# Patient Record
Sex: FEMALE | Race: WHITE | NOT HISPANIC OR LATINO | Employment: OTHER | URBAN - METROPOLITAN AREA
[De-identification: names, ages, dates, MRNs, and addresses within clinical notes are randomized per-mention and may not be internally consistent; named-entity substitution may affect disease eponyms.]

---

## 2017-01-01 ENCOUNTER — HOSPITAL ENCOUNTER (EMERGENCY)
Facility: HOSPITAL | Age: 67
Discharge: HOME | End: 2017-01-01
Attending: EMERGENCY MEDICINE
Payer: MEDICARE

## 2017-01-01 VITALS
RESPIRATION RATE: 16 BRPM | HEIGHT: 67 IN | SYSTOLIC BLOOD PRESSURE: 120 MMHG | OXYGEN SATURATION: 100 % | DIASTOLIC BLOOD PRESSURE: 57 MMHG | TEMPERATURE: 98 F | WEIGHT: 234 LBS | HEART RATE: 79 BPM | BODY MASS INDEX: 36.73 KG/M2

## 2017-01-01 DIAGNOSIS — S82.891A FRACTURE DISLOCATION OF ANKLE, RIGHT, CLOSED, INITIAL ENCOUNTER: Primary | ICD-10-CM

## 2017-01-01 DIAGNOSIS — S99.919A ANKLE INJURY: ICD-10-CM

## 2017-01-01 PROCEDURE — 94770 HC EXHALED C02 TEST: CPT

## 2017-01-01 PROCEDURE — 27000221 HC OXYGEN, UP TO 24 HOURS

## 2017-01-01 PROCEDURE — 63600175 PHARM REV CODE 636 W HCPCS: Performed by: EMERGENCY MEDICINE

## 2017-01-01 PROCEDURE — 27810 TREATMENT OF ANKLE FRACTURE: CPT

## 2017-01-01 PROCEDURE — 25000003 PHARM REV CODE 250

## 2017-01-01 PROCEDURE — 96374 THER/PROPH/DIAG INJ IV PUSH: CPT

## 2017-01-01 PROCEDURE — 27840 TREAT ANKLE DISLOCATION: CPT | Mod: RT

## 2017-01-01 PROCEDURE — 99285 EMERGENCY DEPT VISIT HI MDM: CPT | Mod: 25

## 2017-01-01 PROCEDURE — 27201258 HC MOISTURE TRAP-END TIDAL C02

## 2017-01-01 PROCEDURE — 94761 N-INVAS EAR/PLS OXIMETRY MLT: CPT

## 2017-01-01 RX ORDER — MORPHINE SULFATE 2 MG/ML
6 INJECTION, SOLUTION INTRAMUSCULAR; INTRAVENOUS
Status: COMPLETED | OUTPATIENT
Start: 2017-01-01 | End: 2017-01-01

## 2017-01-01 RX ORDER — LIDOCAINE HYDROCHLORIDE 10 MG/ML
INJECTION, SOLUTION EPIDURAL; INFILTRATION; INTRACAUDAL; PERINEURAL
Status: COMPLETED
Start: 2017-01-01 | End: 2017-01-01

## 2017-01-01 RX ORDER — PROPOFOL 10 MG/ML
100 VIAL (ML) INTRAVENOUS ONCE
Status: COMPLETED | OUTPATIENT
Start: 2017-01-01 | End: 2017-01-01

## 2017-01-01 RX ORDER — LIDOCAINE HYDROCHLORIDE 10 MG/ML
10 INJECTION, SOLUTION EPIDURAL; INFILTRATION; INTRACAUDAL; PERINEURAL
Status: COMPLETED | OUTPATIENT
Start: 2017-01-01 | End: 2017-01-01

## 2017-01-01 RX ORDER — OXYCODONE AND ACETAMINOPHEN 10; 325 MG/1; MG/1
1 TABLET ORAL EVERY 6 HOURS PRN
Qty: 20 TABLET | Refills: 0 | Status: SHIPPED | OUTPATIENT
Start: 2017-01-01 | End: 2017-07-06

## 2017-01-01 RX ADMIN — PROPOFOL 100 MG: 10 INJECTION, EMULSION INTRAVENOUS at 04:01

## 2017-01-01 RX ADMIN — LIDOCAINE HYDROCHLORIDE 100 MG: 10 INJECTION, SOLUTION EPIDURAL; INFILTRATION; INTRACAUDAL; PERINEURAL at 02:01

## 2017-01-01 RX ADMIN — MORPHINE SULFATE 6 MG: 2 INJECTION, SOLUTION INTRAMUSCULAR; INTRAVENOUS at 02:01

## 2017-01-01 NOTE — PROGRESS NOTES
01/01/17 1616   Vital Signs   SpO2 99 %   Pulse Oximetry Type Continuous   Pulse 83   Resp 16   O2 Device (Oxygen Therapy) nasal cannula   Flow (L/min) 2   Oxygen Concentration (%) 28   ETCO2 (mmHg) 30 mmHg

## 2017-01-01 NOTE — ED PROVIDER NOTES
Encounter Date: 1/1/2017       History     Chief Complaint   Patient presents with    Fall    Ankle Pain     Review of patient's allergies indicates:   Allergen Reactions    Hydrocodone Hives    Pcn [penicillins] Hives     HPI Comments: 66-year-old female with a past medical history of hypertension, neuropathy, and diabetes mellitus presents with a chief complaint of right lower leg pain.  The patient reports she was walking down her ramp this morning when she slipped and fell getting her leg twisted up under her.  The patient reports that she landed on her buttocks.  She denies any head injury, head pain, neck pain, or back pain.  The patient did not have a loss of consciousness and it was a mechanical fall.  The patient has not taken anything for pain relief.    The history is provided by the patient and the EMS personnel.     Past Medical History   Diagnosis Date    Arthritis     Diabetes mellitus     Heart attack     Hypertension     Neuropathy     Occasional tremors      Past Medical History Pertinent Negatives   Diagnosis Date Noted    Encounter for blood transfusion 2/2/2015     Past Surgical History   Procedure Laterality Date    Hysterectomy      Knee surgery Left     Ganglion cyst excision      Eye surgery       bilat cataract removal with iol implants    Skin biopsy       on side of nose    Carpal tunnel release       bilateral    Heart stents       History reviewed. No pertinent family history.  Social History   Substance Use Topics    Smoking status: Former Smoker     Quit date: 1/6/2014    Smokeless tobacco: None    Alcohol use Yes      Comment: drinks one wine cooler daily     Review of Systems   Constitutional: Negative for chills and fever.   HENT: Negative for congestion.    Respiratory: Negative for cough and shortness of breath.    Cardiovascular: Negative for chest pain.   Gastrointestinal: Negative for abdominal pain, nausea and vomiting.   Genitourinary: Negative for  "dysuria.   Musculoskeletal: Positive for arthralgias and joint swelling. Negative for back pain and gait problem.        Right lower leg pain and swelling.   Skin: Negative for color change.        Abrasions and ecchymosis noted to the right tib-fib region.   Neurological: Negative for dizziness and numbness.   Psychiatric/Behavioral: Negative for agitation and confusion.       Physical Exam   Initial Vitals   BP Pulse Resp Temp SpO2   17 1305 17 1305 17 1305 17 1305 17 1305   165/79 96 18 97.7 °F (36.5 °C) 98 %     Physical Exam    Nursing note and vitals reviewed.  Constitutional: She appears well-developed and well-nourished.   HENT:   Head: Atraumatic.   Eyes: EOM are normal. Pupils are equal, round, and reactive to light.   Neck: Normal range of motion.   Cardiovascular: Normal rate and regular rhythm.   Pulmonary/Chest: Breath sounds normal.   Abdominal: Soft. Bowel sounds are normal.   Musculoskeletal: Normal range of motion. She exhibits edema and tenderness.        Right shoulder: Normal.        Left shoulder: Normal.   Edema and tenderness noted to the right ankle region.   Neurological: She is alert and oriented to person, place, and time.   Skin: Skin is warm and dry.   Abrasion and ecchymosis noted to the right distal tib-fib region.   Psychiatric: She has a normal mood and affect.         ED Course   Orthopedic Injury  Authorized by: CHINO DURAN   Performed by: CHINO DURAN.  Consent Done: Yes  Consent: Written consent obtained.  Risks and benefits: risks, benefits and alternatives were discussed  Consent given by: patient  Patient identity confirmed:  and name  Time out: Immediately prior to procedure a "time out" was called to verify the correct patient, procedure, equipment, support staff and site/side marked as required.  Injury location: ankle  Location details: right ankle  Injury type: fracture-dislocation  Fracture type: bimalleolar  Comments: The " "patient was placed in a short leg posterior splint and stirrup splint postreduction.  Pre-procedure distal perfusion: normal  Pre-procedure neurological function: normal  Pre-procedure neurovascular assessment: neurovascularly intact  Pre-procedure range of motion: reduced  Local anesthesia used: yes    Anesthesia:  Local anesthesia used: yes  Anesthesia: hematoma block  Local Anesthetic: lidocaine 1% without epinephrine   Anesthetic total: 10 mL  Anesthesia: hematoma block  Sedation:  Patient sedated: yes  Sedation type: moderate (conscious) sedation   Sedatives: propofol    Manipulation performed: yes  Reduction successful: yes  X-ray confirmed reduction: yes  Immobilization: splint  Splint type: ankle stirrup  Post-procedure neurovascular assessment: post-procedure neurovascularly intact  Post-procedure distal perfusion: normal  Post-procedure neurological function: normal  Post-procedure range of motion: improved  Patient tolerance: Patient tolerated the procedure well with no immediate complications    Procedural Sedation  Date/Time: 1/1/2017 8:13 PM  Performed by: CHINO DURAN  Authorized by: CHINO DURAN   Consent Done: Yes  Consent: Written consent obtained.  Risks and benefits: risks, benefits and alternatives were discussed  Time out: Immediately prior to procedure a "time out" was called to verify the correct patient, procedure, equipment, support staff and site/side marked as required.  ASA Class: Class 2 - Mild Illness without functional impairment.   Mallampati Score: Class 3 - Visualization of the soft palate and base of the uvula.   Equipment: on cardiac monitor., on BP monitor., on CO2 monitor., on supplemental oxygen., suction available. and airway equipment available.   Sedatives: propofol  Vitals: Vital signs were monitored during sedation.  Complications: No complications.         Labs Reviewed - No data to display          Medical Decision Making:   Initial Assessment:   66-year-old " female presents with a chief complaint of right lower leg pain status post injury.  Differential Diagnosis:   Initial differential diagnosis included but not limited to fracture, dislocation, contusion, and ligamentous injury.  ED Management:  The patient was emergently evaluated in the ED, her evaluation was significant for an an elderly female with a right lower leg injury.  The patient's pain was aggressively treated with IV narcotic pain medication.  The patient's x-rays were significant for a severe right ankle fracture/dislocation as well as a proximal right fibular fracture per my independent interpretation.  The patient was consciously sedated and her fracture dislocation was reduced.  She tolerated the procedure well.  I discussed the case with the orthopedist on-call, Dr. Bowden.  He visualized the patient's films both before and after reduction, and reports the patient can be discharged home to follow-up with him in his clinic in 3 days.  The patient will be discharged home with by mouth pain medication and on crutches.  She is to follow-up with Dr. Bowden in 3 days.  Other:   I have discussed this case with another health care provider.                   ED Course     Clinical Impression:   The primary encounter diagnosis was Fracture dislocation of ankle, right, closed, initial encounter. A diagnosis of Ankle injury was also pertinent to this visit.          Nelson Kinsey MD  01/01/17 2016       Nelson Kinsey MD  01/01/17 4191

## 2017-01-01 NOTE — ED AVS SNAPSHOT
[facility] [address]  [place] [address]               Kateryna Connolly   2017  1:02 PM   ED    Description:  Female : 1950   Department:  [facility]           Your Care was Coordinated By:     Provider Role From To    Nelson Kinsey MD Attending Provider 17 [id number] --      Reason for Visit     Fall     Ankle Pain           Diagnoses this Visit        Comments    Fracture dislocation of ankle, right, closed, initial encounter    -  Primary     Ankle injury           ED Disposition     None           To Do List           Follow-up Information     Follow up with Bry Bowden MD. Schedule an appointment as soon as possible for a visit in 3 days.    Specialty:  Orthopedic Surgery    Why:  return to the ED, As needed, If symptoms worsen    Contact information:    [address] [place], [place] [address] [place] [address] [phone]         These Medications        Disp Refills Start End    oxycodone-acetaminophen (PERCOCET)  mg per tablet 20 tablet 0 2017     Take 1 tablet by mouth every 6 (six) hours as needed for Pain. - Oral    Pharmacy: [facility] [address] [place] [address], [place] - [address] Ph #: [phone]         [facility]     [facility] Nurse Care Line -  Assistance  Registered nurses in the [facility] provide clinical advisement, health education, appointment booking, and other advisory services.  Call for this free service at [phone].             Medications           Message regarding Medications     Verify the changes and/or additions to your medication regime listed below are the same as discussed with your clinician today.  If any of these changes or additions are incorrect, please notify your healthcare provider.        START taking these NEW medications        Refills    oxycodone-acetaminophen (PERCOCET)  mg per tablet 0    Sig: Take 1 tablet by mouth every 6 (six) hours as  needed for Pain.    Class: Print    Route: Oral      These medications were administered today        Dose Freq    morphine injection 6 mg 6 mg ED 1 Time    Sig: Inject 3 mLs (6 mg total) into the vein ED 1 Time.    Class: Normal    Route: Intravenous    lidocaine (PF) 10 mg/ml (1%) injection 100 mg 10 mL ED 1 Time    Sig: 10 mLs (100 mg total) by Infiltration route ED 1 Time.    Class: Normal    Route: Infiltration    lidocaine (PF) 10 mg/ml (1%) 10 mg/mL (1 %) injection      Notes to Pharmacy: Created by cabinet override    propofol (DIPRIVAN) 10 mg/mL  mg Once    Sig: Inject 10 mLs (100 mg total) into the vein once.    Class: Normal    Route: Intravenous           Verify that the below list of medications is an accurate representation of the medications you are currently taking.  If none reported, the list may be blank. If incorrect, please contact your healthcare provider. Carry this list with you in case of emergency.           Current Medications     ACCU-CHEK CHARLENE PLUS TEST STRP Strp     aspirin 81 MG Chew Take 1 tablet (81 mg total) by mouth once daily.    atorvastatin (LIPITOR) 80 MG tablet Take 1 tablet (80 mg total) by mouth every evening.    ciprofloxacin HCl (CIPRO) 500 MG tablet Take 500 mg by mouth 2 (two) times daily.    clopidogrel (PLAVIX) 75 mg tablet Take 1 tablet (75 mg total) by mouth once daily.    collagenase ointment Apply topically once daily.    insulin glargine (LANTUS) 100 unit/mL injection Inject 20 Units into the skin once daily.    lisinopril (PRINIVIL,ZESTRIL) 2.5 MG tablet Take 1 tablet (2.5 mg total) by mouth once daily.    melatonin 3 mg Tab Take by mouth.    metformin (GLUCOPHAGE) 1000 MG tablet Take 1,000 mg by mouth 2 (two) times daily with meals.    metoprolol succinate (TOPROL-XL) 25 MG 24 hr tablet Take 1 tablet (25 mg total) by mouth once daily.    oxycodone-acetaminophen (PERCOCET)  mg per tablet Take 1 tablet by mouth every 6 (six) hours as needed for Pain.  "          Clinical Reference Information           Your Vitals Were     BP Pulse Temp Resp Height Weight    119/58 80 97.7 °F (36.5 °C) (Oral) 16 5' 7" (1.702 m) 106.1 kg (234 lb)    SpO2 BMI             99% 36.65 kg/m2         Allergies as of 1/1/2017        Reactions    Hydrocodone Hives    Pcn [Penicillins] Hives      Immunizations Administered on Date of Encounter - 1/1/2017     None      ED Micro, Lab, POCT     None      ED Imaging Orders     Start Ordered       Status Ordering Provider    01/01/17 1625 01/01/17 1624  FL Less Than 1 Hour  1 time imaging     Comments:  Fluoro charge for reduction of ankle in emergency room    Final result     01/01/17 1624 01/01/17 1624  X-Ray Ankle 2 View Right  1 time imaging     Comments:  Intra op reduction of right ankle in emergency room    Final result     01/01/17 1312 01/01/17 1311  X-Ray Tibia Fibula 2 View Right  1 time imaging      Final result     01/01/17 1312 01/01/17 1311  X-Ray Ankle Complete Right  1 time imaging      Final result     01/01/17 1312 01/01/17 1311  X-Ray Foot Complete Right  1 time imaging      Final result       Discharge References/Attachments     SPLINT CARE, DISCHARGE INSTRUCTIONS (ENGLISH)    FRACTURE, LEG OR ARM (ENGLISH)    DISLOCATION, ANKLE (ADULT) (ENGLISH)    ANKLE FRACTURES, TREATING (ENGLISH)    FRACTURE, ANKLE (ENGLISH)      Your Scheduled Appointments     Jan 04, 2017  8:00 AM CST   Established Patient Visit with FLORES Doyle - Podiatry ([facility])    [address] [place] [address]   [phone]              Smoking Cessation     If you would like to quit smoking:   You may be eligible for free services if you are a [place] resident and started smoking cigarettes before September 1, 1988.  Call the Smoking Cessation Trust (SCT) toll free at (047) 755-9801 or (143) 609-8773.   Call 2-538-QUIT-NOW if you do not meet the above criteria.             [facility] complies with applicable " Federal civil rights laws and does not discriminate on the basis of race, color, national origin, age, disability, or sex.        Language Assistance Services     ATTENTION: Language assistance services are available, free of charge. Please call [phone].      ATENCIÓN: Si habla arlene, tiene a rosenberg disposición servicios gratuitos de asistencia lingüística. Llame al [phone].     CHÚ Ý: N?u b?n nói Ti?ng Vi?t, có các d?ch v? h? tr? ngôn ng? mi?n phí dành cho b?n. G?i s? [phone].

## 2017-01-01 NOTE — ED NOTES
Pt ankle placed, splint applied - posterior and stirrup. Xray confirmed. Also nonstick applied under splint to area where block was done.

## 2017-01-02 NOTE — ED NOTES
Consents signed by pt. ERP explained procedure to pt. RT is in room, also XRAY for reduction films. RNx3 present in room.

## 2017-01-02 NOTE — ED NOTES
Pt states crutches will be difficult at home. Pt states she will probably go to medical supply store tomorrow to purchase/ borrow WC.  Instructed on crutches use. Will also stay with daughter for help. ERP aware of situation, pt states she feels unable to use crutches to ambulate out of ED at this time but is able to stand. OK to DC home with daughter and crutches per orders by ERP. Pt brought to car by WC.

## 2017-02-15 ENCOUNTER — HOSPITAL ENCOUNTER (EMERGENCY)
Facility: HOSPITAL | Age: 67
End: 2017-02-15
Attending: EMERGENCY MEDICINE
Payer: MEDICARE

## 2017-02-15 VITALS
HEIGHT: 67 IN | TEMPERATURE: 98 F | SYSTOLIC BLOOD PRESSURE: 103 MMHG | HEART RATE: 86 BPM | BODY MASS INDEX: 35.31 KG/M2 | DIASTOLIC BLOOD PRESSURE: 55 MMHG | RESPIRATION RATE: 16 BRPM | OXYGEN SATURATION: 97 % | WEIGHT: 225 LBS

## 2017-02-15 DIAGNOSIS — M79.89 LEG SWELLING: ICD-10-CM

## 2017-02-15 DIAGNOSIS — I82.431 DEEP VEIN THROMBOSIS (DVT) OF POPLITEAL VEIN OF RIGHT LOWER EXTREMITY, UNSPECIFIED CHRONICITY: Primary | ICD-10-CM

## 2017-02-15 PROCEDURE — 25000003 PHARM REV CODE 250: Performed by: NURSE PRACTITIONER

## 2017-02-15 PROCEDURE — 99284 EMERGENCY DEPT VISIT MOD MDM: CPT

## 2017-02-15 RX ADMIN — APIXABAN 10 MG: 2.5 TABLET, FILM COATED ORAL at 07:02

## 2017-02-15 NOTE — ED NOTES
C/o lower right leg pain that is throbbing and preventing her from sleeping well, has a FX ankle on new years day and has had 2 surgeries last one was 2 weeks ago cast/splint in place CMS and pulses intact had an US done at rehab facility but they were unable to R/O DVT alert calm sitting in WC friend at bedside. Aware to notify nurse of needs or concerns.

## 2017-02-15 NOTE — ED AVS SNAPSHOT
[facility] [address] [place] [address]               Kateryna Lai   2/15/2017  4:37 PM   ED    Description:  Female : 1950   Department:  [facility]           Your Care was Coordinated By:     Provider Role From To    Timothy Bledsoe III, MD Attending Provider 02/15/17 1290 --    Jossie Bliss NP Nurse Practitioner 02/15/17 0828 --      Reason for Visit     Leg Pain           Diagnoses this Visit        Comments    Deep vein thrombosis (DVT) of popliteal vein of right lower extremity, unspecified chronicity    -  Primary     Leg swelling           ED Disposition     None           To Do List            These Medications        Disp Refills Start End    apixaban 5 mg Tab 28 tablet 0 2/15/2017 2017    Take 2 tablets (10 mg total) by mouth 2 (two) times daily. - Oral    apixaban 5 mg Tab 60 tablet 0 2/15/2017 3/17/2017    Take 1 tablet (5 mg total) by mouth 2 (two) times daily. - Oral      [facility]     [facility] Nurse Care [facility] -  Assistance  Registered nurses in the [facility] provide clinical advisement, health education, appointment booking, and other advisory services.  Call for this free service at [phone].             Medications           START taking these NEW medications        Refills    apixaban 5 mg Tab 0    Sig: Take 2 tablets (10 mg total) by mouth 2 (two) times daily.    Class: Print    Route: Oral    apixaban 5 mg Tab 0    Sig: Take 1 tablet (5 mg total) by mouth 2 (two) times daily.    Class: Print    Route: Oral      These medications were administered today        Dose Freq    apixaban tablet 10 mg 10 mg ED 1 Time    Sig: Take 2 tablets (10 mg total) by mouth ED 1 Time.    Class: Normal    Route: Oral           Verify that the below list of medications is an accurate representation of the medications you are currently taking.  If none reported, the list may be  "blank. If incorrect, please contact your healthcare provider. Carry this list with you in case of emergency.           Current Medications     apixaban 5 mg Tab Take 2 tablets (10 mg total) by mouth 2 (two) times daily.    apixaban 5 mg Tab Take 1 tablet (5 mg total) by mouth 2 (two) times daily.    apixaban tablet 10 mg Take 2 tablets (10 mg total) by mouth ED 1 Time.           Clinical Reference Information           Your Vitals Were     BP Pulse Temp Resp Height Weight    103/55 (BP Location: Right arm, Patient Position: Sitting) 86 98.3 °F (36.8 °C) (Oral) 16 5' 6.5" (1.689 m) 102.1 kg (225 lb)    SpO2 BMI             97% 35.77 kg/m2         Allergies as of 2/15/2017        Reactions    Pcn [Penicillins] Rash      Immunizations Administered on Date of Encounter - 2/15/2017     None      ED Micro, Lab, POCT     None      ED Imaging Orders     Start Ordered       Status Ordering Provider    02/15/17 1703 02/15/17 1702   Lower Extremity Veins Right  1 time imaging      Edited Result - FINAL       Discharge References/Attachments     DEEP VEIN THROMBOSIS (DVT) (ENGLISH)    DEEP VEIN THROMBOSIS, DISCHARGE INSTRUCTIONS FOR (ENGLISH)      [facility] Sign-Up     Activating your [facility] account is as easy as 1-2-3!     1) Visit [email or web address], select Sign Up Now, enter this activation code and your date of birth, then select Next.  [email or web address]  Expires: 4/1/2017  7:16 PM      2) Create a username and password to use when you visit [facility] in the future and select a security question in case you lose your password and select Next.    3) Enter your e-mail address and click Sign Up!    Additional Information  If you have questions, please e-mail [email or web address] or call [phone] to talk to our [facility] staff. Remember, [facility] is NOT to be used for urgent needs. For medical emergencies, dial 911.         Smoking Cessation     If you would like to quit smoking:   You may be eligible for free services " if you are a [place] resident and started smoking cigarettes before September 1, 1988.  Call the Smoking Cessation Trust (SCT) toll free at (090) 184-1275 or (445) 640-1657.   Call 1-800-QUIT-NOW if you do not meet the above criteria.             [facility] complies with applicable Federal civil rights laws and does not discriminate on the basis of race, color, national origin, age, disability, or sex.        Language Assistance Services     ATTENTION: Language assistance services are available, free of charge. Please call [phone].      ATENCIÓN: Si habla español, tiene a rosenberg disposición servicios gratuitos de asistencia lingüística. Llame al [phone].     CHÚ Ý: N?u b?n nói Ti?ng Vi?t, có các d?ch v? h? tr? ngôn ng? mi?n phí dành cho b?n. G?i s? [phone].

## 2017-02-16 NOTE — ED NOTES
Padding replaced and splint replaced CMS intact pt teaching done. Pt able to transfer self to . Given copy of US and DC instructions.

## 2017-02-16 NOTE — ED NOTES
US at bedside pt able to transfer self to bed with 1 person SBA, pedial pulses per doppler 81-85 NP aware. Dressing and splint removed leg elevated on pillows surgical sites CDI vasotracin dressing intact on medial and lateral sides able to move her toes well foot pink and warm.

## 2017-02-16 NOTE — ED PROVIDER NOTES
Encounter Date: 2/15/2017       History     Chief Complaint   Patient presents with    Leg Pain     RLE pain.  Sent here from [facility] for possible DVT.  Throbbing pain started 3 days ago, interrupting ability to sleep.      Review of patient's allergies indicates:   Allergen Reactions    Pcn [penicillins] Rash     HPI Comments: Kateryna Lai is a 66 year old female presenting to the ED with c/o throbbing, shooting pain in the right lower extremity. The patient broke her ankle on January 1, 2017 and has had to surgical procedures since then to repair the injury. Her last operation was two weeks ago. She began having throbbing pain in the calf shooting to the foot 3 days ago. An ultrasound was done at the patient's nursing home and they were unable to visualize the calf veins. She was sent here for a repeat ultrasound. She denies SOB, chest pain, and has no prior history of DVT.     The history is provided by the patient.     History reviewed. No pertinent past medical history.  No past medical history pertinent negatives.  No past surgical history on file.  History reviewed. No pertinent family history.  Social History   Substance Use Topics    Smoking status: None    Smokeless tobacco: None    Alcohol use None     Review of Systems   Constitutional: Negative.    HENT: Negative.    Respiratory: Negative.  Negative for shortness of breath.    Cardiovascular: Positive for leg swelling. Negative for chest pain and palpitations.   Gastrointestinal: Negative.    Genitourinary: Negative.    Musculoskeletal: Positive for myalgias (right leg pain).   Neurological: Negative.        Physical Exam   Initial Vitals   BP Pulse Resp Temp SpO2   02/15/17 1633 02/15/17 1633 02/15/17 1633 02/15/17 1633 02/15/17 1633   103/55 86 16 98.3 °F (36.8 °C) 97 %     Physical Exam    Nursing note and vitals reviewed.  Constitutional: She appears well-developed and well-nourished. She is not diaphoretic. No distress.   HENT:   Head:  Normocephalic and atraumatic.   Eyes: Conjunctivae are normal.   Neck: Normal range of motion.   Cardiovascular: Normal rate and regular rhythm.   Pulmonary/Chest: Breath sounds normal. No respiratory distress.   Musculoskeletal: Normal range of motion.        Right lower leg: She exhibits tenderness and swelling.   DP pulse confirmed with doppler   Neurological: She is alert and oriented to person, place, and time.   Skin: Skin is warm and dry.        Psychiatric: She has a normal mood and affect.         ED Course   Procedures  Labs Reviewed - No data to display                APC / Resident Notes:   Kateryna Lai is a 66 year old female presenting to the ED with right calf pain/swelling s/p surgical repair of ankle two weeks ago. Surgical wounds appear well healing with no evidence of infection. There was right calf tenderness and swelling. Ultrasound done and revealed occlusive deep vein thrombosis within the right popliteal and peroneal veins. I discussed these findings and the treatment plan with Dr. Bledsoe. I prescribed eliquis and gave her the first dose in the ED. She has a follow up appointment with her orthopedist in two days and I instructed her to notify him of these findings. Prescription for eliquis provided at time of discharge. Specific return precautions discussed and patient verbalized understanding. Based on my clinical evaluation, I do not appreciate any immediate, emergent, or life threatening condition or etiology that warrants additional workup today and feel that the patient can be discharged with close follow up care.                 ED Course     Clinical Impression:   The primary encounter diagnosis was Deep vein thrombosis (DVT) of popliteal vein of right lower extremity, unspecified chronicity. A diagnosis of Leg swelling was also pertinent to this visit.    Disposition:   Disposition: Discharged  Condition: Stable       Jossie Bliss NP  02/15/17 2003

## 2017-04-05 ENCOUNTER — HOSPITAL ENCOUNTER (OUTPATIENT)
Dept: RADIOLOGY | Facility: HOSPITAL | Age: 67
Discharge: HOME | End: 2017-04-05
Attending: INTERNAL MEDICINE
Payer: MEDICARE

## 2017-04-05 DIAGNOSIS — J40 BRONCHITIS: ICD-10-CM

## 2017-04-05 DIAGNOSIS — R05.9 COUGH: ICD-10-CM

## 2017-04-05 PROCEDURE — 71250 CT THORAX DX C-: CPT | Mod: TC

## 2017-04-05 PROCEDURE — 71250 CT THORAX DX C-: CPT | Mod: 26,,, | Performed by: RADIOLOGY

## 2017-04-24 ENCOUNTER — TELEPHONE (OUTPATIENT)
Dept: PODIATRY | Facility: CLINIC | Age: 67
End: 2017-04-24

## 2017-04-24 NOTE — TELEPHONE ENCOUNTER
----- Message from Ragini Domingo sent at 4/24/2017  2:56 PM CDT -----  Patient returning a missed call from Karen.     Call placed to pod. No answer    Please call back at [phone].     Thank you

## 2017-04-24 NOTE — TELEPHONE ENCOUNTER
Spoke with pt.  Post surgical wounds, referred to us for wound care.  Declined to be seen today.  Will call back to see if we can work in sooner than 05/03/2017 which is the appointment she made.

## 2017-04-24 NOTE — TELEPHONE ENCOUNTER
----- Message from Kirstie Banerjee sent at 4/24/2017  2:16 PM CDT -----  Patient needs to get in today to see doctor for wound care on an ankle she broke on 1/1/17. She scheduled for 5/3/17. Oh, she hung up.

## 2017-05-08 ENCOUNTER — TELEPHONE (OUTPATIENT)
Dept: PODIATRY | Facility: CLINIC | Age: 67
End: 2017-05-08

## 2017-05-08 NOTE — TELEPHONE ENCOUNTER
----- Message from Carey Kang sent at 5/8/2017  1:40 PM CDT -----  Contact: self  Patient needs same day appointment due to wounds on right foot both sides of ankle.patient only has transportation on Mondays. Please call patient at [phone]. Thanks!

## 2017-05-08 NOTE — TELEPHONE ENCOUNTER
Called pt reqarding appointment today.  We have scheduled her twice for same issue and she has either canceled or no showed.  Nothing available for today, recommended ED or urgent care.  States that home health and Dr Bowden are follow wounds.  States Dr Bowden referred her back to Dr Alexander.  Declined all offered appointments for this week.  Wants to come in on 05/22/2017 when daughter is available to bring her.  Advised against waiting that long to be seen.  Pt verbalized understanding.

## 2017-05-12 DIAGNOSIS — I70.245 ATHEROSCLEROSIS OF NATIVE ARTERIES OF LEFT LEG WITH ULCERATION OF OTHER PART OF FOOT: Primary | ICD-10-CM

## 2017-05-22 ENCOUNTER — OFFICE VISIT (OUTPATIENT)
Dept: PODIATRY | Facility: CLINIC | Age: 67
End: 2017-05-22
Payer: MEDICARE

## 2017-05-22 ENCOUNTER — HOSPITAL ENCOUNTER (OUTPATIENT)
Dept: RADIOLOGY | Facility: HOSPITAL | Age: 67
Discharge: HOME | End: 2017-05-22
Attending: SURGERY
Payer: MEDICARE

## 2017-05-22 VITALS — WEIGHT: 217 LBS | BODY MASS INDEX: 34.06 KG/M2 | HEIGHT: 67 IN

## 2017-05-22 DIAGNOSIS — L97.319 ANKLE ULCER, RIGHT, WITH UNSPECIFIED SEVERITY: Primary | ICD-10-CM

## 2017-05-22 DIAGNOSIS — I70.245 ATHEROSCLEROSIS OF NATIVE ARTERIES OF LEFT LEG WITH ULCERATION OF OTHER PART OF FOOT: ICD-10-CM

## 2017-05-22 PROCEDURE — 93922 UPR/L XTREMITY ART 2 LEVELS: CPT | Mod: TC

## 2017-05-22 PROCEDURE — 99213 OFFICE O/P EST LOW 20 MIN: CPT | Mod: PBBFAC,25,PO | Performed by: PODIATRIST

## 2017-05-22 PROCEDURE — 93926 LOWER EXTREMITY STUDY: CPT | Mod: TC

## 2017-05-22 PROCEDURE — 99999 PR PBB SHADOW E&M-EST. PATIENT-LVL III: CPT | Mod: PBBFAC,,, | Performed by: PODIATRIST

## 2017-05-22 PROCEDURE — 93922 UPR/L XTREMITY ART 2 LEVELS: CPT | Mod: 26,,, | Performed by: RADIOLOGY

## 2017-05-22 PROCEDURE — 99213 OFFICE O/P EST LOW 20 MIN: CPT | Mod: S$PBB,,, | Performed by: PODIATRIST

## 2017-05-22 PROCEDURE — 93926 LOWER EXTREMITY STUDY: CPT | Mod: 26,,, | Performed by: RADIOLOGY

## 2017-05-22 NOTE — Clinical Note
Dr. Bowden.  Thanks for sending Mrs. Connolly.  I'm concerned about her fibular hardware.  Please see notes for possible recommendations.  Consulted ID (Courtney) for imaging and potential antibiotic management.  Defer to you for any debridement or surgical work unless otherwise stated by you.  Thanks again.  Iglesia

## 2017-05-22 NOTE — PROGRESS NOTES
Subjective:      Patient ID: Kateryna Connolly is a 66 y.o. female.    Chief Complaint: Wound Dehiscence    Wounds from surgical wound dehissance medial and lateral right ankle about 1 month following 2nd ankle surgery.  Currently covering with mepore with home health changing same.  Gradual onset, without improvement over past several weeks, aggravated by increased weight bearing, shoe gear, pressure, and swelling right leg/ankle.  No self treatment.      Review of Systems   Constitution: Negative for chills, diaphoresis, fever, malaise/fatigue and night sweats.   Cardiovascular: Positive for leg swelling. Negative for claudication, cyanosis and syncope.   Skin: Negative for color change, dry skin, rash, suspicious lesions and unusual hair distribution.   Musculoskeletal: Negative for falls, joint pain, joint swelling, muscle cramps, muscle weakness and stiffness.   Gastrointestinal: Negative for constipation, diarrhea, nausea and vomiting.   Neurological: Positive for numbness and sensory change. Negative for brief paralysis, disturbances in coordination, focal weakness, paresthesias and tremors.           Objective:      Physical Exam   Constitutional: She appears well-developed and well-nourished. She is cooperative. No distress.   Cardiovascular:   Pulses:       Popliteal pulses are 2+ on the right side, and 2+ on the left side.        Dorsalis pedis pulses are 1+ on the right side, and 1+ on the left side.        Posterior tibial pulses are 1+ on the right side, and 1+ on the left side.   Capillary refill 3 seconds all toes/distal feet, all toes/both feet warm to touch.      Negative lymphadenopathy bilateral popliteal fossa and tarsal tunnel.      2+ pitting lower extremity edema bilateral.     Musculoskeletal:        Right ankle: Normal. She exhibits normal range of motion, no swelling, no ecchymosis, no deformity, no laceration and normal pulse. Achilles tendon normal. Achilles tendon exhibits no pain, no  defect and normal Cox's test results.   Currently nwb right in wheelchair. All ten toes without clubbing, cyanosis, or signs of ischemia.  No pain to palpation bilateral lower extremities.  Range of motion, stability, muscle strength, and muscle tone normal bilateral feet and legs.     Lymphadenopathy:   Negative lymphadenopathy bilateral popliteal fossa and tarsal tunnel.   Neurological: She is alert. She has normal strength. She displays no atrophy and no tremor. No sensory deficit. She exhibits normal muscle tone. She displays no seizure activity. Gait normal.   Reflex Scores:       Patellar reflexes are 2+ on the right side and 2+ on the left side.       Achilles reflexes are 2+ on the right side and 2+ on the left side.  Skin: Skin is warm, dry and intact. No abrasion, no bruising, no burn, no ecchymosis, no laceration, no lesion and no rash noted. She is not diaphoretic. No cyanosis or erythema. No pallor. Nails show no clubbing.   Wound: medial right ankle    Size:    Pre:[id number]    Base:  Tan fibrous base with moderate serous/serosanaguinous drainage only.  No pus, tracking, fluctuance, malodor, or cardinal signs infection.    Borders:   flat, pink, blanchable skin edges without undermining.    Wound: lateral right ankle    Size:    Pre: about [id number]    Base:  Fibrous tan with moderate serous/serosanaguinous drainage only.  No pus, tracking, fluctuance, malodor, or cardinal signs infection.  Base is firm and may be bone or hardware covered with fibrous wound slough.  Concern for osteomyelitis.    Borders:   flat, pink, blanchable skin edges without undermining.               Assessment:       Encounter Diagnosis   Name Primary?    Ankle ulcer, right, with unspecified severity Yes         Plan:       Kateryna was seen today for wound dehiscence.    Diagnoses and all orders for this visit:    Ankle ulcer, right, with unspecified severity      I counseled the patient on her conditions, their  implications and medical management.    Home health recommendations today:    Recommend santyl to both wound beds, cover with wound foam or mepilex border gauze.  Change same every other day.    Dr. Bowden:  Consider formal open wound debridement with VAC for lateral ankle incision, possible hardware removal pending base appearance.  Alternatively, santyl and VAC can be used provided you have no exposed hardware.    EDEMA control essential to healing these wounds.     ID consult - possible bone involvement.              Return in about 1 week (around 5/29/2017) for wounds right ankle.

## 2017-06-19 ENCOUNTER — OFFICE VISIT (OUTPATIENT)
Dept: PODIATRY | Facility: CLINIC | Age: 67
End: 2017-06-19
Payer: MEDICARE

## 2017-06-19 ENCOUNTER — HOSPITAL ENCOUNTER (OUTPATIENT)
Dept: RADIOLOGY | Facility: CLINIC | Age: 67
Discharge: HOME | End: 2017-06-19
Attending: PODIATRIST
Payer: MEDICARE

## 2017-06-19 VITALS — HEIGHT: 67 IN | BODY MASS INDEX: 34.06 KG/M2 | WEIGHT: 217 LBS

## 2017-06-19 DIAGNOSIS — R60.9 EDEMA, UNSPECIFIED TYPE: ICD-10-CM

## 2017-06-19 DIAGNOSIS — I73.9 PAD (PERIPHERAL ARTERY DISEASE): ICD-10-CM

## 2017-06-19 DIAGNOSIS — E11.40 TYPE 2 DIABETES MELLITUS WITH DIABETIC NEUROPATHY, UNSPECIFIED LONG TERM INSULIN USE STATUS: ICD-10-CM

## 2017-06-19 DIAGNOSIS — L97.319 ANKLE ULCER, RIGHT, WITH UNSPECIFIED SEVERITY: ICD-10-CM

## 2017-06-19 DIAGNOSIS — S91.001A WOUND OF ANKLE, RIGHT, INITIAL ENCOUNTER: Primary | ICD-10-CM

## 2017-06-19 PROCEDURE — 1159F MED LIST DOCD IN RCRD: CPT | Mod: ,,, | Performed by: PODIATRIST

## 2017-06-19 PROCEDURE — 73610 X-RAY EXAM OF ANKLE: CPT | Mod: TC,PO,RT

## 2017-06-19 PROCEDURE — 1125F AMNT PAIN NOTED PAIN PRSNT: CPT | Mod: ,,, | Performed by: PODIATRIST

## 2017-06-19 PROCEDURE — 99999 PR PBB SHADOW E&M-EST. PATIENT-LVL III: CPT | Mod: PBBFAC,,, | Performed by: PODIATRIST

## 2017-06-19 PROCEDURE — 73610 X-RAY EXAM OF ANKLE: CPT | Mod: 26,RT,S$GLB, | Performed by: RADIOLOGY

## 2017-06-19 PROCEDURE — 99213 OFFICE O/P EST LOW 20 MIN: CPT | Mod: S$PBB,,, | Performed by: PODIATRIST

## 2017-06-19 NOTE — PROGRESS NOTES
Subjective:      Patient ID: Kateryna Connolly is a 66 y.o. female.    Chief Complaint: Foot Ulcer (right)    Wounds from surgical wound dehissance medial and lateral right ankle about 1 month following 2nd ankle surgery.  Currently covering with mepore with home health changing same.  Gradual onset, without improvement over past several weeks, aggravated by increased weight bearing, shoe gear, pressure, and swelling right leg/ankle.  No self treatment.      Review of Systems   Constitution: Negative for chills, diaphoresis, fever, malaise/fatigue and night sweats.   Cardiovascular: Positive for leg swelling. Negative for claudication, cyanosis and syncope.   Skin: Negative for color change, dry skin, rash, suspicious lesions and unusual hair distribution.   Musculoskeletal: Negative for falls, joint pain, joint swelling, muscle cramps, muscle weakness and stiffness.   Gastrointestinal: Negative for constipation, diarrhea, nausea and vomiting.   Neurological: Positive for numbness and sensory change. Negative for brief paralysis, disturbances in coordination, focal weakness, paresthesias and tremors.           Objective:      Physical Exam   Constitutional: She appears well-developed and well-nourished. She is cooperative. No distress.   Cardiovascular:   Pulses:       Popliteal pulses are 2+ on the right side, and 2+ on the left side.        Dorsalis pedis pulses are 1+ on the right side, and 1+ on the left side.        Posterior tibial pulses are 1+ on the right side, and 1+ on the left side.   Capillary refill 3 seconds all toes/distal feet, all toes/both feet warm to touch.      Negative lymphadenopathy bilateral popliteal fossa and tarsal tunnel.      2+ pitting lower extremity edema bilateral.     Musculoskeletal:        Right ankle: Normal. She exhibits normal range of motion, no swelling, no ecchymosis, no deformity, no laceration and normal pulse. Achilles tendon normal. Achilles tendon exhibits no pain, no  defect and normal Cox's test results.   Currently nwb right in wheelchair. All ten toes without clubbing, cyanosis, or signs of ischemia.  No pain to palpation bilateral lower extremities.  Range of motion, stability, muscle strength, and muscle tone normal bilateral feet and legs.     Lymphadenopathy:   Negative lymphadenopathy bilateral popliteal fossa and tarsal tunnel.   Neurological: She is alert. She has normal strength. She displays no atrophy and no tremor. No sensory deficit. She exhibits normal muscle tone. She displays no seizure activity. Gait normal.   Reflex Scores:       Patellar reflexes are 2+ on the right side and 2+ on the left side.       Achilles reflexes are 2+ on the right side and 2+ on the left side.  Negative tinel sign to percussion sural, superficial peroneal, deep peroneal, saphenous, and posterior tibial nerves right and left ankles and feet.    Diminished/loss of protective sensation all toes bilateral to 10 gram monofilament.       Skin: Skin is warm, dry and intact. No abrasion, no bruising, no burn, no ecchymosis, no laceration, no lesion and no rash noted. She is not diaphoretic. No cyanosis or erythema. No pallor. Nails show no clubbing.   Wound: medial right ankle    Size:    Pre:[id number]    Base:  Tan fibrous base with moderate serous/serosanaguinous drainage only.  No pus, tracking, fluctuance, malodor, or cardinal signs infection.    Borders:   flat, pink, blanchable skin edges without undermining.    Wound: lateral right ankle    Size:    Pre: about [id number]    Base:  Fibrous tan with moderate serous/serosanaguinous drainage only.  No pus, tracking, fluctuance, malodor, or cardinal signs infection.  Base is firm and may be bone or hardware covered with fibrous wound slough.  Concern for osteomyelitis.    Borders:   flat, pink, blanchable skin edges without undermining.               Assessment:       Encounter Diagnosis   Name Primary?    Wound of ankle, right, initial  encounter Yes         Plan:       Kateryna was seen today for foot ulcer.    Diagnoses and all orders for this visit:    Wound of ankle, right, initial encounter  -     WOUND VAC FOR HOME USE      I counseled the patient on her conditions, their implications and medical management.    Home health recommendations today:    Recommend santyl to both wound beds, cover with wound foam or mepilex border gauze.  Change same every other day.    Discussed verbally with Dr. Bowden.  He believes Mrs Connolly healed enough to undergo hardware removal if absolutely needed.    Imaging will be difficult to interpret.  No clinical signs infection presently.    Rx santyl and VAC can be used - currently no exposed hardware.    EDEMA control essential to healing these wounds.     Rx xrays, arterial dopplers right.                  Return in about 1 week (around 6/26/2017) for right ankle wound.

## 2017-06-26 ENCOUNTER — HOSPITAL ENCOUNTER (OUTPATIENT)
Dept: RADIOLOGY | Facility: HOSPITAL | Age: 67
Discharge: HOME | End: 2017-06-26
Attending: PODIATRIST
Payer: MEDICARE

## 2017-06-26 ENCOUNTER — OFFICE VISIT (OUTPATIENT)
Dept: PODIATRY | Facility: CLINIC | Age: 67
End: 2017-06-26
Payer: MEDICARE

## 2017-06-26 VITALS — WEIGHT: 217 LBS | BODY MASS INDEX: 34.06 KG/M2 | HEIGHT: 67 IN

## 2017-06-26 DIAGNOSIS — G89.29 OTHER CHRONIC PAIN: ICD-10-CM

## 2017-06-26 DIAGNOSIS — I73.9 PAD (PERIPHERAL ARTERY DISEASE): ICD-10-CM

## 2017-06-26 DIAGNOSIS — L97.319 ANKLE ULCER, RIGHT, WITH UNSPECIFIED SEVERITY: Primary | ICD-10-CM

## 2017-06-26 DIAGNOSIS — S91.001A WOUND OF ANKLE, RIGHT, INITIAL ENCOUNTER: ICD-10-CM

## 2017-06-26 DIAGNOSIS — R60.9 EDEMA, UNSPECIFIED TYPE: ICD-10-CM

## 2017-06-26 DIAGNOSIS — E11.40 TYPE 2 DIABETES MELLITUS WITH DIABETIC NEUROPATHY, UNSPECIFIED LONG TERM INSULIN USE STATUS: ICD-10-CM

## 2017-06-26 PROCEDURE — 93922 UPR/L XTREMITY ART 2 LEVELS: CPT | Mod: 26,,, | Performed by: RADIOLOGY

## 2017-06-26 PROCEDURE — 93926 LOWER EXTREMITY STUDY: CPT | Mod: 26,,, | Performed by: RADIOLOGY

## 2017-06-26 PROCEDURE — 99499 UNLISTED E&M SERVICE: CPT | Mod: S$PBB,,, | Performed by: PODIATRIST

## 2017-06-26 PROCEDURE — 93926 LOWER EXTREMITY STUDY: CPT | Mod: TC

## 2017-06-26 PROCEDURE — 11042 DBRDMT SUBQ TIS 1ST 20SQCM/<: CPT | Mod: PBBFAC,PO | Performed by: PODIATRIST

## 2017-06-26 PROCEDURE — 99999 PR PBB SHADOW E&M-EST. PATIENT-LVL II: CPT | Mod: PBBFAC,,, | Performed by: PODIATRIST

## 2017-06-26 PROCEDURE — 93922 UPR/L XTREMITY ART 2 LEVELS: CPT | Mod: TC

## 2017-06-26 PROCEDURE — 11042 DBRDMT SUBQ TIS 1ST 20SQCM/<: CPT | Mod: S$PBB,,, | Performed by: PODIATRIST

## 2017-06-26 NOTE — PROGRESS NOTES
Patient refused appointment on 07/06/2017 states that she only has rides on Monday's. Appointment scheduled for 07/10/2017 at her request.

## 2017-06-26 NOTE — PROGRESS NOTES
Subjective:      Patient ID: Kateryna Connolly is a 66 y.o. female.    Chief Complaint: Foot Ulcer    Wounds from surgical wound dehissance medial and lateral right ankle following 2nd ankle surgery.  Currently covering with santyl/gauze with home health changing same.  Gradual onset, with modest improvement over past several weeks, aggravated by increased weight bearing, shoe gear, pressure, and swelling right leg/ankle.  No self treatment.    xrays show healing bone, syndesmotic instability, assymetrical joint space, but no current indication infection or failed hardware.      Review of Systems   Constitution: Negative for chills, diaphoresis, fever, malaise/fatigue and night sweats.   Cardiovascular: Positive for leg swelling. Negative for claudication, cyanosis and syncope.   Skin: Negative for color change, dry skin, rash, suspicious lesions and unusual hair distribution.   Musculoskeletal: Negative for falls, joint pain, joint swelling, muscle cramps, muscle weakness and stiffness.   Gastrointestinal: Negative for constipation, diarrhea, nausea and vomiting.   Neurological: Positive for numbness and sensory change. Negative for brief paralysis, disturbances in coordination, focal weakness, paresthesias and tremors.           Objective:      Physical Exam   Constitutional: She appears well-developed and well-nourished. She is cooperative. No distress.   Cardiovascular:   Pulses:       Popliteal pulses are 2+ on the right side, and 2+ on the left side.        Dorsalis pedis pulses are 1+ on the right side, and 1+ on the left side.        Posterior tibial pulses are 1+ on the right side, and 1+ on the left side.   Capillary refill 3 seconds all toes/distal feet, all toes/both feet warm to touch.      Negative lymphadenopathy bilateral popliteal fossa and tarsal tunnel.      2+ pitting lower extremity edema bilateral.     Musculoskeletal:        Right ankle: Normal. She exhibits normal range of motion, no swelling,  no ecchymosis, no deformity, no laceration and normal pulse. Achilles tendon normal. Achilles tendon exhibits no pain, no defect and normal Cox's test results.   Currently nwb right in wheelchair. All ten toes without clubbing, cyanosis, or signs of ischemia.  No pain to palpation bilateral lower extremities.  Range of motion, stability, muscle strength, and muscle tone normal bilateral feet and legs.     Lymphadenopathy:   Negative lymphadenopathy bilateral popliteal fossa and tarsal tunnel.   Neurological: She is alert. She has normal strength. She displays no atrophy and no tremor. No sensory deficit. She exhibits normal muscle tone. She displays no seizure activity. Gait normal.   Reflex Scores:       Patellar reflexes are 2+ on the right side and 2+ on the left side.       Achilles reflexes are 2+ on the right side and 2+ on the left side.  Negative tinel sign to percussion sural, superficial peroneal, deep peroneal, saphenous, and posterior tibial nerves right and left ankles and feet.    Diminished/loss of protective sensation all toes bilateral to 10 gram monofilament.       Skin: Skin is warm, dry and intact. No abrasion, no bruising, no burn, no ecchymosis, no laceration, no lesion and no rash noted. She is not diaphoretic. No cyanosis or erythema. No pallor. Nails show no clubbing.   Wound: medial right ankle    Size:    Pre:[id number]    Base:  Tan fibrous base with moderate serous/serosanaguinous drainage only.  No pus, tracking, fluctuance, malodor, or cardinal signs infection.    Borders:   flat, pink, blanchable skin edges without undermining.    Wound: lateral right ankle    Size:    Pre: about [id number]  Post:  10k80e1 mm  Base:  Fibrous tan with moderate serous/serosanaguinous drainage only and small islands of red granulation.  No pus, tracking, fluctuance, malodor, or cardinal signs infection.  Base is firm and may be bone or hardware covered with soft tissue.  Concern for osteomyelitis  minimal at present.    Borders:   flat, pink, blanchable skin edges without undermining.               Assessment:       Encounter Diagnoses   Name Primary?    Ankle ulcer, right, with unspecified severity Yes    Edema, unspecified type     Type 2 diabetes mellitus with diabetic neuropathy, unspecified long term insulin use status     PAD (peripheral artery disease)          Plan:       Kateryna was seen today for foot ulcer.    Diagnoses and all orders for this visit:    Ankle ulcer, right, with unspecified severity  -     WOUND VAC FOR HOME USE    Edema, unspecified type  -     WOUND VAC FOR HOME USE    Type 2 diabetes mellitus with diabetic neuropathy, unspecified long term insulin use status  -     WOUND VAC FOR HOME USE    PAD (peripheral artery disease)  -     WOUND VAC FOR HOME USE      I counseled the patient on her conditions, their implications and medical management.    Home health recommendations today:    Recommend santyl to both wound beds, cover with wound foam or mepilex border gauze.  Change same every other day.    Discussed verbally with Dr. Bowden.  He believes Mrs Connolly healed enough to undergo hardware removal if absolutely needed.    Continue to follow with Dr. Bowden for healing and activity on orif right ankle.    Discussed and patient verbalizes understanding that ankle reduction is not as we would like and will be unstable, but functioning as is likely less risky to loss of limb than removal hardware, revision orif, or fusion presently.  Ultimately these decisions will be made by patient and Dr. Bowden.    Imaging will be difficult to interpret.  No clinical signs infection presently.    Rx santyl and VAC can be used - currently no exposed hardware.    EDEMA control essential to healing these wounds risky to compress - skin break down and decreased arterial flow.     Rx  arterial dopplers right.                  Return in about 1 week (around 7/3/2017).

## 2017-06-26 NOTE — PROCEDURES
"Wound Debridement  Date/Time: 6/26/2017 8:04 AM  Performed by: DEMOND ROBISON  Authorized by: DEMOND ROBISON     Time out: Immediately prior to procedure a "time out" was called to verify the correct patient, procedure, equipment, support staff and site/side marked as required.    Consent Done?:  Yes (Verbal)  Local anesthesia used?: No      Wound Details:    Location:  Right leg (lateral right ankle)    Type of Debridement:  Excisional       Length (cm):  8.2       Area (sq cm):  10.66       Width (cm):  1.3       Percent Debrided (%):  50       Depth (cm):  0.8       Total Area Debrided (sq cm):  5.33    Depth of debridement:  Subcutaneous tissue    Tissue debrided:  Subcutaneous    Devitalized tissue debrided:  Sough and Fibrin    Instruments:  Blade and Nippers    2nd Wound Details:     Location:  Right leg (medial ankle)    Bleeding:  Minimal  Hemostasis Achieved: Yes    Method Used:  Pressure  Patient tolerance:  Patient tolerated the procedure well with no immediate complications      "

## 2017-06-29 ENCOUNTER — TELEPHONE (OUTPATIENT)
Dept: PODIATRY | Facility: CLINIC | Age: 67
End: 2017-06-29

## 2017-06-29 NOTE — TELEPHONE ENCOUNTER
Spoke with Fide with [facility], Dr Alexander circled 4 months on written order initialed and signed. Will refax order.

## 2017-06-29 NOTE — TELEPHONE ENCOUNTER
----- Message from Zoraida Domingo sent at 6/29/2017  1:09 PM CDT -----  Contact: Demetra with [employer]  Please call Fide at [phone], ext [phone]. Prescription for wound vac is not valid.

## 2017-06-30 ENCOUNTER — TELEPHONE (OUTPATIENT)
Dept: PODIATRY | Facility: CLINIC | Age: 67
End: 2017-06-30

## 2017-06-30 NOTE — TELEPHONE ENCOUNTER
----- Message from Jessica Poe sent at 6/30/2017  2:06 PM CDT -----  Contact:  call  [phone]    Calling to speak to  [facility] // please call  For  details

## 2017-06-30 NOTE — TELEPHONE ENCOUNTER
Patient stated that Dr Alexander has taken over her care from Dr Bowden - That she is in extreme pain and that the Gabapentin 300 mb tid nor the Hydrocodone 5/325 given by Dr Bowden is helping the pain - and that she cannot sleep due to pain.  Asking for something stronger.  Please advise. (Did inform patient that Dr Alexander is not in clinic until Thursday, but that a message would be sent -Epic and phone.)

## 2017-06-30 NOTE — TELEPHONE ENCOUNTER
Iglesia Alexander, FLORES Collins, SHANIA   Caller: Unspecified (Today,  2:20 PM)             Consult pain management.  ED if worsening or not bearable.  Can try deil office if she has not.  Not my surgery, and I do not manage chronic pain.

## 2017-06-30 NOTE — TELEPHONE ENCOUNTER
Per Dr Alexander:  Patient informed that she can consult with pain management, go to the ED if worsening or not bearable.  Or she can contact Dr Bowden as he did the surgery - that Dr Alexander does not manage chronic pain.  Patient verbalized understanding.

## 2017-07-05 ENCOUNTER — TELEPHONE (OUTPATIENT)
Dept: PODIATRY | Facility: CLINIC | Age: 67
End: 2017-07-05

## 2017-07-05 NOTE — TELEPHONE ENCOUNTER
Spoke with Shirley with [facility] she does not feel comfortable placing wound vac on Ms Connolly until after her wound is debride, she will schedule next visit for Tuesday 07/11/2017 to place wound vac on after her visit with Dr Alexander.

## 2017-07-06 ENCOUNTER — TELEPHONE (OUTPATIENT)
Dept: PODIATRY | Facility: CLINIC | Age: 67
End: 2017-07-06

## 2017-07-06 ENCOUNTER — HOSPITAL ENCOUNTER (INPATIENT)
Facility: HOSPITAL | Age: 67
LOS: 4 days | Discharge: NURSING, REHAB OR LONG-TERM CARE | DRG: 863 | End: 2017-07-11
Attending: EMERGENCY MEDICINE | Admitting: INTERNAL MEDICINE
Payer: MEDICARE

## 2017-07-06 DIAGNOSIS — S91.001S ANKLE WOUND, RIGHT, SEQUELA: Primary | ICD-10-CM

## 2017-07-06 DIAGNOSIS — J44.9 CHRONIC OBSTRUCTIVE PULMONARY DISEASE, UNSPECIFIED COPD TYPE: ICD-10-CM

## 2017-07-06 DIAGNOSIS — I73.9 PAD (PERIPHERAL ARTERY DISEASE): ICD-10-CM

## 2017-07-06 DIAGNOSIS — L97.309: ICD-10-CM

## 2017-07-06 DIAGNOSIS — E78.00 HYPERCHOLESTEROLEMIA: ICD-10-CM

## 2017-07-06 DIAGNOSIS — E11.42 DIABETIC POLYNEUROPATHY ASSOCIATED WITH TYPE 2 DIABETES MELLITUS: ICD-10-CM

## 2017-07-06 DIAGNOSIS — L97.509 TYPE 2 DIABETES MELLITUS WITH FOOT ULCER, WITH LONG-TERM CURRENT USE OF INSULIN: ICD-10-CM

## 2017-07-06 DIAGNOSIS — L03.115 CELLULITIS OF RIGHT LOWER EXTREMITY: ICD-10-CM

## 2017-07-06 DIAGNOSIS — T14.8XXA INFECTED WOUND: ICD-10-CM

## 2017-07-06 DIAGNOSIS — F17.200 TOBACCO DEPENDENCE: ICD-10-CM

## 2017-07-06 DIAGNOSIS — L08.9 INFECTED WOUND: ICD-10-CM

## 2017-07-06 DIAGNOSIS — S90.551A: ICD-10-CM

## 2017-07-06 DIAGNOSIS — I10 BENIGN ESSENTIAL HTN: ICD-10-CM

## 2017-07-06 DIAGNOSIS — I25.10 CORONARY ARTERY DISEASE, ANGINA PRESENCE UNSPECIFIED, UNSPECIFIED VESSEL OR LESION TYPE, UNSPECIFIED WHETHER NATIVE OR TRANSPLANTED HEART: ICD-10-CM

## 2017-07-06 DIAGNOSIS — Z79.4 TYPE 2 DIABETES MELLITUS WITH FOOT ULCER, WITH LONG-TERM CURRENT USE OF INSULIN: ICD-10-CM

## 2017-07-06 DIAGNOSIS — L03.115 CELLULITIS OF RIGHT ANKLE: ICD-10-CM

## 2017-07-06 DIAGNOSIS — E11.621 TYPE 2 DIABETES MELLITUS WITH FOOT ULCER, WITH LONG-TERM CURRENT USE OF INSULIN: ICD-10-CM

## 2017-07-06 PROBLEM — I82.401 DEEP VEIN THROMBOSIS (DVT) OF RIGHT LOWER EXTREMITY: Status: ACTIVE | Noted: 2017-07-06

## 2017-07-06 PROBLEM — Z79.01 CHRONIC ANTICOAGULATION: Status: ACTIVE | Noted: 2017-07-06

## 2017-07-06 PROBLEM — S91.009A ANKLE WOUND: Status: ACTIVE | Noted: 2017-07-06

## 2017-07-06 PROBLEM — J41.0 SMOKERS' COUGH: Status: ACTIVE | Noted: 2017-07-06

## 2017-07-06 PROBLEM — E11.628 TYPE 2 DIABETES MELLITUS WITH SKIN COMPLICATION, WITH LONG-TERM CURRENT USE OF INSULIN: Status: ACTIVE | Noted: 2017-07-06

## 2017-07-06 LAB
ALBUMIN SERPL BCP-MCNC: 3.6 G/DL
ALP SERPL-CCNC: 62 U/L
ALT SERPL W/O P-5'-P-CCNC: 11 U/L
ANION GAP SERPL CALC-SCNC: 13 MMOL/L
AST SERPL-CCNC: 17 U/L
BASOPHILS # BLD AUTO: 0.1 K/UL
BASOPHILS NFR BLD: 1 %
BILIRUB SERPL-MCNC: 0.6 MG/DL
BUN SERPL-MCNC: 11 MG/DL
CALCIUM SERPL-MCNC: 9.8 MG/DL
CHLORIDE SERPL-SCNC: 101 MMOL/L
CO2 SERPL-SCNC: 26 MMOL/L
CREAT SERPL-MCNC: 0.8 MG/DL
CRP SERPL-MCNC: 27 MG/L
DIFFERENTIAL METHOD: ABNORMAL
EOSINOPHIL # BLD AUTO: 0.2 K/UL
EOSINOPHIL NFR BLD: 2.2 %
ERYTHROCYTE [DISTWIDTH] IN BLOOD BY AUTOMATED COUNT: 15.2 %
ERYTHROCYTE [SEDIMENTATION RATE] IN BLOOD BY WESTERGREN METHOD: 38 MM/HR
EST. GFR  (AFRICAN AMERICAN): >60 ML/MIN/1.73 M^2
EST. GFR  (NON AFRICAN AMERICAN): >60 ML/MIN/1.73 M^2
GLUCOSE SERPL-MCNC: 109 MG/DL
HCT VFR BLD AUTO: 40 %
HGB BLD-MCNC: 13.2 G/DL
LYMPHOCYTES # BLD AUTO: 1.2 K/UL
LYMPHOCYTES NFR BLD: 11.3 %
MCH RBC QN AUTO: 30 PG
MCHC RBC AUTO-ENTMCNC: 33 %
MCV RBC AUTO: 91 FL
MONOCYTES # BLD AUTO: 0.6 K/UL
MONOCYTES NFR BLD: 5.8 %
NEUTROPHILS # BLD AUTO: 8.6 K/UL
NEUTROPHILS NFR BLD: 79.7 %
PLATELET # BLD AUTO: 209 K/UL
PMV BLD AUTO: 8.9 FL
POCT GLUCOSE: 160 MG/DL (ref 70–110)
POTASSIUM SERPL-SCNC: 3.9 MMOL/L
PROT SERPL-MCNC: 7.1 G/DL
RBC # BLD AUTO: 4.4 M/UL
SODIUM SERPL-SCNC: 140 MMOL/L
WBC # BLD AUTO: 10.8 K/UL

## 2017-07-06 PROCEDURE — G0378 HOSPITAL OBSERVATION PER HR: HCPCS

## 2017-07-06 PROCEDURE — 25000003 PHARM REV CODE 250: Performed by: NURSE PRACTITIONER

## 2017-07-06 PROCEDURE — 87040 BLOOD CULTURE FOR BACTERIA: CPT | Mod: 59

## 2017-07-06 PROCEDURE — 96374 THER/PROPH/DIAG INJ IV PUSH: CPT

## 2017-07-06 PROCEDURE — 80053 COMPREHEN METABOLIC PANEL: CPT

## 2017-07-06 PROCEDURE — 96365 THER/PROPH/DIAG IV INF INIT: CPT

## 2017-07-06 PROCEDURE — 25000003 PHARM REV CODE 250: Performed by: PHYSICIAN ASSISTANT

## 2017-07-06 PROCEDURE — 11000001 HC ACUTE MED/SURG PRIVATE ROOM

## 2017-07-06 PROCEDURE — 86140 C-REACTIVE PROTEIN: CPT

## 2017-07-06 PROCEDURE — 63600175 PHARM REV CODE 636 W HCPCS: Performed by: NURSE PRACTITIONER

## 2017-07-06 PROCEDURE — 63600175 PHARM REV CODE 636 W HCPCS: Performed by: PHYSICIAN ASSISTANT

## 2017-07-06 PROCEDURE — 36415 COLL VENOUS BLD VENIPUNCTURE: CPT

## 2017-07-06 PROCEDURE — 99222 1ST HOSP IP/OBS MODERATE 55: CPT | Mod: ,,, | Performed by: PODIATRIST

## 2017-07-06 PROCEDURE — 99284 EMERGENCY DEPT VISIT MOD MDM: CPT | Mod: 25

## 2017-07-06 PROCEDURE — 25000003 PHARM REV CODE 250: Performed by: INTERNAL MEDICINE

## 2017-07-06 PROCEDURE — 85025 COMPLETE CBC W/AUTO DIFF WBC: CPT

## 2017-07-06 PROCEDURE — 85651 RBC SED RATE NONAUTOMATED: CPT

## 2017-07-06 RX ORDER — ACETAMINOPHEN 325 MG/1
650 TABLET ORAL EVERY 6 HOURS PRN
Status: DISCONTINUED | OUTPATIENT
Start: 2017-07-06 | End: 2017-07-11 | Stop reason: HOSPADM

## 2017-07-06 RX ORDER — CLINDAMYCIN PHOSPHATE 900 MG/50ML
900 INJECTION, SOLUTION INTRAVENOUS
Status: DISCONTINUED | OUTPATIENT
Start: 2017-07-06 | End: 2017-07-07

## 2017-07-06 RX ORDER — NAPROXEN SODIUM 220 MG/1
81 TABLET, FILM COATED ORAL DAILY
Status: DISCONTINUED | OUTPATIENT
Start: 2017-07-06 | End: 2017-07-11 | Stop reason: HOSPADM

## 2017-07-06 RX ORDER — GABAPENTIN 100 MG/1
900 CAPSULE ORAL DAILY
COMMUNITY
End: 2018-02-05 | Stop reason: SDUPTHER

## 2017-07-06 RX ORDER — METFORMIN HYDROCHLORIDE 500 MG/1
1000 TABLET ORAL 2 TIMES DAILY WITH MEALS
Status: DISCONTINUED | OUTPATIENT
Start: 2017-07-06 | End: 2017-07-11 | Stop reason: HOSPADM

## 2017-07-06 RX ORDER — ZINC SULFATE 50(220)MG
220 CAPSULE ORAL DAILY
Status: DISCONTINUED | OUTPATIENT
Start: 2017-07-07 | End: 2017-07-11 | Stop reason: HOSPADM

## 2017-07-06 RX ORDER — ONDANSETRON 2 MG/ML
4 INJECTION INTRAMUSCULAR; INTRAVENOUS EVERY 8 HOURS PRN
Status: DISCONTINUED | OUTPATIENT
Start: 2017-07-06 | End: 2017-07-11 | Stop reason: HOSPADM

## 2017-07-06 RX ORDER — METOPROLOL SUCCINATE 25 MG/1
25 TABLET, EXTENDED RELEASE ORAL DAILY
Status: DISCONTINUED | OUTPATIENT
Start: 2017-07-07 | End: 2017-07-11 | Stop reason: HOSPADM

## 2017-07-06 RX ORDER — LISINOPRIL 2.5 MG/1
2.5 TABLET ORAL DAILY
Status: DISCONTINUED | OUTPATIENT
Start: 2017-07-07 | End: 2017-07-11 | Stop reason: HOSPADM

## 2017-07-06 RX ORDER — OXYCODONE AND ACETAMINOPHEN 5; 325 MG/1; MG/1
1 TABLET ORAL EVERY 4 HOURS PRN
Status: DISCONTINUED | OUTPATIENT
Start: 2017-07-06 | End: 2017-07-09

## 2017-07-06 RX ORDER — IBUPROFEN 200 MG
16 TABLET ORAL
Status: DISCONTINUED | OUTPATIENT
Start: 2017-07-06 | End: 2017-07-11 | Stop reason: HOSPADM

## 2017-07-06 RX ORDER — CLOPIDOGREL BISULFATE 75 MG/1
75 TABLET ORAL DAILY
Status: DISCONTINUED | OUTPATIENT
Start: 2017-07-07 | End: 2017-07-11 | Stop reason: HOSPADM

## 2017-07-06 RX ORDER — OXYCODONE AND ACETAMINOPHEN 10; 325 MG/1; MG/1
1 TABLET ORAL EVERY 6 HOURS PRN
Status: DISCONTINUED | OUTPATIENT
Start: 2017-07-06 | End: 2017-07-09

## 2017-07-06 RX ORDER — IBUPROFEN 200 MG
24 TABLET ORAL
Status: DISCONTINUED | OUTPATIENT
Start: 2017-07-06 | End: 2017-07-11 | Stop reason: HOSPADM

## 2017-07-06 RX ORDER — PROMETHAZINE HYDROCHLORIDE 25 MG/1
25 TABLET ORAL EVERY 6 HOURS PRN
COMMUNITY
End: 2018-02-05 | Stop reason: ALTCHOICE

## 2017-07-06 RX ORDER — GLUCAGON 1 MG
1 KIT INJECTION
Status: DISCONTINUED | OUTPATIENT
Start: 2017-07-06 | End: 2017-07-11 | Stop reason: HOSPADM

## 2017-07-06 RX ORDER — ATORVASTATIN CALCIUM 40 MG/1
80 TABLET, FILM COATED ORAL NIGHTLY
Status: DISCONTINUED | OUTPATIENT
Start: 2017-07-06 | End: 2017-07-11 | Stop reason: HOSPADM

## 2017-07-06 RX ORDER — RAMELTEON 8 MG/1
8 TABLET ORAL NIGHTLY PRN
Status: DISCONTINUED | OUTPATIENT
Start: 2017-07-06 | End: 2017-07-11 | Stop reason: HOSPADM

## 2017-07-06 RX ORDER — INSULIN ASPART 100 [IU]/ML
1-10 INJECTION, SOLUTION INTRAVENOUS; SUBCUTANEOUS
Status: DISCONTINUED | OUTPATIENT
Start: 2017-07-06 | End: 2017-07-06

## 2017-07-06 RX ORDER — INSULIN ASPART 100 [IU]/ML
0-5 INJECTION, SOLUTION INTRAVENOUS; SUBCUTANEOUS
Status: DISCONTINUED | OUTPATIENT
Start: 2017-07-06 | End: 2017-07-11 | Stop reason: HOSPADM

## 2017-07-06 RX ORDER — CIPROFLOXACIN 500 MG/1
500 TABLET ORAL EVERY 12 HOURS
Status: DISCONTINUED | OUTPATIENT
Start: 2017-07-06 | End: 2017-07-11 | Stop reason: HOSPADM

## 2017-07-06 RX ORDER — GABAPENTIN 300 MG/1
900 CAPSULE ORAL DAILY
Status: DISCONTINUED | OUTPATIENT
Start: 2017-07-07 | End: 2017-07-11 | Stop reason: HOSPADM

## 2017-07-06 RX ORDER — CLINDAMYCIN PHOSPHATE 900 MG/50ML
900 INJECTION, SOLUTION INTRAVENOUS
Status: DISCONTINUED | OUTPATIENT
Start: 2017-07-06 | End: 2017-07-06

## 2017-07-06 RX ORDER — ALPRAZOLAM 0.25 MG/1
0.25 TABLET ORAL 4 TIMES DAILY PRN
Status: DISCONTINUED | OUTPATIENT
Start: 2017-07-06 | End: 2017-07-11 | Stop reason: HOSPADM

## 2017-07-06 RX ORDER — CLINDAMYCIN PHOSPHATE 900 MG/50ML
900 INJECTION, SOLUTION INTRAVENOUS
Status: COMPLETED | OUTPATIENT
Start: 2017-07-06 | End: 2017-07-06

## 2017-07-06 RX ORDER — PROMETHAZINE HYDROCHLORIDE 25 MG/1
25 TABLET ORAL EVERY 6 HOURS PRN
Status: DISCONTINUED | OUTPATIENT
Start: 2017-07-06 | End: 2017-07-11 | Stop reason: HOSPADM

## 2017-07-06 RX ADMIN — INSULIN DETEMIR 15 UNITS: 100 INJECTION, SOLUTION SUBCUTANEOUS at 09:07

## 2017-07-06 RX ADMIN — ALPRAZOLAM 0.25 MG: 0.25 TABLET ORAL at 09:07

## 2017-07-06 RX ADMIN — CLINDAMYCIN IN 5 PERCENT DEXTROSE 900 MG: 18 INJECTION, SOLUTION INTRAVENOUS at 09:07

## 2017-07-06 RX ADMIN — OXYCODONE HYDROCHLORIDE AND ACETAMINOPHEN 1 TABLET: 10; 325 TABLET ORAL at 09:07

## 2017-07-06 RX ADMIN — ATORVASTATIN CALCIUM 80 MG: 40 TABLET, FILM COATED ORAL at 09:07

## 2017-07-06 RX ADMIN — APIXABAN 5 MG: 2.5 TABLET, FILM COATED ORAL at 09:07

## 2017-07-06 RX ADMIN — CIPROFLOXACIN HYDROCHLORIDE 500 MG: 500 TABLET, FILM COATED ORAL at 09:07

## 2017-07-06 RX ADMIN — METFORMIN HYDROCHLORIDE 1000 MG: 500 TABLET, FILM COATED ORAL at 04:07

## 2017-07-06 RX ADMIN — PROMETHAZINE HYDROCHLORIDE 25 MG: 25 TABLET ORAL at 05:07

## 2017-07-06 RX ADMIN — VANCOMYCIN HYDROCHLORIDE 1000 MG: 1 INJECTION, POWDER, LYOPHILIZED, FOR SOLUTION INTRAVENOUS at 03:07

## 2017-07-06 RX ADMIN — OXYCODONE HYDROCHLORIDE AND ACETAMINOPHEN 1 TABLET: 10; 325 TABLET ORAL at 03:07

## 2017-07-06 RX ADMIN — CLINDAMYCIN IN 5 PERCENT DEXTROSE 900 MG: 18 INJECTION, SOLUTION INTRAVENOUS at 02:07

## 2017-07-06 NOTE — TELEPHONE ENCOUNTER
----- Message from Trista Fang sent at 7/6/2017 11:04 AM CDT -----  Contact: patient  Patient calling in regards to her foot. She wouldn't give anymore information. She wants to speak with the Nurse. Please advise.  Call back .  Thanks!

## 2017-07-06 NOTE — ED NOTES
Patient arrived to the Ed for evaluation of right lower leg wounds to inside and outside of ankle.

## 2017-07-06 NOTE — TELEPHONE ENCOUNTER
----- Message from Carey Kang sent at 7/6/2017 10:11 AM CDT -----  Contact: Los with Dr Kal Madison with Dr Kal Sheth regarding a referral. Please call Los at [phone]. Thanks! Connected to pod.

## 2017-07-06 NOTE — TELEPHONE ENCOUNTER
Spoke with  Mohsen she stated that she is in the Emergency Department for infection. Ask that Dr Alexander come see her. Explained that once she is admitted he should be consulted and then he can go see her.

## 2017-07-06 NOTE — ED PROVIDER NOTES
Encounter Date: 7/6/2017       History     Chief Complaint   Patient presents with    Wound Check     foot surgery Janurary 2017     Patient is a 66 year old female with complaint of wound to right leg for seven months. She reports PMH significant for DM-2, hypertension and MI. She states she broke her ankle in 1/2017 and underwent surgery by Dr. Bowden. She states she has had slow wound healing since the surgery. Two weeks ago she saw Dr. Bowden and was sent to Dr. Alexander for further wound care. She reports she hasn't been on antibiotics in about a month. She was going to Dr. Alexander's office weekly and had home health three times a week. She states she was suppose to have a wound vac placed but the home health nurse was concerned about the drainage and redness around the wound. She reports the wound has been draining for about two days with the associated, worsening redness. She denied fever or chills.       The history is provided by the patient.     Review of patient's allergies indicates:   Allergen Reactions    Hydrocodone Hives    Pcn [penicillins] Hives    Pcn [penicillins] Rash     Past Medical History:   Diagnosis Date    Arthritis     Diabetes mellitus     Heart attack     Hypertension     Neuropathy     Occasional tremors      Past Surgical History:   Procedure Laterality Date    CARPAL TUNNEL RELEASE      bilateral    EYE SURGERY      bilat cataract removal with iol implants    GANGLION CYST EXCISION      heart stents      HYSTERECTOMY      KNEE SURGERY Left     SKIN BIOPSY      on side of nose     History reviewed. No pertinent family history.  Social History   Substance Use Topics    Smoking status: Current Every Day Smoker    Smokeless tobacco: Never Used    Alcohol use Yes      Comment: drinks one wine cooler daily     Review of Systems   Constitutional: Negative for chills and fever.   HENT: Negative for congestion and sore throat.    Respiratory: Negative for shortness of  breath.    Cardiovascular: Negative for chest pain.   Gastrointestinal: Negative for abdominal pain, diarrhea, nausea and vomiting.   Genitourinary: Negative for dysuria.   Musculoskeletal: Negative for back pain.   Skin: Positive for color change and wound. Negative for rash.   Neurological: Negative for weakness.   Hematological: Does not bruise/bleed easily.       Physical Exam     Initial Vitals [07/06/17 1118]   BP Pulse Resp Temp SpO2   137/79 100 16 98.1 °F (36.7 °C) (!) 94 %      MAP       98.33         Physical Exam    Nursing note and vitals reviewed.  Constitutional: She appears well-developed and well-nourished. No distress.   HENT:   Head: Normocephalic and atraumatic.   Right Ear: External ear normal.   Left Ear: External ear normal.   Eyes: Conjunctivae are normal. Pupils are equal, round, and reactive to light. Right eye exhibits no discharge. Left eye exhibits no discharge.   Neck: Normal range of motion. Neck supple.   Cardiovascular: Normal rate, regular rhythm and normal heart sounds. Exam reveals no gallop and no friction rub.    No murmur heard.  Pulmonary/Chest: Breath sounds normal. She has no wheezes. She has no rhonchi. She has no rales.   Abdominal: Soft. Bowel sounds are normal. There is no tenderness. There is no guarding.   Musculoskeletal: Normal range of motion.   Neurological: She is alert.   Skin: Skin is warm and dry. There is erythema.   See picture for further details                     ED Course   Procedures  Labs Reviewed   CBC W/ AUTO DIFFERENTIAL - Abnormal; Notable for the following:        Result Value    RDW 15.2 (*)     MPV 8.9 (*)     Gran # 8.6 (*)     Gran% 79.7 (*)     Lymph% 11.3 (*)     All other components within normal limits   C-REACTIVE PROTEIN - Abnormal; Notable for the following:     CRP 27.0 (*)     All other components within normal limits   SEDIMENTATION RATE, MANUAL - Abnormal; Notable for the following:     Sed Rate 38 (*)     All other components  within normal limits   CULTURE, BLOOD   CULTURE, BLOOD   COMPREHENSIVE METABOLIC PANEL             Medical Decision Making:   History:   Old Medical Records: I decided to obtain old medical records.  Clinical Tests:   Lab Tests: Ordered  Radiological Study: Ordered  Other:   I have discussed this case with another health care provider.       APC / Resident Notes:   This is an emergent evaluation of a 66-year-old female with complaint of nonhealing wound to the right ankle.  She had surgery approximately 7 months ago and has been following with podiatry for wound care.  She reports significant worsening, drainage and erythema to the wound.  She was instructed to come to the ER for further evaluation.  She denied fever.  She is well-appearing.  Vital signs are stable.  She has a large nonhealing wound to the lateral and medial aspect of the right ankle.  There is associated erythema.  There is mild green drainage.  There is mild odor.  There is tenderness to palpation.  Her vital signs do not show any concern for sepsis.  There is no leukocytosis.  I did send blood cultures secondary to the chronic wound.  X-ray shows no bone involvement at this time, however she may require further imaging.  I discussed the case with Dr. Alexander who requested infectious disease consult.  I discussed the case with Dr. Aguilera who is in agreement with admission.  He requested initiation of think and Zosyn however the patient is ALLERGIC to penicillin.  I did give her dose of clindamycin in the ER.  She is in agreement for admission. Discussed results with patient. Return precautions given. Patient is to follow up with their primary care provider. Case was discussed with Dr. Rico who is in agreement with the plan of care. All questions answered.                 ED Course     Clinical Impression:   The primary encounter diagnosis was Ankle wound, right, sequela. Diagnoses of Superficial foreign body of right ankle without major open  wound and without infection and Cellulitis of right lower extremity were also pertinent to this visit.                           Mariya Mora PA-C  07/06/17 0592

## 2017-07-06 NOTE — H&P
[facility] Hospital Medicine  History & Physical  [facility] Medicine  H&P    Patient Name: Kateryna Connolly  MRN: [id number]  Patient Class: OP- Observation   Admission Date: 7/6/2017  Length of Stay: 0 days  Attending Physician: Teto Aguilera MD  Primary Care Provider: Dmitriy Treadwell MD        Subjective:     Principal Problem:Infected wound    HPI:  This is a 66 year old female with complaint of wound to RLE since January 2017. She states she broke her ankle in 1/2017 and underwent surgery by Dr. Bowden. She states she has had slow wound healing since the surgery. Pt has been being followed by Dr. Alexander as out patient. Pt has  PMH significant for DM-2, hypertension, nicotine abuse, CAD with prior coronary stent placement x 2 in 2015 and MI. She was going to Dr. Alexander's office weekly and had home health three times a week. She states she was suppose to have a wound vac placed but the home health nurse was concerned about the drainage and redness around the wound. She reports the wound has been draining for about two days with the associated, worsening redness. She denied fever or chills. Pt placed in hospital for further evaluation and treatment.    Hospital Course:  No notes on file    Past Medical History:   Diagnosis Date    Arthritis     Diabetes mellitus     Heart attack     Hypertension     Neuropathy     Occasional tremors        Past Surgical History:   Procedure Laterality Date    CARPAL TUNNEL RELEASE      bilateral    EYE SURGERY      bilat cataract removal with iol implants    GANGLION CYST EXCISION      heart stents      HYSTERECTOMY      KNEE SURGERY Left     SKIN BIOPSY      on side of nose       Review of patient's allergies indicates:   Allergen Reactions    Hydrocodone Hives    Pcn [penicillins] Hives    Pcn [penicillins] Rash       No current facility-administered medications on file prior to encounter.      Current  Outpatient Prescriptions on File Prior to Encounter   Medication Sig    apixaban 5 mg Tab Take 2 tablets (10 mg total) by mouth 2 (two) times daily. (Patient taking differently: Take 5 mg by mouth 2 (two) times daily. )    atorvastatin (LIPITOR) 80 MG tablet Take 1 tablet (80 mg total) by mouth every evening.    clopidogrel (PLAVIX) 75 mg tablet Take 1 tablet (75 mg total) by mouth once daily.    collagenase ointment Apply topically once daily.    insulin glargine (LANTUS) 100 unit/mL injection Inject 25 Units into the skin every morning.     lisinopril (PRINIVIL,ZESTRIL) 2.5 MG tablet Take 1 tablet (2.5 mg total) by mouth once daily.    melatonin 3 mg Tab Take 3 mg by mouth every evening.     metformin (GLUCOPHAGE) 1000 MG tablet Take 1,000 mg by mouth 2 (two) times daily with meals.    metoprolol succinate (TOPROL-XL) 25 MG 24 hr tablet Take 1 tablet (25 mg total) by mouth once daily.    [DISCONTINUED] ACCU-CHEK CHARLENE PLUS TEST STRP Strp     [DISCONTINUED] aspirin 81 MG Chew Take 1 tablet (81 mg total) by mouth once daily.    [DISCONTINUED] oxycodone-acetaminophen (PERCOCET)  mg per tablet Take 1 tablet by mouth every 6 (six) hours as needed for Pain.     Family History     None        Social History Main Topics    Smoking status: Current Every Day Smoker    Smokeless tobacco: Never Used    Alcohol use Yes      Comment: drinks one wine cooler daily    Drug use: No    Sexual activity: Not on file     Review of Systems   Constitutional: Negative for activity change, appetite change, chills, diaphoresis, fatigue and fever.   HENT: Negative for ear pain and facial swelling.    Eyes: Negative for pain and redness.   Respiratory: Positive for cough. Negative for apnea, choking, chest tightness, wheezing and stridor.         Smoker's cough   Cardiovascular: Positive for leg swelling. Negative for chest pain and palpitations.   Gastrointestinal: Positive for constipation, nausea and vomiting.  Negative for abdominal distention and abdominal pain.   Endocrine: Negative for polydipsia and polyphagia.   Genitourinary: Negative for difficulty urinating, dysuria, flank pain and hematuria.   Musculoskeletal: Positive for arthralgias and gait problem. Negative for neck pain and neck stiffness.   Skin: Positive for wound.        Right ankle - See admit photo   Allergic/Immunologic: Negative for food allergies.   Neurological: Negative for syncope and weakness.   Hematological: Bruises/bleeds easily.   Psychiatric/Behavioral: Negative for agitation, behavioral problems, confusion, hallucinations and suicidal ideas.     Objective:     Vital Signs (Most Recent):  Temp: 98.1 °F (36.7 °C) (07/06/17 1451)  Pulse: 95 (07/06/17 1451)  Resp: 16 (07/06/17 1451)  BP: 117/64 (07/06/17 1451)  SpO2: 100 % (07/06/17 1451) Vital Signs (24h Range):  Temp:  [98.1 °F (36.7 °C)] 98.1 °F (36.7 °C)  Pulse:  [] 95  Resp:  [16] 16  SpO2:  [94 %-100 %] 100 %  BP: (117-137)/(64-79) 117/64     Weight: 94.8 kg (209 lb)  Body mass index is 32.73 kg/m².    Physical Exam   Constitutional: She is oriented to person, place, and time. She appears well-developed and well-nourished. No distress.   HENT:   Head: Normocephalic and atraumatic.   Eyes: Conjunctivae and EOM are normal. Pupils are equal, round, and reactive to light. Right eye exhibits no discharge. Left eye exhibits no discharge.   Neck: Normal range of motion. Neck supple. No JVD present.   Cardiovascular: Normal rate, regular rhythm and intact distal pulses.    Pulmonary/Chest: No stridor. No respiratory distress.   BBS diminished   Abdominal: Soft. Bowel sounds are normal. She exhibits no distension. There is no tenderness.   Obese   Genitourinary:   Genitourinary Comments: Not examined   Musculoskeletal: She exhibits edema.   BLE edema R>L   Neurological: She is alert and oriented to person, place, and time.   Skin: Skin is warm and dry. Capillary refill takes 2 to 3 seconds.  She is not diaphoretic.   RLE dressing intact  See admit photo  RLE wound x 2  with slough and surrounding redness   Psychiatric: She has a normal mood and affect. Her behavior is normal. Judgment and thought content normal.        Significant Labs: Reviewed    Significant Imaging: Reviewed    Assessment/Plan:      COPD (chronic obstructive pulmonary disease)    Nicotine Abuse/ Smoker's cough-  Smoking cessation education  Pt refused nicotine patch          Deep vein thrombosis (DVT) of right lower extremity    With chronic anticoagulation with apixaban-  Continue home apixaban- Dose verified with pharmacy          Type 2 diabetes mellitus with skin complication, with long-term current use of insulin    DM diet  Accuchecks with correctional SSI  Continue home metformin  Continue long acting insulin at lower dose            Diabetic polyneuropathy associated with type 2 diabetes mellitus    Continue home neurontin          CAD (coronary artery disease)    Hx Coronary stent placement x 2/ PAD Hx LLE stent placement/ HTN /Hyperlipidemia-  Continue home asa, plavix, ACEI, statin          * Infected wound    Cellulitis Right ankle-  Consult ID and podiatry  Continue Iv clindamycin till evaluated by ID  Add MVI, Zinc, and Sony  Wound care as per Podiatry  Prn pain medication              VTE Risk Mitigation         Ordered     apixaban tablet 5 mg  2 times daily     Route:  Oral        07/06/17 1630     Low Risk of VTE  Once      07/06/17 1442          SHAMA Bowens  Department of Hospital Medicine   [facility]    Time spent seeing patient( greater than 1/2 spent in direct contact) : 78 minutes

## 2017-07-06 NOTE — TELEPHONE ENCOUNTER
Spoke with Los with Dr Bowden, she stated that Ms Connolly has an infection. I explained to her that the home health called and stated that she did not feel comfortable placing the wound vac until after Dr Alexander debridekaterina's the wound. I advised Los to direct to the [facility] for evaluation of wound for infection.

## 2017-07-06 NOTE — SUBJECTIVE & OBJECTIVE
Past Medical History:   Diagnosis Date    Arthritis     Diabetes mellitus     Heart attack     Hypertension     Neuropathy     Occasional tremors        Past Surgical History:   Procedure Laterality Date    CARPAL TUNNEL RELEASE      bilateral    EYE SURGERY      bilat cataract removal with iol implants    GANGLION CYST EXCISION      heart stents      HYSTERECTOMY      KNEE SURGERY Left     SKIN BIOPSY      on side of nose       Review of patient's allergies indicates:   Allergen Reactions    Hydrocodone Hives    Pcn [penicillins] Hives    Pcn [penicillins] Rash       No current facility-administered medications on file prior to encounter.      Current Outpatient Prescriptions on File Prior to Encounter   Medication Sig    apixaban 5 mg Tab Take 2 tablets (10 mg total) by mouth 2 (two) times daily. (Patient taking differently: Take 5 mg by mouth 2 (two) times daily. )    atorvastatin (LIPITOR) 80 MG tablet Take 1 tablet (80 mg total) by mouth every evening.    clopidogrel (PLAVIX) 75 mg tablet Take 1 tablet (75 mg total) by mouth once daily.    collagenase ointment Apply topically once daily.    insulin glargine (LANTUS) 100 unit/mL injection Inject 25 Units into the skin every morning.     lisinopril (PRINIVIL,ZESTRIL) 2.5 MG tablet Take 1 tablet (2.5 mg total) by mouth once daily.    melatonin 3 mg Tab Take 3 mg by mouth every evening.     metformin (GLUCOPHAGE) 1000 MG tablet Take 1,000 mg by mouth 2 (two) times daily with meals.    metoprolol succinate (TOPROL-XL) 25 MG 24 hr tablet Take 1 tablet (25 mg total) by mouth once daily.    [DISCONTINUED] ACCU-CHEK CHARLENE PLUS TEST STRP Strp     [DISCONTINUED] aspirin 81 MG Chew Take 1 tablet (81 mg total) by mouth once daily.    [DISCONTINUED] oxycodone-acetaminophen (PERCOCET)  mg per tablet Take 1 tablet by mouth every 6 (six) hours as needed for Pain.     Family History     None        Social History Main Topics    Smoking status:  Current Every Day Smoker    Smokeless tobacco: Never Used    Alcohol use Yes      Comment: drinks one wine cooler daily    Drug use: No    Sexual activity: Not on file     Review of Systems   Constitutional: Negative for activity change, appetite change, chills, diaphoresis, fatigue and fever.   HENT: Negative for ear pain and facial swelling.    Eyes: Negative for pain and redness.   Respiratory: Positive for cough. Negative for apnea, choking, chest tightness, wheezing and stridor.         Smoker's cough   Cardiovascular: Positive for leg swelling. Negative for chest pain and palpitations.   Gastrointestinal: Negative for abdominal distention and abdominal pain.   Endocrine: Negative for polydipsia and polyphagia.   Genitourinary: Negative for difficulty urinating, dysuria, flank pain and hematuria.   Musculoskeletal: Positive for arthralgias and gait problem. Negative for neck pain and neck stiffness.   Skin: Positive for wound.        Right ankle - See admit photo   Allergic/Immunologic: Negative for food allergies.   Neurological: Negative for syncope and weakness.   Hematological: Bruises/bleeds easily.   Psychiatric/Behavioral: Negative for agitation, behavioral problems, confusion, hallucinations and suicidal ideas.     Objective:     Vital Signs (Most Recent):  Temp: 98.1 °F (36.7 °C) (07/06/17 1451)  Pulse: 95 (07/06/17 1451)  Resp: 16 (07/06/17 1451)  BP: 117/64 (07/06/17 1451)  SpO2: 100 % (07/06/17 1451) Vital Signs (24h Range):  Temp:  [98.1 °F (36.7 °C)] 98.1 °F (36.7 °C)  Pulse:  [] 95  Resp:  [16] 16  SpO2:  [94 %-100 %] 100 %  BP: (117-137)/(64-79) 117/64     Weight: 94.8 kg (209 lb)  Body mass index is 32.73 kg/m².    Physical Exam   Constitutional: She is oriented to person, place, and time. She appears well-developed and well-nourished. No distress.   HENT:   Head: Normocephalic and atraumatic.   Eyes: Conjunctivae and EOM are normal. Pupils are equal, round, and reactive to light.  Right eye exhibits no discharge. Left eye exhibits no discharge.   Neck: Normal range of motion. Neck supple. No JVD present.   Cardiovascular: Normal rate, regular rhythm and intact distal pulses.    Pulmonary/Chest: No stridor. No respiratory distress.   BBS diminished   Abdominal: Soft. Bowel sounds are normal. She exhibits no distension. There is no tenderness.   Obese   Genitourinary:   Genitourinary Comments: Not examined   Musculoskeletal: She exhibits edema.   BLE edema R>L   Neurological: She is alert and oriented to person, place, and time.   Skin: Skin is warm and dry. Capillary refill takes 2 to 3 seconds. She is not diaphoretic.   RLE dressing intact  See admit photo  RLE wound x 2  with slough and surrounding redness   Psychiatric: She has a normal mood and affect. Her behavior is normal. Judgment and thought content normal.        Significant Labs: Reviewed    Significant Imaging: Reviewed

## 2017-07-06 NOTE — PROGRESS NOTES
[facility] Hospital Medicine  H&P    Patient Name: Kateryna Connolly  MRN: [id number]  Patient Class: OP- Observation   Admission Date: 7/6/2017  Length of Stay: 0 days  Attending Physician: Teto Aguilera MD  Primary Care Provider: Dmitriy Treadwell MD        Subjective:     Principal Problem:Infected wound    HPI:  This is a 66 year old female with complaint of wound to RLE since January 2017. She states she broke her ankle in 1/2017 and underwent surgery by Dr. Bowden. She states she has had slow wound healing since the surgery. Pt has been being followed by Dr. Alexander as out patient. Pt has  PMH significant for DM-2, hypertension, nicotine abuse, CAD with prior coronary stent placement x 2 in 2015 and MI. She was going to Dr. Alexander's office weekly and had home health three times a week. She states she was suppose to have a wound vac placed but the home health nurse was concerned about the drainage and redness around the wound. She reports the wound has been draining for about two days with the associated, worsening redness. She denied fever or chills. Pt placed in hospital for further evaluation and treatment.    Hospital Course:  No notes on file    Past Medical History:   Diagnosis Date    Arthritis     Diabetes mellitus     Heart attack     Hypertension     Neuropathy     Occasional tremors        Past Surgical History:   Procedure Laterality Date    CARPAL TUNNEL RELEASE      bilateral    EYE SURGERY      bilat cataract removal with iol implants    GANGLION CYST EXCISION      heart stents      HYSTERECTOMY      KNEE SURGERY Left     SKIN BIOPSY      on side of nose       Review of patient's allergies indicates:   Allergen Reactions    Hydrocodone Hives    Pcn [penicillins] Hives    Pcn [penicillins] Rash       No current facility-administered medications on file prior to encounter.      Current Outpatient Prescriptions on File Prior to Encounter   Medication Sig     apixaban 5 mg Tab Take 2 tablets (10 mg total) by mouth 2 (two) times daily. (Patient taking differently: Take 5 mg by mouth 2 (two) times daily. )    atorvastatin (LIPITOR) 80 MG tablet Take 1 tablet (80 mg total) by mouth every evening.    clopidogrel (PLAVIX) 75 mg tablet Take 1 tablet (75 mg total) by mouth once daily.    collagenase ointment Apply topically once daily.    insulin glargine (LANTUS) 100 unit/mL injection Inject 25 Units into the skin every morning.     lisinopril (PRINIVIL,ZESTRIL) 2.5 MG tablet Take 1 tablet (2.5 mg total) by mouth once daily.    melatonin 3 mg Tab Take 3 mg by mouth every evening.     metformin (GLUCOPHAGE) 1000 MG tablet Take 1,000 mg by mouth 2 (two) times daily with meals.    metoprolol succinate (TOPROL-XL) 25 MG 24 hr tablet Take 1 tablet (25 mg total) by mouth once daily.    [DISCONTINUED] ACCU-CHEK CHARLENE PLUS TEST STRP Strp     [DISCONTINUED] aspirin 81 MG Chew Take 1 tablet (81 mg total) by mouth once daily.    [DISCONTINUED] oxycodone-acetaminophen (PERCOCET)  mg per tablet Take 1 tablet by mouth every 6 (six) hours as needed for Pain.     Family History     None        Social History Main Topics    Smoking status: Current Every Day Smoker    Smokeless tobacco: Never Used    Alcohol use Yes      Comment: drinks one wine cooler daily    Drug use: No    Sexual activity: Not on file     Review of Systems   Constitutional: Negative for activity change, appetite change, chills, diaphoresis, fatigue and fever.   HENT: Negative for ear pain and facial swelling.    Eyes: Negative for pain and redness.   Respiratory: Positive for cough. Negative for apnea, choking, chest tightness, wheezing and stridor.         Smoker's cough   Cardiovascular: Positive for leg swelling. Negative for chest pain and palpitations.   Gastrointestinal: Positive for constipation, nausea and vomiting. Negative for abdominal distention and abdominal pain.   Endocrine: Negative for  polydipsia and polyphagia.   Genitourinary: Negative for difficulty urinating, dysuria, flank pain and hematuria.   Musculoskeletal: Positive for arthralgias and gait problem. Negative for neck pain and neck stiffness.   Skin: Positive for wound.        Right ankle - See admit photo   Allergic/Immunologic: Negative for food allergies.   Neurological: Negative for syncope and weakness.   Hematological: Bruises/bleeds easily.   Psychiatric/Behavioral: Negative for agitation, behavioral problems, confusion, hallucinations and suicidal ideas.     Objective:     Vital Signs (Most Recent):  Temp: 98.1 °F (36.7 °C) (07/06/17 1451)  Pulse: 95 (07/06/17 1451)  Resp: 16 (07/06/17 1451)  BP: 117/64 (07/06/17 1451)  SpO2: 100 % (07/06/17 1451) Vital Signs (24h Range):  Temp:  [98.1 °F (36.7 °C)] 98.1 °F (36.7 °C)  Pulse:  [] 95  Resp:  [16] 16  SpO2:  [94 %-100 %] 100 %  BP: (117-137)/(64-79) 117/64     Weight: 94.8 kg (209 lb)  Body mass index is 32.73 kg/m².    Physical Exam   Constitutional: She is oriented to person, place, and time. She appears well-developed and well-nourished. No distress.   HENT:   Head: Normocephalic and atraumatic.   Eyes: Conjunctivae and EOM are normal. Pupils are equal, round, and reactive to light. Right eye exhibits no discharge. Left eye exhibits no discharge.   Neck: Normal range of motion. Neck supple. No JVD present.   Cardiovascular: Normal rate, regular rhythm and intact distal pulses.    Pulmonary/Chest: No stridor. No respiratory distress.   BBS diminished   Abdominal: Soft. Bowel sounds are normal. She exhibits no distension. There is no tenderness.   Obese   Genitourinary:   Genitourinary Comments: Not examined   Musculoskeletal: She exhibits edema.   BLE edema R>L   Neurological: She is alert and oriented to person, place, and time.   Skin: Skin is warm and dry. Capillary refill takes 2 to 3 seconds. She is not diaphoretic.   RLE dressing intact  See admit photo  RLE wound x 2   with slough and surrounding redness   Psychiatric: She has a normal mood and affect. Her behavior is normal. Judgment and thought content normal.        Significant Labs: Reviewed    Significant Imaging: Reviewed    Assessment/Plan:      COPD (chronic obstructive pulmonary disease)    Nicotine Abuse/ Smoker's cough-  Smoking cessation education  Pt refused nicotine patch          Deep vein thrombosis (DVT) of right lower extremity    With chronic anticoagulation with apixaban-  Continue home apixaban- Dose verified with pharmacy          Type 2 diabetes mellitus with skin complication, with long-term current use of insulin    DM diet  Accuchecks with correctional SSI  Continue home metformin  Continue long acting insulin at lower dose            Diabetic polyneuropathy associated with type 2 diabetes mellitus    Continue home neurontin          CAD (coronary artery disease)    Hx Coronary stent placement x 2/ PAD Hx LLE stent placement/ HTN /Hyperlipidemia-  Continue home asa, plavix, ACEI, statin          * Infected wound    Cellulitis Right ankle-  Consult ID and podiatry  Continue Iv clindamycin till evaluated by ID  Add MVI, Zinc, and Sony  Wound care as per Podiatry  Prn pain medication              VTE Risk Mitigation         Ordered     apixaban tablet 5 mg  2 times daily     Route:  Oral        07/06/17 1630     Low Risk of VTE  Once      07/06/17 1442          SHAMA Bowens  Department of Hospital Medicine   [facility]    Time spent seeing patient( greater than 1/2 spent in direct contact) : 78 minutes

## 2017-07-06 NOTE — ASSESSMENT & PLAN NOTE
Cellulitis Right ankle-  ID and podiatry following  Continue Iv abx as recommended by ID- Currently on cipro and vancomycin  Continue MVI, Zinc, and Sony  Wound care as per Podiatry  Prn pain medication

## 2017-07-06 NOTE — HPI
This is a 66 year old female with complaint of wound to RLE since January 2017. She states she broke her ankle in 1/2017 and underwent surgery by Dr. Bowden. She states she has had slow wound healing since the surgery. Pt has been being followed by Dr. Alexander as out patient. Pt has  PMH significant for DM-2, hypertension, nicotine abuse, CAD with prior coronary stent placement x 2 in 2015 and MI. She was going to Dr. Alexander's office weekly and had home health three times a week. She states she was suppose to have a wound vac placed but the home health nurse was concerned about the drainage and redness around the wound. She reports the wound has been draining for about two days with the associated, worsening redness. She denied fever or chills. Pt placed in hospital for further evaluation and treatment.

## 2017-07-06 NOTE — ED NOTES
Patient wounds to right ankle are secondary to surgery from approximately end of January to beginning of February as reported by patient. Patient has had wound care done at Dr. Bowden office and home health nursing for wound care. Patient reports that she has a wound vac sitting in her livingroom not being used. Noted patient to have slough to wound beds bilat, no bleeding, red periwound. Patient insisted that after I cleansed her wounds that I apply the Santyl that she had with her. I cleansed the wounds with wound cleanser, applied the santyl to both wounds covered with 4x4 gauze on each side then wrapped with kerlex and secured with tape. Patient then proceded to go outside to smoke a cigarette before I could place her Iv. IV is now in place. IV antibiotics are to be initiated and patient is to be admitted to observation.

## 2017-07-06 NOTE — ASSESSMENT & PLAN NOTE
With chronic anticoagulation with apixaban-  Continue home apixaban- Dose verified with pharmacy  Pt for possible debridement 7/10/17. Hold apixaban after am dose on 7/8/17 and bridge with  full dose lovenox .

## 2017-07-06 NOTE — ASSESSMENT & PLAN NOTE
Hx Coronary stent placement x 2/ PAD Hx LLE stent placement/ HTN /Hyperlipidemia-  Continue home asa, plavix, ACEI, statin

## 2017-07-06 NOTE — ASSESSMENT & PLAN NOTE
DM diet  Accuchecks with correctional SSI  Continue home metformin  Continue long acting insulin at lower dose  Blood sugars running 114-196

## 2017-07-06 NOTE — CONSULTS
[facility]  Podiatry  Consult Note    Patient Name: Kateryna Connolly  MRN: [id number]  Admission Date: 7/6/2017  Hospital Length of Stay: 0 days  Attending Physician: Teto Aguilera MD  Primary Care Provider: Dmitriy Treadwell MD     Consults  Subjective:     History of Present Illness: wound right ankle.    Scheduled Meds:   apixaban  5 mg Oral BID    aspirin  81 mg Oral Daily    atorvastatin  80 mg Oral QHS    clindamycin (CLEOCIN) IVPB  900 mg Intravenous Q8H    [START ON 7/7/2017] clopidogrel  75 mg Oral Daily    [START ON 7/7/2017] gabapentin  900 mg Oral Daily    insulin detemir  15 Units Subcutaneous QHS    [START ON 7/7/2017] lisinopril  2.5 mg Oral Daily    metformin  1,000 mg Oral BID WM    [START ON 7/7/2017] metoprolol succinate  25 mg Oral Daily    [START ON 7/7/2017] multivitamin  1 tablet Oral Daily    [START ON 7/7/2017] zinc sulfate  220 mg Oral Daily     Continuous Infusions:   PRN Meds:acetaminophen, dextrose 50%, dextrose 50%, glucagon (human recombinant), glucose, glucose, insulin aspart, ondansetron, oxycodone-acetaminophen, oxycodone-acetaminophen, promethazine, ramelteon    Review of patient's allergies indicates:   Allergen Reactions    Hydrocodone Hives    Pcn [penicillins] Hives    Pcn [penicillins] Rash        Past Medical History:   Diagnosis Date    Arthritis     Diabetes mellitus     Heart attack     Hypertension     Neuropathy     Occasional tremors      Past Surgical History:   Procedure Laterality Date    CARPAL TUNNEL RELEASE      bilateral    EYE SURGERY      bilat cataract removal with iol implants    GANGLION CYST EXCISION      heart stents      HYSTERECTOMY      KNEE SURGERY Left     SKIN BIOPSY      on side of nose       Family History     None        Social History Main Topics    Smoking status: Current Every Day Smoker    Smokeless tobacco: Never Used    Alcohol use Yes      Comment: drinks one wine cooler daily    Drug  use: No    Sexual activity: Not on file     Review of Systems  Objective:     Vital Signs (Most Recent):  Temp: 98.1 °F (36.7 °C) (07/06/17 1451)  Pulse: 95 (07/06/17 1451)  Resp: 16 (07/06/17 1451)  BP: 117/64 (07/06/17 1451)  SpO2: 100 % (07/06/17 1451) Vital Signs (24h Range):  Temp:  [98.1 °F (36.7 °C)] 98.1 °F (36.7 °C)  Pulse:  [] 95  Resp:  [16] 16  SpO2:  [94 %-100 %] 100 %  BP: (117-137)/(64-79) 117/64     Weight: 94.8 kg (209 lb)  Body mass index is 32.73 kg/m².    Foot Exam    Laboratory:  A1C: No results for input(s): HGBA1C in the last 4320 hours.  Blood Cultures: No results for input(s): LABBLOO in the last 48 hours.  BMP:   Recent Labs  Lab 07/06/17  1221         K 3.9      CO2 26   BUN 11   CREATININE 0.8   CALCIUM 9.8     CBC:   Recent Labs  Lab 07/06/17  1221   WBC 10.80   RBC 4.40   HGB 13.2   HCT 40.0      MCV 91   MCH 30.0   MCHC 33.0     CMP:   Recent Labs  Lab 07/06/17  1221      CALCIUM 9.8   ALBUMIN 3.6   PROT 7.1      K 3.9   CO2 26      BUN 11   CREATININE 0.8   ALKPHOS 62   ALT 11   AST 17   BILITOT 0.6     Coagulation: No results for input(s): INR, APTT in the last 168 hours.    Invalid input(s): PT  CRP:   Recent Labs  Lab 07/06/17  1221   CRP 27.0*     ESR:   Recent Labs  Lab 07/06/17  1221   SEDRATE 38*     Wound Cultures: No results for input(s): LABAERO in the last 4320 hours.  Microbiology Results (last 7 days)     Procedure Component Value Units Date/Time    Blood Culture #2 [[id number]] Collected:  07/06/17 1223    Order Status:  Sent Specimen:  Blood Updated:  07/06/17 1223    Blood Culture #1 [[id number]] Collected:  07/06/17 1221    Order Status:  Sent Specimen:  Blood Updated:  07/06/17 1221          Diagnostic Results:  I have reviewed all pertinent imaging results/findings within the past 24 hours.    Clinical Findings:  Wound: medial right ankle    Size:    Pre: about [id number]    Base:  Tan fibrous base with moderate  serous/serosanaguinous drainage only.  No pus, tracking, fluctuance, malodor, but periwound erythema present consistent with infection/cellulitis.    Borders:   erythema about 1 cm radially outwart to flat, pink, blanchable skin edges without undermining.    Wound: lateral right ankle    Size:    Pre:    about [id number] mm    Base:  Fibrous tan with moderate serous/serosanaguinous drainage only.  No pus, tracking, fluctuance, malodor, but erythema about 1.5 cm radially outward.  Base is firm and may be bone or hardware covered with soft tissue.  Concern for osteomyelitis moderate at present.    Borders:   about 1.5 cm erythema transitioning to flat, pink, blanchable skin edges without undermining.    Trace pulses DP/PT beneath edema about 2-3+ bilateral.    No gait exam. All ten toes without clubbing, cyanosis, or signs of ischemia.  Marked pain to palpation around wounds medial and lateral ankle right, otherwise not focally painful.  Range of motion, stability, muscle strength, and muscle tone normal bilateral feet and legs.    Diminished/loss of protective sensation all toes bilateral to 10 gram monofilament.  Decreased/absent vibratory sensation bilateral feet to 128Hz tuning fork.        Assessment/Plan:     Active Diagnoses:    Diagnosis Date Noted POA    PRINCIPAL PROBLEM:  Infected wound [T14.8, L08.9] 07/06/2017 Yes    Benign essential HTN [I10] 07/06/2017 Yes    Cellulitis of right ankle [L03.115] 07/06/2017 Yes    Chronic anticoagulation [Z79.01] 07/06/2017 Not Applicable    Diabetic polyneuropathy associated with type 2 diabetes mellitus [E11.42] 07/06/2017 Yes    Type 2 diabetes mellitus with skin complication, with long-term current use of insulin [E11.628, Z79.4] 07/06/2017 Not Applicable    Deep vein thrombosis (DVT) of right lower extremity [I82.401] 07/06/2017 Yes    Smokers' cough [J41.0] 07/06/2017 Yes    PAD (peripheral artery disease) [I73.9] 07/06/2017 Yes    COPD (chronic obstructive  pulmonary disease) [J44.9] 07/06/2017 Yes    CAD (coronary artery disease) [I25.10] 01/26/2015 Yes    S/P drug eluting coronary stent placement [Z95.5] 01/26/2015 Not Applicable    Hypercholesterolemia [E78.00] 01/08/2015 Yes    Tobacco dependence [F17.200] 01/08/2015 Yes      Problems Resolved During this Admission:    Diagnosis Date Noted Date Resolved POA       Indium scan, ID consult, wound vac/santyl combo.    Possible OR debridement Monday pending improvement without it.    Debridement in OR will expose hardware without increasing granulation tissue.    Thank you for your consult. will follow.    Iglesia Alexander DPM  Podiatry  [facility]

## 2017-07-07 ENCOUNTER — TELEPHONE (OUTPATIENT)
Dept: PODIATRY | Facility: CLINIC | Age: 67
End: 2017-07-07

## 2017-07-07 LAB
POCT GLUCOSE: 113 MG/DL (ref 70–110)
POCT GLUCOSE: 114 MG/DL (ref 70–110)
POCT GLUCOSE: 120 MG/DL (ref 70–110)
POCT GLUCOSE: 171 MG/DL (ref 70–110)
POCT GLUCOSE: 196 MG/DL (ref 70–110)

## 2017-07-07 PROCEDURE — 63600175 PHARM REV CODE 636 W HCPCS: Performed by: HOSPITALIST

## 2017-07-07 PROCEDURE — 25000003 PHARM REV CODE 250: Performed by: PHYSICIAN ASSISTANT

## 2017-07-07 PROCEDURE — 63600175 PHARM REV CODE 636 W HCPCS: Performed by: PHYSICIAN ASSISTANT

## 2017-07-07 PROCEDURE — 11000001 HC ACUTE MED/SURG PRIVATE ROOM

## 2017-07-07 PROCEDURE — 25000003 PHARM REV CODE 250: Performed by: HOSPITALIST

## 2017-07-07 PROCEDURE — 25000003 PHARM REV CODE 250: Performed by: NURSE PRACTITIONER

## 2017-07-07 PROCEDURE — 25000003 PHARM REV CODE 250: Performed by: INTERNAL MEDICINE

## 2017-07-07 RX ORDER — KETOROLAC TROMETHAMINE 30 MG/ML
15 INJECTION, SOLUTION INTRAMUSCULAR; INTRAVENOUS ONCE
Status: COMPLETED | OUTPATIENT
Start: 2017-07-07 | End: 2017-07-07

## 2017-07-07 RX ORDER — ENOXAPARIN SODIUM 100 MG/ML
1 INJECTION SUBCUTANEOUS ONCE
Status: DISCONTINUED | OUTPATIENT
Start: 2017-07-09 | End: 2017-07-07

## 2017-07-07 RX ORDER — MORPHINE SULFATE 2 MG/ML
6 INJECTION, SOLUTION INTRAMUSCULAR; INTRAVENOUS EVERY 4 HOURS PRN
Status: DISCONTINUED | OUTPATIENT
Start: 2017-07-07 | End: 2017-07-09

## 2017-07-07 RX ORDER — ENOXAPARIN SODIUM 100 MG/ML
1 INJECTION SUBCUTANEOUS
Status: DISCONTINUED | OUTPATIENT
Start: 2017-07-08 | End: 2017-07-08

## 2017-07-07 RX ADMIN — APIXABAN 5 MG: 2.5 TABLET, FILM COATED ORAL at 08:07

## 2017-07-07 RX ADMIN — OXYCODONE HYDROCHLORIDE AND ACETAMINOPHEN 1 TABLET: 10; 325 TABLET ORAL at 03:07

## 2017-07-07 RX ADMIN — METOPROLOL SUCCINATE 25 MG: 25 TABLET, EXTENDED RELEASE ORAL at 08:07

## 2017-07-07 RX ADMIN — VANCOMYCIN HYDROCHLORIDE 1000 MG: 1 INJECTION, POWDER, LYOPHILIZED, FOR SOLUTION INTRAVENOUS at 03:07

## 2017-07-07 RX ADMIN — KETOROLAC TROMETHAMINE 15 MG: 30 INJECTION, SOLUTION INTRAMUSCULAR at 12:07

## 2017-07-07 RX ADMIN — APIXABAN 5 MG: 2.5 TABLET, FILM COATED ORAL at 09:07

## 2017-07-07 RX ADMIN — THERA TABS 1 TABLET: TAB at 08:07

## 2017-07-07 RX ADMIN — MORPHINE SULFATE 6 MG: 2 INJECTION, SOLUTION INTRAMUSCULAR; INTRAVENOUS at 05:07

## 2017-07-07 RX ADMIN — CLINDAMYCIN IN 5 PERCENT DEXTROSE 900 MG: 18 INJECTION, SOLUTION INTRAVENOUS at 05:07

## 2017-07-07 RX ADMIN — CIPROFLOXACIN HYDROCHLORIDE 500 MG: 500 TABLET, FILM COATED ORAL at 09:07

## 2017-07-07 RX ADMIN — INSULIN DETEMIR 20 UNITS: 100 INJECTION, SOLUTION SUBCUTANEOUS at 09:07

## 2017-07-07 RX ADMIN — ALPRAZOLAM 0.25 MG: 0.25 TABLET ORAL at 09:07

## 2017-07-07 RX ADMIN — GABAPENTIN 900 MG: 300 CAPSULE ORAL at 08:07

## 2017-07-07 RX ADMIN — ATORVASTATIN CALCIUM 80 MG: 40 TABLET, FILM COATED ORAL at 09:07

## 2017-07-07 RX ADMIN — ZINC SULFATE 220 MG (50 MG) CAPSULE 220 MG: CAPSULE at 08:07

## 2017-07-07 RX ADMIN — OXYCODONE HYDROCHLORIDE AND ACETAMINOPHEN 1 TABLET: 10; 325 TABLET ORAL at 09:07

## 2017-07-07 RX ADMIN — LISINOPRIL 2.5 MG: 2.5 TABLET ORAL at 08:07

## 2017-07-07 RX ADMIN — METFORMIN HYDROCHLORIDE 1000 MG: 500 TABLET, FILM COATED ORAL at 04:07

## 2017-07-07 RX ADMIN — CIPROFLOXACIN HYDROCHLORIDE 500 MG: 500 TABLET, FILM COATED ORAL at 08:07

## 2017-07-07 RX ADMIN — METFORMIN HYDROCHLORIDE 1000 MG: 500 TABLET, FILM COATED ORAL at 08:07

## 2017-07-07 RX ADMIN — ONDANSETRON 4 MG: 2 INJECTION INTRAMUSCULAR; INTRAVENOUS at 10:07

## 2017-07-07 RX ADMIN — CLOPIDOGREL BISULFATE 75 MG: 75 TABLET ORAL at 08:07

## 2017-07-07 NOTE — PROGRESS NOTES
Wound vac placed per Ana María JAUREGUI to lateral ankle wound per orders.  Pt crying in pain.  Spoke with Dinorah regarding pain management.  Will monitor. Pt refuses to wear boot at this time.

## 2017-07-07 NOTE — NURSING
"Patient alert sitting at bedside. Very agitated and rude. Upset because "somebody should be able to take me down for a cigarette." Explained to patient that won't happen as long as I am her nurse. Reminded of plan of care and treatment plan. Patient asked to be discharged. AMA discussed with patient. Patient apologized for behavior and  decided to stay. Care continued. Wound care done to right ankle. Night meds given. Report given to Rhonda Andino RN.   "

## 2017-07-07 NOTE — PROGRESS NOTES
Notes reviewed  AF  Having a fair amount of pain, but remains sleepy on pain meds  Wound vac in place and I cannot see the wound.  I have asked her to photograph the wound at wound vac change.  Continue мария and rocephin for now  Will follow up on WBC scan

## 2017-07-07 NOTE — PLAN OF CARE
The pt lives at home alone . She verified all info on the face sheet as correct. She is active with [facility]. She has plenty of DME including BSC, 2 walkers, 2 wheelchair's and a shower chair. She uses [facility] pharmacy in [place]. She has stopped driving and her daughter typically brings her to medical appts. She is no longer seeing Dr. Treadwell and has an appt on Monday at 1:20 pm with Dr. Soares . She has no questions or concerns and I wrote my name and number on the pts white board. Татьяна BRADLEY Janusz, [place]     07/07/17 1027   Discharge Assessment   Assessment Type Discharge Planning Assessment   Confirmed/corrected address and phone number on facesheet? Yes   Assessment information obtained from? Patient   Communicated expected length of stay with patient/caregiver no   If Healthcare Directive is received, is it scanned into Epic? no (comment)   Prior to hospitilization cognitive status: Alert/Oriented   Prior to hospitalization functional status: Assistive Equipment   Current cognitive status: Alert/Oriented   Current Functional Status: Assistive Equipment   Arrived From home or self-care   Lives With alone   Able to Return to Prior Arrangements yes   Is patient able to care for self after discharge? Yes   Readmission Within The Last 30 Days no previous admission in last 30 days   Patient currently being followed by outpatient case management? No   Patient currently receives home health services? Yes   Patient previously received home health services and would like to resume services if necessary? Yes   If yes, name of home health provider: [facility]   Does the patient currently use HME? Yes   Patient currently receives private duty nursing? No   Patient currently receives any other outside agency services? No   Equipment Currently Used at Home bedside commode;shower chair;wheelchair;walker, standard   Do you have any problems affording any of your prescribed medications? No  ([facility] )   Is the patient  taking medications as prescribed? yes   Do you have any financial concerns preventing you from receiving the healthcare you need? No  (Medicare and [employer] )   Does the patient have transportation to healthcare appointments? Yes   Transportation Available car;family or friend will provide   On Dialysis? No   Does the patient receive services at the [facility]? No   Are there any open cases? No   Discharge Plan A Home;Home Health   Discharge Plan B Home with family   Patient/Family In Agreement With Plan yes

## 2017-07-07 NOTE — PROGRESS NOTES
[facility] Hospital Medicine  Progress Note    Patient Name: Kateryna Connolly  MRN: [id number]  Patient Class: IP- Inpatient   Admission Date: 7/6/2017  Length of Stay: 0 days  Attending Physician: Ritika Cuadra MD  Primary Care Provider: Dwayne Soares MD        Subjective:     Principal Problem:Infected wound    HPI:  This is a 66 year old female with complaint of wound to RLE since January 2017. She states she broke her ankle in 1/2017 and underwent surgery by Dr. Bowden. She states she has had slow wound healing since the surgery. Pt has been being followed by Dr. Alexander as out patient. Pt has  PMH significant for DM-2, hypertension, nicotine abuse, CAD with prior coronary stent placement x 2 in 2015 and MI. She was going to Dr. Alexander's office weekly and had home health three times a week. She states she was suppose to have a wound vac placed but the home health nurse was concerned about the drainage and redness around the wound. She reports the wound has been draining for about two days with the associated, worsening redness. She denied fever or chills. Pt placed in hospital for further evaluation and treatment.    Hospital Course:  ID and podiatry consulted. Pt for possible debridement 7/10/17. Hold apixaban after am dose on 7/8/17. Will give lovenox full dose x 1 on 7/09/17.     Past Medical History:   Diagnosis Date    Arthritis     Diabetes mellitus     Heart attack     Hypertension     Neuropathy     Occasional tremors        Past Surgical History:   Procedure Laterality Date    CARPAL TUNNEL RELEASE      bilateral    EYE SURGERY      bilat cataract removal with iol implants    GANGLION CYST EXCISION      heart stents      HYSTERECTOMY      KNEE SURGERY Left     SKIN BIOPSY      on side of nose       Review of patient's allergies indicates:   Allergen Reactions    Hydrocodone Hives    Pcn [penicillins] Hives    Pcn [penicillins] Rash       No current  facility-administered medications on file prior to encounter.      Current Outpatient Prescriptions on File Prior to Encounter   Medication Sig    apixaban 5 mg Tab Take 2 tablets (10 mg total) by mouth 2 (two) times daily. (Patient taking differently: Take 5 mg by mouth 2 (two) times daily. )    atorvastatin (LIPITOR) 80 MG tablet Take 1 tablet (80 mg total) by mouth every evening.    clopidogrel (PLAVIX) 75 mg tablet Take 1 tablet (75 mg total) by mouth once daily.    collagenase ointment Apply topically once daily.    insulin glargine (LANTUS) 100 unit/mL injection Inject 25 Units into the skin every morning.     lisinopril (PRINIVIL,ZESTRIL) 2.5 MG tablet Take 1 tablet (2.5 mg total) by mouth once daily.    melatonin 3 mg Tab Take 3 mg by mouth every evening.     metformin (GLUCOPHAGE) 1000 MG tablet Take 1,000 mg by mouth 2 (two) times daily with meals.    metoprolol succinate (TOPROL-XL) 25 MG 24 hr tablet Take 1 tablet (25 mg total) by mouth once daily.    [DISCONTINUED] ACCU-CHEK CHARLENE PLUS TEST STRP Strp     [DISCONTINUED] aspirin 81 MG Chew Take 1 tablet (81 mg total) by mouth once daily.    [DISCONTINUED] oxycodone-acetaminophen (PERCOCET)  mg per tablet Take 1 tablet by mouth every 6 (six) hours as needed for Pain.     Family History     None        Social History Main Topics    Smoking status: Current Every Day Smoker    Smokeless tobacco: Never Used    Alcohol use Yes      Comment: drinks one wine cooler daily    Drug use: No    Sexual activity: Not on file     Review of Systems   Constitutional: Negative for activity change, appetite change, chills, diaphoresis, fatigue and fever.   HENT: Negative for ear pain and facial swelling.    Eyes: Negative for pain and redness.   Respiratory: Positive for cough. Negative for apnea, choking, chest tightness, wheezing and stridor.         Smoker's cough   Cardiovascular: Positive for leg swelling. Negative for chest pain and palpitations.    Gastrointestinal: Negative for abdominal distention and abdominal pain.   Endocrine: Negative for polydipsia and polyphagia.   Genitourinary: Negative for difficulty urinating, dysuria, flank pain and hematuria.   Musculoskeletal: Positive for arthralgias and gait problem. Negative for neck pain and neck stiffness.   Skin: Positive for wound.        Right ankle - See admit photo   Allergic/Immunologic: Negative for food allergies.   Neurological: Negative for syncope and weakness.   Hematological: Bruises/bleeds easily.   Psychiatric/Behavioral: Negative for agitation, behavioral problems, confusion, hallucinations and suicidal ideas.     Objective:     Vital Signs (Most Recent):  Temp: 98.1 °F (36.7 °C) (07/06/17 1451)  Pulse: 95 (07/06/17 1451)  Resp: 16 (07/06/17 1451)  BP: 117/64 (07/06/17 1451)  SpO2: 100 % (07/06/17 1451) Vital Signs (24h Range):  Temp:  [98.1 °F (36.7 °C)] 98.1 °F (36.7 °C)  Pulse:  [] 95  Resp:  [16] 16  SpO2:  [94 %-100 %] 100 %  BP: (117-137)/(64-79) 117/64     Weight: 94.8 kg (209 lb)  Body mass index is 32.73 kg/m².    Physical Exam   Constitutional: She is oriented to person, place, and time. She appears well-developed and well-nourished. No distress.   HENT:   Head: Normocephalic and atraumatic.   Eyes: Conjunctivae and EOM are normal. Pupils are equal, round, and reactive to light. Right eye exhibits no discharge. Left eye exhibits no discharge.   Neck: Normal range of motion. Neck supple. No JVD present.   Cardiovascular: Normal rate, regular rhythm and intact distal pulses.    Pulmonary/Chest: No stridor. No respiratory distress.   BBS diminished   Abdominal: Soft. Bowel sounds are normal. She exhibits no distension. There is no tenderness.   Obese   Genitourinary:   Genitourinary Comments: Not examined   Musculoskeletal: She exhibits edema.   BLE edema R>L   Neurological: She is alert and oriented to person, place, and time.   Skin: Skin is warm and dry. Capillary refill  takes 2 to 3 seconds. She is not diaphoretic.   RLE dressing intact  See admit photo  RLE wound x 2  with slough and surrounding redness   Psychiatric: She has a normal mood and affect. Her behavior is normal. Judgment and thought content normal.        Significant Labs: Reviewed    Significant Imaging: Reviewed    Assessment/Plan:      COPD (chronic obstructive pulmonary disease)    Nicotine Abuse/ Smoker's cough-  Smoking cessation education  Pt refused nicotine patch          Deep vein thrombosis (DVT) of right lower extremity    With chronic anticoagulation with apixaban-  Continue home apixaban- Dose verified with pharmacy  Pt for possible debridement 7/10/17. Hold apixaban after am dose on 7/8/17 and bridge with  full dose lovenox .           Type 2 diabetes mellitus with skin complication, with long-term current use of insulin    DM diet  Accuchecks with correctional SSI  Continue home metformin  Continue long acting insulin at lower dose  Blood sugars running 114-196            Diabetic polyneuropathy associated with type 2 diabetes mellitus    Continue home neurontin          CAD (coronary artery disease)    Hx Coronary stent placement x 2/ PAD Hx LLE stent placement/ HTN /Hyperlipidemia-  Continue home asa, plavix, ACEI, statin          * Infected wound    Cellulitis Right ankle-  ID and podiatry following  Continue Iv abx as recommended by ID- Currently on cipro and vancomycin  Continue MVI, Zinc, and Sony  Wound care as per Podiatry  Prn pain medication              VTE Risk Mitigation         Ordered     apixaban tablet 5 mg  2 times daily     Route:  Oral        07/07/17 1554     Low Risk of VTE  Once      07/06/17 1442          SHAMA Bowens  Department of Hospital Medicine   [facility]    Time spent seeing patient( greater than 1/2 spent in direct contact) : 36 minutes

## 2017-07-07 NOTE — CONSULTS
Kateryna Connolly [id number] is a 66 y.o. female who has been consulted for vancomycin dosing.    The patient has the following labs:     Date Creatinine (mg/dl)    BUN WBC Count   7/6/2017 Estimated Creatinine Clearance: 81.8 mL/min (based on Cr of 0.8). Lab Results   Component Value Date    BUN 11 07/06/2017     Lab Results   Component Value Date    WBC 10.80 07/06/2017        Current weight is 94.8 kg (209 lb)    The patient received  1000 mg on 07/06 at 1547 (if pt has already received first dose including doses in ED)    The patient will be started on vancomycin at a dose of 1000 mg every 12 hours (10.5 mg/kg/dose).  The vancomycin trough has been ordered for 07/08 at 0330.  Target trough goal is 10 to 15 mg/dL for cellulitis.    Patient will be followed by pharmacy for changes in renal function, toxicity, and efficacy.   Thank you for allowing us to participate in this patient's care.     Donny Gardiner, SarahD

## 2017-07-07 NOTE — TELEPHONE ENCOUNTER
----- Message from Yasmeen Nettles sent at 7/6/2017  3:23 PM CDT -----  [facility] in [facility]ulte for the above patient , contact [place] at [phone].      Thank you

## 2017-07-07 NOTE — CONSULTS
Consult Note  Infectious Disease    Reason for Consult:  Wound infection    HPI: Kateryna Connolly is a  66 y.o. female known to me from treatment of osteomyelitis of the distal second and 4th phalanges of the left foot last fall. She sustained an injury on 1/1/17 causing ankle fracture. She had an ORIF at [facility]. She was discharged to [facility] and on the day of admission, was not assisted to restroom as requested and fell onto ankle causing further damage and requiring a second surgery. She never healed the wounds. She recalls being on oral antibiotics the entire time she was at [facility] and did take bactrim after discharge from there. She has been referred to Dr. Alexander for the management of the wound and he has attempted to have wound vacs placed.  has been reluctant to do this because of the appearance of the wounds and over the last 2 days she has had worsening erythema around the lateral wound and increased edema. She was referred to the hospital for admission. It is unclear if her ankle bones have healed enough to have the hardware removed. She unfortunately continues to smoke. BS have been well controlled.     Review of patient's allergies indicates:   Allergen Reactions    Hydrocodone Hives    Pcn [penicillins] Hives    Pcn [penicillins] Rash     Past Medical History:   Diagnosis Date    Arthritis     Diabetes mellitus, since age 27,      Heart attack     Hypertension     Neuropathy     Occasional tremors    Osteomyelitis of the left second and fourth toes 11/2016    Past Surgical History:   Procedure Laterality Date    CARPAL TUNNEL RELEASE      Peripheral vascular intervention per Dr. Rollins    EYE SURGERY      bilat cataract removal with iol implants    GANGLION CYST EXCISION      heart stents      HYSTERECTOMY      KNEE SURGERY Left     SKIN BIOPSY      on side of nose     Social History     Social History    Marital status: Single     Spouse name: N/A    Number  of children: N/A    Years of education: N/A     Social History Main Topics    Smoking status: Current Every Day Smoker    Smokeless tobacco: Never Used    Alcohol use Yes      Comment: drinks one wine cooler daily    Drug use: No    Sexual activity: Not Asked     Other Topics Concern    None     Social History Narrative    ** Merged History Encounter **          History reviewed. No pertinent family history.    Pertinent medications noted:     Pertinent Review of Systems:   No chills, fever, sweats  No chest pain,   No cough, sputum production, No nausea, vomiting, diarrhea,   No unusual headaches, dizziness, vertigo,  No anxiety, depression,  Does not feel she can stop smoking at this time. Not staying in her own home. Still distressed by having to stay at SNF for 4 months  No bleeding, lymphadenopathy, anemia,    EXAM & DIAGNOSTICS REVIEWED:   Vitals:     Temp:  [98.1 °F (36.7 °C)]   Temp: 98.1 °F (36.7 °C) (07/06/17 1451)  Pulse: 95 (07/06/17 1451)  Resp: 16 (07/06/17 1451)  BP: 117/64 (07/06/17 1451)  SpO2: 100 % (07/06/17 1451)    Intake/Output Summary (Last 24 hours) at 07/06/17 1934  Last data filed at 07/06/17 1834   Gross per 24 hour   Intake              420 ml   Output                0 ml   Net              420 ml       General:  In NAD. Looks non toxic. Alert and attentive, cooperative  Eyes:  Anicteric, PERRL, EOMI  ENT:  Mouth w/ pink MMM, no lesions/exudate,  Fair dentition  Neck:  Trachea midline, supple, no adenopathy appreciated  Lungs: clear  Heart:  RRR, no gallop/murmur noted  Abd:  soft, obese, NT, ND, normal BS, no masses/organomegaly appreciated.  :  Voids  Musc:  Joints without effusion, swelling,  erythema, synovitis,   Skin:  Generally warm, dry, normal for color.tinea pedis right foot  Wound: Medial ankle, 3 cm long, all slough, no cellulitis                          Lateral ankle , long, wide, all slough with surrounding cellulitis. No hardware exposed.   Neuro: AAOx3, speech  clear, moves all extrems equally. Has tremor of head  Extrem: Chronic edema of LE, mild cellulitis surrounding the wound. No foul smell. No proximal extension. Pulses 1+.   VAD:    Lines/Tubes/Drains:    General Labs reviewed:    Recent Labs  Lab 07/06/17  1221   WBC 10.80   RBC 4.40   HGB 13.2   HCT 40.0      MCV 91   MCH 30.0   MCHC 33.0       Recent Labs  Lab 07/06/17  1221   CALCIUM 9.8   PROT 7.1      K 3.9   CO2 26      BUN 11   CREATININE 0.8   ALKPHOS 62   ALT 11   AST 17   BILITOT 0.6           Micro:  Microbiology Results (last 7 days)     Procedure Component Value Units Date/Time    Blood Culture #1 [[id number]] Collected:  07/06/17 1221    Order Status:  Sent Specimen:  Blood Updated:  07/06/17 1909    Blood Culture #2 [[id number]] Collected:  07/06/17 1223    Order Status:  Sent Specimen:  Blood Updated:  07/06/17 1909        Imaging Reviewed:   Ankle xray    IMPRESSION & PLAN   1. Wound infection/cellulitis right ankle wound, never healed post surgery, with slough in base  2. Diabetes with smoking  3. PAD, no critical stenoses per arterial ultrasound  4.    Recommendation:   мария and cipro  Agree with WBC nuclear scan as MRI or CT would not be sufficiently sensitive and would have lots of artifact  May need LTAC. May need hyperbaric  Desperately needs to quit smoking. I Have rx xanax prn.   D/w Dr. Alexander

## 2017-07-07 NOTE — PLAN OF CARE
Problem: Patient Care Overview  Goal: Plan of Care Review  Outcome: Ongoing (interventions implemented as appropriate)  Fall and safety precautions maintained. Wound vac to Rt  ankle patent and draining. Zofran given as ordered once for complaints of nausea, see MAR. WBC scan to be done tomorrow. Will continue to monitor.

## 2017-07-08 LAB
POCT GLUCOSE: 116 MG/DL (ref 70–110)
POCT GLUCOSE: 142 MG/DL (ref 70–110)
POCT GLUCOSE: 170 MG/DL (ref 70–110)
POCT GLUCOSE: 186 MG/DL (ref 70–110)
VANCOMYCIN TROUGH SERPL-MCNC: 7.6 UG/ML

## 2017-07-08 PROCEDURE — 36415 COLL VENOUS BLD VENIPUNCTURE: CPT

## 2017-07-08 PROCEDURE — 11000001 HC ACUTE MED/SURG PRIVATE ROOM

## 2017-07-08 PROCEDURE — 80202 ASSAY OF VANCOMYCIN: CPT

## 2017-07-08 PROCEDURE — 25000003 PHARM REV CODE 250: Performed by: INTERNAL MEDICINE

## 2017-07-08 PROCEDURE — 25000003 PHARM REV CODE 250: Performed by: NURSE PRACTITIONER

## 2017-07-08 PROCEDURE — 25000003 PHARM REV CODE 250: Performed by: PHYSICIAN ASSISTANT

## 2017-07-08 PROCEDURE — 63600175 PHARM REV CODE 636 W HCPCS: Performed by: INTERNAL MEDICINE

## 2017-07-08 RX ADMIN — CLOPIDOGREL BISULFATE 75 MG: 75 TABLET ORAL at 08:07

## 2017-07-08 RX ADMIN — ZINC SULFATE 220 MG (50 MG) CAPSULE 220 MG: CAPSULE at 08:07

## 2017-07-08 RX ADMIN — OXYCODONE HYDROCHLORIDE AND ACETAMINOPHEN 1 TABLET: 10; 325 TABLET ORAL at 08:07

## 2017-07-08 RX ADMIN — INSULIN DETEMIR 20 UNITS: 100 INJECTION, SOLUTION SUBCUTANEOUS at 08:07

## 2017-07-08 RX ADMIN — GABAPENTIN 900 MG: 300 CAPSULE ORAL at 08:07

## 2017-07-08 RX ADMIN — VANCOMYCIN HYDROCHLORIDE 1250 MG: 500 INJECTION, POWDER, LYOPHILIZED, FOR SOLUTION INTRAVENOUS at 05:07

## 2017-07-08 RX ADMIN — CIPROFLOXACIN HYDROCHLORIDE 500 MG: 500 TABLET, FILM COATED ORAL at 08:07

## 2017-07-08 RX ADMIN — OXYCODONE HYDROCHLORIDE AND ACETAMINOPHEN 1 TABLET: 10; 325 TABLET ORAL at 01:07

## 2017-07-08 RX ADMIN — APIXABAN 5 MG: 2.5 TABLET, FILM COATED ORAL at 08:07

## 2017-07-08 RX ADMIN — ATORVASTATIN CALCIUM 80 MG: 40 TABLET, FILM COATED ORAL at 08:07

## 2017-07-08 RX ADMIN — METOPROLOL SUCCINATE 25 MG: 25 TABLET, EXTENDED RELEASE ORAL at 08:07

## 2017-07-08 RX ADMIN — ASPIRIN 81 MG CHEWABLE TABLET 81 MG: 81 TABLET CHEWABLE at 08:07

## 2017-07-08 RX ADMIN — METFORMIN HYDROCHLORIDE 1000 MG: 500 TABLET, FILM COATED ORAL at 05:07

## 2017-07-08 RX ADMIN — THERA TABS 1 TABLET: TAB at 08:07

## 2017-07-08 RX ADMIN — OXYCODONE HYDROCHLORIDE AND ACETAMINOPHEN 1 TABLET: 10; 325 TABLET ORAL at 05:07

## 2017-07-08 RX ADMIN — LISINOPRIL 2.5 MG: 2.5 TABLET ORAL at 08:07

## 2017-07-08 RX ADMIN — METFORMIN HYDROCHLORIDE 1000 MG: 500 TABLET, FILM COATED ORAL at 07:07

## 2017-07-08 NOTE — PLAN OF CARE
Problem: Fall Risk (Adult)  Goal: Absence of Falls  Patient will demonstrate the desired outcomes by discharge/transition of care.   Outcome: Ongoing (interventions implemented as appropriate)  Patient free from falls or injuries this shift.  Patient understands importance of calling for assistance when needing out of bed  Will continue to monitor

## 2017-07-08 NOTE — PLAN OF CARE
Problem: Patient Care Overview  Goal: Plan of Care Review  Outcome: Ongoing (interventions implemented as appropriate)  POC reviewed with patient, understanding verbalized.  Wound vac remains inplace.  Will continue to monitor

## 2017-07-08 NOTE — ASSESSMENT & PLAN NOTE
DM diet  Accuchecks with correctional SSI  Continue home metformin  Continue long acting insulin at lower dose  Blood sugars running 113-170

## 2017-07-08 NOTE — SUBJECTIVE & OBJECTIVE
Past Medical History:   Diagnosis Date    Arthritis     Diabetes mellitus     Heart attack     Hypertension     Neuropathy     Occasional tremors        Past Surgical History:   Procedure Laterality Date    CARPAL TUNNEL RELEASE      bilateral    EYE SURGERY      bilat cataract removal with iol implants    GANGLION CYST EXCISION      heart stents      HYSTERECTOMY      KNEE SURGERY Left     SKIN BIOPSY      on side of nose       Review of patient's allergies indicates:   Allergen Reactions    Hydrocodone Hives    Pcn [penicillins] Hives    Pcn [penicillins] Rash       No current facility-administered medications on file prior to encounter.      Current Outpatient Prescriptions on File Prior to Encounter   Medication Sig    apixaban 5 mg Tab Take 2 tablets (10 mg total) by mouth 2 (two) times daily. (Patient taking differently: Take 5 mg by mouth 2 (two) times daily. )    atorvastatin (LIPITOR) 80 MG tablet Take 1 tablet (80 mg total) by mouth every evening.    clopidogrel (PLAVIX) 75 mg tablet Take 1 tablet (75 mg total) by mouth once daily.    collagenase ointment Apply topically once daily.    insulin glargine (LANTUS) 100 unit/mL injection Inject 25 Units into the skin every morning.     lisinopril (PRINIVIL,ZESTRIL) 2.5 MG tablet Take 1 tablet (2.5 mg total) by mouth once daily.    melatonin 3 mg Tab Take 3 mg by mouth every evening.     metformin (GLUCOPHAGE) 1000 MG tablet Take 1,000 mg by mouth 2 (two) times daily with meals.    metoprolol succinate (TOPROL-XL) 25 MG 24 hr tablet Take 1 tablet (25 mg total) by mouth once daily.     Family History     None        Social History Main Topics    Smoking status: Current Every Day Smoker    Smokeless tobacco: Never Used    Alcohol use Yes      Comment: drinks one wine cooler daily    Drug use: No    Sexual activity: Not on file     Review of Systems   Constitutional: Negative for activity change, appetite change, chills, diaphoresis,  fatigue and fever.   HENT: Negative for ear pain and facial swelling.    Eyes: Negative for pain and redness.   Respiratory: Positive for cough. Negative for apnea, choking, chest tightness, wheezing and stridor.         Smoker's cough   Cardiovascular: Positive for leg swelling. Negative for chest pain and palpitations.   Gastrointestinal: Negative for abdominal distention and abdominal pain.   Endocrine: Negative for polydipsia and polyphagia.   Genitourinary: Negative for difficulty urinating, dysuria, flank pain and hematuria.   Musculoskeletal: Positive for arthralgias and gait problem. Negative for neck pain and neck stiffness.   Skin: Positive for wound.        Right ankle - See admit photo   Allergic/Immunologic: Negative for food allergies.   Neurological: Negative for syncope and weakness.   Hematological: Bruises/bleeds easily.   Psychiatric/Behavioral: Negative for agitation, behavioral problems, confusion, hallucinations and suicidal ideas.     Objective:     Vital Signs (Most Recent):  Temp: 98.5 °F (36.9 °C) (07/08/17 1500)  Pulse: 78 (07/08/17 1500)  Resp: 18 (07/08/17 1500)  BP: (!) 102/50 (07/08/17 1500)  SpO2: 99 % (07/08/17 1500) Vital Signs (24h Range):  Temp:  [97.1 °F (36.2 °C)-98.5 °F (36.9 °C)] 98.5 °F (36.9 °C)  Pulse:  [] 78  Resp:  [18-20] 18  SpO2:  [92 %-99 %] 99 %  BP: ()/(50-64) 102/50     Weight: 94.8 kg (209 lb)  Body mass index is 32.73 kg/m².    Physical Exam   Constitutional: She is oriented to person, place, and time. She appears well-developed and well-nourished. No distress.   HENT:   Head: Normocephalic and atraumatic.   Eyes: Conjunctivae and EOM are normal. Pupils are equal, round, and reactive to light. Right eye exhibits no discharge. Left eye exhibits no discharge.   Neck: Normal range of motion. Neck supple. No JVD present.   Cardiovascular: Normal rate, regular rhythm and intact distal pulses.    Pulmonary/Chest: No stridor. No respiratory distress.   BBS  diminished   Abdominal: Soft. Bowel sounds are normal. She exhibits no distension. There is no tenderness.   Obese   Genitourinary:   Genitourinary Comments: Not examined   Musculoskeletal: She exhibits edema.   BLE edema R>L   Neurological: She is alert and oriented to person, place, and time.   Skin: Skin is warm and dry. Capillary refill takes 2 to 3 seconds. She is not diaphoretic.   RLE dressing intact  See admit photo  RLE wound x 2  with slough and surrounding redness   Psychiatric: She has a normal mood and affect. Her behavior is normal. Judgment and thought content normal.     Significant Labs: Reviewed    Significant Imaging: Reviewed

## 2017-07-08 NOTE — CONSULTS
Kateryna Connolly [id number] is a 66 y.o. female who has been consulted for vancomycin dosing.    The patient has the following labs:     Date Creatinine (mg/dl)    BUN WBC Count   7/8/2017 Estimated Creatinine Clearance: 81.8 mL/min (based on Cr of 0.8). Lab Results   Component Value Date    BUN 11 07/06/2017     Lab Results   Component Value Date    WBC 10.80 07/06/2017        Current weight is 94.8 kg (209 lb)    Pt is receiving vancomycin 1000 mg every 12 hours.  Vancomycin trough from 7/8 at 0331 was 7.6 mg/dL.  Target trough range is 10-15 mg/dL.   Trough was drawn on time and anticipate it is subtherapeutic. Pharmacy will increase current dose.   The patient will be changed to a vancomycin dose of 1250 mg every 12 hours. A vancomycin trough has been ordered for 7/9 at 1700.      Patient will be followed by pharmacy for changes in renal function, toxicity, and efficacy.  Thank you for allowing us to participate in this patient's care.     Lauro Loera

## 2017-07-08 NOTE — CARE UPDATE
Vac in place, indium scan negative osteomyelitis presently.  Will follow wound.  Possible oR debridement pending improvement.

## 2017-07-08 NOTE — PLAN OF CARE
Problem: Patient Care Overview  Goal: Plan of Care Review  Outcome: Ongoing (interventions implemented as appropriate)  Pt slept most of the night.  Prn pain medications given for complaints of right ankle pain.  Wound vac intact.  Up to BSC with assist.  Call light within reach.

## 2017-07-08 NOTE — PROGRESS NOTES
[facility] Hospital Medicine  Progress Note    Patient Name: Kateryna Connolly  MRN: [id number]  Patient Class: IP- Inpatient   Admission Date: 7/6/2017  Length of Stay: 1 days  Attending Physician: Ritika Cuadra MD  Primary Care Provider: Dwayne Soares MD        Subjective:     Principal Problem:Infected wound    HPI:  This is a 66 year old female with complaint of wound to RLE since January 2017. She states she broke her ankle in 1/2017 and underwent surgery by Dr. Bowden. She states she has had slow wound healing since the surgery. Pt has been being followed by Dr. Alexander as out patient. Pt has  PMH significant for DM-2, hypertension, nicotine abuse, CAD with prior coronary stent placement x 2 in 2015 and MI. She was going to Dr. Alexander's office weekly and had home health three times a week. She states she was suppose to have a wound vac placed but the home health nurse was concerned about the drainage and redness around the wound. She reports the wound has been draining for about two days with the associated, worsening redness. She denied fever or chills. Pt placed in hospital for further evaluation and treatment.    Hospital Course:  ID and podiatry consulted.  Wound care as per podiatry.    Past Medical History:   Diagnosis Date    Arthritis     Diabetes mellitus     Heart attack     Hypertension     Neuropathy     Occasional tremors        Past Surgical History:   Procedure Laterality Date    CARPAL TUNNEL RELEASE      bilateral    EYE SURGERY      bilat cataract removal with iol implants    GANGLION CYST EXCISION      heart stents      HYSTERECTOMY      KNEE SURGERY Left     SKIN BIOPSY      on side of nose       Review of patient's allergies indicates:   Allergen Reactions    Hydrocodone Hives    Pcn [penicillins] Hives    Pcn [penicillins] Rash       No current facility-administered medications on file prior to encounter.      Current Outpatient Prescriptions on File  Prior to Encounter   Medication Sig    apixaban 5 mg Tab Take 2 tablets (10 mg total) by mouth 2 (two) times daily. (Patient taking differently: Take 5 mg by mouth 2 (two) times daily. )    atorvastatin (LIPITOR) 80 MG tablet Take 1 tablet (80 mg total) by mouth every evening.    clopidogrel (PLAVIX) 75 mg tablet Take 1 tablet (75 mg total) by mouth once daily.    collagenase ointment Apply topically once daily.    insulin glargine (LANTUS) 100 unit/mL injection Inject 25 Units into the skin every morning.     lisinopril (PRINIVIL,ZESTRIL) 2.5 MG tablet Take 1 tablet (2.5 mg total) by mouth once daily.    melatonin 3 mg Tab Take 3 mg by mouth every evening.     metformin (GLUCOPHAGE) 1000 MG tablet Take 1,000 mg by mouth 2 (two) times daily with meals.    metoprolol succinate (TOPROL-XL) 25 MG 24 hr tablet Take 1 tablet (25 mg total) by mouth once daily.     Family History     None        Social History Main Topics    Smoking status: Current Every Day Smoker    Smokeless tobacco: Never Used    Alcohol use Yes      Comment: drinks one wine cooler daily    Drug use: No    Sexual activity: Not on file     Review of Systems   Constitutional: Negative for activity change, appetite change, chills, diaphoresis, fatigue and fever.   HENT: Negative for ear pain and facial swelling.    Eyes: Negative for pain and redness.   Respiratory: Positive for cough. Negative for apnea, choking, chest tightness, wheezing and stridor.         Smoker's cough   Cardiovascular: Positive for leg swelling. Negative for chest pain and palpitations.   Gastrointestinal: Negative for abdominal distention and abdominal pain.   Endocrine: Negative for polydipsia and polyphagia.   Genitourinary: Negative for difficulty urinating, dysuria, flank pain and hematuria.   Musculoskeletal: Positive for arthralgias and gait problem. Negative for neck pain and neck stiffness.   Skin: Positive for wound.        Right ankle - See admit photo    Allergic/Immunologic: Negative for food allergies.   Neurological: Negative for syncope and weakness.   Hematological: Bruises/bleeds easily.   Psychiatric/Behavioral: Negative for agitation, behavioral problems, confusion, hallucinations and suicidal ideas.     Objective:     Vital Signs (Most Recent):  Temp: 98.5 °F (36.9 °C) (07/08/17 1500)  Pulse: 78 (07/08/17 1500)  Resp: 18 (07/08/17 1500)  BP: (!) 102/50 (07/08/17 1500)  SpO2: 99 % (07/08/17 1500) Vital Signs (24h Range):  Temp:  [97.1 °F (36.2 °C)-98.5 °F (36.9 °C)] 98.5 °F (36.9 °C)  Pulse:  [] 78  Resp:  [18-20] 18  SpO2:  [92 %-99 %] 99 %  BP: ()/(50-64) 102/50     Weight: 94.8 kg (209 lb)  Body mass index is 32.73 kg/m².    Physical Exam   Constitutional: She is oriented to person, place, and time. She appears well-developed and well-nourished. No distress.   HENT:   Head: Normocephalic and atraumatic.   Eyes: Conjunctivae and EOM are normal. Pupils are equal, round, and reactive to light. Right eye exhibits no discharge. Left eye exhibits no discharge.   Neck: Normal range of motion. Neck supple. No JVD present.   Cardiovascular: Normal rate, regular rhythm and intact distal pulses.    Pulmonary/Chest: No stridor. No respiratory distress.   BBS diminished   Abdominal: Soft. Bowel sounds are normal. She exhibits no distension. There is no tenderness.   Obese   Genitourinary:   Genitourinary Comments: Not examined   Musculoskeletal: She exhibits edema.   BLE edema R>L   Neurological: She is alert and oriented to person, place, and time.   Skin: Skin is warm and dry. Capillary refill takes 2 to 3 seconds. She is not diaphoretic.   RLE dressing intact  See admit photo  RLE wound x 2  with slough and surrounding redness   Psychiatric: She has a normal mood and affect. Her behavior is normal. Judgment and thought content normal.     Significant Labs: Reviewed    Significant Imaging: Reviewed    Assessment/Plan:      COPD (chronic obstructive  pulmonary disease)    Nicotine Abuse/ Smoker's cough-  Smoking cessation education  Pt refused nicotine patch          Deep vein thrombosis (DVT) of right lower extremity    With chronic anticoagulation with apixaban-  Continue home apixaban- Dose verified with pharmacy             Type 2 diabetes mellitus with skin complication, with long-term current use of insulin    DM diet  Accuchecks with correctional SSI  Continue home metformin  Continue long acting insulin at lower dose  Blood sugars running 113-170            Diabetic polyneuropathy associated with type 2 diabetes mellitus    Continue home neurontin          CAD (coronary artery disease)    Hx Coronary stent placement x 2/ PAD Hx LLE stent placement/ HTN /Hyperlipidemia-  Continue home asa, plavix, ACEI, statin          * Infected wound    Cellulitis Right ankle-  ID and podiatry following  Continue Iv abx as recommended by ID- Currently on cipro and vancomycin  Continue MVI, Zinc, and Sony  Wound care as per Podiatry  Prn pain medication              VTE Risk Mitigation         Ordered     apixaban tablet 5 mg  2 times daily     Route:  Oral        07/08/17 1057     Low Risk of VTE  Once      07/06/17 1442          SHAMA Bowens  Department of Hospital Medicine   [facility]    Time spent seeing patient( greater than 1/2 spent in direct contact) : 28 minutes

## 2017-07-08 NOTE — ASSESSMENT & PLAN NOTE
With chronic anticoagulation with apixaban-  Continue home apixaban- Dose verified with pharmacy

## 2017-07-08 NOTE — PLAN OF CARE
Problem: Pain, Acute (Adult)  Goal: Acceptable Pain Control/Comfort Level  Patient will demonstrate the desired outcomes by discharge/transition of care.   Outcome: Ongoing (interventions implemented as appropriate)  Patient reports pain is adequately controlled by PRN painmedication

## 2017-07-09 LAB
POCT GLUCOSE: 118 MG/DL (ref 70–110)
POCT GLUCOSE: 174 MG/DL (ref 70–110)
POCT GLUCOSE: 176 MG/DL (ref 70–110)
POCT GLUCOSE: 85 MG/DL (ref 70–110)
VANCOMYCIN TROUGH SERPL-MCNC: 12.2 UG/ML

## 2017-07-09 PROCEDURE — 25000003 PHARM REV CODE 250: Performed by: INTERNAL MEDICINE

## 2017-07-09 PROCEDURE — 36415 COLL VENOUS BLD VENIPUNCTURE: CPT

## 2017-07-09 PROCEDURE — 80202 ASSAY OF VANCOMYCIN: CPT

## 2017-07-09 PROCEDURE — 63600175 PHARM REV CODE 636 W HCPCS: Performed by: NURSE PRACTITIONER

## 2017-07-09 PROCEDURE — 63600175 PHARM REV CODE 636 W HCPCS: Performed by: INTERNAL MEDICINE

## 2017-07-09 PROCEDURE — 63600175 PHARM REV CODE 636 W HCPCS: Performed by: HOSPITALIST

## 2017-07-09 PROCEDURE — 25000003 PHARM REV CODE 250: Performed by: PHYSICIAN ASSISTANT

## 2017-07-09 PROCEDURE — 11000001 HC ACUTE MED/SURG PRIVATE ROOM

## 2017-07-09 PROCEDURE — 63600175 PHARM REV CODE 636 W HCPCS: Performed by: PHYSICIAN ASSISTANT

## 2017-07-09 PROCEDURE — 25000003 PHARM REV CODE 250: Performed by: NURSE PRACTITIONER

## 2017-07-09 RX ORDER — MORPHINE SULFATE 4 MG/ML
8 INJECTION, SOLUTION INTRAMUSCULAR; INTRAVENOUS EVERY 4 HOURS PRN
Status: DISCONTINUED | OUTPATIENT
Start: 2017-07-09 | End: 2017-07-11 | Stop reason: HOSPADM

## 2017-07-09 RX ORDER — OXYCODONE AND ACETAMINOPHEN 10; 325 MG/1; MG/1
2 TABLET ORAL EVERY 6 HOURS PRN
Status: DISCONTINUED | OUTPATIENT
Start: 2017-07-09 | End: 2017-07-11 | Stop reason: HOSPADM

## 2017-07-09 RX ORDER — OXYCODONE AND ACETAMINOPHEN 10; 325 MG/1; MG/1
1 TABLET ORAL EVERY 6 HOURS PRN
Status: DISCONTINUED | OUTPATIENT
Start: 2017-07-09 | End: 2017-07-11 | Stop reason: HOSPADM

## 2017-07-09 RX ADMIN — CLOPIDOGREL BISULFATE 75 MG: 75 TABLET ORAL at 09:07

## 2017-07-09 RX ADMIN — MORPHINE SULFATE 6 MG: 2 INJECTION, SOLUTION INTRAMUSCULAR; INTRAVENOUS at 11:07

## 2017-07-09 RX ADMIN — VANCOMYCIN HYDROCHLORIDE 1250 MG: 500 INJECTION, POWDER, LYOPHILIZED, FOR SOLUTION INTRAVENOUS at 05:07

## 2017-07-09 RX ADMIN — APIXABAN 5 MG: 2.5 TABLET, FILM COATED ORAL at 09:07

## 2017-07-09 RX ADMIN — GABAPENTIN 900 MG: 300 CAPSULE ORAL at 09:07

## 2017-07-09 RX ADMIN — OXYCODONE HYDROCHLORIDE AND ACETAMINOPHEN 1 TABLET: 10; 325 TABLET ORAL at 05:07

## 2017-07-09 RX ADMIN — ZINC SULFATE 220 MG (50 MG) CAPSULE 220 MG: CAPSULE at 09:07

## 2017-07-09 RX ADMIN — MORPHINE SULFATE 6 MG: 2 INJECTION, SOLUTION INTRAMUSCULAR; INTRAVENOUS at 06:07

## 2017-07-09 RX ADMIN — ASPIRIN 81 MG CHEWABLE TABLET 81 MG: 81 TABLET CHEWABLE at 09:07

## 2017-07-09 RX ADMIN — THERA TABS 1 TABLET: TAB at 09:07

## 2017-07-09 RX ADMIN — ALPRAZOLAM 0.25 MG: 0.25 TABLET ORAL at 12:07

## 2017-07-09 RX ADMIN — CIPROFLOXACIN HYDROCHLORIDE 500 MG: 500 TABLET, FILM COATED ORAL at 09:07

## 2017-07-09 RX ADMIN — CIPROFLOXACIN HYDROCHLORIDE 500 MG: 500 TABLET, FILM COATED ORAL at 10:07

## 2017-07-09 RX ADMIN — LISINOPRIL 2.5 MG: 2.5 TABLET ORAL at 09:07

## 2017-07-09 RX ADMIN — METFORMIN HYDROCHLORIDE 1000 MG: 500 TABLET, FILM COATED ORAL at 09:07

## 2017-07-09 RX ADMIN — METFORMIN HYDROCHLORIDE 1000 MG: 500 TABLET, FILM COATED ORAL at 06:07

## 2017-07-09 RX ADMIN — MORPHINE SULFATE 8 MG: 4 INJECTION INTRAVENOUS at 11:07

## 2017-07-09 RX ADMIN — METOPROLOL SUCCINATE 25 MG: 25 TABLET, EXTENDED RELEASE ORAL at 09:07

## 2017-07-09 RX ADMIN — MORPHINE SULFATE 6 MG: 2 INJECTION, SOLUTION INTRAMUSCULAR; INTRAVENOUS at 12:07

## 2017-07-09 RX ADMIN — ATORVASTATIN CALCIUM 80 MG: 40 TABLET, FILM COATED ORAL at 10:07

## 2017-07-09 RX ADMIN — ALPRAZOLAM 0.25 MG: 0.25 TABLET ORAL at 11:07

## 2017-07-09 RX ADMIN — INSULIN DETEMIR 20 UNITS: 100 INJECTION, SOLUTION SUBCUTANEOUS at 10:07

## 2017-07-09 RX ADMIN — VANCOMYCIN HYDROCHLORIDE 1250 MG: 500 INJECTION, POWDER, LYOPHILIZED, FOR SOLUTION INTRAVENOUS at 06:07

## 2017-07-09 NOTE — ASSESSMENT & PLAN NOTE
Diabetic diet  Cont Accuchecks with correctional SSI  Continue home metformin  Continue long acting insulin at lower dose

## 2017-07-09 NOTE — SUBJECTIVE & OBJECTIVE
Interval History:  Says her pain is not well controlled.  Getting dosed frequently with 1 tab of Percocet 10.    Review of Systems   Constitutional: Negative for fever.   Respiratory: Positive for cough. Negative for shortness of breath.    Cardiovascular: Negative for chest pain.     Objective:     Vital Signs (Most Recent):  Temp: 98.7 °F (37.1 °C) (07/09/17 1500)  Pulse: 80 (07/09/17 1500)  Resp: 20 (07/09/17 1500)  BP: (!) 120/57 (07/09/17 1500)  SpO2: (!) 90 % (07/09/17 1500) Vital Signs (24h Range):  Temp:  [97.9 °F (36.6 °C)-98.7 °F (37.1 °C)] 98.7 °F (37.1 °C)  Pulse:  [80-93] 80  Resp:  [18-20] 20  SpO2:  [90 %-99 %] 90 %  BP: ()/(47-67) 120/57     Weight: 94.8 kg (209 lb)  Body mass index is 32.73 kg/m².    Intake/Output Summary (Last 24 hours) at 07/09/17 1727  Last data filed at 07/09/17 0400   Gross per 24 hour   Intake             1050 ml   Output              900 ml   Net              150 ml      Physical Exam   Constitutional: She is oriented to person, place, and time. No distress.   Neck: No JVD present.   Cardiovascular: Normal rate and regular rhythm.    Pulmonary/Chest: Effort normal. She has decreased breath sounds.   Abdominal: Soft. She exhibits no distension.   Neurological: She is alert and oriented to person, place, and time.   Skin: Skin is warm. She is not diaphoretic.   Right lower leg dressing left in place.  Photos reviewed in chart.   Psychiatric: She has a normal mood and affect. Her behavior is normal.       Significant Labs: None    Significant Imaging: none

## 2017-07-09 NOTE — PROGRESS NOTES
Afebrile  Spending most of her time sleeping  Accidentally pulled off the wound vac, very painful. Photo in her phone shows improvement    Nuclear wbc scan seems to light up over soft tissue rather than bone, but I would recommend treating her with IV antibiotics for 3-4 weeks while she is in a hospital/SNF setting as the hardware is so superficial that it's contamination is more likely than not. Unsure when the hardware can be removed.   Offered LTAC but she would prefer to return to [facility]  Rec: 4 weeks vanc and rocephin  Awaiting debridement  Will need picc line  She still craving cigarettes, but its been 3 days since she has had one    Dr. Linh cohen will be covering for me for a week.  Please call with questions

## 2017-07-09 NOTE — ASSESSMENT & PLAN NOTE
Cellulitis Right ankle-  ID and podiatry following  Continue Iv abx as recommended by ID- Currently on cipro and vancomycin  Continue MVI, Zinc, and Sony  Wound care as per Podiatry  Prn pain medication.  Plan is 3 to 4 weeks of intravenous antibiotics.  Pt would rather return to [facility] than to an LTAC.

## 2017-07-09 NOTE — CONSULTS
Kateryna Connolly [id number] is a 66 y.o. female who has been consulted for vancomycin dosing.    The patient has the following labs:     Date Creatinine (mg/dl)    WBC Count   7/9/2017 Estimated Creatinine Clearance: 81.8 mL/min (based on Cr of 0.8). Lab Results   Component Value Date    WBC 10.80 07/06/2017        Current weight is 94.8 kg (209 lb)    Pt is receiving vancomycin 1250 mg every 12 hours.  Vancomycin trough from 7/9/2017  at 1802  was 12.2 mg/dL.  Target trough range is 10-15 mg/dL.   Trough was drawn on time and anticipate it is  Therapeutic. Pharmacy will continue current dose.   The vancomycin trough has been ordered for 7/12/17 at 0500 .     Patient will be followed by pharmacy for changes in renal function, toxicity, and efficacy.    Thank you for allowing us to participate in this patient's care.     Hany Ochoa, Pharmacist

## 2017-07-09 NOTE — PLAN OF CARE
Problem: Pain, Acute (Adult)  Goal: Acceptable Pain Control/Comfort Level  Patient will demonstrate the desired outcomes by discharge/transition of care.   Outcome: Ongoing (interventions implemented as appropriate)   07/09/17 0054   Pain, Acute (Adult)   Acceptable Pain Control/Comfort Level making progress toward outcome

## 2017-07-09 NOTE — PROGRESS NOTES
[facility] Hospital Medicine  Progress Note    Patient Name: Kateryna Connolly  MRN: [id number]  Patient Class: IP- Inpatient   Admission Date: 7/6/2017  Length of Stay: 2 days  Attending Physician: Ritika Cuadra MD  Primary Care Provider: Dwayne Soares MD        Subjective:     Principal Problem:Infected wound    HPI:  This is a 66 year old female with complaint of wound to RLE since January 2017. She states she broke her ankle in 1/2017 and underwent surgery by Dr. Bowden. She states she has had slow wound healing since the surgery. Pt has been being followed by Dr. Alexander as out patient. Pt has  PMH significant for DM-2, hypertension, nicotine abuse, CAD with prior coronary stent placement x 2 in 2015 and MI. She was going to Dr. Alexander's office weekly and had home health three times a week. She states she was suppose to have a wound vac placed but the home health nurse was concerned about the drainage and redness around the wound. She reports the wound has been draining for about two days with the associated, worsening redness. She denied fever or chills. Pt placed in hospital for further evaluation and treatment.    Hospital Course:  ID and podiatry consulted.  Wound care as per podiatry.    Interval History:  Says her pain is not well controlled.  Getting dosed frequently with 1 tab of Percocet 10.    Review of Systems   Constitutional: Negative for fever.   Respiratory: Positive for cough. Negative for shortness of breath.    Cardiovascular: Negative for chest pain.     Objective:     Vital Signs (Most Recent):  Temp: 98.7 °F (37.1 °C) (07/09/17 1500)  Pulse: 80 (07/09/17 1500)  Resp: 20 (07/09/17 1500)  BP: (!) 120/57 (07/09/17 1500)  SpO2: (!) 90 % (07/09/17 1500) Vital Signs (24h Range):  Temp:  [97.9 °F (36.6 °C)-98.7 °F (37.1 °C)] 98.7 °F (37.1 °C)  Pulse:  [80-93] 80  Resp:  [18-20] 20  SpO2:  [90 %-99 %] 90 %  BP: ()/(47-67) 120/57     Weight: 94.8 kg (209 lb)  Body mass index  is 32.73 kg/m².    Intake/Output Summary (Last 24 hours) at 07/09/17 1727  Last data filed at 07/09/17 0400   Gross per 24 hour   Intake             1050 ml   Output              900 ml   Net              150 ml      Physical Exam   Constitutional: She is oriented to person, place, and time. No distress.   Neck: No JVD present.   Cardiovascular: Normal rate and regular rhythm.    Pulmonary/Chest: Effort normal. She has decreased breath sounds.   Abdominal: Soft. She exhibits no distension.   Neurological: She is alert and oriented to person, place, and time.   Skin: Skin is warm. She is not diaphoretic.   Right lower leg dressing left in place.  Photos reviewed in chart.   Psychiatric: She has a normal mood and affect. Her behavior is normal.       Significant Labs: None    Significant Imaging: none    Assessment/Plan:      COPD (chronic obstructive pulmonary disease)    Nicotine Abuse/ Smoker's cough-  Smoking cessation education  Pt refused nicotine patch          PAD (peripheral artery disease)    Patient on statin, aspirin, and Plavix.          Deep vein thrombosis (DVT) of right lower extremity    With chronic anticoagulation with apixaban-  Continue home apixaban- Dose verified with pharmacy             Type 2 diabetes mellitus with skin complication, with long-term current use of insulin    Diabetic diet  Cont Accuchecks with correctional SSI  Continue home metformin  Continue long acting insulin at lower dose            Diabetic polyneuropathy associated with type 2 diabetes mellitus    Continue home neurontin          Cellulitis of right ankle    Associated with the aforementioned wound on lower leg.  On antibiotics.  Erythema is lessening.          Benign essential HTN    Chronic, controlled.  Continue home regimen monitoring BP closely.  Will titrate BP medications as needed for sustained BP control.            CAD (coronary artery disease)    Hx Coronary stent placement x 2/ PAD Hx LLE stent placement/  HTN /Hyperlipidemia-  Continue home asa, plavix, ACEI, statin          Hypercholesterolemia    Continue statin          Tobacco dependence    Pt counseled again to quit smoking, anny in light of her non-healing wound.          * Infected wound    Cellulitis Right ankle-  ID and podiatry following  Continue Iv abx as recommended by ID- Currently on cipro and vancomycin  Continue MVI, Zinc, and Sony  Wound care as per Podiatry  Prn pain medication.  Plan is 3 to 4 weeks of intravenous antibiotics.  Pt would rather return to [facility] than to an LTAC.              VTE Risk Mitigation         Ordered     apixaban tablet 5 mg  2 times daily     Route:  Oral        07/08/17 1057     Low Risk of VTE  Once      07/06/17 1442          Teto Aguilera MD  Department of Hospital Medicine   [facility]

## 2017-07-10 PROBLEM — S91.009A ANKLE WOUND: Status: ACTIVE | Noted: 2017-07-10

## 2017-07-10 LAB
ANION GAP SERPL CALC-SCNC: 12 MMOL/L
BUN SERPL-MCNC: 9 MG/DL
CALCIUM SERPL-MCNC: 9.1 MG/DL
CHLORIDE SERPL-SCNC: 102 MMOL/L
CO2 SERPL-SCNC: 29 MMOL/L
CREAT SERPL-MCNC: 0.7 MG/DL
EST. GFR  (AFRICAN AMERICAN): >60 ML/MIN/1.73 M^2
EST. GFR  (NON AFRICAN AMERICAN): >60 ML/MIN/1.73 M^2
GLUCOSE SERPL-MCNC: 84 MG/DL
POCT GLUCOSE: 130 MG/DL (ref 70–110)
POCT GLUCOSE: 178 MG/DL (ref 70–110)
POTASSIUM SERPL-SCNC: 3.9 MMOL/L
SODIUM SERPL-SCNC: 143 MMOL/L

## 2017-07-10 PROCEDURE — 36569 INSJ PICC 5 YR+ W/O IMAGING: CPT

## 2017-07-10 PROCEDURE — 25000003 PHARM REV CODE 250: Performed by: INTERNAL MEDICINE

## 2017-07-10 PROCEDURE — 99232 SBSQ HOSP IP/OBS MODERATE 35: CPT | Mod: ,,, | Performed by: INTERNAL MEDICINE

## 2017-07-10 PROCEDURE — 25000003 PHARM REV CODE 250: Performed by: NURSE PRACTITIONER

## 2017-07-10 PROCEDURE — 36584 COMPL RPLCMT PICC RS&I: CPT

## 2017-07-10 PROCEDURE — 80048 BASIC METABOLIC PNL TOTAL CA: CPT

## 2017-07-10 PROCEDURE — 05H533Z INSERTION OF INFUSION DEVICE INTO RIGHT SUBCLAVIAN VEIN, PERCUTANEOUS APPROACH: ICD-10-PCS | Performed by: INTERNAL MEDICINE

## 2017-07-10 PROCEDURE — C1751 CATH, INF, PER/CENT/MIDLINE: HCPCS

## 2017-07-10 PROCEDURE — 25000003 PHARM REV CODE 250: Performed by: HOSPITALIST

## 2017-07-10 PROCEDURE — 99221 1ST HOSP IP/OBS SF/LOW 40: CPT | Mod: ,,, | Performed by: PODIATRIST

## 2017-07-10 PROCEDURE — 25000003 PHARM REV CODE 250: Performed by: PHYSICIAN ASSISTANT

## 2017-07-10 PROCEDURE — 11000001 HC ACUTE MED/SURG PRIVATE ROOM

## 2017-07-10 PROCEDURE — 63600175 PHARM REV CODE 636 W HCPCS: Performed by: INTERNAL MEDICINE

## 2017-07-10 PROCEDURE — 76937 US GUIDE VASCULAR ACCESS: CPT

## 2017-07-10 PROCEDURE — 63600175 PHARM REV CODE 636 W HCPCS: Performed by: HOSPITALIST

## 2017-07-10 PROCEDURE — 86580 TB INTRADERMAL TEST: CPT | Performed by: INTERNAL MEDICINE

## 2017-07-10 PROCEDURE — 36415 COLL VENOUS BLD VENIPUNCTURE: CPT

## 2017-07-10 RX ADMIN — INSULIN DETEMIR 20 UNITS: 100 INJECTION, SOLUTION SUBCUTANEOUS at 08:07

## 2017-07-10 RX ADMIN — OXYCODONE HYDROCHLORIDE AND ACETAMINOPHEN 2 TABLET: 10; 325 TABLET ORAL at 10:07

## 2017-07-10 RX ADMIN — VANCOMYCIN HYDROCHLORIDE 1250 MG: 500 INJECTION, POWDER, LYOPHILIZED, FOR SOLUTION INTRAVENOUS at 06:07

## 2017-07-10 RX ADMIN — MORPHINE SULFATE 8 MG: 4 INJECTION INTRAVENOUS at 09:07

## 2017-07-10 RX ADMIN — THERA TABS 1 TABLET: TAB at 08:07

## 2017-07-10 RX ADMIN — TUBERCULIN PURIFIED PROTEIN DERIVATIVE 5 UNITS: 5 INJECTION, SOLUTION INTRADERMAL at 12:07

## 2017-07-10 RX ADMIN — METFORMIN HYDROCHLORIDE 1000 MG: 500 TABLET, FILM COATED ORAL at 06:07

## 2017-07-10 RX ADMIN — OXYCODONE HYDROCHLORIDE AND ACETAMINOPHEN 1 TABLET: 10; 325 TABLET ORAL at 04:07

## 2017-07-10 RX ADMIN — APIXABAN 5 MG: 2.5 TABLET, FILM COATED ORAL at 08:07

## 2017-07-10 RX ADMIN — CLOPIDOGREL BISULFATE 75 MG: 75 TABLET ORAL at 08:07

## 2017-07-10 RX ADMIN — GABAPENTIN 900 MG: 300 CAPSULE ORAL at 08:07

## 2017-07-10 RX ADMIN — ATORVASTATIN CALCIUM 80 MG: 40 TABLET, FILM COATED ORAL at 08:07

## 2017-07-10 RX ADMIN — CIPROFLOXACIN HYDROCHLORIDE 500 MG: 500 TABLET, FILM COATED ORAL at 08:07

## 2017-07-10 RX ADMIN — METOPROLOL SUCCINATE 25 MG: 25 TABLET, EXTENDED RELEASE ORAL at 08:07

## 2017-07-10 RX ADMIN — ZINC SULFATE 220 MG (50 MG) CAPSULE 220 MG: CAPSULE at 08:07

## 2017-07-10 RX ADMIN — METFORMIN HYDROCHLORIDE 1000 MG: 500 TABLET, FILM COATED ORAL at 08:07

## 2017-07-10 RX ADMIN — OXYCODONE HYDROCHLORIDE AND ACETAMINOPHEN 2 TABLET: 10; 325 TABLET ORAL at 06:07

## 2017-07-10 RX ADMIN — LISINOPRIL 2.5 MG: 2.5 TABLET ORAL at 08:07

## 2017-07-10 RX ADMIN — VANCOMYCIN HYDROCHLORIDE 1250 MG: 500 INJECTION, POWDER, LYOPHILIZED, FOR SOLUTION INTRAVENOUS at 05:07

## 2017-07-10 RX ADMIN — ASPIRIN 81 MG CHEWABLE TABLET 81 MG: 81 TABLET CHEWABLE at 08:07

## 2017-07-10 NOTE — PROGRESS NOTES
Infectious Disease Progress Note    Impression:  Patient with soft tissue infection in area of ankle hardware. Currently no evidence of osteomyelitis. Patient is tolerating therapy well with no adverse effects.     Plan:  Podiatry does not recommend further surgical intervention at this time. Patient also declines any invasive surgical intervention.   She understands that despite our best efforts she her wound may not improve and she may need debridement, hardware removal or if the infection worsens possible loss of limb.     Plan to treat with IV antibiotic therapy for 4 weeks with vancomycin (goal 15-20)and ciprofloxacin end date of August 3rd.   Will need to be discharged with midline to a facility that can manage both the antibiotics and wound vac.   Will need biweekly CBC, Chem20, and vancomycin trough    Time spent (more than half in direct face to face contact with patient) : 15    Please page for any questions [phone] (answering service )  [phone] pager or [phone] cell  Linh Armendariz MD, MPH      HPI  Patient is a 66 yr old female with DM with neuropathy and hypertension who was admitted to the hospital on 7/6/17 after a fall. She had an initial fal and right ankle fracture in January of 2017 requiring ORIF. She was discharged to a SNF and had another fall resulting in wound dehiscence, requiring another surgical repair and fixation. The surgical wound has not healed since. She has had outpatient antibiotic therapy (oral medications) as well.   Patient intially seen by Dr Valdez, please refer to her note from 7/06/17 for further history.       Interval History:  Afebrile  Podiatry does not recommend any further surgical intervention at this time.   No complaints.     Review of Symptoms:  Review of Systems   Constitutional: Negative for chills, diaphoresis, fever and weight loss.   HENT: Negative for congestion and nosebleeds.    Respiratory: Negative for cough, sputum  production and wheezing.    Cardiovascular: Negative for chest pain, palpitations and leg swelling.   Gastrointestinal: Positive for constipation. Negative for abdominal pain, diarrhea and vomiting.   Genitourinary: Negative for dysuria, flank pain and frequency.   Musculoskeletal: Positive for back pain. Negative for joint pain and neck pain.        Chronic LBP   Skin: Negative for itching and rash.   Neurological: Negative for sensory change, seizures, weakness and headaches.   Endo/Heme/Allergies: Does not bruise/bleed easily.   Psychiatric/Behavioral: Negative for suicidal ideas. The patient is not nervous/anxious and does not have insomnia.        Allergies:  Review of patient's allergies indicates:   Allergen Reactions    Hydrocodone Hives    Pcn [penicillins] Hives    Pcn [penicillins] Rash        Medications:  Antibiotics     Start     Stop Route Frequency Ordered    07/08/17 0530  vancomycin (VANCOCIN) 1,250 mg in dextrose 5 % 250 mL IVPB  (Vancomycin IVPB with levels panel)      -- IV Every 12 hours (non-standard times) 07/08/17 0459    07/06/17 1915  ciprofloxacin HCl tablet 500 mg      -- Oral Every 12 hours 07/06/17 1904      Vancomycin 7/06 to present   Ciprofloxacin 7/06 to present    Prior  Clindamycin 7/06 x 1     Physical Exam:  VS (24h):   Vitals:    07/10/17 1100   BP: 116/68   Pulse: 84   Resp: 18   Temp: 98.8 °F (37.1 °C)     Temp:  [98.7 °F (37.1 °C)-99.1 °F (37.3 °C)]     I & O (Last 24H):  Intake/Output Summary (Last 24 hours) at 07/10/17 1446  Last data filed at 07/09/17 1814   Gross per 24 hour   Intake              610 ml   Output              800 ml   Net             -190 ml       Physical Exam   Constitutional: She is oriented to person, place, and time. She appears well-developed and well-nourished.   Obese elderly female   HENT:   Head: Normocephalic.   Nose: Nose normal.   Mouth/Throat: No oropharyngeal exudate.   Eyes: Conjunctivae, EOM and lids are normal. Pupils are equal,  round, and reactive to light. Right eye exhibits no discharge. No scleral icterus.   Neck: Trachea normal, normal range of motion and full passive range of motion without pain. Neck supple. No thyromegaly present.   Cardiovascular: Normal rate, regular rhythm, normal heart sounds, intact distal pulses and normal pulses.  Exam reveals no friction rub.    No murmur heard.  Pulmonary/Chest: Effort normal and breath sounds normal. No respiratory distress. She has no wheezes. She exhibits no tenderness.   Abdominal: Soft. Normal appearance and bowel sounds are normal. She exhibits no distension, no abdominal bruit, no ascites and no pulsatile midline mass. There is no tenderness. There is no rigidity, no rebound, no guarding, no CVA tenderness and negative Gee's sign.   Musculoskeletal: She exhibits no edema.        Right ankle: She exhibits decreased range of motion and swelling. Tenderness. Lateral malleolus tenderness found.   Wound vac on left foot.   NV intact. Pink toes bilaterally.   Viewed photos on patient's phone of wound from yesterday   LE edema to thigh 1+, non indurated   Lymphadenopathy:     She has no cervical adenopathy.   Neurological: She is alert and oriented to person, place, and time. She has normal strength. No cranial nerve deficit or sensory deficit. GCS eye subscore is 4. GCS verbal subscore is 5. GCS motor subscore is 6.   Skin: Skin is warm and dry. Capillary refill takes less than 2 seconds. No rash noted.        Psychiatric: She has a normal mood and affect. Her speech is normal and behavior is normal. Judgment and thought content normal. Cognition and memory are normal.       Lines:  Right Midline 7/10/17- c/d/i     Labs:  CBC:   Lab Results   Component Value Date    WBC 10.80 07/06/2017    WBC 8.80 02/02/2015    WBC 8.00 01/06/2015    HCT 40.0 07/06/2017     07/06/2017       BMP:   Recent Labs  Lab 07/10/17  0419   GLU 84      K 3.9      CO2 29   BUN 9   CREATININE  0.7   CALCIUM 9.1       LFT: Lab Results   Component Value Date    ALT 11 2017    AST 17 2017    ALKPHOS 62 2017    BILITOT 0.6 2017       Microbiology    BCX - ngtd    BCX - ngtd     Imagin/08 Indium scan - The patient's white blood cells were labeled with 0.535 mCi of indium-111 which was injected in the right arm. Delayed images obtained through the distal tibia and fibula regions as well as the ankles demonstrate a vague primarily lateral right distal tibial region increased tracer uptake without significant abnormal tracer uptake about the ankle joints themselves. The area of increased tracer uptake is in the region of the known wound demonstrated on the 2017 examination suggesting infection or inflammation in this region.     Xray right ankle   1.  Status post ORIF right ankle with intact hardware and no change in alignment.  2.  Large lateral soft tissue wound and generalized soft tissue swelling.     xray right ankle   Status post right ankle ORIF, with lateral soft tissue wound in generalized soft tissue swelling raising consideration for cellulitis.  No radiographic evidence for osteomyelitis.    Plan - see top of note

## 2017-07-10 NOTE — PROGRESS NOTES
[facility]  Podiatry  Progress Note    Patient Name: Kateryna Connolly  MRN: [id number]  Admission Date: 7/6/2017  Hospital Length of Stay: 3 days  Attending Physician: Ritika Cuadra MD  Primary Care Provider: Dwayne Soares MD     Subjective:     Interval History: 4 days since beginning VAC therapy right lateral ankle wound. Dressing CDI.  Pain present, but mild improvement on pain medication.  NO heel protection boots.  Indium scan negative osteomyelitis.    Medial right ankle dressing CDI - mepilex border.        Scheduled Meds:   apixaban  5 mg Oral BID    aspirin  81 mg Oral Daily    atorvastatin  80 mg Oral QHS    ciprofloxacin HCl  500 mg Oral Q12H    clopidogrel  75 mg Oral Daily    gabapentin  900 mg Oral Daily    insulin detemir  20 Units Subcutaneous QHS    lisinopril  2.5 mg Oral Daily    metformin  1,000 mg Oral BID WM    metoprolol succinate  25 mg Oral Daily    multivitamin  1 tablet Oral Daily    vancomycin (VANCOCIN) IVPB  1,250 mg Intravenous Q12H    zinc sulfate  220 mg Oral Daily     Continuous Infusions:   PRN Meds:acetaminophen, alprazolam, dextrose 50%, dextrose 50%, glucagon (human recombinant), glucose, glucose, insulin aspart, morphine, ondansetron, oxycodone-acetaminophen, oxycodone-acetaminophen, promethazine, ramelteon    Review of Systems  Objective:     Vital Signs (Most Recent):  Temp: 98.8 °F (37.1 °C) (07/10/17 1100)  Pulse: 84 (07/10/17 1100)  Resp: 18 (07/10/17 1100)  BP: 116/68 (07/10/17 1100)  SpO2: 98 % (07/10/17 1100) Vital Signs (24h Range):  Temp:  [98.8 °F (37.1 °C)-99.1 °F (37.3 °C)] 98.8 °F (37.1 °C)  Pulse:  [84-95] 84  Resp:  [18] 18  SpO2:  [95 %-98 %] 98 %  BP: (116-132)/(57-68) 116/68     Weight: 94.8 kg (209 lb)  Body mass index is 32.73 kg/m².    Foot Exam    Laboratory:  A1C: No results for input(s): HGBA1C in the last 4320 hours.  Blood Cultures: No results for input(s): LABBLOO in the last 48 hours.  CBC:   Recent Labs  Lab  07/06/17  1221   WBC 10.80   RBC 4.40   HGB 13.2   HCT 40.0      MCV 91   MCH 30.0   MCHC 33.0     CMP:   Recent Labs  Lab 07/06/17  1221 07/10/17  0419    84   CALCIUM 9.8 9.1   ALBUMIN 3.6  --    PROT 7.1  --     143   K 3.9 3.9   CO2 26 29    102   BUN 11 9   CREATININE 0.8 0.7   ALKPHOS 62  --    ALT 11  --    AST 17  --    BILITOT 0.6  --      Coagulation: No results for input(s): INR, APTT in the last 168 hours.    Invalid input(s): PT  CRP:   Recent Labs  Lab 07/06/17  1221   CRP 27.0*     ESR:   Recent Labs  Lab 07/06/17  1221   SEDRATE 38*     Wound Cultures: No results for input(s): LABAERO in the last 4320 hours.  Microbiology Results (last 7 days)     Procedure Component Value Units Date/Time    Blood Culture #1 [[id number]] Collected:  07/06/17 1221    Order Status:  Completed Specimen:  Blood Updated:  07/09/17 2212     Blood Culture, Routine No Growth to date     Blood Culture, Routine No Growth to date     Blood Culture, Routine No Growth to date     Blood Culture, Routine No Growth to date    Blood Culture #2 [[id number]] Collected:  07/06/17 1223    Order Status:  Completed Specimen:  Blood Updated:  07/09/17 2212     Blood Culture, Routine No Growth to date     Blood Culture, Routine No Growth to date     Blood Culture, Routine No Growth to date     Blood Culture, Routine No Growth to date          Diagnostic Results:  I have reviewed all pertinent imaging results/findings within the past 24 hours.    Clinical Findings:  Lateral wound right ankle:    Size:    Pre:[id number]  Post: [id number]    Base:  Granular, pink and fibrous tan mix about 80/20, with moderate serous/serosanaguinous drainage only.  No pus, tracking, fluctuance, malodor, or cardinal signs infection.    Borders:   flat, pink, blanchable skin edges without undermining.      Wound: medial right ankle    Size:    About the size of a quarter    Base:  Fibrous tan with moderate serous/serosanaguinous drainage  only.  No pus, tracking, fluctuance, malodor, or cardinal signs infection.    Borders:   flat, pink, blanchable skin edges without undermining.    Otherwise, Skin thin, shiny, atrophic, with decreased density and distribution of pedal hair bilateral, but without hyperpigmentation, adolph discoloration,  ulcers, masses, nodules or cords palpated bilateral feet and legs.      DP and PT pulses trace, but palpable bilateral, capillary refill 3 seconds all toes/distal feet, all toes/both feet warm to touch.  Negative lymphadenopathy bilateral popliteal fossa and tarsal tunnel.  Negavie lower extremity edema bilateral - greatly improved.    Diminished/loss of protective sensation all toes bilateral to 10 gram monofilament.          Assessment/Plan:     Active Diagnoses:    Diagnosis Date Noted POA    PRINCIPAL PROBLEM:  Infected wound [T14.8, L08.9] 07/06/2017 Yes    Benign essential HTN [I10] 07/06/2017 Yes    Cellulitis of right ankle [L03.115] 07/06/2017 Yes    Diabetic polyneuropathy associated with type 2 diabetes mellitus [E11.42] 07/06/2017 Yes    Type 2 diabetes mellitus with skin complication, with long-term current use of insulin [E11.628, Z79.4] 07/06/2017 Not Applicable    Deep vein thrombosis (DVT) of right lower extremity [I82.401] 07/06/2017 Yes    PAD (peripheral artery disease) [I73.9] 07/06/2017 Yes    COPD (chronic obstructive pulmonary disease) [J44.9] 07/06/2017 Yes    CAD (coronary artery disease) [I25.10] 01/26/2015 Yes    Hypercholesterolemia [E78.00] 01/08/2015 Yes    Tobacco dependence [F17.200] 01/08/2015 Yes      Problems Resolved During this Admission:    Diagnosis Date Noted Date Resolved POA       Continue santyl to wound bases and every other day dressing changes with VAC lateral at 125 mmHg continuous therapy.  Cover medial wound with telfa or wound foam over santyl.    Long term care facility wound orders should reflect above.    Minimal weight bearing all times.  May walk with  PT assistance to prevent falls.    Heel offload boots all times bilateral.    Follow in clinic with me outpatient 1 week, sooner prn.    Discussed again and patient and partner verbally acknowledge that this is not traditional use of VAC with fibrous elements in wound base.  However with the santyl and mechanical debridement provided it is improving the wound and avoiding both the extreme pain of sharp bedside debridement and need for aggressive OR debridement which would likely rapidly spread soft tissue infection to hardware and bone.        Iglesia Alexander DPM  Podiatry  [facility]

## 2017-07-10 NOTE — PROGRESS NOTES
I sent 3 day placement packet, current meds, labs, Dr. Claudio note, ID note, and SNF consult with medication recommendations to Cecilio via [facility].  Татьяна Boudreaux LMSW

## 2017-07-10 NOTE — PLAN OF CARE
Spoke with Dr Alexander regarding the pt's plan of care; he is not taking the pt to the OR. He states she is improving with the wound vac and wants to continue care with it. The pt can discharge with the wound vac to SNF or LTAC per Dr Alexander's standpoint....Wilmer Koehler CM

## 2017-07-10 NOTE — PLAN OF CARE
Problem: Patient Care Overview  Goal: Plan of Care Review  Outcome: Ongoing (interventions implemented as appropriate)  Resting quietly during two hour rounds. Pain controlled with PRN pain medication. Blood sugar controlled with CBG's AC & HS and PRN insulin. No falls or trauma this shift.Pt. Verbalizes understanding of their plan of care.

## 2017-07-10 NOTE — PROGRESS NOTES
I spoke to Heena at [place] [phone], she is currently not able to get into [facility] and asked that I manually fax the referral to her at [phone]. Faxed and confirmation received.  Татьяна Boudreaux LMSW

## 2017-07-10 NOTE — PROGRESS NOTES
Progress Note  Hospital Medicine  Patient Name:Kateryna Connolly  MRN:  [id number]  Patient Class: IP- Inpatient  Admit Date: 7/6/2017  Length of Stay: 3 days  Expected Discharge Date:   Attending Physician: Ritika Cuadra MD  Primary Care Provider:  Dwayne Soares MD    SUBJECTIVE:     Principal Problem: Infected wound  Initial history of present illness: This is a 66 year old female with complaint of wound to RLE since January 2017. She states she broke her ankle in 1/2017 and underwent surgery by Dr. Bowden. She states she has had slow wound healing since the surgery. Pt has been being followed by Dr. Alexander as out patient. Pt has  PMH significant for DM-2, hypertension, nicotine abuse, CAD with prior coronary stent placement x 2 in 2015 and MI. She was going to Dr. Alexander's office weekly and had home health three times a week. She states she was suppose to have a wound vac placed but the home health nurse was concerned about the drainage and redness around the wound. She reports the wound has been draining for about two days with the associated, worsening redness. She denied fever or chills. Pt placed in hospital for further evaluation and treatment.    PMH/PSH/SH/FH/Meds: reviewed.    Symptoms/Review of Systems:  RLE cellulitis and wound improving. No shortness of breath, cough, chest pain or headache, fever or abdominal pain.     Diet:  Adequate intake.    Activity level: Normal.    Pain:  Controlled    OBJECTIVE:   Vital Signs (Most Recent):      Temp: 98.8 °F (37.1 °C) (07/10/17 0400)  Pulse: 90 (07/10/17 0400)  Resp: 18 (07/10/17 0400)  BP: 124/60 (07/10/17 0400)  SpO2: 95 % (07/10/17 0400)       Vital Signs Range (Last 24H):  Temp:  [97.9 °F (36.6 °C)-99.1 °F (37.3 °C)]   Pulse:  [80-95]   Resp:  [18-20]   BP: ()/(57-67)   SpO2:  [90 %-96 %]     Weight: 94.8 kg (209 lb)  Body mass index is 32.73 kg/m².    Intake/Output Summary (Last 24 hours) at 07/10/17 0811  Last data filed at 07/09/17 0766   Gross  per 24 hour   Intake              610 ml   Output              800 ml   Net             -190 ml     Physical Examination:  Constitutional: She is oriented to person, place, and time. No distress.   Neck: No JVD present.   Cardiovascular: Normal rate and regular rhythm.    Pulmonary/Chest: Effort normal. She has decreased breath sounds.   Abdominal: Soft. She exhibits no distension.   Neurological: She is alert and oriented to person, place, and time.   Skin: Skin is warm. She is not diaphoretic.   Right lower leg dressing left in place.  Photos reviewed in chart.   Psychiatric: She has a normal mood and affect. Her behavior is normal.     CBC:    Recent Labs  Lab 07/06/17  1221   WBC 10.80   RBC 4.40   HGB 13.2   HCT 40.0      MCV 91   MCH 30.0   MCHC 33.0   BMP    Recent Labs  Lab 07/06/17  1221 07/10/17  0419    84    143   K 3.9 3.9    102   CO2 26 29   BUN 11 9   CREATININE 0.8 0.7   CALCIUM 9.8 9.1      Diagnostic Results:  Microbiology Results (last 7 days)     Procedure Component Value Units Date/Time    Blood Culture #1 [[id number]] Collected:  07/06/17 1221    Order Status:  Completed Specimen:  Blood Updated:  07/09/17 2212     Blood Culture, Routine No Growth to date     Blood Culture, Routine No Growth to date     Blood Culture, Routine No Growth to date     Blood Culture, Routine No Growth to date    Blood Culture #2 [[id number]] Collected:  07/06/17 1223    Order Status:  Completed Specimen:  Blood Updated:  07/09/17 2212     Blood Culture, Routine No Growth to date     Blood Culture, Routine No Growth to date     Blood Culture, Routine No Growth to date     Blood Culture, Routine No Growth to date         Indium scan:  The patient's white blood cells were labeled with 0.535 mCi of indium-111 which was injected in the right arm. Delayed images obtained through the distal tibia and fibula regions as well as the ankles demonstrate a vague primarily lateral right distal tibial  region increased tracer uptake without significant abnormal tracer uptake about the ankle joints themselves. The area of increased tracer uptake is in the region of the known wound demonstrated on the July 6, 2017 examination suggesting infection or inflammation in this region.    Right ankle x-ray:  1.  Status post ORIF right ankle with intact hardware and no change in alignment.  2.  Large lateral soft tissue wound and generalized soft tissue swelling.    Right ankle x-ray:  Status post right ankle ORIF, with lateral soft tissue wound in generalized soft tissue swelling raising consideration for cellulitis.  No radiographic evidence for osteomyelitis.    Assessment/Plan:         COPD (chronic obstructive pulmonary disease)     Nicotine Abuse/ Smoker's cough-  Smoking cessation education  Pt refused nicotine patch          PAD (peripheral artery disease)     Patient on statin, aspirin, and Plavix.          Deep vein thrombosis (DVT) of right lower extremity     With chronic anticoagulation with apixaban-  Continue home apixaban- Dose verified with pharmacy             Type 2 diabetes mellitus with skin complication, with long-term current use of insulin     Diabetic diet  Cont Accuchecks with correctional SSI  Continue home metformin  Continue long acting insulin at lower dose          Diabetic polyneuropathy associated with type 2 diabetes mellitus     Continue home neurontin          Cellulitis of right ankle     Associated with the aforementioned wound on lower leg.  On antibiotics.  Erythema is lessening.          Benign essential HTN     Chronic, controlled.  Continue home regimen monitoring BP closely.  Will titrate BP medications as needed for sustained BP control.             CAD (coronary artery disease)     Hx Coronary stent placement x 2/ PAD Hx LLE stent placement/ HTN /Hyperlipidemia-  Continue home asa, plavix, ACEI, statin          Hypercholesterolemia     Continue statin          Tobacco dependence      Pt counseled again to quit smoking, anny in light of her non-healing wound.          * Infected wound     Cellulitis Right ankle-  ID and podiatry following  Continue Iv abx as recommended by ID- Currently on cipro and vancomycin  Continue MVI, Zinc, and Sony  Wound care as per Podiatry  Prn pain medication.  Plan is 3 to 4 weeks of intravenous antibiotics.  Pt would [facility] than to an LTAC.     VTE Risk Mitigation         Ordered     apixaban tablet 5 mg  2 times daily     Route:  Oral        07/08/17 1057     Low Risk of VTE  Once      07/06/17 1442        Ritika Cuadra MD  Department of Hospital Medicine   [facility]

## 2017-07-11 ENCOUNTER — TELEPHONE (OUTPATIENT)
Dept: PODIATRY | Facility: CLINIC | Age: 67
End: 2017-07-11

## 2017-07-11 VITALS
SYSTOLIC BLOOD PRESSURE: 100 MMHG | BODY MASS INDEX: 32.8 KG/M2 | HEIGHT: 67 IN | TEMPERATURE: 98 F | WEIGHT: 209 LBS | OXYGEN SATURATION: 97 % | DIASTOLIC BLOOD PRESSURE: 58 MMHG | RESPIRATION RATE: 20 BRPM | HEART RATE: 80 BPM

## 2017-07-11 LAB
BACTERIA BLD CULT: NORMAL
BACTERIA BLD CULT: NORMAL
POCT GLUCOSE: 109 MG/DL (ref 70–110)
POCT GLUCOSE: 163 MG/DL (ref 70–110)
POCT GLUCOSE: 168 MG/DL (ref 70–110)

## 2017-07-11 PROCEDURE — 63600175 PHARM REV CODE 636 W HCPCS: Performed by: INTERNAL MEDICINE

## 2017-07-11 PROCEDURE — 25000003 PHARM REV CODE 250: Performed by: NURSE PRACTITIONER

## 2017-07-11 PROCEDURE — 25000003 PHARM REV CODE 250: Performed by: INTERNAL MEDICINE

## 2017-07-11 PROCEDURE — 25000003 PHARM REV CODE 250: Performed by: PHYSICIAN ASSISTANT

## 2017-07-11 PROCEDURE — 99239 HOSP IP/OBS DSCHRG MGMT >30: CPT | Mod: ,,, | Performed by: INTERNAL MEDICINE

## 2017-07-11 RX ORDER — OXYCODONE AND ACETAMINOPHEN 10; 325 MG/1; MG/1
1 TABLET ORAL EVERY 6 HOURS PRN
Qty: 10 TABLET | Refills: 0 | Status: SHIPPED | OUTPATIENT
Start: 2017-07-11 | End: 2017-10-02

## 2017-07-11 RX ORDER — ZINC SULFATE 50(220)MG
220 CAPSULE ORAL DAILY
COMMUNITY
Start: 2017-07-11 | End: 2018-02-05 | Stop reason: ALTCHOICE

## 2017-07-11 RX ORDER — CIPROFLOXACIN 500 MG/1
500 TABLET ORAL EVERY 12 HOURS
Start: 2017-07-11 | End: 2017-08-03

## 2017-07-11 RX ORDER — NAPROXEN SODIUM 220 MG/1
81 TABLET, FILM COATED ORAL DAILY
Refills: 0 | COMMUNITY
Start: 2017-07-11 | End: 2018-02-05 | Stop reason: ALTCHOICE

## 2017-07-11 RX ORDER — ALPRAZOLAM 0.25 MG/1
0.25 TABLET ORAL 3 TIMES DAILY PRN
Qty: 10 TABLET | Refills: 0 | Status: SHIPPED | OUTPATIENT
Start: 2017-07-11 | End: 2018-02-05 | Stop reason: ALTCHOICE

## 2017-07-11 RX ADMIN — METFORMIN HYDROCHLORIDE 1000 MG: 500 TABLET, FILM COATED ORAL at 09:07

## 2017-07-11 RX ADMIN — METOPROLOL SUCCINATE 25 MG: 25 TABLET, EXTENDED RELEASE ORAL at 09:07

## 2017-07-11 RX ADMIN — LISINOPRIL 2.5 MG: 2.5 TABLET ORAL at 09:07

## 2017-07-11 RX ADMIN — GABAPENTIN 900 MG: 300 CAPSULE ORAL at 09:07

## 2017-07-11 RX ADMIN — ZINC SULFATE 220 MG (50 MG) CAPSULE 220 MG: CAPSULE at 09:07

## 2017-07-11 RX ADMIN — VANCOMYCIN HYDROCHLORIDE 1250 MG: 500 INJECTION, POWDER, LYOPHILIZED, FOR SOLUTION INTRAVENOUS at 06:07

## 2017-07-11 RX ADMIN — CLOPIDOGREL BISULFATE 75 MG: 75 TABLET ORAL at 09:07

## 2017-07-11 RX ADMIN — CIPROFLOXACIN HYDROCHLORIDE 500 MG: 500 TABLET, FILM COATED ORAL at 09:07

## 2017-07-11 RX ADMIN — ASPIRIN 81 MG CHEWABLE TABLET 81 MG: 81 TABLET CHEWABLE at 09:07

## 2017-07-11 RX ADMIN — THERA TABS 1 TABLET: TAB at 09:07

## 2017-07-11 RX ADMIN — APIXABAN 5 MG: 2.5 TABLET, FILM COATED ORAL at 09:07

## 2017-07-11 NOTE — PROGRESS NOTES
Infectious Disease Progress Note    Impression:  Patient with soft tissue infection in area of ankle hardware. Currently no evidence of osteomyelitis. Patient is tolerating therapy well with no adverse effects.     Plan:  Plan to treat with IV antibiotic therapy for 4 weeks with vancomycin (goal 15-20)and ciprofloxacin end date of August 3rd.   Will need to be discharged with midline to a facility that can manage both the antibiotics and wound vac.   Will need biweekly CBC, Chem20, and vancomycin trough    Time spent (more than half in direct face to face contact with patient) : 15    Please contact for any questions   [phone] service  [phone] pager  [phone] cell  Linh Armendariz MD, MPH  Covering for [facility], Infectious Diseases      HPI  Patient is a 66 yr old female with DM with neuropathy and hypertension who was admitted to the hospital on 7/6/17 after a fall. She had an initial fal and right ankle fracture in January of 2017 requiring ORIF. She was discharged to a SNF and had another fall resulting in wound dehiscence, requiring another surgical repair and fixation. The surgical wound has not healed since. She has had outpatient antibiotic therapy (oral medications) as well.   Patient intially seen by Dr Valdez, please refer to her note from 7/06/17 for further history.       Interval History:  Patient discharged prior to being seen.     Review of Symptoms:  Review of Systems       Allergies:  Review of patient's allergies indicates:   Allergen Reactions    Hydrocodone Hives    Pcn [penicillins] Hives    Pcn [penicillins] Rash        Medications:  Antibiotics     Start     Stop Route Frequency Ordered    07/08/17 0530  vancomycin (VANCOCIN) 1,250 mg in dextrose 5 % 250 mL IVPB  (Vancomycin IVPB with levels panel)      -- IV Every 12 hours (non-standard times) 07/08/17 9359    07/06/17 1917  ciprofloxacin HCl tablet 500 mg      -- Oral Every 12 hours 07/06/17 1902       Vancomycin 7/06 to present   Ciprofloxacin 7/06 to present    Prior  Clindamycin 7/06 x 1     Physical Exam:  VS (24h):   Vitals:    07/11/17 0348   BP: (!) 114/56   Pulse: 80   Resp: 18   Temp: 98 °F (36.7 °C)     Temp:  [98 °F (36.7 °C)-98.3 °F (36.8 °C)]     I & O (Last 24H):    Intake/Output Summary (Last 24 hours) at 07/11/17 1142  Last data filed at 07/11/17 0622   Gross per 24 hour   Intake             1090 ml   Output              700 ml   Net              390 ml       Physical Exam       Lines:  Right Midline 7/10/17- c/d/i     Labs:  CBC:   Lab Results   Component Value Date    WBC 10.80 07/06/2017    WBC 8.80 02/02/2015    WBC 8.00 01/06/2015    HCT 40.0 07/06/2017     07/06/2017       BMP: No results for input(s): GLU, NA, K, CL, CO2, BUN, CREATININE, CALCIUM, MG in the last 24 hours.    LFT:   Lab Results   Component Value Date    ALT 11 07/06/2017    AST 17 07/06/2017    ALKPHOS 62 07/06/2017    BILITOT 0.6 07/06/2017       Microbiology   7/06 BCX - ngtd   7/06 BCX - ngtd

## 2017-07-11 NOTE — DISCHARGE INSTRUCTIONS
Admit to [facility]     Dx-   Patient Active Problem List   Diagnosis    NSTEMI (non-ST elevated myocardial infarction)    Tobacco dependence    Hypercholesterolemia    CAD (coronary artery disease)    S/P drug eluting coronary stent placement    BMI 33.0-33.9,adult    Infected wound    Benign essential HTN    Cellulitis of right ankle    Diabetic polyneuropathy associated with type 2 diabetes mellitus    Type 2 diabetes mellitus with skin complication, with long-term current use of insulin    Deep vein thrombosis (DVT) of right lower extremity    PAD (peripheral artery disease)    COPD (chronic obstructive pulmonary disease)    Ankle wound     Diet- 1800 ADA    Accu checks AC and HS with Aspart insulin with SSI-   **LOW CORRECTION DOSE**   Blood Glucose   mg/dL                  Pre-meal                Bedtime   151-200                0 unit                      0 unit   201-250                2 units                    1 unit   251-300                3 units                    1 unit   301-350                4 units                    2 units   >350                     5 units                    3 units   Administer subcutaneously if needed at times designated by monitoring schedule.        Consult wound care nurse     Wound Vac to right ankle-  Frequency: Every other day  Location: Right, Ankle lateral side.  Cleanse with: Wound Cleanser - scrub base and blot dry.  Cover wound base with: Santyl (RX)  Fill / Pack with: none  Cover dressing with: cast padding/ace in neutral position right foot/ankle.  Wrap with: 4 inch ace  Wound VAC: Black sponge  small  Pressure redistribution device: Pressure Relieving Boots  MicroClimate / Turn assist: none    PICC line care per protocol    Vancomycin 1250mg IV q12 and Cipro 500mg po BID until 8/3/17; Dx-Cellulitis of right ankle     Will need biweekly CBC, Chem20, and vancomycin trough    Activity as tolerated  Fall Precautions

## 2017-07-11 NOTE — NURSING
Report to madina at [facility], they will send someone to transport, wound vac disconnected, drsg to rt ankle wound cdi, midline to rt ue intact, vss, nad noted, no complaints voiced

## 2017-07-11 NOTE — DISCHARGE SUMMARY
Discharge Summary  Hospital Medicine    Admit Date: 7/6/2017    Date and Time: 7/11/201711:05 AM    Discharge Attending Physician: Ritika Cuadra MD    Primary Care Physician: Dwayne Soares MD    Diagnoses:  Active Hospital Problems    Diagnosis  POA    *Infected wound [T14.8, L08.9]  Yes     Right ankle      Ankle wound [S91.009A]  Yes    Benign essential HTN [I10]  Yes    Cellulitis of right ankle [L03.115]  Yes    Diabetic polyneuropathy associated with type 2 diabetes mellitus [E11.42]  Yes    Type 2 diabetes mellitus with skin complication, with long-term current use of insulin [E11.628, Z79.4]  Not Applicable    Deep vein thrombosis (DVT) of right lower extremity [I82.401]  Yes     Hx of- Currently on Eliquis      PAD (peripheral artery disease) [I73.9]  Yes     Hx Recent stent LLE April 2017      COPD (chronic obstructive pulmonary disease) [J44.9]  Yes    CAD (coronary artery disease) [I25.10]  Yes    Hypercholesterolemia [E78.00]  Yes    Tobacco dependence [F17.200]  Yes      Resolved Hospital Problems    Diagnosis Date Resolved POA   No resolved problems to display.     Discharged Condition: Good    Hospital Course:   This is a 66 year old female with complaint of wound to RLE since January 2017. She stated she broke her ankle in 1/2017 and underwent surgery by Dr. Bowden. She stated she has had slow wound healing since the surgery. Pt had been being followed by Dr. Alexander as out patient. Pt has  PMH significant for DM-2, hypertension, nicotine abuse, CAD with prior coronary stent placement x 2 in 2015 and MI. She was going to Dr. Alexander's office weekly and had home health three times a week. She stated she was suppose to have a wound vac placed but the home health nurse was concerned about the drainage and redness around the wound. She reported the wound had been draining for about two days with the associated, worsening redness. She denied fever or chills. Pt placed in hospital for further  evaluation and treatment. Patient was admitted to Hospitalist medicine service. Patient was evaluated by Dr. Alexander and Dr. Valdez. Patient underwent Indium scan which was negative for osteomyelitis. Wound VAC placed by Dr. Alexander who avoided doing any surgery intervention at this time with fear it might lead to infection of hardware. Dr. Valdez recommended longterm IV antibiotics. Patient declined LTAC placement and wanted to go to SNF where she was accepted for further wound care and IV antibiotics. Patient was discharged to SNF in stable condition with following discharge plan of care. Total time with the patient was 30 minutes and greater than 50% was spent in counseling and coordination of care. The assessment and plan have been discussed at length. Physicians' notes reviewed. Labs and procedure reviewed.     Consults: Dr. Alexander and Dr. Valdez    Significant Diagnostic Studies:   Indium scan:  The patient's white blood cells were labeled with 0.535 mCi of indium-111 which was injected in the right arm. Delayed images obtained through the distal tibia and fibula regions as well as the ankles demonstrate a vague primarily lateral right distal tibial region increased tracer uptake without significant abnormal tracer uptake about the ankle joints themselves. The area of increased tracer uptake is in the region of the known wound demonstrated on the July 6, 2017 examination suggesting infection or inflammation in this region.     Right ankle x-ray:  1.  Status post ORIF right ankle with intact hardware and no change in alignment.  2.  Large lateral soft tissue wound and generalized soft tissue swelling.     Right ankle x-ray:  Status post right ankle ORIF, with lateral soft tissue wound in generalized soft tissue swelling raising consideration for cellulitis.  No radiographic evidence for osteomyelitis.     Microbiology Results (last 7 days)     Procedure Component Value Units Date/Time    Blood Culture #1  [[id number]] Collected:  07/06/17 1221    Order Status:  Completed Specimen:  Blood Updated:  07/10/17 2212     Blood Culture, Routine No Growth to date     Blood Culture, Routine No Growth to date     Blood Culture, Routine No Growth to date     Blood Culture, Routine No Growth to date     Blood Culture, Routine No Growth to date    Blood Culture #2 [[id number]] Collected:  07/06/17 1223    Order Status:  Completed Specimen:  Blood Updated:  07/10/17 2212     Blood Culture, Routine No Growth to date     Blood Culture, Routine No Growth to date     Blood Culture, Routine No Growth to date     Blood Culture, Routine No Growth to date     Blood Culture, Routine No Growth to date        Special Treatments/Procedures: as above  Disposition: SNF    Medications:  Reconciled Home Medications: Current Discharge Medication List      START taking these medications    Details   alprazolam (XANAX) 0.25 MG tablet Take 1 tablet (0.25 mg total) by mouth 3 (three) times daily as needed for Anxiety.  Qty: 10 tablet, Refills: 0      ciprofloxacin HCl (CIPRO) 500 MG tablet Take 1 tablet (500 mg total) by mouth every 12 (twelve) hours.      dextrose 5 % SolP 250 mL with vancomycin 1,000 mg SolR 1,250 mg Inject 1,250 mg into the vein every 12 (twelve) hours.      multivitamin (THERAGRAN) tablet Take 1 tablet by mouth once daily.      zinc sulfate (ZINCATE) 220 (50) mg capsule Take 1 capsule (220 mg total) by mouth once daily.         CONTINUE these medications which have CHANGED    Details   aspirin 81 MG Chew Take 1 tablet (81 mg total) by mouth once daily.  Refills: 0      oxycodone-acetaminophen (PERCOCET)  mg per tablet Take 1 tablet by mouth every 6 (six) hours as needed.  Qty: 10 tablet, Refills: 0         CONTINUE these medications which have NOT CHANGED    Details   apixaban 5 mg Tab Take 2 tablets (10 mg total) by mouth 2 (two) times daily.  Qty: 26 tablet, Refills: 0      atorvastatin (LIPITOR) 80 MG tablet Take 1 tablet  (80 mg total) by mouth every evening.  Qty: 30 tablet, Refills: 11      clopidogrel (PLAVIX) 75 mg tablet Take 1 tablet (75 mg total) by mouth once daily.  Qty: 30 tablet, Refills: 11      collagenase ointment Apply topically once daily.  Qty: 30 g, Refills: 3      gabapentin (NEURONTIN) 100 MG capsule Take 900 mg by mouth once daily.       insulin glargine (LANTUS) 100 unit/mL injection Inject 25 Units into the skin every morning.       lisinopril (PRINIVIL,ZESTRIL) 2.5 MG tablet Take 1 tablet (2.5 mg total) by mouth once daily.  Qty: 30 tablet, Refills: 11      melatonin 3 mg Tab Take 3 mg by mouth every evening.       metformin (GLUCOPHAGE) 1000 MG tablet Take 1,000 mg by mouth 2 (two) times daily with meals.      metoprolol succinate (TOPROL-XL) 25 MG 24 hr tablet Take 1 tablet (25 mg total) by mouth once daily.  Qty: 30 tablet, Refills: 11      promethazine (PHENERGAN) 25 MG tablet Take 25 mg by mouth every 6 (six) hours as needed for Nausea.             Discharge Procedure Orders  Diet general   Order Comments: Cardiac/ 2 gram sodium low cholesterol diet     Diet Diabetic 1800 Calories     Other restrictions (specify):   Order Comments: Fall precautions.  Keep RLE elevated     Call MD for:   Order Comments: For worsening symptoms, chest pain, shortness of breath, increased abdominal pain, high grade fever, stroke or stroke like symptoms, immediately go to the nearest Emergency Room or call 911 as soon as possible.     Change dressing (specify)   Order Comments: Dressing change: Wound-VAC per procotol.       Follow-up Information     Iglesia Alexander DPM In 1 week.    Specialties:  Podiatry, Wound Care  Why:  For wound re-check  Contact information:  [address]  [place] [address] [phone]             Dwayne Soares MD In 2 weeks.    Specialty:  Family Medicine  Contact information:  [address]  [address]  [place] [address]  [phone]             Julee Valdez MD In 2 weeks.    Specialty:   Infectious Diseases  Contact information:  [address] [facility]  [address]  [facility] [address]  [phone]

## 2017-07-11 NOTE — PROGRESS NOTES
I spoke to Heena at [facility] and they are able to accept the pt . However she needs to speak to the pt about her finances and she asked me to have the pt call her. I brought the pt Heena's name and contact phone number on a sticky and asked if she needed me to give the phone to her however she stated that she has a phone and will call her. I exited the room. Татьяна Boudreaux LMSW

## 2017-07-11 NOTE — TELEPHONE ENCOUNTER
Los Hernandes notified that Ms. aLi is in [facility] she will need to get the most recent information from them

## 2017-07-11 NOTE — PROGRESS NOTES
I received the pts approved 142 via e-mail and placed the original in the pts blue folder. Татьяна Boudreaux LMSW

## 2017-07-11 NOTE — PROGRESS NOTES
I spoke to Heena at [facility]  [phone] and they are able to accept the pt. She asked that the nurse call report asap bc the  is there waiting to come transport the pt. I updated the pts nurse, Yasmeen. Татьяна Boudreaux LMSW

## 2017-07-11 NOTE — TELEPHONE ENCOUNTER
----- Message from Meg Mohamud sent at 7/11/2017  1:30 PM CDT -----  Contact: Shahab Madison - Treatment Nurse at [facility] [phone] is calling for most recent notes and measurements of wound on patient/please fax to [phone]

## 2017-07-11 NOTE — PROGRESS NOTES
I left a message for Heena at [phone]. I faxed the pts PPD, 142. PASRR, dc orders, AVS and prescription to [phone] and sent a note via [employer].  Татьяна Boudreaux LMSW

## 2017-07-11 NOTE — NURSING
PICC placement right brachial vein attempted. Accessed vein under real time ultrasound. Encountered resistance threading catheter despite flushing and changing pt.'s position multiple times. Unable to achieve successful 3CG confirmation. At this point CXR was done to assist in placement and it curled and ended in subclavian vein per radiologist.  Over wire placement attempted using 3CG and same difficulties repeated. Since pt. had no access it was decided to pass a BARD midline cath over wire. This succeeded with great blood return. CXR done to R/O pneumothorax and none noted.  Plan to attempt PICC placement tomorrow on left side.

## 2017-07-11 NOTE — PROGRESS NOTES
I sent PPD and Chest xray to Heena at [facility] via [facility] and selected destination complete.  Татьяна Boudreaux LMSW

## 2017-07-11 NOTE — PROGRESS NOTES
I completed the [place] assessment with Jonna at [phone] opt 3 . I faxed the signed and completed PASRR to [facility] at [phone]. I will follow up with obtaining the pts emailed 142. Татьяна Boudreaux LMSW

## 2017-07-11 NOTE — NURSING
Wound vac dressing changed to lateral right ankle with Dr. Alexander at bedside. Dressing changed to medial right ankle.

## 2017-07-12 ENCOUNTER — PATIENT OUTREACH (OUTPATIENT)
Dept: ADMINISTRATIVE | Facility: CLINIC | Age: 67
End: 2017-07-12

## 2017-07-13 DIAGNOSIS — Z11.59 NEED FOR HEPATITIS C SCREENING TEST: ICD-10-CM

## 2017-07-18 ENCOUNTER — TELEPHONE (OUTPATIENT)
Dept: PODIATRY | Facility: CLINIC | Age: 67
End: 2017-07-18

## 2017-07-18 DIAGNOSIS — L03.115 CELLULITIS OF RIGHT LOWER EXTREMITY: ICD-10-CM

## 2017-07-18 DIAGNOSIS — Z79.2 ENCOUNTER FOR LONG-TERM (CURRENT) USE OF ANTIBIOTICS: Primary | ICD-10-CM

## 2017-07-18 NOTE — TELEPHONE ENCOUNTER
Los, with [facility],  Calling regard wound vac, explained that patient has not been in clinic since orders were placed will need to be seen in clinic to give new orders. Appointment scheduled for 07/26/2017 at 11:00 am.

## 2017-07-18 NOTE — TELEPHONE ENCOUNTER
----- Message from Jessica Poe sent at 7/18/2017 11:08 AM CDT -----  Contact:  call  //[phone]// [facility]    Calling to   Speak to the nurse  ,  Questions  About a  Wound  vac

## 2017-07-26 ENCOUNTER — TELEPHONE (OUTPATIENT)
Dept: PODIATRY | Facility: CLINIC | Age: 67
End: 2017-07-26

## 2017-07-26 ENCOUNTER — OFFICE VISIT (OUTPATIENT)
Dept: PODIATRY | Facility: CLINIC | Age: 67
End: 2017-07-26
Payer: MEDICARE

## 2017-07-26 VITALS — HEIGHT: 67 IN | BODY MASS INDEX: 32.8 KG/M2 | WEIGHT: 209 LBS

## 2017-07-26 DIAGNOSIS — G89.29 OTHER CHRONIC PAIN: ICD-10-CM

## 2017-07-26 DIAGNOSIS — I73.9 PAD (PERIPHERAL ARTERY DISEASE): ICD-10-CM

## 2017-07-26 DIAGNOSIS — L97.319 ANKLE ULCER, RIGHT, WITH UNSPECIFIED SEVERITY: Primary | ICD-10-CM

## 2017-07-26 DIAGNOSIS — E11.40 TYPE 2 DIABETES MELLITUS WITH DIABETIC NEUROPATHY, UNSPECIFIED LONG TERM INSULIN USE STATUS: ICD-10-CM

## 2017-07-26 DIAGNOSIS — R60.9 EDEMA, UNSPECIFIED TYPE: ICD-10-CM

## 2017-07-26 PROCEDURE — 11042 DBRDMT SUBQ TIS 1ST 20SQCM/<: CPT | Mod: PBBFAC,PO | Performed by: PODIATRIST

## 2017-07-26 PROCEDURE — 99499 UNLISTED E&M SERVICE: CPT | Mod: S$PBB,,, | Performed by: PODIATRIST

## 2017-07-26 PROCEDURE — 11046 DBRDMT MUSC&/FSCA EA ADDL: CPT | Mod: S$PBB,,, | Performed by: PODIATRIST

## 2017-07-26 PROCEDURE — 99213 OFFICE O/P EST LOW 20 MIN: CPT | Mod: PBBFAC,PO | Performed by: PODIATRIST

## 2017-07-26 PROCEDURE — 99999 PR PBB SHADOW E&M-EST. PATIENT-LVL III: CPT | Mod: PBBFAC,,, | Performed by: PODIATRIST

## 2017-07-26 PROCEDURE — 11043 DBRDMT MUSC&/FSCA 1ST 20/<: CPT | Mod: PBBFAC,PO | Performed by: PODIATRIST

## 2017-07-26 RX ORDER — LIDOCAINE HYDROCHLORIDE 20 MG/ML
JELLY TOPICAL
Qty: 30 ML | Refills: 2 | Status: SHIPPED | OUTPATIENT
Start: 2017-07-26 | End: 2020-01-22 | Stop reason: ALTCHOICE

## 2017-07-26 RX ORDER — DEXTROSE 4 G
TABLET,CHEWABLE ORAL
COMMUNITY
Start: 2013-02-14

## 2017-07-26 NOTE — TELEPHONE ENCOUNTER
Spoke with Los wound care orders confirmed.   Medial right ankle:  Remove dressing, apply lidocaine gel.  Remove gel and cleanse wound with wound cleanser.  Blot dry.  Scrub wound base lightly with coarse gauze.  Apply santyl to wound base.  Cover with border gauze.  Repeat daily.  Lateral right ankle:  Remove dressing/VAC, apply lidocaine gel.  Remove gel and cleanse wound with wound cleanser.  Blot dry.  Scrub wound base lightly with coarse gauze.  Apply santyl to wound base.  Apply wound VAC (NPWT).  Repeat every other day.

## 2017-07-26 NOTE — PROCEDURES
"Wound Debridement  Date/Time: 7/26/2017 11:58 AM  Performed by: DEMOND ROBISON  Authorized by: DEMOND ROBISON     Time out: Immediately prior to procedure a "time out" was called to verify the correct patient, procedure, equipment, support staff and site/side marked as required.    Consent Done?:  Yes (Verbal)  Local anesthesia used?: No      Wound Details:    Location:  Right foot    Location:  Right Ankle    Type of Debridement:  Excisional       Length (cm):  8.8       Area (sq cm):  26.4       Width (cm):  3       Percent Debrided (%):  100       Depth (cm):  1.1       Total Area Debrided (sq cm):  26.4    Depth of debridement:  Muscle/fascia/tendon    Tissue debrided:  Dermis, Epidermis, Fascia and Subcutaneous    Devitalized tissue debrided:  Fibrin, Sough and Exudate    Instruments:  Nippers    Additional wounds:  1    2nd Wound Details:     Location:  Right foot    Location:  Right Ankle    Location:  Right Ankle    Type of Debridement:  Excisional       Length (cm):  3.6       Area (sq cm):  5.76       Width (cm):  1.6       Percent Debrided (%):  100       Depth (cm):  0.3       Total Area Debrided (sq cm):  5.76    Depth of debridement:  Subcutaneous tissue    Tissue debrided:  Subcutaneous and Other    Devitalized tissue debrided:  Fibrin and Sough    Instruments:  Nippers    Bleeding:  Minimal  Hemostasis Achieved: Yes    Method Used:  Pressure  Patient tolerance:  Patient tolerated the procedure well with no immediate complications      "

## 2017-07-26 NOTE — PROGRESS NOTES
Subjective:      Patient ID: Kateryna Connolly is a 66 y.o. female.    Chief Complaint: Wound Check    Wounds right ankle medial and laterally using santyl and bordered gauze medially and santyl and VAC laterally.  Pain controlled.  Resident at [facility] presently.  One month until next follow up with Dr. Bowden.    Review of Systems   Constitution: Negative for chills, fever, malaise/fatigue and night sweats.   Cardiovascular: Negative for claudication, cyanosis and leg swelling.   Skin: Positive for poor wound healing. Negative for color change, itching, rash and suspicious lesions.   Musculoskeletal: Negative for falls, gout, joint pain and myalgias.   Neurological: Positive for numbness, paresthesias and sensory change.           Objective:      Physical Exam   Constitutional: She is oriented to person, place, and time. She appears well-developed and well-nourished. She is cooperative. No distress.   Oriented to time, place, and person.   Cardiovascular:   Pulses:       Dorsalis pedis pulses are 1+ on the right side, and 1+ on the left side.        Posterior tibial pulses are 1+ on the right side, and 1+ on the left side.   Capillary fill time 3-5 seconds.  All toes warm to touch.      2+ pitting lower extremity edema bilateral.    Negative elevational pallor and dependent rubor bilateral.     Musculoskeletal:   Normal angle, base, station of gait. Decreased stride length, early heel off, moderately propulsive toe off bilateral.    All ten toes without clubbing, cyanosis, or signs of ischemia.      No pain to palpation bilateral lower extremities.      Range of motion, stability, muscle strength, and muscle tone are age and health appropriate normal bilateral feet and legs.       Lymphadenopathy:   Negative lymphadenopathy bilateral popliteal fossa and tarsal tunnel.     Neurological: She is alert and oriented to person, place, and time. She has normal strength. She is not disoriented. She displays no atrophy  and no tremor. A sensory deficit is present. She exhibits normal muscle tone.   Reflex Scores:       Patellar reflexes are 2+ on the right side and 2+ on the left side.       Achilles reflexes are 2+ on the right side and 2+ on the left side.  Decreased/absent vibratory sensation bilateral feet to 128Hz tuning fork.    Paresthesias bilateral feet with no clearly identified trigger or source.       Skin: Skin is warm, dry and intact. No abrasion, no bruising, no burn, no ecchymosis, no laceration, no lesion, no petechiae and no rash noted. She is not diaphoretic. No cyanosis or erythema. No pallor. Nails show no clubbing.   Skin thin, atrophic, with decreased density and distribution of pedal hair bilateral, but without hyperpigmentation, adolph discoloration,  ulcers, masses, nodules or cords palpated bilateral feet and legs.      Wound:  Lateral right ankle    Size:    Pre:  [id number]  Post: [id number]    Base:  Granular, pink and fibrous tan mix about 75/25 with moderate serous/serosanaguinous drainage only.  No pus, tracking, fluctuance, malodor, or cardinal signs infection.    Borders:   flat, pink, blanchable skin edges without undermining.    Wound:  Medial right ankle    Size:    Pre:[id number]  Post: [id number]    Base:  Tan fibrous with moderate serous/serosanaguinous drainage only.  No pus, tracking, fluctuance, malodor, or cardinal signs infection.    Borders:   flat, pink, blanchable skin edges without undermining.                   Assessment:       Encounter Diagnoses   Name Primary?    Ankle ulcer, right, with unspecified severity Yes    Edema, unspecified type     Type 2 diabetes mellitus with diabetic neuropathy, unspecified long term insulin use status     PAD (peripheral artery disease)     Other chronic pain          Plan:       Kateryna was seen today for wound check.    Diagnoses and all orders for this visit:    Ankle ulcer, right, with unspecified severity    Edema, unspecified type    Type 2  diabetes mellitus with diabetic neuropathy, unspecified long term insulin use status    PAD (peripheral artery disease)    Other chronic pain    Other orders  -     collagenase ointment; Apply topically once daily.  -     lidocaine HCL 2% (XYLOCAINE) 2 % jelly; Apply topically as needed. Apply once topically every dressing change to right ankle prior to mechanical debridement of wound with coarse gausze.      I counseled the patient on her conditions, their implications and medical management.        Debrided wounds.    Change wound dressings as follows:    Medial right ankle:  Remove dressing, apply lidocaine gel.    Remove gel and cleanse wound with wound cleanser.  Blot dry.    Scrub wound base lightly with coarse gauze.    Apply santyl to wound base.    Cover with border gauze.    Repeat daily.        Lateral right ankle:  Remove dressing/VAC, apply lidocaine gel.    Remove gel and cleanse wound with wound cleanser.  Blot dry.    Scrub wound base lightly with coarse gauze.    Apply santyl to wound base.    Apply wound VAC (NPWT).    Repeat every other day.          Return in about 1 week (around 8/2/2017).

## 2017-07-26 NOTE — TELEPHONE ENCOUNTER
----- Message from Yasmeen Wilson sent at 7/26/2017  1:36 PM CDT -----  Los Hernandes requesting to speak with nurse concerning patient's wound vac orders /please call back at [phone] to advise.

## 2017-08-02 ENCOUNTER — OFFICE VISIT (OUTPATIENT)
Dept: PODIATRY | Facility: CLINIC | Age: 67
End: 2017-08-02
Payer: MEDICARE

## 2017-08-02 VITALS — HEIGHT: 67 IN | WEIGHT: 209 LBS | BODY MASS INDEX: 32.8 KG/M2

## 2017-08-02 DIAGNOSIS — I73.9 PAD (PERIPHERAL ARTERY DISEASE): ICD-10-CM

## 2017-08-02 DIAGNOSIS — L97.319 ANKLE ULCER, RIGHT, WITH UNSPECIFIED SEVERITY: Primary | ICD-10-CM

## 2017-08-02 DIAGNOSIS — G89.29 OTHER CHRONIC PAIN: ICD-10-CM

## 2017-08-02 DIAGNOSIS — E11.40 TYPE 2 DIABETES MELLITUS WITH DIABETIC NEUROPATHY, UNSPECIFIED LONG TERM INSULIN USE STATUS: ICD-10-CM

## 2017-08-02 DIAGNOSIS — R60.9 EDEMA, UNSPECIFIED TYPE: ICD-10-CM

## 2017-08-02 PROCEDURE — 4010F ACE/ARB THERAPY RXD/TAKEN: CPT | Mod: ,,, | Performed by: PODIATRIST

## 2017-08-02 PROCEDURE — 1159F MED LIST DOCD IN RCRD: CPT | Mod: ,,, | Performed by: PODIATRIST

## 2017-08-02 PROCEDURE — 99212 OFFICE O/P EST SF 10 MIN: CPT | Mod: PBBFAC,PO | Performed by: PODIATRIST

## 2017-08-02 PROCEDURE — 1125F AMNT PAIN NOTED PAIN PRSNT: CPT | Mod: ,,, | Performed by: PODIATRIST

## 2017-08-02 PROCEDURE — 99999 PR PBB SHADOW E&M-EST. PATIENT-LVL II: CPT | Mod: PBBFAC,,, | Performed by: PODIATRIST

## 2017-08-02 PROCEDURE — 99212 OFFICE O/P EST SF 10 MIN: CPT | Mod: S$PBB,,, | Performed by: PODIATRIST

## 2017-08-02 NOTE — PROGRESS NOTES
Subjective:      Patient ID: Kateryna Connolly is a 66 y.o. female.    Chief Complaint: Wound Check    Wounds right ankle medial and laterally using santyl and bordered gauze medially and santyl and VAC laterally.  Pain controlled.  Resident at [facility] presently.  One month until next follow up with Dr. Bowden.    Review of Systems   Constitution: Negative for chills, fever, malaise/fatigue and night sweats.   Cardiovascular: Negative for claudication, cyanosis and leg swelling.   Skin: Positive for poor wound healing. Negative for color change, itching, rash and suspicious lesions.   Musculoskeletal: Negative for falls, gout, joint pain and myalgias.   Neurological: Positive for numbness, paresthesias and sensory change.           Objective:      Physical Exam   Constitutional: She is oriented to person, place, and time. She appears well-developed and well-nourished. She is cooperative. No distress.   Oriented to time, place, and person.   Cardiovascular:   Pulses:       Dorsalis pedis pulses are 1+ on the right side, and 1+ on the left side.        Posterior tibial pulses are 1+ on the right side, and 1+ on the left side.   Capillary fill time 3-5 seconds.  All toes warm to touch.      2+ pitting lower extremity edema bilateral.    Negative elevational pallor and dependent rubor bilateral.     Musculoskeletal:   Normal angle, base, station of gait. Decreased stride length, early heel off, moderately propulsive toe off bilateral.    All ten toes without clubbing, cyanosis, or signs of ischemia.      No pain to palpation bilateral lower extremities.      Range of motion, stability, muscle strength, and muscle tone are age and health appropriate normal bilateral feet and legs.       Lymphadenopathy:   Negative lymphadenopathy bilateral popliteal fossa and tarsal tunnel.     Neurological: She is alert and oriented to person, place, and time. She has normal strength. She is not disoriented. She displays no atrophy  and no tremor. A sensory deficit is present. She exhibits normal muscle tone.   Reflex Scores:       Patellar reflexes are 2+ on the right side and 2+ on the left side.       Achilles reflexes are 2+ on the right side and 2+ on the left side.  Decreased/absent vibratory sensation bilateral feet to 128Hz tuning fork.    Paresthesias bilateral feet with no clearly identified trigger or source.       Skin: Skin is warm, dry and intact. No abrasion, no bruising, no burn, no ecchymosis, no laceration, no lesion, no petechiae and no rash noted. She is not diaphoretic. No cyanosis or erythema. No pallor. Nails show no clubbing.   Skin thin, atrophic, with decreased density and distribution of pedal hair bilateral, but without hyperpigmentation, adolph discoloration,  ulcers, masses, nodules or cords palpated bilateral feet and legs.      Wound:  Lateral right ankle    Size:    Pre:  [id number]      Base:  Granular, pink and fibrous tan mix about 75/25 with moderate serous/serosanaguinous drainage only.  No pus, tracking, fluctuance, malodor, or cardinal signs infection.    Borders:   flat, pink, blanchable skin edges without undermining.    Wound:  Medial right ankle    Size:    Pre:[id number]      Base:  Tan fibrous with moderate serous/serosanaguinous drainage only.  No pus, tracking, fluctuance, malodor, or cardinal signs infection.    Borders:   flat, pink, blanchable skin edges without undermining.                   Assessment:       Encounter Diagnoses   Name Primary?    Ankle ulcer, right, with unspecified severity Yes    Edema, unspecified type     Type 2 diabetes mellitus with diabetic neuropathy, unspecified long term insulin use status     PAD (peripheral artery disease)     Other chronic pain          Plan:       Kateryna was seen today for wound check.    Diagnoses and all orders for this visit:    Ankle ulcer, right, with unspecified severity    Edema, unspecified type    Type 2 diabetes mellitus with  diabetic neuropathy, unspecified long term insulin use status    PAD (peripheral artery disease)    Other chronic pain      I counseled the patient on her conditions, their implications and medical management.        Dressed wound with santyl and woundfoam, kerlix due to improper supplies provided to change VAC (no foam) - home health to resume VAC tomorrow/next change.    Change wound dressings as follows:    Medial right ankle:  Remove dressing, apply lidocaine gel.    Remove gel and cleanse wound with wound cleanser.  Blot dry.    Scrub wound base lightly with coarse gauze.    Apply santyl to wound base.    Cover with border gauze.    Repeat daily.        Lateral right ankle:  Remove dressing/VAC, apply lidocaine gel.    Remove gel and cleanse wound with wound cleanser.  Blot dry.    Scrub wound base lightly with coarse gauze.    Apply santyl to wound base.    Apply wound VAC (NPWT).    Repeat every other day.          Return in about 1 week (around 8/9/2017).

## 2017-08-03 ENCOUNTER — TELEPHONE (OUTPATIENT)
Dept: PODIATRY | Facility: CLINIC | Age: 67
End: 2017-08-03

## 2017-08-07 ENCOUNTER — OFFICE VISIT (OUTPATIENT)
Dept: PODIATRY | Facility: CLINIC | Age: 67
End: 2017-08-07
Payer: MEDICARE

## 2017-08-07 DIAGNOSIS — E11.40 TYPE 2 DIABETES MELLITUS WITH DIABETIC NEUROPATHY, UNSPECIFIED LONG TERM INSULIN USE STATUS: ICD-10-CM

## 2017-08-07 DIAGNOSIS — G89.29 OTHER CHRONIC PAIN: ICD-10-CM

## 2017-08-07 DIAGNOSIS — R60.9 EDEMA, UNSPECIFIED TYPE: ICD-10-CM

## 2017-08-07 DIAGNOSIS — L97.319 ANKLE ULCER, RIGHT, WITH UNSPECIFIED SEVERITY: Primary | ICD-10-CM

## 2017-08-07 DIAGNOSIS — I73.9 PAD (PERIPHERAL ARTERY DISEASE): ICD-10-CM

## 2017-08-07 PROCEDURE — 1159F MED LIST DOCD IN RCRD: CPT | Mod: ,,, | Performed by: PODIATRIST

## 2017-08-07 PROCEDURE — 99212 OFFICE O/P EST SF 10 MIN: CPT | Mod: S$PBB,,, | Performed by: PODIATRIST

## 2017-08-07 PROCEDURE — 99999 PR PBB SHADOW E&M-EST. PATIENT-LVL II: CPT | Mod: PBBFAC,,, | Performed by: PODIATRIST

## 2017-08-07 PROCEDURE — 99212 OFFICE O/P EST SF 10 MIN: CPT | Mod: PBBFAC,PO | Performed by: PODIATRIST

## 2017-08-07 PROCEDURE — 4010F ACE/ARB THERAPY RXD/TAKEN: CPT | Mod: ,,, | Performed by: PODIATRIST

## 2017-08-07 NOTE — PROGRESS NOTES
Subjective:      Patient ID: Kateryna Connolly is a 66 y.o. female.    Chief Complaint: Follow-up    Wounds right ankle medial and laterally using santyl and bordered gauze medially and laterally.  VAC not placed due to multiple visits to view the wound today.  Pain controlled.  Resident at [place] presently.  Today, has up with Dr. Bowden.    Review of Systems   Constitution: Negative for chills, fever, malaise/fatigue and night sweats.   Cardiovascular: Positive for leg swelling. Negative for claudication and cyanosis.   Skin: Positive for poor wound healing. Negative for color change, itching, rash and suspicious lesions.   Musculoskeletal: Negative for falls, gout, joint pain and myalgias.   Neurological: Positive for numbness, paresthesias and sensory change.           Objective:      Physical Exam   Constitutional: She is oriented to person, place, and time. She appears well-developed and well-nourished. She is cooperative. No distress.   Oriented to time, place, and person.   Cardiovascular:   Pulses:       Dorsalis pedis pulses are 1+ on the right side, and 1+ on the left side.        Posterior tibial pulses are 1+ on the right side, and 1+ on the left side.   Capillary fill time 3-5 seconds.  All toes warm to touch.      2+ pitting lower extremity edema bilateral.    Negative elevational pallor and dependent rubor bilateral.     Musculoskeletal:   Normal angle, base, station of gait. Decreased stride length, early heel off, moderately propulsive toe off bilateral.    All ten toes without clubbing, cyanosis, or signs of ischemia.      No pain to palpation bilateral lower extremities.      Range of motion, stability, muscle strength, and muscle tone are age and health appropriate normal bilateral feet and legs.       Lymphadenopathy:   Negative lymphadenopathy bilateral popliteal fossa and tarsal tunnel.     Neurological: She is alert and oriented to person, place, and time. She has normal strength. She  is not disoriented. She displays no atrophy and no tremor. A sensory deficit is present. She exhibits normal muscle tone.   Reflex Scores:       Patellar reflexes are 2+ on the right side and 2+ on the left side.       Achilles reflexes are 2+ on the right side and 2+ on the left side.  Decreased/absent vibratory sensation bilateral feet to 128Hz tuning fork.    Paresthesias bilateral feet with no clearly identified trigger or source.       Skin: Skin is warm, dry and intact. No abrasion, no bruising, no burn, no ecchymosis, no laceration, no lesion, no petechiae and no rash noted. She is not diaphoretic. No cyanosis or erythema. No pallor. Nails show no clubbing.   Skin thin, atrophic, with decreased density and distribution of pedal hair bilateral, but without hyperpigmentation, adolph discoloration,  ulcers, masses, nodules or cords palpated bilateral feet and legs.      Wound:  Lateral right ankle    Size:    Pre:  [id number]      Base:  Granular, pink and fibrous tan mix about 70/30 with moderate serous/serosanaguinous drainage only.  No pus, tracking, fluctuance, malodor, or cardinal signs infection.    Borders:   flat, pink, blanchable skin edges without undermining.    Wound:  Medial right ankle    Size:    Pre:[id number]      Base:  Tan fibrous with moderate serous/serosanaguinous drainage only.  No pus, tracking, fluctuance, malodor, or cardinal signs infection.    Borders:   flat, pink, blanchable skin edges without undermining.                   Assessment:       Encounter Diagnoses   Name Primary?    Ankle ulcer, right, with unspecified severity Yes    Edema, unspecified type     Type 2 diabetes mellitus with diabetic neuropathy, unspecified long term insulin use status     PAD (peripheral artery disease)     Other chronic pain          Plan:       Kateryna was seen today for follow-up.    Diagnoses and all orders for this visit:    Ankle ulcer, right, with unspecified severity    Edema, unspecified  type    Type 2 diabetes mellitus with diabetic neuropathy, unspecified long term insulin use status    PAD (peripheral artery disease)    Other chronic pain      I counseled the patient on her conditions, their implications and medical management.        Dressed wound with santyl and woundfoam, [facility] to resume VAC tomorrow/next change.    Change wound dressings as follows:    Medial right ankle:  Remove dressing, apply lidocaine gel.    Remove gel and cleanse wound with wound cleanser.  Blot dry.    Scrub wound base lightly with coarse gauze.    Apply santyl to wound base.    Cover with border gauze.    Repeat daily.        Lateral right ankle:  Remove dressing/VAC, apply lidocaine gel.    Remove gel and cleanse wound with wound cleanser.  Blot dry.    Scrub wound base lightly with coarse gauze.    Apply santyl to wound base.    Apply wound VAC (NPWT).    Repeat every other day.          Return in about 1 week (around 8/14/2017).

## 2017-08-14 ENCOUNTER — TELEPHONE (OUTPATIENT)
Dept: PODIATRY | Facility: CLINIC | Age: 67
End: 2017-08-14

## 2017-08-14 ENCOUNTER — OFFICE VISIT (OUTPATIENT)
Dept: PODIATRY | Facility: CLINIC | Age: 67
End: 2017-08-14
Payer: MEDICARE

## 2017-08-14 VITALS — BODY MASS INDEX: 32.8 KG/M2 | WEIGHT: 209 LBS | HEIGHT: 67 IN

## 2017-08-14 DIAGNOSIS — L97.319 ANKLE ULCER, RIGHT, WITH UNSPECIFIED SEVERITY: Primary | ICD-10-CM

## 2017-08-14 DIAGNOSIS — I73.9 PAD (PERIPHERAL ARTERY DISEASE): ICD-10-CM

## 2017-08-14 DIAGNOSIS — R60.9 EDEMA, UNSPECIFIED TYPE: ICD-10-CM

## 2017-08-14 DIAGNOSIS — E11.40 TYPE 2 DIABETES MELLITUS WITH DIABETIC NEUROPATHY, UNSPECIFIED LONG TERM INSULIN USE STATUS: ICD-10-CM

## 2017-08-14 PROCEDURE — 3008F BODY MASS INDEX DOCD: CPT | Mod: ,,, | Performed by: PODIATRIST

## 2017-08-14 PROCEDURE — 1159F MED LIST DOCD IN RCRD: CPT | Mod: ,,, | Performed by: PODIATRIST

## 2017-08-14 PROCEDURE — 4010F ACE/ARB THERAPY RXD/TAKEN: CPT | Mod: ,,, | Performed by: PODIATRIST

## 2017-08-14 PROCEDURE — 1125F AMNT PAIN NOTED PAIN PRSNT: CPT | Mod: ,,, | Performed by: PODIATRIST

## 2017-08-14 PROCEDURE — 99212 OFFICE O/P EST SF 10 MIN: CPT | Mod: S$PBB,,, | Performed by: PODIATRIST

## 2017-08-14 PROCEDURE — 99212 OFFICE O/P EST SF 10 MIN: CPT | Mod: PBBFAC,PO | Performed by: PODIATRIST

## 2017-08-14 PROCEDURE — 99999 PR PBB SHADOW E&M-EST. PATIENT-LVL II: CPT | Mod: PBBFAC,,, | Performed by: PODIATRIST

## 2017-08-14 NOTE — PROGRESS NOTES
Subjective:      Patient ID: Kateryna Connolly is a 66 y.o. female.    Chief Complaint: Foot Ulcer (right)    Wounds right ankle medial and laterally using santyl and bordered gauze medially and laterally.  VAC in place lateral wound at 125 mmHg continuous therapy changing every other day.  Pain controlled.  Resident at [place] presently.  Dr. Bowden cleared her to walk.    Review of Systems   Constitution: Negative for chills, fever, malaise/fatigue and night sweats.   Cardiovascular: Positive for leg swelling. Negative for claudication and cyanosis.   Skin: Positive for poor wound healing. Negative for color change, itching, rash and suspicious lesions.   Musculoskeletal: Negative for falls, gout, joint pain and myalgias.   Neurological: Positive for numbness, paresthesias and sensory change.           Objective:      Physical Exam   Constitutional: She is oriented to person, place, and time. She appears well-developed and well-nourished. She is cooperative. No distress.   Oriented to time, place, and person.   Cardiovascular:   Pulses:       Dorsalis pedis pulses are 1+ on the right side, and 1+ on the left side.        Posterior tibial pulses are 1+ on the right side, and 1+ on the left side.   Capillary fill time 3-5 seconds.  All toes warm to touch.      2+ pitting lower extremity edema bilateral.    Negative elevational pallor and dependent rubor bilateral.     Musculoskeletal:   Normal angle, base, station of gait. Decreased stride length, early heel off, moderately propulsive toe off bilateral.    All ten toes without clubbing, cyanosis, or signs of ischemia.      No pain to palpation bilateral lower extremities.      Range of motion, stability, muscle strength, and muscle tone are age and health appropriate normal bilateral feet and legs.       Lymphadenopathy:   Negative lymphadenopathy bilateral popliteal fossa and tarsal tunnel.     Neurological: She is alert and oriented to person, place, and time.  She has normal strength. She is not disoriented. She displays no atrophy and no tremor. A sensory deficit is present. She exhibits normal muscle tone.   Reflex Scores:       Patellar reflexes are 2+ on the right side and 2+ on the left side.       Achilles reflexes are 2+ on the right side and 2+ on the left side.  Decreased/absent vibratory sensation bilateral feet to 128Hz tuning fork.    Paresthesias bilateral feet with no clearly identified trigger or source.       Skin: Skin is warm, dry and intact. No abrasion, no bruising, no burn, no ecchymosis, no laceration, no lesion, no petechiae and no rash noted. She is not diaphoretic. No cyanosis or erythema. No pallor. Nails show no clubbing.   Skin thin, atrophic, with decreased density and distribution of pedal hair bilateral, but without hyperpigmentation, adolph discoloration,  ulcers, masses, nodules or cords palpated bilateral feet and legs.      Wound:  Lateral right ankle    Size:    Pre:  [id number]      Base:  Granular, pink and fibrous tan mix about 70/30 with moderate serous/serosanaguinous drainage only.  No pus, tracking, fluctuance, malodor, or cardinal signs infection.    Borders:   flat, pink, blanchable skin edges without undermining.    Wound:  Medial right ankle    Size:    Pre:[id number]      Base:  Tan fibrous with moderate serous/serosanaguinous drainage only.  No pus, tracking, fluctuance, malodor, or cardinal signs infection.    Borders:   flat, pink, blanchable skin edges without undermining.                   Assessment:       Encounter Diagnoses   Name Primary?    Ankle ulcer, right, with unspecified severity Yes    Edema, unspecified type     Type 2 diabetes mellitus with diabetic neuropathy, unspecified long term insulin use status     PAD (peripheral artery disease)          Plan:       Kateryna was seen today for foot ulcer.    Diagnoses and all orders for this visit:    Ankle ulcer, right, with unspecified severity    Edema,  unspecified type    Type 2 diabetes mellitus with diabetic neuropathy, unspecified long term insulin use status    PAD (peripheral artery disease)      I counseled the patient on her conditions, their implications and medical management.    Dressed today with santyl, wound foam, kerlix - will apply VAC at [place].  Continue present changes as below.    Change wound dressings as follows:    Medial right ankle:  Remove dressing, apply lidocaine gel.    Remove gel and cleanse wound with wound cleanser.  Blot dry.    Scrub wound base lightly with coarse gauze.    Apply santyl to wound base.    Cover with border gauze.    Repeat daily.        Lateral right ankle:  Remove dressing/VAC, apply lidocaine gel.    Remove gel and cleanse wound with wound cleanser.  Blot dry.    Scrub wound base lightly with coarse gauze.    Apply santyl to wound base.    Apply wound VAC (NPWT).    Repeat every other day.      Long detailed discussion about the potential benefits of BKA/AKA.  Patient wants to speak with her family before appointing with Vascular or Orthopedics to discuss.    No Follow-up on file.

## 2017-08-14 NOTE — TELEPHONE ENCOUNTER
----- Message from Monse Ford sent at 8/14/2017 12:55 PM CDT -----  Contact: rep with [facility] #[phone]  Pt seen today, requesting progress notes of visit     Call back on # [phone] fax # [phone]  thanks

## 2017-08-15 ENCOUNTER — TELEPHONE (OUTPATIENT)
Dept: PODIATRY | Facility: CLINIC | Age: 67
End: 2017-08-15

## 2017-08-15 NOTE — TELEPHONE ENCOUNTER
----- Message from Jessica Poe sent at 8/15/2017  9:23 AM CDT -----  Contact:  call  yudy//[phone]    Calling to  Speak to the  Nurse  About progress  Note  An  Order // please call  Fax: [phone]

## 2017-08-21 ENCOUNTER — OFFICE VISIT (OUTPATIENT)
Dept: PODIATRY | Facility: CLINIC | Age: 67
End: 2017-08-21
Payer: MEDICARE

## 2017-08-21 VITALS — BODY MASS INDEX: 32.65 KG/M2 | HEIGHT: 67 IN | WEIGHT: 208 LBS

## 2017-08-21 DIAGNOSIS — L97.319 ANKLE ULCER, RIGHT, WITH UNSPECIFIED SEVERITY: Primary | ICD-10-CM

## 2017-08-21 DIAGNOSIS — I73.9 PAD (PERIPHERAL ARTERY DISEASE): ICD-10-CM

## 2017-08-21 DIAGNOSIS — R60.9 EDEMA, UNSPECIFIED TYPE: ICD-10-CM

## 2017-08-21 PROCEDURE — 99999 PR PBB SHADOW E&M-EST. PATIENT-LVL III: CPT | Mod: PBBFAC,,, | Performed by: PODIATRIST

## 2017-08-21 PROCEDURE — 99213 OFFICE O/P EST LOW 20 MIN: CPT | Mod: PBBFAC,PO | Performed by: PODIATRIST

## 2017-08-21 PROCEDURE — 97597 DBRDMT OPN WND 1ST 20 CM/<: CPT | Mod: PBBFAC,PO | Performed by: PODIATRIST

## 2017-08-21 PROCEDURE — 97597 DBRDMT OPN WND 1ST 20 CM/<: CPT | Mod: S$PBB,,, | Performed by: PODIATRIST

## 2017-08-21 PROCEDURE — 99499 UNLISTED E&M SERVICE: CPT | Mod: S$PBB,,, | Performed by: PODIATRIST

## 2017-08-21 NOTE — PROGRESS NOTES
Subjective:      Patient ID: Kateryna Connolly is a 67 y.o. female.    Chief Complaint: Foot Ulcer (right)    Wounds right ankle medial and laterally using santyl and bordered gauze medially and vac laterally.  VAC in place lateral wound at 125 mmHg continuous therapy changing every other day.  Pain controlled.  Resident at [place] presently.  Dr. Bowden cleared her to walk. No acute changes since last visit.    Review of Systems   Constitution: Negative for chills, fever, malaise/fatigue and night sweats.   Cardiovascular: Positive for leg swelling. Negative for claudication and cyanosis.   Skin: Positive for poor wound healing. Negative for color change, itching, rash and suspicious lesions.   Musculoskeletal: Negative for falls, gout, joint pain and myalgias.   Neurological: Positive for numbness, paresthesias and sensory change.           Objective:      Physical Exam   Constitutional: She is oriented to person, place, and time. She appears well-developed and well-nourished. She is cooperative. No distress.   Oriented to time, place, and person.   Cardiovascular:   Pulses:       Dorsalis pedis pulses are 1+ on the right side, and 1+ on the left side.        Posterior tibial pulses are 1+ on the right side, and 1+ on the left side.   Capillary fill time 3-5 seconds.  All toes warm to touch.      2+ pitting lower extremity edema bilateral.    Negative elevational pallor and dependent rubor bilateral.     Musculoskeletal:   Normal angle, base, station of gait. Decreased stride length, early heel off, moderately propulsive toe off bilateral.    All ten toes without clubbing, cyanosis, or signs of ischemia.      No pain to palpation bilateral lower extremities.      Range of motion, stability, muscle strength, and muscle tone are age and health appropriate normal bilateral feet and legs.       Lymphadenopathy:   Negative lymphadenopathy bilateral popliteal fossa and tarsal tunnel.     Neurological: She is alert  and oriented to person, place, and time. She has normal strength. She is not disoriented. She displays no atrophy and no tremor. A sensory deficit is present. She exhibits normal muscle tone.   Reflex Scores:       Patellar reflexes are 2+ on the right side and 2+ on the left side.       Achilles reflexes are 2+ on the right side and 2+ on the left side.  Decreased/absent vibratory sensation bilateral feet to 128Hz tuning fork.    Paresthesias bilateral feet with no clearly identified trigger or source.       Skin: Skin is warm, dry and intact. No abrasion, no bruising, no burn, no ecchymosis, no laceration, no lesion, no petechiae and no rash noted. She is not diaphoretic. No cyanosis or erythema. No pallor. Nails show no clubbing.   Skin thin, atrophic, with decreased density and distribution of pedal hair bilateral, but without hyperpigmentation, adolph discoloration,  ulcers, masses, nodules or cords palpated bilateral feet and legs.      Wound:  Lateral right ankle    Size:    Pre:  [id number]      Base:  Granular, pink and fibrous tan mix about 70/30 with moderate serous/serosanaguinous drainage only.  No pus, tracking, fluctuance, malodor, or cardinal signs infection.    Borders:   flat, pink, blanchable skin edges without undermining.    Wound:  Medial right ankle    Size:    Pre:[id number]  Post: [id number]    Base:  Tan fibrous with moderate serous/serosanaguinous drainage only.  No pus, tracking, fluctuance, malodor, or cardinal signs infection.    Borders:   flat, pink, blanchable skin edges without undermining.                   Assessment:       Encounter Diagnoses   Name Primary?    Ankle ulcer, right, with unspecified severity Yes    PAD (peripheral artery disease)     Edema, unspecified type          Plan:       Kateryna was seen today for foot ulcer.    Diagnoses and all orders for this visit:    Ankle ulcer, right, with unspecified severity    PAD (peripheral artery disease)    Edema, unspecified  type      I counseled the patient on her conditions, their implications and medical management.    Dressed today with santyl, wound foam, kerlix - will apply VAC at [place].  Continue present changes as below.    Selective debridement today to right medial ankle wound    Change wound dressings as follows:    Medial right ankle:  Remove dressing, apply lidocaine gel.    Remove gel and cleanse wound with wound cleanser.  Blot dry.    Scrub wound base lightly with coarse gauze.    Apply santyl to wound base.    Cover with border gauze.    Repeat daily.        Lateral right ankle:  Remove dressing/VAC, apply lidocaine gel.    Remove gel and cleanse wound with wound cleanser.  Blot dry.    Scrub wound base lightly with coarse gauze.    Apply santyl to wound base.    Apply wound VAC (NPWT).    Repeat every other day.      Long detailed discussion about the potential benefits of BKA/AKA, patient refuses to consider this at this time and would like to continue forward with aggressive wound care.     Return in about 1 week (around 8/28/2017).    Cm Foster DPM PGY-3

## 2017-08-21 NOTE — PROGRESS NOTES
Reviewed resident note, exam and procedures were performed under my direct supervision.  Agree with note and care.  Discrepancies discussed.

## 2017-08-21 NOTE — PROCEDURES
"Wound Debridement  Date/Time: 8/21/2017 11:41 AM  Performed by: DEMOND ROBISON  Authorized by: DEMOND ROBISON     Time out: Immediately prior to procedure a "time out" was called to verify the correct patient, procedure, equipment, support staff and site/side marked as required.    Consent Done?:  Yes (Verbal)    Wound Details:    Location:  Left foot    Location:  Left Ankle (medial)    Type of Debridement:  Non-excisional       Length (cm):  35       Area (sq cm):  630       Width (cm):  18       Percent Debrided (%):  100       Depth (cm):  3       Total Area Debrided (sq cm):  630    Depth of debridement:  Subcutaneous tissue    Devitalized tissue debrided:  Fibrin    Instruments:  Nippers    Bleeding:  None  Patient tolerance:  Patient tolerated the procedure well with no immediate complications      "

## 2017-08-28 ENCOUNTER — TELEPHONE (OUTPATIENT)
Dept: PODIATRY | Facility: CLINIC | Age: 67
End: 2017-08-28

## 2017-08-28 ENCOUNTER — OFFICE VISIT (OUTPATIENT)
Dept: PODIATRY | Facility: CLINIC | Age: 67
End: 2017-08-28
Payer: MEDICARE

## 2017-08-28 VITALS — WEIGHT: 207.88 LBS | HEIGHT: 67 IN | BODY MASS INDEX: 32.63 KG/M2

## 2017-08-28 DIAGNOSIS — G89.29 OTHER CHRONIC PAIN: ICD-10-CM

## 2017-08-28 DIAGNOSIS — I73.9 PAD (PERIPHERAL ARTERY DISEASE): ICD-10-CM

## 2017-08-28 DIAGNOSIS — R60.9 EDEMA, UNSPECIFIED TYPE: ICD-10-CM

## 2017-08-28 DIAGNOSIS — E11.40 TYPE 2 DIABETES MELLITUS WITH DIABETIC NEUROPATHY, UNSPECIFIED LONG TERM INSULIN USE STATUS: ICD-10-CM

## 2017-08-28 DIAGNOSIS — L97.319 ANKLE ULCER, RIGHT, WITH UNSPECIFIED SEVERITY: Primary | ICD-10-CM

## 2017-08-28 PROCEDURE — 99999 PR PBB SHADOW E&M-EST. PATIENT-LVL III: CPT | Mod: PBBFAC,,, | Performed by: PODIATRIST

## 2017-08-28 PROCEDURE — 4010F ACE/ARB THERAPY RXD/TAKEN: CPT | Mod: ,,, | Performed by: PODIATRIST

## 2017-08-28 PROCEDURE — 99213 OFFICE O/P EST LOW 20 MIN: CPT | Mod: PBBFAC,PO | Performed by: PODIATRIST

## 2017-08-28 PROCEDURE — 1159F MED LIST DOCD IN RCRD: CPT | Mod: ,,, | Performed by: PODIATRIST

## 2017-08-28 PROCEDURE — 99212 OFFICE O/P EST SF 10 MIN: CPT | Mod: S$PBB,,, | Performed by: PODIATRIST

## 2017-08-28 PROCEDURE — 1125F AMNT PAIN NOTED PAIN PRSNT: CPT | Mod: ,,, | Performed by: PODIATRIST

## 2017-08-28 NOTE — TELEPHONE ENCOUNTER
Received incoming call from phone room stating that [facility] was on phone when phone call was transferred to me there was no one there.

## 2017-08-28 NOTE — PROGRESS NOTES
Subjective:      Patient ID: Kateryna Connolly is a 67 y.o. female.    Chief Complaint: Wound Check    Wounds right ankle medial and laterally using santyl and bordered gauze medially and laterally.  VAC in place lateral wound at 125 mmHg continuous therapy changing every other day.  Pain controlled.  Resident at [place] presently.      Review of Systems   Constitution: Negative for chills, fever, malaise/fatigue and night sweats.   Cardiovascular: Positive for leg swelling. Negative for claudication and cyanosis.   Skin: Positive for poor wound healing. Negative for color change, itching, rash and suspicious lesions.   Musculoskeletal: Negative for falls, gout, joint pain and myalgias.   Neurological: Positive for numbness, paresthesias and sensory change.           Objective:      Physical Exam   Constitutional: She is oriented to person, place, and time. She appears well-developed and well-nourished. She is cooperative. No distress.   Oriented to time, place, and person.   Cardiovascular:   Pulses:       Dorsalis pedis pulses are 1+ on the right side, and 1+ on the left side.        Posterior tibial pulses are 1+ on the right side, and 1+ on the left side.   Capillary fill time 3-5 seconds.  All toes warm to touch.      >2+ pitting lower extremity edema bilateral - increases from last visit.    Negative elevational pallor and dependent rubor bilateral.     Musculoskeletal:   Normal angle, base, station of gait. Decreased stride length, early heel off, moderately propulsive toe off bilateral.    All ten toes without clubbing, cyanosis, or signs of ischemia.      No pain to palpation bilateral lower extremities.      Range of motion, stability, muscle strength, and muscle tone are age and health appropriate normal bilateral feet and legs.       Lymphadenopathy:   Negative lymphadenopathy bilateral popliteal fossa and tarsal tunnel.     Neurological: She is alert and oriented to person, place, and time. She has  normal strength. She is not disoriented. She displays no atrophy and no tremor. A sensory deficit is present. She exhibits normal muscle tone.   Reflex Scores:       Patellar reflexes are 2+ on the right side and 2+ on the left side.       Achilles reflexes are 2+ on the right side and 2+ on the left side.  Decreased/absent vibratory sensation bilateral feet to 128Hz tuning fork.    Paresthesias bilateral feet with no clearly identified trigger or source.       Skin: Skin is warm, dry and intact. No abrasion, no bruising, no burn, no ecchymosis, no laceration, no lesion, no petechiae and no rash noted. She is not diaphoretic. No cyanosis or erythema. No pallor. Nails show no clubbing.   Skin thin, atrophic, with decreased density and distribution of pedal hair bilateral, but without hyperpigmentation, adolph discoloration, masses, nodules or cords palpated bilateral feet and legs.      Wound:  Lateral right ankle    Size:    Pre:  [id number]      Base:  Granular, pink and fibrous tan mix about 75/25 with moderate serous/serosanaguinous drainage only.  No pus, tracking, fluctuance, malodor, or cardinal signs infection.    Borders:   flat, pink, blanchable skin edges without undermining.    Wound:  Medial right ankle    Size:    Pre:[id number]      Base:  Tan fibrous developing small granular area centrally with moderate serous/serosanaguinous drainage only.  No pus, tracking, fluctuance, malodor, or cardinal signs infection.    Borders:   flat, pink, blanchable skin edges without undermining.                   Assessment:       No diagnosis found.      Plan:       There are no diagnoses linked to this encounter.  I counseled the patient on her conditions, their implications and medical management.    Dressed today with santyl, wound foam, kerlix - will apply VAC at [place].  Continue present changes as below.    Change wound dressings as follows:    Medial right ankle:  Remove dressing, apply lidocaine  gel.    Remove gel and cleanse wound with wound cleanser.  Blot dry.    Scrub wound base lightly with coarse gauze.    Apply santyl to wound base.    Cover with border gauze.    Repeat daily.        Lateral right ankle:  Remove dressing/VAC, apply lidocaine gel.    Remove gel and cleanse wound with wound cleanser.  Blot dry.    Scrub wound base lightly with coarse gauze.    Apply santyl to wound base.    Apply wound VAC (NPWT).    Repeat every other day.      Long detailed discussion about the potential benefits of BKA/AKA.  Patient politely refuses all consideration of this today.    No Follow-up on file.

## 2017-08-31 ENCOUNTER — TELEPHONE (OUTPATIENT)
Dept: PODIATRY | Facility: CLINIC | Age: 67
End: 2017-08-31

## 2017-08-31 NOTE — TELEPHONE ENCOUNTER
----- Message from Heather Franco sent at 8/31/2017  9:28 AM CDT -----  Contact: [employer]   Clarify lidocaine HCL 2% (XYLOCAINE) 2 % jelly  Call back   Ref numb [id number]

## 2017-08-31 NOTE — TELEPHONE ENCOUNTER
Called back was on hold for 6 minutes unable to wait longer. Refaxed paperwork signed by Dr Alexander.

## 2017-09-06 ENCOUNTER — NURSE TRIAGE (OUTPATIENT)
Dept: ADMINISTRATIVE | Facility: CLINIC | Age: 67
End: 2017-09-06

## 2017-09-06 ENCOUNTER — OFFICE VISIT (OUTPATIENT)
Dept: PODIATRY | Facility: CLINIC | Age: 67
End: 2017-09-06
Payer: MEDICARE

## 2017-09-06 VITALS — BODY MASS INDEX: 34.21 KG/M2 | HEIGHT: 67 IN | RESPIRATION RATE: 16 BRPM | WEIGHT: 218 LBS

## 2017-09-06 DIAGNOSIS — R60.9 EDEMA, UNSPECIFIED TYPE: ICD-10-CM

## 2017-09-06 DIAGNOSIS — E11.40 TYPE 2 DIABETES MELLITUS WITH DIABETIC NEUROPATHY, UNSPECIFIED LONG TERM INSULIN USE STATUS: ICD-10-CM

## 2017-09-06 DIAGNOSIS — L97.319 ANKLE ULCER, RIGHT, WITH UNSPECIFIED SEVERITY: Primary | ICD-10-CM

## 2017-09-06 DIAGNOSIS — I73.9 PAD (PERIPHERAL ARTERY DISEASE): ICD-10-CM

## 2017-09-06 PROCEDURE — 99213 OFFICE O/P EST LOW 20 MIN: CPT | Mod: S$PBB,,, | Performed by: PODIATRIST

## 2017-09-06 PROCEDURE — 1159F MED LIST DOCD IN RCRD: CPT | Mod: ,,, | Performed by: PODIATRIST

## 2017-09-06 PROCEDURE — 99213 OFFICE O/P EST LOW 20 MIN: CPT | Mod: PBBFAC,PO | Performed by: PODIATRIST

## 2017-09-06 PROCEDURE — 1125F AMNT PAIN NOTED PAIN PRSNT: CPT | Mod: ,,, | Performed by: PODIATRIST

## 2017-09-06 PROCEDURE — 4010F ACE/ARB THERAPY RXD/TAKEN: CPT | Mod: ,,, | Performed by: PODIATRIST

## 2017-09-06 PROCEDURE — 99999 PR PBB SHADOW E&M-EST. PATIENT-LVL III: CPT | Mod: PBBFAC,,, | Performed by: PODIATRIST

## 2017-09-06 NOTE — PROGRESS NOTES
Subjective:      Patient ID: Kateryna Connolly is a 67 y.o. female.    Chief Complaint: ankle ulcer    Wounds right ankle medial and laterally using santyl and bordered gauze medially and laterally.  VAC in place lateral wound at 125 mmHg continuous therapy changing every other day.  Pain controlled.  Resident at [facility] presently.      Review of Systems   Constitution: Negative for chills, fever, malaise/fatigue and night sweats.   Cardiovascular: Positive for leg swelling. Negative for claudication and cyanosis.   Skin: Positive for poor wound healing. Negative for color change, itching, rash and suspicious lesions.   Musculoskeletal: Negative for falls, gout, joint pain and myalgias.   Neurological: Positive for numbness, paresthesias and sensory change.           Objective:      Physical Exam   Constitutional: She is oriented to person, place, and time. She appears well-developed and well-nourished. She is cooperative. No distress.   Oriented to time, place, and person.   Cardiovascular:   Pulses:       Dorsalis pedis pulses are 1+ on the right side, and 1+ on the left side.        Posterior tibial pulses are 1+ on the right side, and 1+ on the left side.   Capillary fill time 3-5 seconds.  All toes warm to touch.      2+ pitting lower extremity edema bilateral - increases from last visit.    Negative elevational pallor and dependent rubor bilateral.     Musculoskeletal:   Normal angle, base, station of gait. Decreased stride length, early heel off, moderately propulsive toe off bilateral.    All ten toes without clubbing, cyanosis, or signs of ischemia.      No pain to palpation bilateral lower extremities.      Range of motion, stability, muscle strength, and muscle tone are age and health appropriate normal bilateral feet and legs.       Lymphadenopathy:   Negative lymphadenopathy bilateral popliteal fossa and tarsal tunnel.     Neurological: She is alert and oriented to person, place, and time. She has  normal strength. She is not disoriented. She displays no atrophy and no tremor. A sensory deficit is present. She exhibits normal muscle tone.   Reflex Scores:       Patellar reflexes are 2+ on the right side and 2+ on the left side.       Achilles reflexes are 2+ on the right side and 2+ on the left side.  Decreased/absent vibratory sensation bilateral feet to 128Hz tuning fork.    Paresthesias bilateral feet with no clearly identified trigger or source.   Skin: Skin is warm, dry and intact. No abrasion, no bruising, no burn, no ecchymosis, no laceration, no lesion, no petechiae and no rash noted. She is not diaphoretic. No cyanosis or erythema. No pallor. Nails show no clubbing.   Skin thin, atrophic, with decreased density and distribution of pedal hair bilateral, but without hyperpigmentation, adolph discoloration, masses, nodules or cords palpated bilateral feet and legs.      Wound:  Lateral right ankle    Size:    Pre:  [id number]      Base:  Granular, pink and fibrous tan mix about 75/25 with moderate serous/serosanaguinous drainage only.  No pus, tracking, fluctuance, malodor, or cardinal signs infection.  Lateral fibular plate is exposed today in lateral wound base with screw head clearly visible.    Borders:   flat, pink, blanchable skin edges without undermining.    Wound:  Medial right ankle    Size:    Pre:[id number]      Base:  Tan fibrous developing small granular area centrally with moderate serous/serosanaguinous drainage only.  No pus, tracking, fluctuance, malodor, or cardinal signs infection.    Borders:   flat, pink, blanchable skin edges without undermining.                   Assessment:       Encounter Diagnoses   Name Primary?    Ankle ulcer, right, with unspecified severity Yes    PAD (peripheral artery disease)     Edema, unspecified type     Type 2 diabetes mellitus with diabetic neuropathy, unspecified long term insulin use status          Plan:       Kateryna was seen today for ankle  ulcer.    Diagnoses and all orders for this visit:    Ankle ulcer, right, with unspecified severity    PAD (peripheral artery disease)    Edema, unspecified type    Type 2 diabetes mellitus with diabetic neuropathy, unspecified long term insulin use status      I counseled the patient on her conditions, their implications and medical management.    Dressed today with normal saline wet to dry, kerlix - will apply VAC at [place].  Continue present changes as below.    Discussed with patient that exposed hardware is very dangerous for a wound and can very quickly lead to bone infection requiring amputation.  I've recommended removal of hardware, culture of hardware track, and grafting of wound when clean cultures obtained.    Debriding medial wound at the same time will minimize surgical exposure and allow for good sharp wound debridement to optimize healing.      Will contact [facility] for recommendation on coordinating bridge for anticoagulation and transfer to hospital for surgery.    Patient verbally expresses understanding of the plan and rationale.  Further she acknowledges at least 50% chance (estimate) of right leg amputation at BK or AK level.  Only reason to take to surgery is the very high risk of infection and amputation with exposed non vascular hardware which I estimate as slightly greater than not removing.    Verbally expresses risk from continued smoking.    Change wound dressings as follows:    Medial right ankle:  Remove dressing, apply lidocaine gel.    Remove gel and cleanse wound with wound cleanser.  Blot dry.    Scrub wound base lightly with coarse gauze.    Apply santyl to wound base.    Cover with border gauze.    Repeat daily.        Lateral right ankle:  Remove dressing/VAC, apply lidocaine gel.    Remove gel and cleanse wound with wound cleanser.  Blot dry.    Scrub wound base lightly with coarse gauze.    Apply santyl to wound base.    Apply wound VAC (NPWT).    Repeat every other  day.      Long detailed discussion about the potential benefits of BKA/AKA.  Patient politely refuses all consideration of this today.    Return in about 1 week (around 9/13/2017) for wound right ankle.

## 2017-09-06 NOTE — Clinical Note
Tashi Bowden. Mrs Connolly's lateral ankle hardware is now exposed and she refuses amputation consideration presently.  Any objection to my removing hardware and attempting limb salvage?  This is her desire even with my estimate of greater then 50% chance of loosing leg regardless.  [place]

## 2017-09-06 NOTE — TELEPHONE ENCOUNTER
Pt was calling to be admitted so she can have surgery tomorrow. Advised that she needed to follow up with MD     Reason for Disposition   Information only question and nurse able to answer    Protocols used: ST NO PROTOCOL CALL: INFORMATION ONLY-A-[place]

## 2017-09-07 ENCOUNTER — TELEPHONE (OUTPATIENT)
Dept: PODIATRY | Facility: CLINIC | Age: 67
End: 2017-09-07

## 2017-09-07 NOTE — TELEPHONE ENCOUNTER
----- Message from Adrienne Tsang sent at 9/6/2017  6:28 PM CDT -----  Contact: Pt  Pt is calling, stated that after visit today 9/6/17 she was brought back to nursing facility.  Once arrived, pt was sent to [facility].    Pt can be reached at [phone].    Thank you

## 2017-09-07 NOTE — TELEPHONE ENCOUNTER
Spoke with Dr. Khalif Marr's nurse she advised me to contact [facility] and have the attending physician evaluate the pt and have them contact infectious disease for an evaluation that needs to be done asap.    I spoke with the pt's nurse  at [facility] and she is going to speak with the attending physician and get the pt an appointment with Infectious disease to evaluate her leg.     The nurse will call back once the above actions have been made

## 2017-09-07 NOTE — TELEPHONE ENCOUNTER
----- Message from Yasmeen Nettles sent at 9/7/2017 11:37 AM CDT -----  Contact patient at [phone], to advise if wound vac should be continued.    Thank you

## 2017-09-07 NOTE — TELEPHONE ENCOUNTER
Patient was called and informed that she will need to continue wound vac until after surgery. Pt verbalized understanding.

## 2017-09-07 NOTE — TELEPHONE ENCOUNTER
----- Message from Wilton Maldonado sent at 9/7/2017 10:55 AM CDT -----  Contact: Ángela Motta with [facility] returning call.please call back at [phone]. Thanks,

## 2017-09-08 ENCOUNTER — TELEPHONE (OUTPATIENT)
Dept: PODIATRY | Facility: CLINIC | Age: 67
End: 2017-09-08

## 2017-09-08 NOTE — TELEPHONE ENCOUNTER
Spoke with Meryl Hernandes, Explained that Dr Alexander will consent her and set a date for surgery on 09/11/2017. Spoke with Ms Mohsen discussed that at her next visit of 09/11/2017 she will discuss surgery and sign consents.

## 2017-09-08 NOTE — TELEPHONE ENCOUNTER
----- Message from Sallie Saba sent at 9/8/2017  2:02 PM CDT -----  Contact: self  Pt is calling in regards to having questions she has. Per pt, also has a question about being on two different blood thinners. Pt would like to know if she should stop taking them until after surgery or what?    Pt can be reached at [phone].    Thank you.

## 2017-09-11 ENCOUNTER — TELEPHONE (OUTPATIENT)
Dept: PODIATRY | Facility: CLINIC | Age: 67
End: 2017-09-11

## 2017-09-11 ENCOUNTER — OFFICE VISIT (OUTPATIENT)
Dept: PODIATRY | Facility: CLINIC | Age: 67
End: 2017-09-11
Payer: MEDICARE

## 2017-09-11 VITALS — WEIGHT: 218.94 LBS | BODY MASS INDEX: 34.36 KG/M2 | HEIGHT: 67 IN

## 2017-09-11 DIAGNOSIS — I73.9 PAD (PERIPHERAL ARTERY DISEASE): ICD-10-CM

## 2017-09-11 DIAGNOSIS — E11.40 TYPE 2 DIABETES MELLITUS WITH DIABETIC NEUROPATHY, UNSPECIFIED LONG TERM INSULIN USE STATUS: ICD-10-CM

## 2017-09-11 DIAGNOSIS — R60.9 EDEMA, UNSPECIFIED TYPE: ICD-10-CM

## 2017-09-11 DIAGNOSIS — L97.319 ANKLE ULCER, RIGHT, WITH UNSPECIFIED SEVERITY: Primary | ICD-10-CM

## 2017-09-11 PROCEDURE — 1159F MED LIST DOCD IN RCRD: CPT | Mod: ,,, | Performed by: PODIATRIST

## 2017-09-11 PROCEDURE — 99212 OFFICE O/P EST SF 10 MIN: CPT | Mod: S$PBB,,, | Performed by: PODIATRIST

## 2017-09-11 PROCEDURE — 99212 OFFICE O/P EST SF 10 MIN: CPT | Mod: PBBFAC,PO | Performed by: PODIATRIST

## 2017-09-11 PROCEDURE — 4010F ACE/ARB THERAPY RXD/TAKEN: CPT | Mod: ,,, | Performed by: PODIATRIST

## 2017-09-11 PROCEDURE — 99999 PR PBB SHADOW E&M-EST. PATIENT-LVL II: CPT | Mod: PBBFAC,,, | Performed by: PODIATRIST

## 2017-09-11 PROCEDURE — 1125F AMNT PAIN NOTED PAIN PRSNT: CPT | Mod: ,,, | Performed by: PODIATRIST

## 2017-09-11 NOTE — PROGRESS NOTES
Subjective:      Patient ID: Kateryna Connolly is a 67 y.o. female.    Chief Complaint: Foot Ulcer (right ankle)    Wounds right ankle medial and laterally using santyl and bordered gauze medially and laterally.  VAC in place lateral wound at 125 mmHg continuous therapy changing every other day.  Pain controlled.  Resident at [facility] presently.      Review of Systems   Constitution: Negative for chills, fever, malaise/fatigue and night sweats.   Cardiovascular: Positive for leg swelling. Negative for claudication and cyanosis.   Skin: Positive for poor wound healing. Negative for color change, itching, rash and suspicious lesions.   Musculoskeletal: Negative for falls, gout, joint pain and myalgias.   Neurological: Positive for numbness, paresthesias and sensory change.           Objective:      Physical Exam   Constitutional: She is oriented to person, place, and time. She appears well-developed and well-nourished. She is cooperative. No distress.   Oriented to time, place, and person.   Cardiovascular:   Pulses:       Dorsalis pedis pulses are 1+ on the right side, and 1+ on the left side.        Posterior tibial pulses are 1+ on the right side, and 1+ on the left side.   Capillary fill time 3-5 seconds.  All toes warm to touch.      2+ pitting lower extremity edema bilateral - increases from last visit.    Negative elevational pallor and dependent rubor bilateral.     Musculoskeletal:   Minimal stance/ambulation for transfer in clinic.  Decreased stride length, early heel off, moderately propulsive toe off bilateral.    All ten toes without clubbing, cyanosis, or signs of ischemia.       Range of motion, stability, muscle strength, and muscle tone are age and health appropriate normal bilateral feet and legs.       Lymphadenopathy:   Negative lymphadenopathy bilateral popliteal fossa and tarsal tunnel.     Neurological: She is alert and oriented to person, place, and time. She has normal strength. She is not  disoriented. She displays no atrophy and no tremor. A sensory deficit is present. She exhibits normal muscle tone.   Reflex Scores:       Patellar reflexes are 2+ on the right side and 2+ on the left side.       Achilles reflexes are 2+ on the right side and 2+ on the left side.  Decreased/absent vibratory sensation bilateral feet to 128Hz tuning fork.    Paresthesias bilateral feet with no clearly identified trigger or source.   Skin: Skin is warm, dry and intact. No abrasion, no bruising, no burn, no ecchymosis, no laceration, no lesion, no petechiae and no rash noted. She is not diaphoretic. No cyanosis or erythema. No pallor. Nails show no clubbing.   Skin thin, atrophic, with decreased density and distribution of pedal hair bilateral, but without hyperpigmentation, adolph discoloration, masses, nodules or cords palpated bilateral feet and legs.      Wound:  Lateral right ankle    Size:    Pre:  [id number]      Base:  Granular, pink and fibrous tan mix about 80/20 with moderate serous/serosanaguinous drainage only.  No pus, tracking, fluctuance, malodor, or cardinal signs infection.  Lateral fibular plate is exposed today in lateral wound base with screw head clearly visible.    Borders:   flat, pink, blanchable skin edges without undermining.    Wound:  Medial right ankle    Size:    Pre:[id number]      Base:  Tan fibrous developing  granular area about 60/40 mix today with moderate serous/serosanaguinous drainage only.  No pus, tracking, fluctuance, malodor, or cardinal signs infection.    Borders:   flat, pink, blanchable skin edges without undermining.                   Assessment:       Encounter Diagnoses   Name Primary?    Ankle ulcer, right, with unspecified severity Yes    PAD (peripheral artery disease)     Edema, unspecified type     Type 2 diabetes mellitus with diabetic neuropathy, unspecified long term insulin use status          Plan:       Kateryna was seen today for foot ulcer.    Diagnoses and  all orders for this visit:    Ankle ulcer, right, with unspecified severity    PAD (peripheral artery disease)    Edema, unspecified type    Type 2 diabetes mellitus with diabetic neuropathy, unspecified long term insulin use status      I counseled the patient on her conditions, their implications and medical management.    Dressed today with wound foam over santyl, kerlix - will apply VAC at [place].  Continue present changes as below.    Discussed with patient that exposed hardware is very dangerous for a wound and can very quickly lead to bone infection requiring amputation.  I've recommended removal of hardware, culture of hardware track, and grafting of wound when clean cultures and granular wound bed obtained.    Debriding medial wound at the same time will minimize surgical exposure and allow for good sharp wound debridement to optimize healing.      Patient verbally expresses understanding of the plan and rationale.  Further she acknowledges at least 50% chance (estimate) of right leg amputation at BK or AK level.  Only reason to take to surgery is the very high risk of infection and amputation with exposed non vascular hardware which I estimate as slightly greater than not removing.    Verbally expresses uncerstanding of 300% increased risk limb loss/complication from continued smoking.    Adequate vitamin supplementation, protein intake, and hydration - discussed with patient.      Change wound dressings as follows:    Medial right ankle:  Remove dressing, apply lidocaine gel.    Remove gel and cleanse wound with wound cleanser.  Blot dry.    Scrub wound base lightly with coarse gauze.    Apply santyl to wound base.    Cover with border gauze.    Repeat daily.        Lateral right ankle:  Remove dressing/VAC, apply lidocaine gel.    Remove gel and cleanse wound with wound cleanser.  Blot dry.    Scrub wound base lightly with coarse gauze.    Apply santyl to wound base.    Apply wound VAC  (NPWT).    Repeat every other day.      Long detailed discussion about the potential benefits of BKA/AKA.  Patient politely refuses all consideration of this today.    Dr. Valdez verbally verified no present need for antibiotics on phone last week.    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    Patient waiting to be examined and medically cleared by Dr. Roque at [facility] so her plate can be removed and hopefully prevent osteomyelitis and facilitate wound healing.  Considerations:  coordinating bridge for anticoagulation and transfer to hospital for surgery    Return in about 1 week (around 9/18/2017).

## 2017-09-11 NOTE — TELEPHONE ENCOUNTER
----- Message from Fabiolaeveline Decker sent at 9/11/2017 10:23 AM CDT -----  Contact: [phone]  Returning your call.  Please call Ashleigh at [phone].

## 2017-09-11 NOTE — TELEPHONE ENCOUNTER
Spoke with Katy at [facility], she stated that Dr Marino has not seen Ms Connolly since 07/15/2017. She is not sure why he did not see her in August. Asked her to have History and physical done this week to allow us to schedule surgery. Explained that if Ms Lai is not seen this week I will get her an appointment with her Primary here at [facility].

## 2017-09-11 NOTE — TELEPHONE ENCOUNTER
Called Meryl Fox  with [facility], Explained that Ms Connolly will need a  History and Physical with Surgical clearance dated with in the past 30 days. She will talk with Dr Quinteros and call me back

## 2017-09-11 NOTE — PROGRESS NOTES
I'll defer to you on this issue.  Had the same conversation with her.  Please feel free to do whatever you feel is best.

## 2017-09-12 ENCOUNTER — TELEPHONE (OUTPATIENT)
Dept: PODIATRY | Facility: CLINIC | Age: 67
End: 2017-09-12

## 2017-09-12 NOTE — TELEPHONE ENCOUNTER
----- Message from Carey Kang sent at 9/12/2017  1:57 PM CDT -----  Contact: Ángela rivas calling regarding medical clearance for pending surgery. Physician needs to know the date for surgery if any labs or test needed. John call Ángela at [phone]. Thanks!

## 2017-09-14 ENCOUNTER — TELEPHONE (OUTPATIENT)
Dept: PODIATRY | Facility: CLINIC | Age: 67
End: 2017-09-14

## 2017-09-14 NOTE — TELEPHONE ENCOUNTER
----- Message from Katlyn Kingsley sent at 9/14/2017  7:58 AM CDT -----  Contact: [facility]-  [phone]  The nurse asking for patient's progress notes for office visit on Monday. Fax [phone].Thanks!

## 2017-09-18 ENCOUNTER — OFFICE VISIT (OUTPATIENT)
Dept: PODIATRY | Facility: CLINIC | Age: 67
End: 2017-09-18
Payer: MEDICARE

## 2017-09-18 ENCOUNTER — TELEPHONE (OUTPATIENT)
Dept: PODIATRY | Facility: CLINIC | Age: 67
End: 2017-09-18

## 2017-09-18 VITALS — WEIGHT: 218 LBS | HEIGHT: 67 IN | BODY MASS INDEX: 34.21 KG/M2

## 2017-09-18 DIAGNOSIS — I73.9 PAD (PERIPHERAL ARTERY DISEASE): ICD-10-CM

## 2017-09-18 DIAGNOSIS — R60.9 EDEMA, UNSPECIFIED TYPE: ICD-10-CM

## 2017-09-18 DIAGNOSIS — E11.40 TYPE 2 DIABETES MELLITUS WITH DIABETIC NEUROPATHY, UNSPECIFIED LONG TERM INSULIN USE STATUS: ICD-10-CM

## 2017-09-18 DIAGNOSIS — L97.319 ANKLE ULCER, RIGHT, WITH UNSPECIFIED SEVERITY: Primary | ICD-10-CM

## 2017-09-18 PROCEDURE — 4010F ACE/ARB THERAPY RXD/TAKEN: CPT | Mod: ,,, | Performed by: PODIATRIST

## 2017-09-18 PROCEDURE — 1159F MED LIST DOCD IN RCRD: CPT | Mod: ,,, | Performed by: PODIATRIST

## 2017-09-18 PROCEDURE — 99213 OFFICE O/P EST LOW 20 MIN: CPT | Mod: PBBFAC,PO | Performed by: PODIATRIST

## 2017-09-18 PROCEDURE — 1125F AMNT PAIN NOTED PAIN PRSNT: CPT | Mod: ,,, | Performed by: PODIATRIST

## 2017-09-18 PROCEDURE — 99212 OFFICE O/P EST SF 10 MIN: CPT | Mod: S$PBB,,, | Performed by: PODIATRIST

## 2017-09-18 PROCEDURE — 99999 PR PBB SHADOW E&M-EST. PATIENT-LVL III: CPT | Mod: PBBFAC,,, | Performed by: PODIATRIST

## 2017-09-18 NOTE — TELEPHONE ENCOUNTER
----- Message from Araceli Hoffman sent at 9/15/2017  4:54 PM CDT -----  Contact: Patient  Kateryna, patient [phone], Calling about surgery clearance that was to be faxed over from Dr Masterson office. Please advise if received or not. Thanks.

## 2017-09-18 NOTE — PROGRESS NOTES
Subjective:      Patient ID: Kateryna Connolly is a 67 y.o. female.    Chief Complaint: Foot Ulcer (right ankle)    Wounds right ankle medial and laterally using santyl and bordered gauze medially and laterally.  VAC in place lateral wound at 125 mmHg continuous therapy changing every other day.  Pain controlled.  Resident at [facility] presently.      Review of Systems   Constitution: Negative for chills, fever, malaise/fatigue and night sweats.   Cardiovascular: Positive for leg swelling. Negative for claudication and cyanosis.   Skin: Positive for poor wound healing. Negative for color change, itching, rash and suspicious lesions.   Musculoskeletal: Negative for falls, gout, joint pain and myalgias.   Neurological: Positive for numbness, paresthesias and sensory change.           Objective:      Physical Exam   Constitutional: She is oriented to person, place, and time. She appears well-developed and well-nourished. She is cooperative. No distress.   Oriented to time, place, and person.   Cardiovascular:   Pulses:       Dorsalis pedis pulses are 1+ on the right side, and 1+ on the left side.        Posterior tibial pulses are 1+ on the right side, and 1+ on the left side.   Capillary fill time 3-5 seconds.  All toes warm to touch.      3+pitting lower extremity edema bilateral - increases from last visit.    Negative elevational pallor and dependent rubor bilateral.     Musculoskeletal:   Minimal stance/ambulation for transfer in clinic.  Decreased stride length, early heel off, moderately propulsive toe off bilateral.    All ten toes without clubbing, cyanosis, or signs of ischemia.       Range of motion, stability, muscle strength, and muscle tone are age and health appropriate normal bilateral feet and legs.       Lymphadenopathy:   Negative lymphadenopathy bilateral popliteal fossa and tarsal tunnel.     Neurological: She is alert and oriented to person, place, and time. She has normal strength. She is not  disoriented. She displays no atrophy and no tremor. A sensory deficit is present. She exhibits normal muscle tone.   Reflex Scores:       Patellar reflexes are 2+ on the right side and 2+ on the left side.       Achilles reflexes are 2+ on the right side and 2+ on the left side.  Decreased/absent vibratory sensation bilateral feet to 128Hz tuning fork.    Paresthesias bilateral feet with no clearly identified trigger or source.   Skin: Skin is warm, dry and intact. No abrasion, no bruising, no burn, no ecchymosis, no laceration, no lesion, no petechiae and no rash noted. She is not diaphoretic. No cyanosis or erythema. No pallor. Nails show no clubbing.   Skin thin, atrophic, with decreased density and distribution of pedal hair bilateral, but without hyperpigmentation, adolph discoloration, masses, nodules or cords palpated bilateral feet and legs.      Wound:  Lateral right ankle    Size:    Pre:  [id number]      Base:  Granular, pink and fibrous tan mix about 80/20 with moderate serous/serosanaguinous drainage only.  No pus, tracking, fluctuance, malodor, or cardinal signs infection.  Lateral fibular plate is exposed today in lateral wound base with screw head clearly visible.    Borders:   flat, pink, blanchable skin edges without undermining.    Wound:  Medial right ankle    Size:    Pre:[id number]      Base:  Tan fibrous developing  granular area about 60/40 mix today with moderate serous/serosanaguinous drainage only.  No pus, tracking, fluctuance, malodor, or cardinal signs infection.    Borders:   flat, pink, blanchable skin edges without undermining.                   Assessment:       Encounter Diagnoses   Name Primary?    Ankle ulcer, right, with unspecified severity Yes    PAD (peripheral artery disease)     Edema, unspecified type     Type 2 diabetes mellitus with diabetic neuropathy, unspecified long term insulin use status          Plan:       Kateryna was seen today for foot ulcer.    Diagnoses and  all orders for this visit:    Ankle ulcer, right, with unspecified severity    PAD (peripheral artery disease)    Edema, unspecified type    Type 2 diabetes mellitus with diabetic neuropathy, unspecified long term insulin use status      I counseled the patient on her conditions, their implications and medical management.    Dressed today with wound foam over santyl, kerlix - will apply VAC at [place].  Continue present changes as below.    Discussed with patient that exposed hardware is very dangerous for a wound and can very quickly lead to bone infection requiring amputation.  I've recommended removal of hardware, culture of hardware track, and grafting of wound when clean cultures and granular wound bed obtained.    Debriding medial wound at the same time will minimize surgical exposure and allow for good sharp wound debridement to optimize healing.      Patient verbally expresses understanding of the plan and rationale.  Further she acknowledges at least 50% chance (estimate) of right leg amputation at BK or AK level.  Only reason to take to surgery is the very high risk of infection and amputation with exposed non vascular hardware which I estimate as slightly greater than not removing.    Verbally expresses uncerstanding of 300% increased risk limb loss/complication from continued smoking.    Adequate vitamin supplementation, protein intake, and hydration - discussed with patient.      Change wound dressings as follows:    Medial right ankle:  Remove dressing, apply lidocaine gel.    Remove gel and cleanse wound with wound cleanser.  Blot dry.    Scrub wound base lightly with coarse gauze.    Apply santyl to wound base.    Cover with border gauze.    Repeat daily.        Lateral right ankle:  Remove dressing/VAC, apply lidocaine gel.    Remove gel and cleanse wound with wound cleanser.  Blot dry.    Scrub wound base lightly with coarse gauze.    Apply santyl to wound base.    Apply wound VAC  (NPWT).    Repeat every other day.      Long detailed discussion about the potential benefits of BKA/AKA.  Patient politely refuses all consideration of this today.    Schedule removal hardware lateral right ankle next Wednesday with holding anticoagulant and plavix 7 days prior.  Risk of clot to leg lung heart brain discussed, risk of non healing, infection, sepsis, death, BKA/AKA discussed as about 50%.  Declines removal of hardware right medial ankle at same procedure due to no communication of wounds, hardware tracks, and desire to minimize surgical sites/risk.  Verbally acknowledges possibility that it may have to be done in the future if it becomes exposed.  Discussed smoking risk as about 300% greater for serious limb threatening complication than non smoker.      Return in about 1 week (around 9/25/2017).

## 2017-09-21 ENCOUNTER — TELEPHONE (OUTPATIENT)
Dept: PODIATRY | Facility: CLINIC | Age: 67
End: 2017-09-21

## 2017-09-21 DIAGNOSIS — T84.7XXD HARDWARE COMPLICATING WOUND INFECTION, SUBSEQUENT ENCOUNTER: Primary | ICD-10-CM

## 2017-09-21 DIAGNOSIS — S91.001A WOUND OF RIGHT ANKLE: ICD-10-CM

## 2017-09-21 RX ORDER — LIDOCAINE HYDROCHLORIDE 10 MG/ML
1 INJECTION, SOLUTION EPIDURAL; INFILTRATION; INTRACAUDAL; PERINEURAL ONCE
Status: CANCELLED | OUTPATIENT
Start: 2017-09-21 | End: 2017-09-21

## 2017-09-21 RX ORDER — CLINDAMYCIN PHOSPHATE 900 MG/50ML
900 INJECTION, SOLUTION INTRAVENOUS
Status: CANCELLED | OUTPATIENT
Start: 2017-09-21

## 2017-09-21 NOTE — TELEPHONE ENCOUNTER
Anita in surgery was able to move surgery to 09/27/2017. Notified Ms Connolly that surgery is set for Wednesday 09/27/2017.

## 2017-09-21 NOTE — TELEPHONE ENCOUNTER
----- Message from Iglesia Alexander DPM sent at 9/21/2017  8:21 AM CDT -----  Hold blood thinner Eliquis, and plavix beginning Monday 9/25/2017 for surgery scheduled 10/2/2017 per Dr. Quinteros who cleared the patient for surgery.

## 2017-09-21 NOTE — TELEPHONE ENCOUNTER
Spoke with Mrs Mohsen Thakkarmarilu did Not give her the blood thinner on Wednesday 09/20/2017 or today.

## 2017-09-21 NOTE — TELEPHONE ENCOUNTER
Spoke with Mrs Connolyl, Stephon did Not give her the blood thinner on Wednesday 09/20/2017 or today.

## 2017-09-24 ENCOUNTER — NURSE TRIAGE (OUTPATIENT)
Dept: ADMINISTRATIVE | Facility: CLINIC | Age: 67
End: 2017-09-24

## 2017-09-24 NOTE — TELEPHONE ENCOUNTER
"    Reason for Disposition   Other signs of wound infection    Answer Assessment - Initial Assessment Questions  1. LOCATION: "Where is the wound located?"       Left ankle  2. WOUND APPEARANCE: "What does the wound look like?"       Wound vac in place, slough throughout the wound   3. SIZE: If redness is present, ask: "What is the size of the red area?" (Inches, centimeters, or compare to size of a coin)       large wound [id number]  4. SPREAD: "What's changed in the last day?"  "Do you see any red streaks coming from the wound?"      no  5. ONSET: "When did it start to look infected?"       Malodorous but does not look infected   6. PAIN: "Is there any pain?" If so, ask: "How bad is the pain?"   (Scale 1-10; or mild, moderate, severe)      no  7. FEVER: "Do you have a fever?" If so, ask: "What is your temperature, how was it measured, and when did it start?"      no  8. OTHER SYMPTOMS: "Do you have any other symptoms?" (e.g., shaking chills, weakness, rash elsewhere on body)      no  9. PREGNANCY: "Is there any chance you are pregnant?" "When was your last menstrual period?"      n/a    Protocols used:  WOUND INFECTION-A-    Rocio Hernandez from [facility] called to report that the patient had the wound vac clamped leading to the left ankle wound and now there is a malodorous discharge (red and orange) coming from the wound bed. No fever, no chills. Dr. Alexander does not have on call provider covering service today. Conference call with Dr. Torre who is covering for Dr. Soares (patient's pcp). Dr. Torre advised nurse to continue the orders as written by Dr. Alexander for the wound vac and dressing changes and to call back if patient demonstrates signs/symptoms of infection. Rocio HERNANDEZ states patient has an appointment with Dr. Alexander on 9/25/17 @ [id number] and she should keep that appt.   "

## 2017-09-25 ENCOUNTER — OFFICE VISIT (OUTPATIENT)
Dept: PODIATRY | Facility: CLINIC | Age: 67
End: 2017-09-25
Payer: MEDICARE

## 2017-09-25 ENCOUNTER — TELEPHONE (OUTPATIENT)
Dept: PODIATRY | Facility: CLINIC | Age: 67
End: 2017-09-25

## 2017-09-25 VITALS
SYSTOLIC BLOOD PRESSURE: 110 MMHG | TEMPERATURE: 99 F | HEIGHT: 67 IN | WEIGHT: 218 LBS | HEART RATE: 104 BPM | DIASTOLIC BLOOD PRESSURE: 73 MMHG | BODY MASS INDEX: 34.21 KG/M2

## 2017-09-25 DIAGNOSIS — R60.9 EDEMA, UNSPECIFIED TYPE: ICD-10-CM

## 2017-09-25 DIAGNOSIS — T84.7XXD HARDWARE COMPLICATING WOUND INFECTION, SUBSEQUENT ENCOUNTER: Primary | ICD-10-CM

## 2017-09-25 DIAGNOSIS — L97.319 ANKLE ULCER, RIGHT, WITH UNSPECIFIED SEVERITY: ICD-10-CM

## 2017-09-25 DIAGNOSIS — E11.40 TYPE 2 DIABETES MELLITUS WITH DIABETIC NEUROPATHY, UNSPECIFIED LONG TERM INSULIN USE STATUS: ICD-10-CM

## 2017-09-25 DIAGNOSIS — I73.9 PAD (PERIPHERAL ARTERY DISEASE): ICD-10-CM

## 2017-09-25 PROCEDURE — 97597 DBRDMT OPN WND 1ST 20 CM/<: CPT | Mod: PBBFAC,PO | Performed by: PODIATRIST

## 2017-09-25 PROCEDURE — 99999 PR PBB SHADOW E&M-EST. PATIENT-LVL III: CPT | Mod: PBBFAC,,, | Performed by: PODIATRIST

## 2017-09-25 PROCEDURE — 99213 OFFICE O/P EST LOW 20 MIN: CPT | Mod: PBBFAC,PO | Performed by: PODIATRIST

## 2017-09-25 PROCEDURE — 1159F MED LIST DOCD IN RCRD: CPT | Mod: ,,, | Performed by: PODIATRIST

## 2017-09-25 PROCEDURE — 3074F SYST BP LT 130 MM HG: CPT | Mod: ,,, | Performed by: PODIATRIST

## 2017-09-25 PROCEDURE — 3078F DIAST BP <80 MM HG: CPT | Mod: ,,, | Performed by: PODIATRIST

## 2017-09-25 PROCEDURE — 99213 OFFICE O/P EST LOW 20 MIN: CPT | Mod: S$PBB,,, | Performed by: PODIATRIST

## 2017-09-25 PROCEDURE — 1125F AMNT PAIN NOTED PAIN PRSNT: CPT | Mod: ,,, | Performed by: PODIATRIST

## 2017-09-25 NOTE — TELEPHONE ENCOUNTER
----- Message from Vero Longoria sent at 9/22/2017 12:27 PM CDT -----  Contact: self [phone]  She has discontinued the blood thinner.  Today is the 3rd day she is off.  Thank you!

## 2017-09-25 NOTE — PROGRESS NOTES
Subjective:      Patient ID: Kateryna Connolly is a 67 y.o. female.    Chief Complaint: Foot Ulcer (right)    Wounds right ankle medial and laterally using santyl and bordered gauze medially and laterally.  VAC in place lateral wound at 125 mmHg continuous therapy changing every other day.  Pain controlled.  Resident at [place] presently.  Had VAC tube occluded/kinked overnight resulting in less drainage away from wound during that period with malodor nad mild dusky color change.    Review of Systems   Constitution: Negative for chills, fever, malaise/fatigue and night sweats.   Cardiovascular: Positive for leg swelling. Negative for claudication and cyanosis.   Skin: Positive for poor wound healing. Negative for color change, itching, rash and suspicious lesions.   Musculoskeletal: Negative for falls, gout, joint pain and myalgias.   Neurological: Positive for numbness, paresthesias and sensory change.           Objective:      Physical Exam   Constitutional: She is oriented to person, place, and time. She appears well-developed and well-nourished. She is cooperative. No distress.   Oriented to time, place, and person.   Cardiovascular:   Pulses:       Dorsalis pedis pulses are 0 on the right side, and 1+ on the left side.        Posterior tibial pulses are 0 on the right side, and 1+ on the left side.   Capillary fill time 3-5 seconds.  All toes warm to touch.      2+pitting lower extremity edema right.  Negative elevational pallor and dependent rubor bilateral.    Pulses right not palpable through edema.   Musculoskeletal:   Minimal stance/ambulation for transfer in clinic.  Decreased stride length, early heel off, moderately propulsive toe off bilateral.    All ten toes without clubbing, cyanosis, or signs of ischemia.       Range of motion, stability, muscle strength, and muscle tone are age and health appropriate normal bilateral feet and legs.       Lymphadenopathy:   Negative lymphadenopathy bilateral  popliteal fossa and tarsal tunnel.     Neurological: She is alert and oriented to person, place, and time. She has normal strength. She is not disoriented. She displays no atrophy and no tremor. A sensory deficit is present. She exhibits normal muscle tone.   Reflex Scores:       Patellar reflexes are 2+ on the right side and 2+ on the left side.       Achilles reflexes are 2+ on the right side and 2+ on the left side.  Decreased/absent vibratory sensation bilateral feet to 128Hz tuning fork.    Paresthesias bilateral feet with no clearly identified trigger or source.   Skin: Skin is warm, dry and intact. No abrasion, no bruising, no burn, no ecchymosis, no laceration, no lesion, no petechiae and no rash noted. She is not diaphoretic. No cyanosis or erythema. No pallor. Nails show no clubbing.   Skin thin, atrophic, with decreased density and distribution of pedal hair bilateral, but without hyperpigmentation, adolph discoloration, masses, nodules or cords palpated bilateral feet and legs.      Wound:  Lateral right ankle    Size:    Pre:  About 9x4x1.3 cm      Base:  Granular, pink and fibrous tan mix about 80/20 with moderate serous/serosanaguinous drainage only.  No pus, tracking, fluctuance, or cardinal signs infection.  Lateral fibular plate is exposed today in lateral wound base with screw head clearly visible.    Mild malodor consistent with increased bacterial load.    Borders:   Atrophicflat, pink, blanchable skin edges without undermining.    Wound:  Medial right ankle    Size:    Pre:3.5x2x0.3cm      Base:  Tan fibrous developing  granular area about 70/30 mix today with moderate serous/serosanaguinous drainage only.  No pus, tracking, fluctuance, malodor, or cardinal signs infection.    Borders:    Atrophic, flat, pink, blanchable skin edges without undermining.                   Assessment:       Encounter Diagnoses   Name Primary?    Hardware complicating wound infection, subsequent encounter Yes     PAD (peripheral artery disease)     Ankle ulcer, right, with unspecified severity     Edema, unspecified type     Type 2 diabetes mellitus with diabetic neuropathy, unspecified long term insulin use status          Plan:       Kateryna was seen today for foot ulcer.    Diagnoses and all orders for this visit:    Hardware complicating wound infection, subsequent encounter  -     Basic metabolic panel; Future  -     CBC auto differential; Future  -     C-reactive protein; Future  -     Sedimentation rate, manual; Future  -     X-Ray Ankle Complete Right; Future    PAD (peripheral artery disease)  -     Basic metabolic panel; Future  -     CBC auto differential; Future  -     C-reactive protein; Future  -     Sedimentation rate, manual; Future  -     X-Ray Ankle Complete Right; Future    Ankle ulcer, right, with unspecified severity  -     Basic metabolic panel; Future  -     CBC auto differential; Future  -     C-reactive protein; Future  -     Sedimentation rate, manual; Future  -     X-Ray Ankle Complete Right; Future    Edema, unspecified type  -     Basic metabolic panel; Future  -     CBC auto differential; Future  -     C-reactive protein; Future  -     Sedimentation rate, manual; Future  -     X-Ray Ankle Complete Right; Future    Type 2 diabetes mellitus with diabetic neuropathy, unspecified long term insulin use status  -     Basic metabolic panel; Future  -     CBC auto differential; Future  -     C-reactive protein; Future  -     Sedimentation rate, manual; Future  -     X-Ray Ankle Complete Right; Future      I counseled the patient on her conditions, their implications and medical management.    Dressed today with damp to dry over santyl, kerlix - will apply VAC at [place].  Continue present changes as below.    Discussed with patient that exposed hardware is very dangerous for a wound and can very quickly lead to bone infection requiring amputation.  I've recommended removal of hardware, culture of  hardware track, and grafting of wound when clean cultures and granular wound bed obtained.    Adequate vitamin supplementation, protein intake, and hydration - discussed with patient.      Change wound dressings as follows:    Medial right ankle:  Remove dressing, apply lidocaine gel.    Remove gel and cleanse wound with wound cleanser.  Blot dry.    Scrub wound base lightly with coarse gauze.    Apply santyl to wound base.    Cover with border gauze.    Repeat daily.        Lateral right ankle:  Remove dressing/VAC, apply lidocaine gel.    Remove gel and cleanse wound with wound cleanser.  Blot dry.    Scrub wound base lightly with coarse gauze.    Apply santyl to wound base.    Apply wound VAC (NPWT).    Repeat every other day.      Long detailed discussion about the potential benefits of BKA/AKA.  Patient politely refuses discussion presently.    Schedule removal hardware lateral right ankle Wednesday with holding anticoagulant and plavix 7 days prior.  Risk of clot to leg lung heart brain discussed, risk of non healing, infection, sepsis, death, BKA/AKA discussed as about 50%.  Declines removal of hardware right medial ankle at same procedure due to no communication of wounds, hardware tracks, and desire to minimize surgical sites/risk.  Verbally acknowledges possibility that it may have to be done in the future if it becomes exposed.  Discussed smoking risk as about 300% greater for serious limb threatening complication than non smoker.      Return in about 1 week (around 10/2/2017).

## 2017-09-26 ENCOUNTER — ANESTHESIA EVENT (OUTPATIENT)
Dept: SURGERY | Facility: HOSPITAL | Age: 67
End: 2017-09-26
Payer: MEDICARE

## 2017-09-26 RX ORDER — INSULIN ASPART 100 [IU]/ML
INJECTION, SOLUTION INTRAVENOUS; SUBCUTANEOUS
COMMUNITY
End: 2019-10-02

## 2017-09-26 RX ORDER — ACETAMINOPHEN 325 MG/1
325 TABLET ORAL EVERY 8 HOURS PRN
COMMUNITY
End: 2018-02-05 | Stop reason: ALTCHOICE

## 2017-09-27 ENCOUNTER — SURGERY (OUTPATIENT)
Age: 67
End: 2017-09-27

## 2017-09-27 ENCOUNTER — HOSPITAL ENCOUNTER (OUTPATIENT)
Facility: HOSPITAL | Age: 67
Discharge: HOME | End: 2017-09-27
Attending: PODIATRIST | Admitting: PODIATRIST
Payer: MEDICARE

## 2017-09-27 ENCOUNTER — ANESTHESIA (OUTPATIENT)
Dept: SURGERY | Facility: HOSPITAL | Age: 67
End: 2017-09-27
Payer: MEDICARE

## 2017-09-27 VITALS
DIASTOLIC BLOOD PRESSURE: 64 MMHG | TEMPERATURE: 98 F | WEIGHT: 212 LBS | OXYGEN SATURATION: 97 % | BODY MASS INDEX: 33.27 KG/M2 | HEIGHT: 67 IN | RESPIRATION RATE: 16 BRPM | HEART RATE: 76 BPM | SYSTOLIC BLOOD PRESSURE: 141 MMHG

## 2017-09-27 DIAGNOSIS — S91.001A WOUND OF RIGHT ANKLE: ICD-10-CM

## 2017-09-27 DIAGNOSIS — T84.7XXD HARDWARE COMPLICATING WOUND INFECTION, SUBSEQUENT ENCOUNTER: ICD-10-CM

## 2017-09-27 LAB — POCT GLUCOSE: 117 MG/DL (ref 70–110)

## 2017-09-27 PROCEDURE — 36000706: Performed by: PODIATRIST

## 2017-09-27 PROCEDURE — 25000003 PHARM REV CODE 250: Performed by: ANESTHESIOLOGY

## 2017-09-27 PROCEDURE — 87186 SC STD MICRODIL/AGAR DIL: CPT | Mod: 59

## 2017-09-27 PROCEDURE — 82962 GLUCOSE BLOOD TEST: CPT | Performed by: PODIATRIST

## 2017-09-27 PROCEDURE — 20670 REMOVAL IMPLANT SUPERFICIAL: CPT | Mod: RT,,, | Performed by: PODIATRIST

## 2017-09-27 PROCEDURE — 63600175 PHARM REV CODE 636 W HCPCS: Performed by: ANESTHESIOLOGY

## 2017-09-27 PROCEDURE — 25000003 PHARM REV CODE 250: Performed by: PODIATRIST

## 2017-09-27 PROCEDURE — 36000707: Performed by: PODIATRIST

## 2017-09-27 PROCEDURE — 37000008 HC ANESTHESIA 1ST 15 MINUTES: Performed by: PODIATRIST

## 2017-09-27 PROCEDURE — 87070 CULTURE OTHR SPECIMN AEROBIC: CPT

## 2017-09-27 PROCEDURE — 99900104 DSU ONLY-NO CHARGE-EA ADD'L HR (STAT): Performed by: PODIATRIST

## 2017-09-27 PROCEDURE — 27200750 HC INSULATED NEEDLE/ STIMUPLEX: Performed by: ANESTHESIOLOGY

## 2017-09-27 PROCEDURE — 71000015 HC POSTOP RECOV 1ST HR: Performed by: PODIATRIST

## 2017-09-27 PROCEDURE — 63600175 PHARM REV CODE 636 W HCPCS: Performed by: NURSE ANESTHETIST, CERTIFIED REGISTERED

## 2017-09-27 PROCEDURE — 87075 CULTR BACTERIA EXCEPT BLOOD: CPT

## 2017-09-27 PROCEDURE — D9220A PRA ANESTHESIA: Mod: ANES,,, | Performed by: ANESTHESIOLOGY

## 2017-09-27 PROCEDURE — 76942 ECHO GUIDE FOR BIOPSY: CPT | Performed by: ANESTHESIOLOGY

## 2017-09-27 PROCEDURE — 37000009 HC ANESTHESIA EA ADD 15 MINS: Performed by: PODIATRIST

## 2017-09-27 PROCEDURE — 71000039 HC RECOVERY, EACH ADD'L HOUR: Performed by: PODIATRIST

## 2017-09-27 PROCEDURE — 27200651 HC AIRWAY, LMA: Performed by: NURSE ANESTHETIST, CERTIFIED REGISTERED

## 2017-09-27 PROCEDURE — 71000016 HC POSTOP RECOV ADDL HR: Performed by: PODIATRIST

## 2017-09-27 PROCEDURE — 64450 NJX AA&/STRD OTHER PN/BRANCH: CPT | Mod: 59,RT,, | Performed by: ANESTHESIOLOGY

## 2017-09-27 PROCEDURE — 88300 SURGICAL PATH GROSS: CPT | Performed by: PATHOLOGY

## 2017-09-27 PROCEDURE — 76942 ECHO GUIDE FOR BIOPSY: CPT | Mod: 26,,, | Performed by: ANESTHESIOLOGY

## 2017-09-27 PROCEDURE — 71000033 HC RECOVERY, INTIAL HOUR: Performed by: PODIATRIST

## 2017-09-27 PROCEDURE — 87077 CULTURE AEROBIC IDENTIFY: CPT | Mod: 59

## 2017-09-27 PROCEDURE — S0077 INJECTION, CLINDAMYCIN PHOSP: HCPCS | Performed by: PODIATRIST

## 2017-09-27 PROCEDURE — 99900103 DSU ONLY-NO CHARGE-INITIAL HR (STAT): Performed by: PODIATRIST

## 2017-09-27 PROCEDURE — D9220A PRA ANESTHESIA: Mod: CRNA,,, | Performed by: NURSE ANESTHETIST, CERTIFIED REGISTERED

## 2017-09-27 RX ORDER — CLINDAMYCIN PHOSPHATE 900 MG/50ML
900 INJECTION, SOLUTION INTRAVENOUS
Status: COMPLETED | OUTPATIENT
Start: 2017-09-27 | End: 2017-09-27

## 2017-09-27 RX ORDER — ONDANSETRON 2 MG/ML
INJECTION INTRAMUSCULAR; INTRAVENOUS
Status: DISCONTINUED | OUTPATIENT
Start: 2017-09-27 | End: 2017-09-27

## 2017-09-27 RX ORDER — PROPOFOL 10 MG/ML
VIAL (ML) INTRAVENOUS
Status: DISCONTINUED | OUTPATIENT
Start: 2017-09-27 | End: 2017-09-27

## 2017-09-27 RX ORDER — SODIUM CHLORIDE, SODIUM LACTATE, POTASSIUM CHLORIDE, CALCIUM CHLORIDE 600; 310; 30; 20 MG/100ML; MG/100ML; MG/100ML; MG/100ML
INJECTION, SOLUTION INTRAVENOUS CONTINUOUS
Status: DISCONTINUED | OUTPATIENT
Start: 2017-09-27 | End: 2017-09-28 | Stop reason: HOSPADM

## 2017-09-27 RX ORDER — LIDOCAINE HYDROCHLORIDE 10 MG/ML
1 INJECTION, SOLUTION EPIDURAL; INFILTRATION; INTRACAUDAL; PERINEURAL ONCE
Status: DISCONTINUED | OUTPATIENT
Start: 2017-09-27 | End: 2017-09-28 | Stop reason: HOSPADM

## 2017-09-27 RX ORDER — LIDOCAINE HYDROCHLORIDE 10 MG/ML
1 INJECTION, SOLUTION EPIDURAL; INFILTRATION; INTRACAUDAL; PERINEURAL ONCE
Status: COMPLETED | OUTPATIENT
Start: 2017-09-27 | End: 2017-09-27

## 2017-09-27 RX ORDER — ACETAMINOPHEN 10 MG/ML
INJECTION, SOLUTION INTRAVENOUS
Status: DISCONTINUED | OUTPATIENT
Start: 2017-09-27 | End: 2017-09-27

## 2017-09-27 RX ORDER — FENTANYL CITRATE 50 UG/ML
25 INJECTION, SOLUTION INTRAMUSCULAR; INTRAVENOUS EVERY 5 MIN PRN
Status: COMPLETED | OUTPATIENT
Start: 2017-09-27 | End: 2017-09-27

## 2017-09-27 RX ORDER — LIDOCAINE HCL/PF 100 MG/5ML
SYRINGE (ML) INTRAVENOUS
Status: DISCONTINUED | OUTPATIENT
Start: 2017-09-27 | End: 2017-09-27

## 2017-09-27 RX ORDER — FENTANYL CITRATE 50 UG/ML
INJECTION, SOLUTION INTRAMUSCULAR; INTRAVENOUS
Status: DISCONTINUED | OUTPATIENT
Start: 2017-09-27 | End: 2017-09-27

## 2017-09-27 RX ORDER — FENTANYL CITRATE 50 UG/ML
25 INJECTION, SOLUTION INTRAMUSCULAR; INTRAVENOUS EVERY 5 MIN PRN
Status: DISCONTINUED | OUTPATIENT
Start: 2017-09-27 | End: 2017-09-28 | Stop reason: HOSPADM

## 2017-09-27 RX ORDER — SODIUM CHLORIDE, SODIUM LACTATE, POTASSIUM CHLORIDE, CALCIUM CHLORIDE 600; 310; 30; 20 MG/100ML; MG/100ML; MG/100ML; MG/100ML
1000 INJECTION, SOLUTION INTRAVENOUS ONCE
Status: DISCONTINUED | OUTPATIENT
Start: 2017-09-27 | End: 2017-09-28 | Stop reason: HOSPADM

## 2017-09-27 RX ORDER — MIDAZOLAM HYDROCHLORIDE 1 MG/ML
INJECTION, SOLUTION INTRAMUSCULAR; INTRAVENOUS
Status: DISCONTINUED | OUTPATIENT
Start: 2017-09-27 | End: 2017-09-27

## 2017-09-27 RX ORDER — OXYCODONE HYDROCHLORIDE 5 MG/1
5 TABLET ORAL ONCE AS NEEDED
Status: COMPLETED | OUTPATIENT
Start: 2017-09-27 | End: 2017-09-27

## 2017-09-27 RX ADMIN — OXYCODONE HYDROCHLORIDE 5 MG: 5 TABLET ORAL at 03:09

## 2017-09-27 RX ADMIN — ONDANSETRON 4 MG: 2 INJECTION, SOLUTION INTRAMUSCULAR; INTRAVENOUS at 01:09

## 2017-09-27 RX ADMIN — MIDAZOLAM 2 MG: 1 INJECTION INTRAMUSCULAR; INTRAVENOUS at 12:09

## 2017-09-27 RX ADMIN — CLINDAMYCIN PHOSPHATE 900 MG: 18 INJECTION, SOLUTION INTRAVENOUS at 12:09

## 2017-09-27 RX ADMIN — FENTANYL CITRATE 25 MCG: 50 INJECTION INTRAMUSCULAR; INTRAVENOUS at 02:09

## 2017-09-27 RX ADMIN — PROPOFOL 200 MG: 10 INJECTION, EMULSION INTRAVENOUS at 12:09

## 2017-09-27 RX ADMIN — SODIUM CHLORIDE, SODIUM LACTATE, POTASSIUM CHLORIDE, AND CALCIUM CHLORIDE: 600; 310; 30; 20 INJECTION, SOLUTION INTRAVENOUS at 02:09

## 2017-09-27 RX ADMIN — FENTANYL CITRATE 50 MCG: 50 INJECTION INTRAMUSCULAR; INTRAVENOUS at 12:09

## 2017-09-27 RX ADMIN — ACETAMINOPHEN 1000 MG: 10 INJECTION, SOLUTION INTRAVENOUS at 01:09

## 2017-09-27 RX ADMIN — PROMETHAZINE HYDROCHLORIDE 6.25 MG: 25 INJECTION INTRAMUSCULAR; INTRAVENOUS at 02:09

## 2017-09-27 RX ADMIN — FENTANYL CITRATE 25 MCG: 50 INJECTION INTRAMUSCULAR; INTRAVENOUS at 03:09

## 2017-09-27 RX ADMIN — LIDOCAINE HYDROCHLORIDE 100 MG: 20 INJECTION, SOLUTION INTRAVENOUS at 12:09

## 2017-09-27 RX ADMIN — LIDOCAINE HYDROCHLORIDE: 10 INJECTION, SOLUTION EPIDURAL; INFILTRATION; INTRACAUDAL; PERINEURAL at 11:09

## 2017-09-27 RX ADMIN — FENTANYL CITRATE 50 MCG: 50 INJECTION INTRAMUSCULAR; INTRAVENOUS at 01:09

## 2017-09-27 RX ADMIN — SODIUM CHLORIDE, SODIUM LACTATE, POTASSIUM CHLORIDE, AND CALCIUM CHLORIDE: 600; 310; 30; 20 INJECTION, SOLUTION INTRAVENOUS at 11:09

## 2017-09-27 NOTE — PLAN OF CARE
Pt. AAOx4, drinking clear liquids without nausea, pain is controlled and pt. Denies pain at this time.  Dr. Alexander brought a stabilizing boot and his assistant placed it on the RLE.  Pt. Has touch weight bearing - may stand and transfer to w/c to commode or back to bed only - no full wt. Bearing at this time. Discharge instructions including wound care,medications, touch wt. Bearing and ice to extremity and elevate RLE was reviewed with pt. And her daughter who verbalized understanding.  RN called report to Komal De Souza LPN, at [facility] in [place] who verbalized understanding. Ms. De Souza stated she would have [facility]'s transport person and their van will come to the Registration entrance to  pt.  Pt. Taken by wheelchair to van and discharge orders given to [place] transport person, from [place].

## 2017-09-27 NOTE — TRANSFER OF CARE
"Anesthesia Transfer of Care Note    Patient: Kateryna Connolly    Procedure(s) Performed: Procedure(s) (LRB):  REMOVAL-HARDWARE-ANKLE right lateral only (Right)    Patient location: PACU    Transport from OR: Transported from OR on room air with adequate spontaneous ventilation    Post pain: adequate analgesia    Post assessment: no apparent anesthetic complications    Post vital signs: stable    Level of consciousness: awake    Nausea/Vomiting: no nausea/vomiting    Complications: none    Transfer of care protocol was followed      Last vitals:   Visit Vitals  /60 (BP Location: Left arm, Patient Position: Lying)   Pulse 85   Temp 36.4 °C (97.5 °F) (Other (see comments))   Resp 18   Ht 5' 7" (1.702 m)   Wt 96.2 kg (212 lb)   SpO2 95%   Breastfeeding? No   BMI 33.20 kg/m²     "

## 2017-09-27 NOTE — DISCHARGE INSTRUCTIONS
DO NOT RESUME  Elliquis or ASA or any anticoagulants without an order from Dr. Iglesia Alexander.  May resume other medications.    DAILY WOUND CARE:  D/C Santyl.  Cleanse with wound cleanser, use wound gel, foam dressing such as Froy or Mepilex foam, then Kerlex and then ACE wrap. Place in boot - touch wound bearing for transfers only.  KEEP RLE ELEVATED, ice to area x 24 hrs and prn.  Ibuprofen 600 mg po q6hr prn pain per Dr. Alexander.  Lidocaine gel topically to affected area prn and with wound care for topical pain relief.     Advance diet as tolerated and resume previous diet.    Consult Dr. Alexander for any questions or needs for further orders.    General Information:    1.  Do not drink alcoholic beverages including beer for 24 hours or as long as you are on pain medication..  2.  Do not drive a motor vehicle, operate machinery or power tools, or signs legal papers for 24 hours or as long as you are on pain medication.   3.  You may experience light-headedness, dizziness, and sleepiness following surgery. Please do not stay alone. A responsible adult should be with you for this 24 hour period.  4.  Go home and rest.    5. Progress slowly to a normal diet unless instructed.  Otherwise, begin with liquids such as soft drinks, then soup and crackers working up to solid foods. Drink plenty of nonalcoholic fluids.  6.  Certain anesthetics and pain medications produce nausea and vomiting in certain       individuals. If nausea becomes a problem at home, call you doctor.    7. A nurse will be calling you sometime after surgery. Do not be alarmed. This is our way of finding out how you are doing.    8. Several times every hour while you are awake, take 2-3 deep breaths and cough. If you had stomach surgery hold a pillow or rolled towel firmly against your stomach before you cough. This will help with any pain the cough might cause.  9. Several times every hour while you are awake, pump and flex your feet 5-6 times and do  foot circles. This will help prevent blood clots.    10.Call your doctor for severe pain, bleeding, fever, or signs or symptoms of infection (pain, swelling, redness, foul odor, drainage).    Post op instructions for prevention of DVT  What is deep vein thrombosis?  Deep vein thrombosis (DVT) is the medical term for blood clots in the deep veins of the leg.  These blood clots can be dangerous.  A DVT can block a blood vessel and keep blood from getting where it needs to go.  Another problem is that the clot can travel to other parts of the body such as the lungs.  A clot that travels to the lungs is called a pulmonary embolus (PE) and can cause serious problems with breathing which can lead to death.  Am I at risk for DVT/PE?  If you are not very active, you are at risk of DVT.  Anyone confined to bed, sitting for long periods of time, recovering from surgery, etc. increases the risk of DVT.  Other risk factors are cancer diagnosis, certain medications, estrogen replacement in any form,older age, obesity, pregnancy, smoking, history of clotting disorders, and dehydration.  How will I know if I have a DVT?   Swelling in the lower leg   Pain   Warmth, redness, hardness or bulging of the vein  If you have any of these symptoms, call your doctors office right away.  Some people will not have any symptoms until the clot moves to the lungs.  What are the symptoms of a PE?   Panting, shortness of breath, or trouble breathing   Sharp, knife-like chest pain when you breathe   Coughing or coughing up blood   Rapid heartbeat  If you have any of these symptoms or get worse quickly, call 911 for emergency treatment.  How can I prevent a DVT?   Avoid long periods of inactivity and dont cross your legs--get up and walk around every hour or so.   Stay active--walking after surgery is highly encouraged.  This means you should get out of the house and walk in the neighborhood.  Going up and down stairs will not impair  "healing (unless advised against such activity by your doctor).     Drink plenty of noncaffeinated, nonalcoholic fluids each day to prevent dehydration.   Wear special support stockings, if they have been advised by your doctor.   If you travel, stop at least once an hour and walk around.   Avoid smoking (assistance with stopping is available through your healthcare provider)  Always notify your doctor if you are not able to follow the post operative instructions that are given to you at the time of discharge.  It may be necessary to prescribe one of the medications available to prevent DVT.Discharge Instructions: After Your Surgery/Procedure  Youve just had surgery. During surgery you were given medicine called anesthesia to keep you relaxed and free of pain. After surgery you may have some pain or nausea. This is common. Here are some tips for feeling better and getting well after surgery.     Stay on schedule with your medication.   Going home  Your doctor or nurse will show you how to take care of yourself when you go home. He or she will also answer your questions. Have an adult family member or friend drive you home.      For your safety we recommend these precautions for the first 24 hours after your procedure:    · Do not drive or use heavy equipment.  · Do not make important decisions or sign legal papers.  · Do not drink alcohol.  · Have someone stay with you, if needed. He or she can watch for problems and help keep you safe.  · Your concentration, balance, coordination, and judgement may be impaired for many hours after anesthesia.  Use caution when ambulating or standing up.     · You may feel weak and "washed out" after anesthesia and surgery.      Subtle residual effects of general anesthesia or sedation with regional / local anesthesia can last more than 24 hours.  Rest for the remainder of the day or longer if your Doctor/Surgeon has advised you to do so.  Although you may feel normal within the " first 24 hours, your reflexes and mental ability may be impaired without you realizing it.  You may feel dizzy, lightheaded or sleepy for 24 hours or longer.      Be sure to go to all follow-up visits with your doctor. And rest after your surgery for as long as your doctor tells you to.    Coping with pain  If you have pain after surgery, pain medicine will help you feel better. Take it as told, before pain becomes severe. Also, ask your doctor or pharmacist about other ways to control pain. This might be with heat, ice, or relaxation. And follow any other instructions your surgeon or nurse gives you.  ICE TO EXTREMITY PRN FOR 24 HOURS.  ELEVATE EXTREMITY AT ALL TIMES.    Tips for taking pain medicine  To get the best relief possible, remember these points:  · Pain medicines can upset your stomach. Taking them with a little food may help.  · Most pain relievers taken by mouth need at least 20 to 30 minutes to start to work.  · Taking medicine on a schedule can help you remember to take it. Try to time your medicine so that you can take it before starting an activity. This might be before you get dressed, go for a walk, or sit down for dinner.  · Constipation is a common side effect of pain medicines. Call your doctor before taking any medicines such as laxatives or stool softeners to help ease constipation. Also ask if you should skip any foods. Drinking lots of fluids and eating foods such as fruits and vegetables that are high in fiber can also help. Remember, do not take laxatives unless your surgeon has prescribed them.  · Drinking alcohol and taking pain medicine can cause dizziness and slow your breathing. It can even be deadly. Do not drink alcohol while taking pain medicine.  · Pain medicine can make you react more slowly to things. Do not drive or run machinery while taking pain medicine.  ·   Your health care provider may tell you to take acetaminophen to help ease your pain. Ask him or her how much you  are supposed to take each day. Acetaminophen or other pain relievers may interact with your prescription medicines or other over-the-counter (OTC) drugs. Some prescription medicines have acetaminophen and other ingredients. Using both prescription and OTC acetaminophen for pain can cause you to overdose. Read the labels on your OTC medicines with care. This will help you to clearly know the list of ingredients, how much to take, and any warnings. It may also help you not take too much acetaminophen. If you have questions or do not understand the information, ask your pharmacist or health care provider to explain it to you before you take the OTC medicine.    Managing nausea  Some people have an upset stomach after surgery. This is often because of anesthesia, pain, or pain medicine, or the stress of surgery. These tips will help you handle nausea and eat healthy foods as you get better. If you were on a special food plan before surgery, ask your doctor if you should follow it while you get better. These tips may help:  · Do not push yourself to eat. Your body will tell you when to eat and how much.  · Start off with clear liquids and soup. They are easier to digest.  · Next try semi-solid foods, such as mashed potatoes, applesauce, and gelatin, as you feel ready.  · Slowly move to solid foods. Dont eat fatty, rich, or spicy foods at first.  · Do not force yourself to have 3 large meals a day. Instead eat smaller amounts more often.  · Take pain medicines with a small amount of solid food, such as crackers or toast, to avoid nausea.     Call your surgeon if  · You still have pain an hour after taking medicine. The medicine may not be strong enough.  · You feel too sleepy, dizzy, or groggy. The medicine may be too strong.  · You have side effects like nausea, vomiting, or skin changes, such as rash, itching, or hives.       If you have obstructive sleep apnea  You were given anesthesia medicine during surgery to  keep you comfortable and free of pain. After surgery, you may have more apnea spells because of this medicine and other medicines you were given. The spells may last longer than usual.   At home:  · Keep using the continuous positive airway pressure (CPAP) device when you sleep. Unless your health care provider tells you not to, use it when you sleep, day or night. CPAP is a common device used to treat obstructive sleep apnea.  · Talk with your provider before taking any pain medicine, muscle relaxants, or sedatives. Your provider will tell you about the possible dangers of taking these medicines.  © 8661-6159 [employer]. [address] [place] [address], [place] [address]. All rights reserved. This information is not intended as a substitute for professional medical care. Always follow your healthcare professional's instructions.  We hope your stay was comfortable as you heal now, mend and rest.    For we have enjoyed taking care of you by giving your our best.    And as you get better, by regaining your health and strength;   We count it as a privilege to have served you and hope your time at [facility] was well spent.      Thank  You!!!    Discharge Instructions: After Your Surgery/Procedure  Youve just had surgery. During surgery you were given medicine called anesthesia to keep you relaxed and free of pain. After surgery you may have some pain or nausea. This is common. Here are some tips for feeling better and getting well after surgery.     Stay on schedule with your medication.   Going home  Your doctor or nurse will show you how to take care of yourself when you go home. He or she will also answer your questions. Have an adult family member or friend drive you home.      For your safety we recommend these precaution for the first 24 hours after your procedure:  · Do not drive or use heavy equipment.  · Do not make important decisions or sign legal papers.  · Do not drink alcohol.  · Have someone stay  "with you, if needed. He or she can watch for problems and help keep you safe.  · Your concentration, balance, coordination, and judgement may be impaired for many hours after anesthesia.  Use caution when ambulating or standing up.     · You may feel weak and "washed out" after anesthesia and surgery.      Subtle residual effects of general anesthesia or sedation with regional / local anesthesia can last more than 24 hours.  Rest for the remainder of the day or longer if your Doctor/Surgeon has advised you to do so.  Although you may feel normal within the first 24 hours, your reflexes and mental ability may be impaired without you realizing it.  You may feel dizzy, lightheaded or sleepy for 24 hours or longer.      Be sure to go to all follow-up visits with your doctor. And rest after your surgery for as long as your doctor tells you to.  Coping with pain  If you have pain after surgery, pain medicine will help you feel better. Take it as told, before pain becomes severe. Also, ask your doctor or pharmacist about other ways to control pain. This might be with heat, ice, or relaxation. And follow any other instructions your surgeon or nurse gives you.  Tips for taking pain medicine  To get the best relief possible, remember these points:  · Pain medicines can upset your stomach. Taking them with a little food may help.  · Most pain relievers taken by mouth need at least 20 to 30 minutes to start to work.  · Taking medicine on a schedule can help you remember to take it. Try to time your medicine so that you can take it before starting an activity. This might be before you get dressed, go for a walk, or sit down for dinner.  · Constipation is a common side effect of pain medicines. Call your doctor before taking any medicines such as laxatives or stool softeners to help ease constipation. Also ask if you should skip any foods. Drinking lots of fluids and eating foods such as fruits and vegetables that are high in " fiber can also help. Remember, do not take laxatives unless your surgeon has prescribed them.  · Drinking alcohol and taking pain medicine can cause dizziness and slow your breathing. It can even be deadly. Do not drink alcohol while taking pain medicine.  · Pain medicine can make you react more slowly to things. Do not drive or run machinery while taking pain medicine.  Your health care provider may tell you to take acetaminophen to help ease your pain. Ask him or her how much you are supposed to take each day. Acetaminophen or other pain relievers may interact with your prescription medicines or other over-the-counter (OTC) drugs. Some prescription medicines have acetaminophen and other ingredients. Using both prescription and OTC acetaminophen for pain can cause you to overdose. Read the labels on your OTC medicines with care. This will help you to clearly know the list of ingredients, how much to take, and any warnings. It may also help you not take too much acetaminophen. If you have questions or do not understand the information, ask your pharmacist or health care provider to explain it to you before you take the OTC medicine.  Managing nausea  Some people have an upset stomach after surgery. This is often because of anesthesia, pain, or pain medicine, or the stress of surgery. These tips will help you handle nausea and eat healthy foods as you get better. If you were on a special food plan before surgery, ask your doctor if you should follow it while you get better. These tips may help:  · Do not push yourself to eat. Your body will tell you when to eat and how much.  · Start off with clear liquids and soup. They are easier to digest.  · Next try semi-solid foods, such as mashed potatoes, applesauce, and gelatin, as you feel ready.  · Slowly move to solid foods. Dont eat fatty, rich, or spicy foods at first.  · Do not force yourself to have 3 large meals a day. Instead eat smaller amounts more often.  · Take  pain medicines with a small amount of solid food, such as crackers or toast, to avoid nausea.     Call your surgeon if  · You still have pain an hour after taking medicine. The medicine may not be strong enough.  · You feel too sleepy, dizzy, or groggy. The medicine may be too strong.  · You have side effects like nausea, vomiting, or skin changes, such as rash, itching, or hives.       If you have obstructive sleep apnea  You were given anesthesia medicine during surgery to keep you comfortable and free of pain. After surgery, you may have more apnea spells because of this medicine and other medicines you were given. The spells may last longer than usual.   At home:  · Keep using the continuous positive airway pressure (CPAP) device when you sleep. Unless your health care provider tells you not to, use it when you sleep, day or night. CPAP is a common device used to treat obstructive sleep apnea.  · Talk with your provider before taking any pain medicine, muscle relaxants, or sedatives. Your provider will tell you about the possible dangers of taking these medicines.  © 0830-6177 [employer]. [address] [place] [address]. All rights reserved. This information is not intended as a substitute for professional medical care. Always follow your healthcare professional's instructions.    Discharge Instructions for Ankle Surgery  You had ankle surgery. This surgery is performed for a variety of reasons. You and your doctor discussed your condition and the surgery before the procedure. Here are instructions that will help you care for your ankle when you are at home.  Activity  · Plan to stay on the first floor of your home as much as possible for the first week or two after the surgery.  · Arrange your household to keep the items you need handy.  · Remove electrical cords, throw rugs, and anything else that may cause you to fall.  · Use nonslip bath mats, grab bars, an elevated toilet seat, and  a shower chair in your bathroom.  · Use a cane, crutches, a walker, or handrails until your balance, flexibility, and strength improve. And remember to ask for help from others when you need it.  · Free up your hands so that you can use them to keep balance. Do this by using a arnie pack, apron, or pockets to carry things.  · Follow the weight-bearing instructions given to you by your doctor. He or she will tell you how much weight you are--or are not--allowed to put on your ankle.  · Do all exercises you learned in the hospital, as instructed by your doctor.  · Dont drive until your doctor says its OK. And never drive if you are taking opioid pain medicine.  · Follow your doctors instructions regarding care for your dressing, splint, or cast. A supportive dressing, splint, or cast may be applied after surgery to protect your ankle as it heals.  Home care  · Avoid soaking your ankle in water until your incisions are completely closed and dried.  · Follow your doctors instructions regarding showering. He or she will tell you when you can begin showering again. Then shower as needed. Carefully cover your ankle with plastic to keep the dressing, splint, or cast dry. To avoid falling while showering, sit on a shower stool or chair.  · Use an ice pack or bag of frozen peas--or something similar--wrapped in a thin towel to reduce the swelling. Keep the foot elevated. Apply the ice pack for 20 minutes; then remove it for 20 minutes. Repeat as needed.  · Take pain medicine as directed.  · Sleep with 2 pillows under your knee and ankle. Keep your ankle elevated above the level of your heart when sitting in a chair or on the couch.     When to seek medical attention  Call 911 right away if you have any of the following:  · Chest pain  · Shortness of breath  · Painful calf that is tender and warm to the touch  Otherwise, call your doctor right away if you have any of the following:  · An ankle splint, cast, or dressing  that has become wet  · Sensation that the splint or cast is becoming tighter, especially if you have pain with movement of your toes, numbness or tingling, or a sudden increase in pain   · Swelling in the foot, ankle or calf that is not relieved by elevating the foot  · Fever above 100.4°F (or 38°C) or shaking chills  · Increased pain with or without activity  · Drainage, redness, or warmth at the incision  · Opening of the incision   Date Last Reviewed: 11/15/2015  © 3768-1134 [employer]. [address] [place] [address]. All rights reserved. This information is not intended as a substitute for professional medical care. Always follow your healthcare professional's instructions.

## 2017-09-27 NOTE — ANESTHESIA POSTPROCEDURE EVALUATION
"Anesthesia Post Evaluation    Patient: Kateryna Connolly    Procedure(s) Performed: Procedure(s) (LRB):  REMOVAL-HARDWARE-ANKLE right lateral only (Right)    Final Anesthesia Type: general  Patient location during evaluation: PACU  Patient participation: Yes- Able to Participate  Level of consciousness: awake and alert  Post-procedure vital signs: reviewed and stable  Pain management: adequate  Airway patency: patent  PONV status at discharge: No PONV  Anesthetic complications: no      Cardiovascular status: hemodynamically stable  Respiratory status: unassisted and room air  Hydration status: euvolemic  Follow-up not needed.        Visit Vitals  BP (!) 127/55 (BP Location: Right arm, Patient Position: Lying)   Pulse 79   Temp 36.9 °C (98.4 °F) (Temporal)   Resp 16   Ht 5' 7" (1.702 m)   Wt 96.2 kg (212 lb)   SpO2 98%   Breastfeeding? No   BMI 33.20 kg/m²       Pain/Briseyda Score: Pain Assessment Performed: Yes (9/27/2017  4:45 PM)  Presence of Pain: denies (9/27/2017  4:45 PM)  Pain Rating Prior to Med Admin: 6 (9/27/2017  3:49 PM)  Pain Rating Post Med Admin: 0 (9/27/2017  3:10 PM)  Briseyda Score: 10 (9/27/2017  4:45 PM)      "

## 2017-09-27 NOTE — DISCHARGE SUMMARY
[facility]  Podiatry  Discharge Summary      Patient Name: Kateryna Connolly  MRN: [id number]  Admission Date: 9/27/2017  Hospital Length of Stay: 0 days  Discharge Date and Time:  09/27/2017 2:05 PM  Attending Physician: Iglesia Alexander DPM   Discharging Provider: Ragini Markham MD  Primary Care Provider: Dwayne Soares MD    HPI:  Pt presents for outpt procedure    Procedure(s) (LRB):  REMOVAL-HARDWARE-ANKLE right lateral only (Right)      Hospital Course:  Outpt procedure performed without complications.  Patient was admitted to extended recovery for 23 hour observation, consultation for PCA, and monitoring until discharge planned for 9/28/2017 due to pain and unavailability of transportation from [place].        Pending Diagnostic Studies:     None        Final Active Diagnoses:    Diagnosis Date Noted POA    Wound of right ankle [S91.001A] 09/27/2017 Yes      Problems Resolved During this Admission:    Diagnosis Date Noted Date Resolved POA      Discharged Condition: good    Disposition:     Follow Up:  Follow-up Information     Iglesia Alexander DPM In 1 week.    Specialties:  Podiatry, Wound Care  Contact information:  [address] [place] [id number]  [phone]                 Patient Instructions:     X-Ray Ankle Complete Right   Standing Status: Future  Standing Exp. Date: 09/27/18   Order Specific Question Answer Comments   Reason for Exam: post op    May the Radiologist modify the order per protocol to meet the clinical needs of the patient? Yes      Diet general     Keep surgical extremity elevated     Weight bearing restrictions (specify)   Order Comments: Minimal wb to right foot for transfers only     Call MD for:  temperature >100.4     Call MD for:  persistent nausea and vomiting     Call MD for:  severe uncontrolled pain     Call MD for:  difficulty breathing, headache or visual disturbances     Call MD for:  redness, tenderness, or signs of infection (pain, swelling,  redness, odor or green/yellow discharge around incision site)     Call MD for:  hives     Call MD for:  persistent dizziness or light-headedness     Call MD for:  extreme fatigue     Leave dressing on - Keep it clean, dry, and intact until clinic visit       Medications:  Reconciled Home Medications:   Current Discharge Medication List      CONTINUE these medications which have NOT CHANGED    Details   acetaminophen (TYLENOL) 325 MG tablet Take 325 mg by mouth every 8 (eight) hours as needed for Pain.      alprazolam (XANAX) 0.25 MG tablet Take 1 tablet (0.25 mg total) by mouth 3 (three) times daily as needed for Anxiety.  Qty: 10 tablet, Refills: 0      gabapentin (NEURONTIN) 100 MG capsule Take 900 mg by mouth once daily.       insulin aspart (NOVOLOG) 100 unit/mL injection Inject into the skin as needed for High Blood Sugar.      insulin glargine (LANTUS) 100 unit/mL injection Inject 25 Units into the skin every morning.       lisinopril (PRINIVIL,ZESTRIL) 2.5 MG tablet Take 1 tablet (2.5 mg total) by mouth once daily.  Qty: 30 tablet, Refills: 11      melatonin 3 mg Tab Take 3 mg by mouth every evening.       metformin (GLUCOPHAGE) 1000 MG tablet Take 1,000 mg by mouth 2 (two) times daily with meals.      metoprolol succinate (TOPROL-XL) 25 MG 24 hr tablet Take 1 tablet (25 mg total) by mouth once daily.  Qty: 30 tablet, Refills: 11      multivitamin (THERAGRAN) tablet Take 1 tablet by mouth once daily.      oxycodone-acetaminophen (PERCOCET)  mg per tablet Take 1 tablet by mouth every 6 (six) hours as needed.  Qty: 10 tablet, Refills: 0      zinc sulfate (ZINCATE) 220 (50) mg capsule Take 1 capsule (220 mg total) by mouth once daily.      apixaban 5 mg Tab Take 2 tablets (10 mg total) by mouth 2 (two) times daily.  Qty: 26 tablet, Refills: 0      aspirin 81 MG Chew Take 1 tablet (81 mg total) by mouth once daily.  Refills: 0      atorvastatin (LIPITOR) 80 MG tablet Take 1 tablet (80 mg total) by mouth  every evening.  Qty: 30 tablet, Refills: 11      blood-glucose meter Misc Use the meter as directed      clopidogrel (PLAVIX) 75 mg tablet Take 1 tablet (75 mg total) by mouth once daily.  Qty: 30 tablet, Refills: 11      collagenase ointment Apply topically once daily.  Qty: 90 g, Refills: 0      LACTULOSE ORAL Take 5 mLs by mouth.      lidocaine HCL 2% (XYLOCAINE) 2 % jelly Apply topically as needed. Apply once topically every dressing change to right ankle prior to mechanical debridement of wound with coarse gausze.  Qty: 30 mL, Refills: 2      promethazine (PHENERGAN) 25 MG tablet Take 25 mg by mouth every 6 (six) hours as needed for Nausea.           Time spent on the discharge of patient: 5 minutes    Ragini Markham MD  Podiatry  [facility]

## 2017-09-27 NOTE — H&P (VIEW-ONLY)
Subjective:      Patient ID: Kateryna Connolly is a 67 y.o. female.    Chief Complaint: Foot Ulcer (right)    Wounds right ankle medial and laterally using santyl and bordered gauze medially and laterally.  VAC in place lateral wound at 125 mmHg continuous therapy changing every other day.  Pain controlled.  Resident at [place] presently.  Had VAC tube occluded/kinked overnight resulting in less drainage away from wound during that period with malodor nad mild dusky color change.    Review of Systems   Constitution: Negative for chills, fever, malaise/fatigue and night sweats.   Cardiovascular: Positive for leg swelling. Negative for claudication and cyanosis.   Skin: Positive for poor wound healing. Negative for color change, itching, rash and suspicious lesions.   Musculoskeletal: Negative for falls, gout, joint pain and myalgias.   Neurological: Positive for numbness, paresthesias and sensory change.           Objective:      Physical Exam   Constitutional: She is oriented to person, place, and time. She appears well-developed and well-nourished. She is cooperative. No distress.   Oriented to time, place, and person.   Cardiovascular:   Pulses:       Dorsalis pedis pulses are 0 on the right side, and 1+ on the left side.        Posterior tibial pulses are 0 on the right side, and 1+ on the left side.   Capillary fill time 3-5 seconds.  All toes warm to touch.      2+pitting lower extremity edema right.  Negative elevational pallor and dependent rubor bilateral.    Pulses right not palpable through edema.   Musculoskeletal:   Minimal stance/ambulation for transfer in clinic.  Decreased stride length, early heel off, moderately propulsive toe off bilateral.    All ten toes without clubbing, cyanosis, or signs of ischemia.       Range of motion, stability, muscle strength, and muscle tone are age and health appropriate normal bilateral feet and legs.       Lymphadenopathy:   Negative lymphadenopathy bilateral  popliteal fossa and tarsal tunnel.     Neurological: She is alert and oriented to person, place, and time. She has normal strength. She is not disoriented. She displays no atrophy and no tremor. A sensory deficit is present. She exhibits normal muscle tone.   Reflex Scores:       Patellar reflexes are 2+ on the right side and 2+ on the left side.       Achilles reflexes are 2+ on the right side and 2+ on the left side.  Decreased/absent vibratory sensation bilateral feet to 128Hz tuning fork.    Paresthesias bilateral feet with no clearly identified trigger or source.   Skin: Skin is warm, dry and intact. No abrasion, no bruising, no burn, no ecchymosis, no laceration, no lesion, no petechiae and no rash noted. She is not diaphoretic. No cyanosis or erythema. No pallor. Nails show no clubbing.   Skin thin, atrophic, with decreased density and distribution of pedal hair bilateral, but without hyperpigmentation, adolph discoloration, masses, nodules or cords palpated bilateral feet and legs.      Wound:  Lateral right ankle    Size:    Pre:  About 9x4x1.3 cm      Base:  Granular, pink and fibrous tan mix about 80/20 with moderate serous/serosanaguinous drainage only.  No pus, tracking, fluctuance, or cardinal signs infection.  Lateral fibular plate is exposed today in lateral wound base with screw head clearly visible.    Mild malodor consistent with increased bacterial load.    Borders:   Atrophicflat, pink, blanchable skin edges without undermining.    Wound:  Medial right ankle    Size:    Pre:3.5x2x0.3cm      Base:  Tan fibrous developing  granular area about 70/30 mix today with moderate serous/serosanaguinous drainage only.  No pus, tracking, fluctuance, malodor, or cardinal signs infection.    Borders:    Atrophic, flat, pink, blanchable skin edges without undermining.                   Assessment:       Encounter Diagnoses   Name Primary?    Hardware complicating wound infection, subsequent encounter Yes     PAD (peripheral artery disease)     Ankle ulcer, right, with unspecified severity     Edema, unspecified type     Type 2 diabetes mellitus with diabetic neuropathy, unspecified long term insulin use status          Plan:       Kateryna was seen today for foot ulcer.    Diagnoses and all orders for this visit:    Hardware complicating wound infection, subsequent encounter  -     Basic metabolic panel; Future  -     CBC auto differential; Future  -     C-reactive protein; Future  -     Sedimentation rate, manual; Future  -     X-Ray Ankle Complete Right; Future    PAD (peripheral artery disease)  -     Basic metabolic panel; Future  -     CBC auto differential; Future  -     C-reactive protein; Future  -     Sedimentation rate, manual; Future  -     X-Ray Ankle Complete Right; Future    Ankle ulcer, right, with unspecified severity  -     Basic metabolic panel; Future  -     CBC auto differential; Future  -     C-reactive protein; Future  -     Sedimentation rate, manual; Future  -     X-Ray Ankle Complete Right; Future    Edema, unspecified type  -     Basic metabolic panel; Future  -     CBC auto differential; Future  -     C-reactive protein; Future  -     Sedimentation rate, manual; Future  -     X-Ray Ankle Complete Right; Future    Type 2 diabetes mellitus with diabetic neuropathy, unspecified long term insulin use status  -     Basic metabolic panel; Future  -     CBC auto differential; Future  -     C-reactive protein; Future  -     Sedimentation rate, manual; Future  -     X-Ray Ankle Complete Right; Future      I counseled the patient on her conditions, their implications and medical management.    Dressed today with damp to dry over santyl, kerlix - will apply VAC at [facility].  Continue present changes as below.    Discussed with patient that exposed hardware is very dangerous for a wound and can very quickly lead to bone infection requiring amputation.  I've recommended removal of hardware, culture of  hardware track, and grafting of wound when clean cultures and granular wound bed obtained.    Adequate vitamin supplementation, protein intake, and hydration - discussed with patient.      Change wound dressings as follows:    Medial right ankle:  Remove dressing, apply lidocaine gel.    Remove gel and cleanse wound with wound cleanser.  Blot dry.    Scrub wound base lightly with coarse gauze.    Apply santyl to wound base.    Cover with border gauze.    Repeat daily.        Lateral right ankle:  Remove dressing/VAC, apply lidocaine gel.    Remove gel and cleanse wound with wound cleanser.  Blot dry.    Scrub wound base lightly with coarse gauze.    Apply santyl to wound base.    Apply wound VAC (NPWT).    Repeat every other day.      Long detailed discussion about the potential benefits of BKA/AKA.  Patient politely refuses discussion presently.    Schedule removal hardware lateral right ankle Wednesday with holding anticoagulant and plavix 7 days prior.  Risk of clot to leg lung heart brain discussed, risk of non healing, infection, sepsis, death, BKA/AKA discussed as about 50%.  Declines removal of hardware right medial ankle at same procedure due to no communication of wounds, hardware tracks, and desire to minimize surgical sites/risk.  Verbally acknowledges possibility that it may have to be done in the future if it becomes exposed.  Discussed smoking risk as about 300% greater for serious limb threatening complication than non smoker.      Return in about 1 week (around 10/2/2017).

## 2017-09-27 NOTE — PLAN OF CARE
Admitted to [facility]. Warm blankets and socks provided. Assessment complete. Scd wrap placed on left leg only. Comfort assured.

## 2017-09-27 NOTE — BRIEF OP NOTE
[facility]  Brief Operative Note     SUMMARY     Surgery Date: 9/27/2017     Surgeon(s) and Role:     * Iglesia Alexander DPM - Primary    Assisting Surgeon: Ragini Markham DPM    Pre-op Diagnosis:  Hardware complicating wound infection, subsequent encounter [T84.7XXD]    Post-op Diagnosis:  Post-Op Diagnosis Codes:     * Hardware complicating wound infection, subsequent encounter [T84.7XXD]    Procedure(s) (LRB):  REMOVAL-HARDWARE-ANKLE right lateral only (Right)    Anesthesia: General    Description of the findings of the procedure: hardware visibly exposed through wound, mild bleeding noted.     Findings/Key Components: Removal of lateral ankle hardware through wound.     Estimated Blood Loss: 30 mL         Specimens:   Specimen (12h ago through future)    None          Discharge Note    SUMMARY     Admit Date: 9/27/2017    Discharge Date and Time:  09/27/2017 2:04 PM    Hospital Course (synopsis of major diagnoses, care, treatment, and services provided during the course of the hospital stay):      Final Diagnosis: Post-Op Diagnosis Codes:     * Hardware complicating wound infection, subsequent encounter [T84.7XXD]    Disposition: Home or Self Care    Follow Up/Patient Instructions:     Medications:  Reconciled Home Medications:   Current Discharge Medication List      CONTINUE these medications which have NOT CHANGED    Details   acetaminophen (TYLENOL) 325 MG tablet Take 325 mg by mouth every 8 (eight) hours as needed for Pain.      alprazolam (XANAX) 0.25 MG tablet Take 1 tablet (0.25 mg total) by mouth 3 (three) times daily as needed for Anxiety.  Qty: 10 tablet, Refills: 0      gabapentin (NEURONTIN) 100 MG capsule Take 900 mg by mouth once daily.       insulin aspart (NOVOLOG) 100 unit/mL injection Inject into the skin as needed for High Blood Sugar.      insulin glargine (LANTUS) 100 unit/mL injection Inject 25 Units into the skin every morning.       lisinopril (PRINIVIL,ZESTRIL)  2.5 MG tablet Take 1 tablet (2.5 mg total) by mouth once daily.  Qty: 30 tablet, Refills: 11      melatonin 3 mg Tab Take 3 mg by mouth every evening.       metformin (GLUCOPHAGE) 1000 MG tablet Take 1,000 mg by mouth 2 (two) times daily with meals.      metoprolol succinate (TOPROL-XL) 25 MG 24 hr tablet Take 1 tablet (25 mg total) by mouth once daily.  Qty: 30 tablet, Refills: 11      multivitamin (THERAGRAN) tablet Take 1 tablet by mouth once daily.      oxycodone-acetaminophen (PERCOCET)  mg per tablet Take 1 tablet by mouth every 6 (six) hours as needed.  Qty: 10 tablet, Refills: 0      zinc sulfate (ZINCATE) 220 (50) mg capsule Take 1 capsule (220 mg total) by mouth once daily.      apixaban 5 mg Tab Take 2 tablets (10 mg total) by mouth 2 (two) times daily.  Qty: 26 tablet, Refills: 0      aspirin 81 MG Chew Take 1 tablet (81 mg total) by mouth once daily.  Refills: 0      atorvastatin (LIPITOR) 80 MG tablet Take 1 tablet (80 mg total) by mouth every evening.  Qty: 30 tablet, Refills: 11      blood-glucose meter Misc Use the meter as directed      clopidogrel (PLAVIX) 75 mg tablet Take 1 tablet (75 mg total) by mouth once daily.  Qty: 30 tablet, Refills: 11      collagenase ointment Apply topically once daily.  Qty: 90 g, Refills: 0      LACTULOSE ORAL Take 5 mLs by mouth.      lidocaine HCL 2% (XYLOCAINE) 2 % jelly Apply topically as needed. Apply once topically every dressing change to right ankle prior to mechanical debridement of wound with coarse gausze.  Qty: 30 mL, Refills: 2      promethazine (PHENERGAN) 25 MG tablet Take 25 mg by mouth every 6 (six) hours as needed for Nausea.             Discharge Procedure Orders  X-Ray Ankle Complete Right   Standing Status: Future  Standing Exp. Date: 09/27/18   Order Specific Question Answer Comments   Reason for Exam: post op    May the Radiologist modify the order per protocol to meet the clinical needs of the patient? Yes      Diet general     Keep  surgical extremity elevated     Weight bearing restrictions (specify)   Order Comments: Minimal wb to right foot for transfers only     Call MD for:  temperature >100.4     Call MD for:  persistent nausea and vomiting     Call MD for:  severe uncontrolled pain     Call MD for:  difficulty breathing, headache or visual disturbances     Call MD for:  redness, tenderness, or signs of infection (pain, swelling, redness, odor or green/yellow discharge around incision site)     Call MD for:  hives     Call MD for:  persistent dizziness or light-headedness     Call MD for:  extreme fatigue     Leave dressing on - Keep it clean, dry, and intact until clinic visit       Follow-up Information     Iglesia Alexander DPM In 1 week.    Specialties:  Podiatry, Wound Care  Contact information:  [address] [place] [address] [phone]

## 2017-09-27 NOTE — ANESTHESIA PROCEDURE NOTES
Peripheral    Patient location during procedure: pre-op   Block not for primary anesthetic.  Reason for block: at surgeon's request and post-op pain management   Post-op Pain Location: Right ankle   Start time: 9/27/2017 2:40 PM  Timeout: 9/27/2017 2:40 PM   End time: 9/27/2017 2:55 PM  Staffing  Anesthesiologist: CHIKI STANFORD  Performed: anesthesiologist   Preanesthetic Checklist  Completed: patient identified, site marked, surgical consent, pre-op evaluation, timeout performed, IV checked, risks and benefits discussed and monitors and equipment checked  Peripheral Block  Patient position: supine  Prep: ChloraPrep  Patient monitoring: heart rate, cardiac monitor, continuous pulse ox, continuous capnometry and frequent blood pressure checks  Block type: popliteal  Laterality: right  Injection technique: single shot  Needle  Needle type: Stimuplex   Needle gauge: 21 G  Needle length: 4 in  Needle localization: anatomical landmarks and ultrasound guidance   -ultrasound image captured on disc.  Assessment  Injection assessment: negative aspiration, negative parasthesia and local visualized surrounding nerve  Paresthesia pain: none  Heart rate change: no  Slow fractionated injection: yes  Medications:  Bolus administered: 30 mL of 0.5 ropivacaine  Epinephrine added: none  Additional Notes  VSS.  [facility] RN monitoring vitals throughout procedure.  Patient tolerated procedure well.

## 2017-09-27 NOTE — ANESTHESIA PREPROCEDURE EVALUATION
09/27/2017  Kateryna Connolly is a 67 y.o., female.    Anesthesia Evaluation    I have reviewed the Patient Summary Reports.    I have reviewed the Nursing Notes.   I have reviewed the Medications.     Review of Systems  Anesthesia Hx:  No problems with previous Anesthesia    Social:  Smoker    Hematology/Oncology:  Hematology Normal   Oncology Normal     EENT/Dental:   Edentulous   Cardiovascular:   Hypertension Past MI CAD  CABG/stent     Pulmonary:   COPD    Renal/:  Renal/ Normal     Hepatic/GI:  Hepatic/GI Normal    Musculoskeletal:   Arthritis     Neurological:   Neuromuscular Disease,   Peripheral Neuropathy    Endocrine:   Diabetes, type 2    Dermatological:  Skin Normal    Psych:  Psychiatric Normal           Physical Exam  General:  Obesity    Airway/Jaw/Neck:  Airway Findings: Mouth Opening: Normal Tongue: Normal  General Airway Assessment: Adult  Mallampati: I  TM Distance: Normal, at least 6 cm        Eyes/Ears/Nose:  EYES/EARS/NOSE FINDINGS: Normal   Dental:  Dental Findings: Edentulous   Chest/Lungs:  Chest/Lungs Findings: Clear to auscultation, Normal Respiratory Rate     Heart/Vascular:  Heart Findings: Rate: Normal  Rhythm: Regular Rhythm  Sounds: Normal     Abdomen:  Abdomen Findings: Normal    Musculoskeletal:  Musculoskeletal Findings: Normal   Skin:  Skin Findings: Normal    Mental Status:  Mental Status Findings: Normal        Anesthesia Plan  Type of Anesthesia, risks & benefits discussed:  Anesthesia Type:  general  Patient's Preference:   Intra-op Monitoring Plan: standard ASA monitors  Intra-op Monitoring Plan Comments:   Post Op Pain Control Plan: IV/PO Opioids PRN  Post Op Pain Control Plan Comments:   Induction:   IV  Beta Blocker:  Patient is on a Beta-Blocker and has received one dose within the past 24 hours (No further documentation required).       Informed Consent:  Patient understands risks and agrees with Anesthesia plan.  Questions answered. Anesthesia consent signed with patient.  ASA Score: 3     Day of Surgery Review of History & Physical: I have interviewed and examined the patient. I have reviewed the patient's H&P dated:  There are no significant changes.          Ready For Surgery From Anesthesia Perspective.

## 2017-09-27 NOTE — INTERVAL H&P NOTE
The patient has been examined and the H&P has been reviewed:    I concur with the findings and no changes have occurred since H&P was written.right ankle marked as surgical site with purple skin marker with agreement from doctor, patient, nurse.    Anesthesia/Surgery risks, benefits and alternative options discussed and understood by patient/family.          Active Hospital Problems    Diagnosis  POA    Wound of right ankle [S91.001A]  Yes      Resolved Hospital Problems    Diagnosis Date Resolved POA   No resolved problems to display.

## 2017-09-27 NOTE — HISTORY AND PHYSICAL ADDENDUM
UPDATE    67y.o. Female for removal of ankle hardware by Dr. Alexander.    H&P completed by Dr. JOAN Quinteros on 9/12/2017    No significant medical changes in interim.      Anesthesia risks and benefits discussed.  Consent obtained.     Patient ready for surgery.

## 2017-09-27 NOTE — OP NOTE
Operative Note       Surgery Date: 9/27/2017     Surgeon(s) and Role:     * Iglesia Alexander DPM - Primary     * Ragini Markham DPM - 1st Assist     Pre-op Diagnosis:  Hardware complicating wound infection, subsequent encounter [T84.7XXD]    Post-op Diagnosis: Post-Op Diagnosis Codes:     * Hardware complicating wound infection, subsequent encounter [T84.7XXD]    Procedure(s) (LRB):  REMOVAL-HARDWARE-ANKLE right lateral only (Right)    Anesthesia: General    Procedure in Detail/Findings:  The patient was brought to the operating room on a stretcher and placed on the operating table in a supine position. Following the successful induction of general anesthesia, a tourniquet was applied to the patients right thigh but was not used during the procedure. Then, the right leg was scrubbed, prepped and draped in the usual aseptic manner. A marking pen was utilized to create a incision guide in a linear fashion. A time out was performed.    Attention was directed to the lateral aspect of the right ankle where an incision was made directly along the proximal and distal aspects of the ulcer in order to expose the hardware. The incision was made through dermis, then deepened via sharp and blunt dissection through subcutaneous tissue ensuring to retract any major vessels and nerves and preserve the tendons. Minimal bleeding was noted, which was controlled with pressure. The incision was then deepened to expose the hardware completely.    The locking and non locking screws were then removed along with the plate. This was sent to pathology. The wound was then debrided with a curette and rongeur to reveal a bleeding, granular base, measuring 10.1x4x1.3cm. The wound was then flushed with 3 L of sterile saline using pulse lavage. The fluoro was used to ensure all of the lateral hardware was removed.     Deep cultures aerobic and anaerobic were taken from the screw holes in the right fibula without purulence or signs of infection  noted.  The done density and perfusion seems normal.    The incision was dressed with adaptic and packed with kerlix and then dressed with kerlix and ACE. The patient tolerated the procedure and anesthesia well. The patient was transported to recovery with vital signs stable and vascular status intact.      Estimated Blood Loss: 30 mL           Specimens     Start     Ordered    09/27/17 1320  Specimen to Pathology - Surgery  Once      09/27/17 1410        Implants: * No implants in log *           Disposition: PACU - hemodynamically stable.           Condition: Good    Attestation:  I was present and scrubbed for the entire procedure.           Discharge Note    Admit Date: 9/27/2017    Attending Physician: Iglesia Alexander DPM     Discharge Physician: Iglesia Alexander DPM    Final Diagnosis: Post-Op Diagnosis Codes:     * Hardware complicating wound infection, subsequent encounter [T84.7XXD]    Disposition: Home or Self Care    Patient Instructions:   Current Discharge Medication List      CONTINUE these medications which have NOT CHANGED    Details   acetaminophen (TYLENOL) 325 MG tablet Take 325 mg by mouth every 8 (eight) hours as needed for Pain.      alprazolam (XANAX) 0.25 MG tablet Take 1 tablet (0.25 mg total) by mouth 3 (three) times daily as needed for Anxiety.  Qty: 10 tablet, Refills: 0      gabapentin (NEURONTIN) 100 MG capsule Take 900 mg by mouth once daily.       insulin aspart (NOVOLOG) 100 unit/mL injection Inject into the skin as needed for High Blood Sugar.      insulin glargine (LANTUS) 100 unit/mL injection Inject 25 Units into the skin every morning.       lisinopril (PRINIVIL,ZESTRIL) 2.5 MG tablet Take 1 tablet (2.5 mg total) by mouth once daily.  Qty: 30 tablet, Refills: 11      melatonin 3 mg Tab Take 3 mg by mouth every evening.       metformin (GLUCOPHAGE) 1000 MG tablet Take 1,000 mg by mouth 2 (two) times daily with meals.      metoprolol succinate (TOPROL-XL) 25 MG 24 hr tablet Take 1  tablet (25 mg total) by mouth once daily.  Qty: 30 tablet, Refills: 11      multivitamin (THERAGRAN) tablet Take 1 tablet by mouth once daily.      oxycodone-acetaminophen (PERCOCET)  mg per tablet Take 1 tablet by mouth every 6 (six) hours as needed.  Qty: 10 tablet, Refills: 0      zinc sulfate (ZINCATE) 220 (50) mg capsule Take 1 capsule (220 mg total) by mouth once daily.      apixaban 5 mg Tab Take 2 tablets (10 mg total) by mouth 2 (two) times daily.  Qty: 26 tablet, Refills: 0      aspirin 81 MG Chew Take 1 tablet (81 mg total) by mouth once daily.  Refills: 0      atorvastatin (LIPITOR) 80 MG tablet Take 1 tablet (80 mg total) by mouth every evening.  Qty: 30 tablet, Refills: 11      blood-glucose meter Misc Use the meter as directed      clopidogrel (PLAVIX) 75 mg tablet Take 1 tablet (75 mg total) by mouth once daily.  Qty: 30 tablet, Refills: 11      collagenase ointment Apply topically once daily.  Qty: 90 g, Refills: 0      LACTULOSE ORAL Take 5 mLs by mouth.      lidocaine HCL 2% (XYLOCAINE) 2 % jelly Apply topically as needed. Apply once topically every dressing change to right ankle prior to mechanical debridement of wound with coarse gausze.  Qty: 30 mL, Refills: 2      promethazine (PHENERGAN) 25 MG tablet Take 25 mg by mouth every 6 (six) hours as needed for Nausea.             Discharge Procedure Orders (must include Diet, Follow-up, Activity)    Discharge Procedure Orders (must include Diet, Follow-up, Activity)  X-Ray Ankle Complete Right   Standing Status: Future  Standing Exp. Date: 09/27/18   Order Specific Question Answer Comments   Reason for Exam: post op    May the Radiologist modify the order per protocol to meet the clinical needs of the patient? Yes      Diet general     Keep surgical extremity elevated     Weight bearing restrictions (specify)   Order Comments: Minimal wb to right foot for transfers only     Call MD for:  temperature >100.4     Call MD for:  persistent nausea  and vomiting     Call MD for:  severe uncontrolled pain     Call MD for:  difficulty breathing, headache or visual disturbances     Call MD for:  redness, tenderness, or signs of infection (pain, swelling, redness, odor or green/yellow discharge around incision site)     Call MD for:  hives     Call MD for:  persistent dizziness or light-headedness     Call MD for:  extreme fatigue     Leave dressing on - Keep it clean, dry, and intact until clinic visit          Discharge Date: No discharge date for patient encounter.

## 2017-09-28 ENCOUNTER — TELEPHONE (OUTPATIENT)
Dept: PODIATRY | Facility: CLINIC | Age: 67
End: 2017-09-28

## 2017-09-28 NOTE — TELEPHONE ENCOUNTER
----- Message from Carey Kang sent at 9/28/2017  1:53 PM CDT -----  Contact: self  Patient states [facility] is trying to contact office for pain medication. Please call [facility] at [phone]. Please call patient at [phone]. Thanks!

## 2017-09-28 NOTE — OR NURSING
Pt. Discharge was delayed due to nursing home did not have their transport available as it was out in [place], Transportation arrived at hospital and pt. Was discharged via wheelchair to [facility] transport personnel [place].  Report had been called at 1730 to SHANIA De Souza who verbalized understanding of discharge instructions.  Written discharge papers were sent with pt. To NH.

## 2017-09-28 NOTE — TELEPHONE ENCOUNTER
----- Message from Brooklyn Sotelo sent at 9/28/2017 11:30 AM CDT -----  Contact: Ani Motta  States that the patient had a procedure done yesterday and the pain medication is not working.  The patient is requesting to change it and to please call Ángela back at [phone].  Thank you

## 2017-09-28 NOTE — TELEPHONE ENCOUNTER
Los with [facility], stated that Ms Mohsen advised her that she was not having any pain. Los wanted orders for dressing changes advised that she is to leave dressing on - keep it clean, dry, and intact until clinic visit of 10/04/2017. Per discharge summary 09/28/2017

## 2017-09-29 ENCOUNTER — TELEPHONE (OUTPATIENT)
Dept: PODIATRY | Facility: CLINIC | Age: 67
End: 2017-09-29

## 2017-09-29 NOTE — TELEPHONE ENCOUNTER
Spoke with Ángela, she stated Ms Connolly is having a lot of swelling and pain. Wanted to know If you can change pain medication.

## 2017-09-29 NOTE — TELEPHONE ENCOUNTER
----- Message from Trista Fang sent at 9/29/2017 11:11 AM CDT -----  Contact: Ángela (Nurse haim/ Trudi)  Ángela (Nurse haim/ Trudi) calling in regards to the patient having extreme pain in the leg operated on. She left a message yesterday and no one called her back. Please advise. Call to pod. Call connected to pod. Warm transferred.  Thanks!

## 2017-09-29 NOTE — TELEPHONE ENCOUNTER
New Dressing order given to Los, Replace dressing daily:  Wound gel to base, cover pack with kerlix, wrap with another kerlix, secure with 1-2 ace bandages.  Repeat daily beginning tomorrow.  Change anytime there is strike through of visible fluid. Instructed her to have Ms Lai Rest, Ice to back of knee (Protecting skin from Ice with a cloth) Compression, and Elevation above the heart. Notified [facility] appointment scheduled for 10/02/2017 at 1 pm.

## 2017-09-29 NOTE — TELEPHONE ENCOUNTER
----- Message from Siva Butts sent at 9/29/2017 11:14 AM CDT -----  Contact: [email or web address], [phone], ask for Kateryna Connolly's nurse  Ángela called in stating that she needs to speak with someone because Pt's pain medication is not working and she is having a lot of pain. I advised that the office spoke with someone yesterday and was advised that there was no pain and that dressing change orders were need. Ángela advised that Pt is in excruciating pain and they need someone to call and advise. She also needs to know when Pt can resume Plavix and Eloquis. Please return call and advise

## 2017-10-02 ENCOUNTER — TELEPHONE (OUTPATIENT)
Dept: PODIATRY | Facility: CLINIC | Age: 67
End: 2017-10-02

## 2017-10-02 ENCOUNTER — OFFICE VISIT (OUTPATIENT)
Dept: PODIATRY | Facility: CLINIC | Age: 67
End: 2017-10-02
Payer: MEDICARE

## 2017-10-02 VITALS — WEIGHT: 212.06 LBS | BODY MASS INDEX: 33.28 KG/M2 | HEIGHT: 67 IN

## 2017-10-02 DIAGNOSIS — T84.7XXD HARDWARE COMPLICATING WOUND INFECTION, SUBSEQUENT ENCOUNTER: Primary | ICD-10-CM

## 2017-10-02 DIAGNOSIS — I73.9 PAD (PERIPHERAL ARTERY DISEASE): ICD-10-CM

## 2017-10-02 DIAGNOSIS — R60.9 EDEMA, UNSPECIFIED TYPE: ICD-10-CM

## 2017-10-02 DIAGNOSIS — L97.319 ANKLE ULCER, RIGHT, WITH UNSPECIFIED SEVERITY: ICD-10-CM

## 2017-10-02 DIAGNOSIS — E11.40 TYPE 2 DIABETES MELLITUS WITH DIABETIC NEUROPATHY, UNSPECIFIED LONG TERM INSULIN USE STATUS: ICD-10-CM

## 2017-10-02 LAB — BACTERIA SPEC ANAEROBE CULT: NORMAL

## 2017-10-02 PROCEDURE — 99024 POSTOP FOLLOW-UP VISIT: CPT | Mod: ,,, | Performed by: PODIATRIST

## 2017-10-02 PROCEDURE — 99213 OFFICE O/P EST LOW 20 MIN: CPT | Mod: PBBFAC,PO | Performed by: PODIATRIST

## 2017-10-02 PROCEDURE — 99999 PR PBB SHADOW E&M-EST. PATIENT-LVL III: CPT | Mod: PBBFAC,,, | Performed by: PODIATRIST

## 2017-10-02 RX ORDER — SULFAMETHOXAZOLE AND TRIMETHOPRIM 800; 160 MG/1; MG/1
1 TABLET ORAL 2 TIMES DAILY
Qty: 20 TABLET | Refills: 0 | Status: SHIPPED | OUTPATIENT
Start: 2017-10-02 | End: 2018-02-05 | Stop reason: ALTCHOICE

## 2017-10-02 NOTE — TELEPHONE ENCOUNTER
----- Message from Iglesia Alexander DPM sent at 10/2/2017  1:54 PM CDT -----  Change wound dressings as follows:    Medial right ankle:  Remove dressing, apply lidocaine gel.    Remove gel and cleanse wound with wound cleanser.  Blot dry.    Scrub wound base lightly with coarse gauze.    Apply santyl to wound base.    Cover with border gauze.    Repeat daily.        Lateral right ankle:  Remove dressing/VAC, apply lidocaine gel.    Remove gel and cleanse wound with wound cleanser.  Blot dry.    Scrub wound base lightly with coarse gauze.    Apply santyl to wound base.    Apply wound VAC (NPWT) 125 mm Hg continuous therapy.    Repeat every other day.      Orders from this visit for [facility].      Stop oxycodone.    Rx demerol 100 mg po q 6 hours prn pain - not with any other narcotic pain medication.    Patient verbalized understanding that this is a one time only prescription to see if this medicine does better than the current one.    Rx bactrim ds one po bid.    Hydrate 4L water daily.

## 2017-10-02 NOTE — TELEPHONE ENCOUNTER
Wound dressing orders Faxed via [facility] to [facility] and verbal given to Los wound care nurse with [facility].

## 2017-10-02 NOTE — PROGRESS NOTES
Subjective:      Patient ID: Kateryna Connolly is a 67 y.o. female.    Chief Complaint: Follow-up    Wounds right ankle medial and laterally using santyl and bordered gauze medially and adaptic, wound gel, 4x4 kerlix laterally.  1 week s/p hardware removal.  Denies fcnsnvd.  Cultures grew sensitive to bactrim.  Review of Systems   Constitution: Negative for chills, fever, malaise/fatigue and night sweats.   Cardiovascular: Positive for leg swelling. Negative for claudication and cyanosis.   Skin: Positive for poor wound healing. Negative for color change, itching, rash and suspicious lesions.   Musculoskeletal: Negative for falls, gout, joint pain and myalgias.   Neurological: Positive for numbness, paresthesias and sensory change.           Objective:      Physical Exam   Constitutional: She is oriented to person, place, and time. She appears well-developed and well-nourished. She is cooperative. No distress.   Oriented to time, place, and person.   Cardiovascular:   Pulses:       Dorsalis pedis pulses are 0 on the right side, and 1+ on the left side.        Posterior tibial pulses are 0 on the right side, and 1+ on the left side.   Capillary fill time 3-5 seconds.  All toes warm to touch.      2+pitting lower extremity edema right.  Negative elevational pallor and dependent rubor bilateral.    Pulses right not palpable through edema.   Musculoskeletal:   Minimal stance/ambulation for transfer in clinic.  Decreased stride length, early heel off, moderately propulsive toe off bilateral.    All ten toes without clubbing, cyanosis, or signs of ischemia.       Range of motion, stability, muscle strength, and muscle tone are age and health appropriate normal bilateral feet and legs.       Lymphadenopathy:   Negative lymphadenopathy bilateral popliteal fossa and tarsal tunnel.     Neurological: She is alert and oriented to person, place, and time. She has normal strength. She is not disoriented. She displays no atrophy  and no tremor. A sensory deficit is present. She exhibits normal muscle tone.   Reflex Scores:       Patellar reflexes are 2+ on the right side and 2+ on the left side.       Achilles reflexes are 2+ on the right side and 2+ on the left side.  Decreased/absent vibratory sensation bilateral feet to 128Hz tuning fork.    Paresthesias bilateral feet with no clearly identified trigger or source.   Skin: Skin is warm, dry and intact. No abrasion, no bruising, no burn, no ecchymosis, no laceration, no lesion, no petechiae and no rash noted. She is not diaphoretic. No cyanosis or erythema. No pallor. Nails show no clubbing.   Skin thin, atrophic, with decreased density and distribution of pedal hair bilateral, but without hyperpigmentation, adolph discoloration, masses, nodules or cords palpated bilateral feet and legs.      Wound:  Lateral right ankle    Size:    Pre:  12.3x4.6x1.3 cm      Base:  Granular, pink and fibrous tan mix about 50/50 with moderate serous/serosanaguinous drainage only.  No pus, tracking, fluctuance, or cardinal signs infection.  Lateral fibular plate is exposed today in lateral wound base with screw head clearly visible.    Mild malodor consistent with increased bacterial load.    Borders:   Atrophicflat, pink, blanchable skin edges without undermining.    Wound:  Medial right ankle    Size:    Pre:3.6x1.7x0.3cm      Base:  Tan fibrous developing  granular area about 70/30 mix today with moderate serous/serosanaguinous drainage only.  No pus, tracking, fluctuance, malodor, or cardinal signs infection.    Borders:    Atrophic, flat, pink, blanchable skin edges without undermining.                   Assessment:       Encounter Diagnoses   Name Primary?    Hardware complicating wound infection, subsequent encounter Yes    PAD (peripheral artery disease)     Ankle ulcer, right, with unspecified severity     Edema, unspecified type     Type 2 diabetes mellitus with diabetic neuropathy, unspecified  long term insulin use status          Plan:       Kateryna was seen today for follow-up.    Diagnoses and all orders for this visit:    Hardware complicating wound infection, subsequent encounter    PAD (peripheral artery disease)    Ankle ulcer, right, with unspecified severity    Edema, unspecified type    Type 2 diabetes mellitus with diabetic neuropathy, unspecified long term insulin use status    Other orders  -     sulfamethoxazole-trimethoprim 800-160mg (BACTRIM DS) 800-160 mg Tab; Take 1 tablet by mouth 2 (two) times daily.  -     meperidine (DEMEROL) 100 MG tablet; Take 1 tablet (100 mg total) by mouth every 6 (six) hours as needed for Pain. Not to be taken with any other narcotic medication.      I counseled the patient on her conditions, their implications and medical management.    Dressed today with damp to dry over santyl, kerlix - will apply VAC at [place].  Continue present changes as below.    Discussed with patient that exposed hardware is very dangerous for a wound and can very quickly lead to bone infection requiring amputation.  I've recommended removal of hardware, culture of hardware track, and grafting of wound when clean cultures and granular wound bed obtained.    Adequate vitamin supplementation, protein intake, and hydration - discussed with patient.      Change wound dressings as follows:    Medial right ankle:  Remove dressing, apply lidocaine gel.    Remove gel and cleanse wound with wound cleanser.  Blot dry.    Scrub wound base lightly with coarse gauze.    Apply santyl to wound base.    Cover with border gauze.    Repeat daily.        Lateral right ankle:  Remove dressing/VAC, apply lidocaine gel.    Remove gel and cleanse wound with wound cleanser.  Blot dry.    Scrub wound base lightly with coarse gauze.    Apply santyl to wound base.    Apply wound VAC (NPWT) 125 mm Hg continuous therapy.    Repeat every other day.        Stop oxycodone.    Rx demerol 100 mg po q 6 hours prn  pain - not with any other narcotic pain medication.    Patient verbalized understanding that this is a one time only prescription to see if this medicine does better than the current one.    Rx bactrim ds one po bid.    Hydrate 4L water daily.    Return in about 1 week (around 10/9/2017) for wound right ankle..

## 2017-10-03 LAB
BACTERIA SPEC AEROBE CULT: NORMAL
BACTERIA SPEC AEROBE CULT: NORMAL

## 2017-10-05 ENCOUNTER — TELEPHONE (OUTPATIENT)
Dept: PODIATRY | Facility: CLINIC | Age: 67
End: 2017-10-05

## 2017-10-05 NOTE — TELEPHONE ENCOUNTER
Spoke with Vandana notified her that Dr Alexander is in [place] on Tuesdays and that we are booked up for that day.

## 2017-10-05 NOTE — TELEPHONE ENCOUNTER
----- Message from eMli Izaguirre sent at 10/5/2017 11:15 AM CDT -----  Contact: pt/nurse Meryl Goldstein  Pt/Nurse Meryl Goldstein would like to switch her appointment to 10/10 please ...[phone](Meryl goldstein ask for who is her nurse)

## 2017-10-09 ENCOUNTER — OFFICE VISIT (OUTPATIENT)
Dept: PODIATRY | Facility: CLINIC | Age: 67
End: 2017-10-09
Payer: MEDICARE

## 2017-10-09 DIAGNOSIS — L97.319 ANKLE ULCER, RIGHT, WITH UNSPECIFIED SEVERITY: ICD-10-CM

## 2017-10-09 DIAGNOSIS — E11.40 TYPE 2 DIABETES MELLITUS WITH DIABETIC NEUROPATHY, UNSPECIFIED LONG TERM INSULIN USE STATUS: ICD-10-CM

## 2017-10-09 DIAGNOSIS — I73.9 PAD (PERIPHERAL ARTERY DISEASE): Primary | ICD-10-CM

## 2017-10-09 DIAGNOSIS — R60.9 EDEMA, UNSPECIFIED TYPE: ICD-10-CM

## 2017-10-09 PROCEDURE — 99499 UNLISTED E&M SERVICE: CPT | Mod: S$PBB,,, | Performed by: PODIATRIST

## 2017-10-09 PROCEDURE — 99212 OFFICE O/P EST SF 10 MIN: CPT | Mod: PBBFAC,PO | Performed by: PODIATRIST

## 2017-10-09 PROCEDURE — 99999 PR PBB SHADOW E&M-EST. PATIENT-LVL II: CPT | Mod: PBBFAC,,, | Performed by: PODIATRIST

## 2017-10-09 NOTE — PROGRESS NOTES
Subjective:      Patient ID: Kateryna Connolly is a 67 y.o. female.    Chief Complaint: No chief complaint on file.    Wound right ankle medial and lateral right ankle.  Using santyl to both daily due to debridement being too painful to endure.  VAC in place right lateral wound.  Ambulates minimally with walker.    Review of Systems   Constitution: Negative for chills, diaphoresis, fever, malaise/fatigue and night sweats.   Cardiovascular: Positive for leg swelling. Negative for claudication, cyanosis and syncope.   Skin: Positive for poor wound healing. Negative for color change, dry skin, nail changes, rash, suspicious lesions and unusual hair distribution.   Musculoskeletal: Negative for falls, joint pain, joint swelling, muscle cramps, muscle weakness and stiffness.   Gastrointestinal: Negative for constipation, diarrhea, nausea and vomiting.   Neurological: Positive for paresthesias and sensory change. Negative for brief paralysis, disturbances in coordination, focal weakness, numbness and tremors.           Objective:      Physical Exam   Constitutional: She appears well-developed and well-nourished. She is cooperative. No distress.   Cardiovascular:   Pulses:       Popliteal pulses are 1+ on the right side, and 1+ on the left side.        Dorsalis pedis pulses are 1+ on the right side, and 1+ on the left side.        Posterior tibial pulses are 1+ on the right side, and 1+ on the left side.   Capillary refill 3 seconds all toes/distal feet, all toes/both feet warm to touch.      Negative lymphadenopathy bilateral popliteal fossa and tarsal tunnel.     Pulses trace right foot.     2+ pitting lower extremity edema bilateral.     Musculoskeletal:        Right ankle: Normal. She exhibits normal range of motion, no swelling, no ecchymosis, no deformity, no laceration and normal pulse. Achilles tendon normal. Achilles tendon exhibits no pain, no defect and normal Cox's test results.    All ten toes without  clubbing, cyanosis, or signs of ischemia.  No pain to palpation bilateral lower extremities.  Range of motion, stability, muscle strength, and muscle tone age and health appropriate normal bilateral feet and legs.     Lymphadenopathy:   Negative lymphadenopathy bilateral popliteal fossa and tarsal tunnel.   Neurological: She is alert. She has normal strength. She displays no atrophy and no tremor. No sensory deficit. She exhibits normal muscle tone. She displays no seizure activity. Gait normal.   Reflex Scores:       Patellar reflexes are 2+ on the right side and 2+ on the left side.       Achilles reflexes are 2+ on the right side and 2+ on the left side.  Negative tinel sign to percussion sural, superficial peroneal, deep peroneal, saphenous, and posterior tibial nerves right and left ankles and feet.    Decreased/absent vibratory sensation bilateral feet to 128Hz tuning fork.       Skin: Skin is warm, dry and intact. No abrasion, no bruising, no burn, no ecchymosis, no laceration, no lesion and no rash noted. She is not diaphoretic. No cyanosis or erythema. No pallor. Nails show no clubbing.     Wound:  Lateral right ankle    Size:    Pre:12x4.5x1.2 cm      Base:  Granular, pink and tan/yellow fibrin mix about 50/50 with moderate serous/serosanaguinous drainage only.  No pus, tracking, fluctuance, malodor, or cardinal signs infection.    Borders:  Atrophic, flat, pink, blanchable skin edges without undermining.    Wound:  Medial right ankle    Size:    Pre:3.5x1.7x0.3 cm      Base:  Granular, pink and tan yellow fibrous about 40/60 mix with moderate serous/serosanaguinous drainage only.  No pus, tracking, fluctuance, malodor, or cardinal signs infection.    Borders:  Atrophic flat, pink, blanchable skin edges without undermining.               Assessment:       Encounter Diagnoses   Name Primary?    PAD (peripheral artery disease) Yes    Ankle ulcer, right, with unspecified severity     Edema, unspecified  type     Type 2 diabetes mellitus with diabetic neuropathy, unspecified long term insulin use status          Plan:       Diagnoses and all orders for this visit:    PAD (peripheral artery disease)    Ankle ulcer, right, with unspecified severity    Edema, unspecified type    Type 2 diabetes mellitus with diabetic neuropathy, unspecified long term insulin use status      I counseled the patient on her conditions, their implications and medical management.    Continue wound vac right lateral ankle three times weekly changes:  125 mmHg continous therapy with santyl to wound base prior to VAC application.    Continue santyl, aubrey foam, kerlix every other day medial right ankle wound.    Continue light compression toes to above knee 12 hours daily LLE for edema control.    Discussed HBOT - transportation prevents.    Adequate vitamin supplementation, protein intake, and hydration - discussed with patient.    councelled again on smoking cessation.          Return in about 1 week (around 10/16/2017).

## 2017-10-10 ENCOUNTER — TELEPHONE (OUTPATIENT)
Dept: PODIATRY | Facility: CLINIC | Age: 67
End: 2017-10-10

## 2017-10-10 NOTE — TELEPHONE ENCOUNTER
----- Message from Meg Mohamud sent at 10/10/2017  8:01 AM CDT -----  Contact: [facility] Madison with [facility] [phone] is calling to speak with Nurse/needing orders clarified from yesterday's visit with Dr Alexander/please call

## 2017-10-10 NOTE — TELEPHONE ENCOUNTER
Los wound care nurse is asking that you Please clarify wound care orders for Medial ankle. Also Ángela her nurse is stating that she is almost out of the Demerol how do you want to precede.

## 2017-10-12 NOTE — TELEPHONE ENCOUNTER
Informed Los wound care Nurse with [facility] wound care orders are as follows.    Wound care medially is santyl covered with gauze or wound foam changed every other day.     Demerol is a one time prescription.  She will have to have her physician adjust her baseline pain med if she wants that to continue.  Her wound is back to pre operative status.  I made the one time nature of that prescription very clear at the last visit when it was written.  I will not write it again.

## 2017-10-16 ENCOUNTER — OFFICE VISIT (OUTPATIENT)
Dept: PODIATRY | Facility: CLINIC | Age: 67
End: 2017-10-16
Payer: MEDICARE

## 2017-10-16 VITALS — BODY MASS INDEX: 33.28 KG/M2 | WEIGHT: 212.06 LBS | HEIGHT: 67 IN

## 2017-10-16 DIAGNOSIS — L97.319 ANKLE ULCER, RIGHT, WITH UNSPECIFIED SEVERITY: ICD-10-CM

## 2017-10-16 DIAGNOSIS — I73.9 PAD (PERIPHERAL ARTERY DISEASE): Primary | ICD-10-CM

## 2017-10-16 DIAGNOSIS — E11.40 TYPE 2 DIABETES MELLITUS WITH DIABETIC NEUROPATHY, UNSPECIFIED LONG TERM INSULIN USE STATUS: ICD-10-CM

## 2017-10-16 PROCEDURE — 99213 OFFICE O/P EST LOW 20 MIN: CPT | Mod: PBBFAC,PO | Performed by: PODIATRIST

## 2017-10-16 PROCEDURE — 99212 OFFICE O/P EST SF 10 MIN: CPT | Mod: ,,, | Performed by: PODIATRIST

## 2017-10-16 PROCEDURE — 99999 PR PBB SHADOW E&M-EST. PATIENT-LVL III: CPT | Mod: PBBFAC,,, | Performed by: PODIATRIST

## 2017-10-16 NOTE — PROGRESS NOTES
Subjective:      Patient ID: Kateryna Connolly is a 67 y.o. female.    Chief Complaint: Follow-up (wound vax )    Wound right ankle medial and lateral right ankle.  Using santyl to both daily due to debridement being too painful to endure.  VAC in place right lateral wound.  Ambulates minimally with walker.    Review of Systems   Constitution: Negative for chills, diaphoresis, fever, malaise/fatigue and night sweats.   Cardiovascular: Positive for leg swelling. Negative for claudication, cyanosis and syncope.   Skin: Positive for poor wound healing. Negative for color change, dry skin, nail changes, rash, suspicious lesions and unusual hair distribution.   Musculoskeletal: Negative for falls, joint pain, joint swelling, muscle cramps, muscle weakness and stiffness.   Gastrointestinal: Negative for constipation, diarrhea, nausea and vomiting.   Neurological: Positive for paresthesias and sensory change. Negative for brief paralysis, disturbances in coordination, focal weakness, numbness and tremors.           Objective:      Physical Exam   Constitutional: She appears well-developed and well-nourished. She is cooperative. No distress.   Cardiovascular:   Pulses:       Popliteal pulses are 1+ on the right side, and 1+ on the left side.        Dorsalis pedis pulses are 1+ on the right side, and 1+ on the left side.        Posterior tibial pulses are 1+ on the right side, and 1+ on the left side.   Capillary refill 3 seconds all toes/distal feet, all toes/both feet warm to touch.      Negative lymphadenopathy bilateral popliteal fossa and tarsal tunnel.     Pulses trace right foot.     <2+ pitting lower extremity edema bilateral.     Musculoskeletal:        Right ankle: Normal. She exhibits normal range of motion, no swelling, no ecchymosis, no deformity, no laceration and normal pulse. Achilles tendon normal. Achilles tendon exhibits no pain, no defect and normal Cox's test results.    All ten toes without  clubbing, cyanosis, or signs of ischemia.  No pain to palpation bilateral lower extremities.  Range of motion, stability, muscle strength, and muscle tone age and health appropriate normal bilateral feet and legs.     Lymphadenopathy:   Negative lymphadenopathy bilateral popliteal fossa and tarsal tunnel.   Neurological: She is alert. She has normal strength. She displays no atrophy and no tremor. No sensory deficit. She exhibits normal muscle tone. She displays no seizure activity. Gait normal.   Reflex Scores:       Patellar reflexes are 2+ on the right side and 2+ on the left side.       Achilles reflexes are 2+ on the right side and 2+ on the left side.  Negative tinel sign to percussion sural, superficial peroneal, deep peroneal, saphenous, and posterior tibial nerves right and left ankles and feet.    Decreased/absent vibratory sensation bilateral feet to 128Hz tuning fork.       Skin: Skin is warm, dry and intact. No abrasion, no bruising, no burn, no ecchymosis, no laceration, no lesion and no rash noted. She is not diaphoretic. No cyanosis or erythema. No pallor. Nails show no clubbing.     Wound:  Lateral right ankle    Size:    Pre: 11.6x4.2x1.1 cm    Base:  Granular, pink and tan/yellow fibrin mix about 80/20 with moderate serous/serosanaguinous drainage only.  No pus, tracking, fluctuance, malodor, or cardinal signs infection.  Peroneal tendon exposed in central proximal wound bed    Borders:  Atrophic, flat, pink, blanchable skin edges without undermining.    Wound:  Medial right ankle    Size:    Pre:3.3x1.5x0.3 cm      Base:  Granular, pink and tan yellow fibrous about 40/60 mix with moderate serous/serosanaguinous drainage only.  No pus, tracking, fluctuance, malodor, or cardinal signs infection.    Borders:  Atrophic flat, pink, blanchable skin edges without undermining.               Assessment:       Encounter Diagnoses   Name Primary?    PAD (peripheral artery disease) Yes    Type 2 diabetes  mellitus with diabetic neuropathy, unspecified long term insulin use status     Ankle ulcer, right, with unspecified severity          Plan:       Kateryna was seen today for follow-up.    Diagnoses and all orders for this visit:    PAD (peripheral artery disease)    Type 2 diabetes mellitus with diabetic neuropathy, unspecified long term insulin use status    Ankle ulcer, right, with unspecified severity      I counseled the patient on her conditions, their implications and medical management.    Continue wound vac right lateral ankle three times weekly changes:  125 mmHg continous therapy with santyl to wound base prior to VAC application.    Continue santyl, aubrey foam, kerlix every other day medial right ankle wound.    Continue light compression toes to above knee 12 hours daily LLE for edema control.    Discussed HBOT - transportation prevents.    Adequate vitamin supplementation, protein intake, and hydration - discussed with patient.    councelled again on smoking cessation.    Trying to get dermagraft approved for patient.    Consult PT to begin ambulation rle with assistance and walker only in fx boot right.          Return in about 1 week (around 10/23/2017).

## 2017-10-23 ENCOUNTER — TELEPHONE (OUTPATIENT)
Dept: PODIATRY | Facility: CLINIC | Age: 67
End: 2017-10-23

## 2017-10-23 ENCOUNTER — OFFICE VISIT (OUTPATIENT)
Dept: PODIATRY | Facility: CLINIC | Age: 67
End: 2017-10-23
Payer: MEDICARE

## 2017-10-23 VITALS — HEIGHT: 67 IN | WEIGHT: 212.06 LBS | BODY MASS INDEX: 33.28 KG/M2

## 2017-10-23 DIAGNOSIS — E11.40 TYPE 2 DIABETES MELLITUS WITH DIABETIC NEUROPATHY, UNSPECIFIED LONG TERM INSULIN USE STATUS: ICD-10-CM

## 2017-10-23 DIAGNOSIS — R60.9 EDEMA, UNSPECIFIED TYPE: ICD-10-CM

## 2017-10-23 DIAGNOSIS — L97.319 ANKLE ULCER, RIGHT, WITH UNSPECIFIED SEVERITY: ICD-10-CM

## 2017-10-23 DIAGNOSIS — I73.9 PAD (PERIPHERAL ARTERY DISEASE): Primary | ICD-10-CM

## 2017-10-23 PROCEDURE — 99499 UNLISTED E&M SERVICE: CPT | Mod: S$PBB,,, | Performed by: PODIATRIST

## 2017-10-23 PROCEDURE — 99999 PR PBB SHADOW E&M-EST. PATIENT-LVL III: CPT | Mod: PBBFAC,,, | Performed by: PODIATRIST

## 2017-10-23 PROCEDURE — 99213 OFFICE O/P EST LOW 20 MIN: CPT | Mod: PBBFAC,PO | Performed by: PODIATRIST

## 2017-10-23 NOTE — PROGRESS NOTES
Subjective:      Patient ID: Kateryna Connolly is a 67 y.o. female.    Chief Complaint: Foot Ulcer (right leg)    Pt 66 y/o female  has a past medical history of Arthritis; Diabetes mellitus; Heart attack; Hypertension; Neuropathy; and Occasional tremors. Wound right ankle medial and lateral right ankle.  Using santyl to both daily due to debridement being too painful to endure.  VAC in place right lateral wound.  Ambulates minimally with walker.    Review of Systems   Constitution: Negative for chills, diaphoresis, fever, malaise/fatigue and night sweats.   Cardiovascular: Positive for leg swelling. Negative for claudication, cyanosis and syncope.   Skin: Positive for poor wound healing. Negative for color change, dry skin, nail changes, rash, suspicious lesions and unusual hair distribution.   Musculoskeletal: Negative for falls, joint pain, joint swelling, muscle cramps, muscle weakness and stiffness.   Gastrointestinal: Negative for constipation, diarrhea, nausea and vomiting.   Neurological: Positive for paresthesias and sensory change. Negative for brief paralysis, disturbances in coordination, focal weakness, numbness and tremors.           Objective:      Physical Exam   Constitutional: She appears well-developed and well-nourished. She is cooperative. No distress.   Cardiovascular:   Pulses:       Popliteal pulses are 1+ on the right side, and 1+ on the left side.        Dorsalis pedis pulses are 1+ on the right side, and 1+ on the left side.        Posterior tibial pulses are 1+ on the right side, and 1+ on the left side.   Capillary refill 3 seconds all toes/distal feet, all toes/both feet warm to touch.      Negative lymphadenopathy bilateral popliteal fossa and tarsal tunnel.     Pulses trace right foot.     <2+ pitting lower extremity edema bilateral.     Musculoskeletal:        Right ankle: Normal. She exhibits normal range of motion, no swelling, no ecchymosis, no deformity, no laceration and normal  pulse. Achilles tendon normal. Achilles tendon exhibits no pain, no defect and normal Cox's test results.    All ten toes without clubbing, cyanosis, or signs of ischemia.  No pain to palpation bilateral lower extremities.  Range of motion, stability, muscle strength, and muscle tone age and health appropriate normal bilateral feet and legs.     Lymphadenopathy:   Negative lymphadenopathy bilateral popliteal fossa and tarsal tunnel.   Neurological: She is alert. She has normal strength. She displays no atrophy and no tremor. No sensory deficit. She exhibits normal muscle tone. She displays no seizure activity. Gait normal.   Reflex Scores:       Patellar reflexes are 2+ on the right side and 2+ on the left side.       Achilles reflexes are 2+ on the right side and 2+ on the left side.  Negative tinel sign to percussion sural, superficial peroneal, deep peroneal, saphenous, and posterior tibial nerves right and left ankles and feet.    Decreased/absent vibratory sensation bilateral feet to 128Hz tuning fork.       Skin: Skin is warm, dry and intact. No abrasion, no bruising, no burn, no ecchymosis, no laceration, no lesion and no rash noted. She is not diaphoretic. No cyanosis or erythema. No pallor. Nails show no clubbing.     Wound:  Lateral right ankle    Size:    Measurement: 10.0x4.0x1.1 cm    Base:  Granular, about 90/10 with moderate serosanaguinous drainage only.  No pus, tracking, fluctuance, malodor, or cardinal signs infection.  Mild peroneal tendon exposed in central proximal wound bed, with decreased exposure, granulating.     Borders:  Atrophic, flat, pink, blanchable skin edges without undermining.    Wound:  Medial right ankle    Size:    Measurement:3.2x1.4x0.3 cm      Base:  Granular, pink and tan yellow fibrous about 40/60 mix with moderate serous/serosanaguinous drainage only.  No pus, tracking, fluctuance, malodor, or cardinal signs infection.    Borders:  Atrophic flat, pink, blanchable  skin edges without undermining.               Assessment:       Encounter Diagnoses   Name Primary?    PAD (peripheral artery disease) Yes    Type 2 diabetes mellitus with diabetic neuropathy, unspecified long term insulin use status     Ankle ulcer, right, with unspecified severity     Edema, unspecified type          Plan:       Kateryna was seen today for foot ulcer.    Diagnoses and all orders for this visit:    PAD (peripheral artery disease)    Type 2 diabetes mellitus with diabetic neuropathy, unspecified long term insulin use status    Ankle ulcer, right, with unspecified severity    Edema, unspecified type      I counseled the patient on her conditions, their implications and medical management.    Applied dermagraft to distal half of lateral wound. Dermagraft was cut to size with overlap circumferentially such that total contact between wound surface and deep product surface were in apposition , secured in place with steri strips, and covered with adaptic, wound gel, aperture pad, mepilex foam, and [id number], and football type dressing.  Ambulate minimally in sx shoe.  Lot #: [id number], EXP: 2018-01-07.  All unused product was discarded in red bag container.    Dressed lateral wound with adaptic, steri strips, and christin foam. Pt instructed not to remove lateral wound dressing and to keep it clean, dry, and intact until next office visit.    Discontinue wound vac for now.     Continue santyl, aubrey foam, kerlix every other day medial right ankle wound.    Continue light compression toes to above knee 12 hours daily LLE for edema control.    Discussed HBOT - transportation prevents.    Adequate vitamin supplementation, protein intake, and hydration - discussed with patient.    councelled again on smoking cessation.    Continue PT to begin ambulation rle with assistance and walker only in fx boot right.          Return in about 1 week (around 10/30/2017).

## 2017-10-23 NOTE — TELEPHONE ENCOUNTER
----- Message from Yasmeen Nettles sent at 10/23/2017  3:07 PM CDT -----  Contact: Ragini whitmore/[facility]  Ragini has questing regarding patient physical therapy, she would like to know what she can and can not due as far as weight bearing, range of motion, strengthening, contact her at [phone],'        Thank you

## 2017-10-23 NOTE — PROGRESS NOTES
Reviewed resident note, exam and procedures were performed under my direct supervision.  Agree with note and care.  Discrepancies discussed.    Dermagraft was cut to size with overlap circumferentially such that total contact between wound surface and deep product surface were in apposition , secured in place with steri strips, and covered with adaptic, wound gel, aperture pad, mepilex foam, and [id number], and football type dressing.  Ambulate minimally in sx shoe.  Lot #:  [id number], EXP: 2018-01-07.  All unused product was discarded in red bag container.    Continue daily/every other day santyl/kerlix medial right ankle wound.    Stop VAC this week.

## 2017-10-30 ENCOUNTER — OFFICE VISIT (OUTPATIENT)
Dept: PODIATRY | Facility: CLINIC | Age: 67
End: 2017-10-30
Payer: MEDICARE

## 2017-10-30 ENCOUNTER — TELEPHONE (OUTPATIENT)
Dept: PODIATRY | Facility: CLINIC | Age: 67
End: 2017-10-30

## 2017-10-30 DIAGNOSIS — L97.319 ANKLE ULCER, RIGHT, WITH UNSPECIFIED SEVERITY: Primary | ICD-10-CM

## 2017-10-30 DIAGNOSIS — E11.40 TYPE 2 DIABETES MELLITUS WITH DIABETIC NEUROPATHY, UNSPECIFIED LONG TERM INSULIN USE STATUS: ICD-10-CM

## 2017-10-30 DIAGNOSIS — I73.9 PAD (PERIPHERAL ARTERY DISEASE): ICD-10-CM

## 2017-10-30 DIAGNOSIS — R60.9 EDEMA, UNSPECIFIED TYPE: ICD-10-CM

## 2017-10-30 PROCEDURE — 99212 OFFICE O/P EST SF 10 MIN: CPT | Mod: PBBFAC,PO | Performed by: PODIATRIST

## 2017-10-30 PROCEDURE — 99999 PR PBB SHADOW E&M-EST. PATIENT-LVL II: CPT | Mod: PBBFAC,,, | Performed by: PODIATRIST

## 2017-10-30 PROCEDURE — 99212 OFFICE O/P EST SF 10 MIN: CPT | Mod: S$PBB,,, | Performed by: PODIATRIST

## 2017-10-30 NOTE — TELEPHONE ENCOUNTER
----- Message from Chelsea Ramirez sent at 10/30/2017  2:35 PM CDT -----  Contact: Mi Mi from [facility] [phone]  Mi Mi called from [facility] they need wound care orders for  This patient.

## 2017-10-30 NOTE — TELEPHONE ENCOUNTER
Spoke with Mi Mi, Instructions for wound care given, Dressed lateral wound with adaptic, triple antibiotic ointment,  and christin foam. Pt instructed not to remove lateral wound dressing and to keep it clean, dry, and intact until next office visit.   Continue santyl, aubrey foam, kerlix every other day medial right ankle wound. Clinical note faxed via epic.

## 2017-10-30 NOTE — PROGRESS NOTES
Subjective:      Patient ID: Kateryna Connolly is a 67 y.o. female.    Chief Complaint: No chief complaint on file.    Pt 66 y/o female  has a past medical history of Arthritis; Diabetes mellitus; Heart attack; Hypertension; Neuropathy; and Occasional tremors. Wound right ankle medial and lateral right ankle.  Using santyl to medial wound daily due to debridement being too painful to endure.  Dressing and dermagraft in place right lateral wound.  Ambulates minimally with walker.    Review of Systems   Constitution: Negative for chills, diaphoresis, fever, malaise/fatigue and night sweats.   Cardiovascular: Positive for leg swelling. Negative for claudication, cyanosis and syncope.   Skin: Positive for poor wound healing. Negative for color change, dry skin, nail changes, rash, suspicious lesions and unusual hair distribution.   Musculoskeletal: Negative for falls, joint pain, joint swelling, muscle cramps, muscle weakness and stiffness.   Gastrointestinal: Negative for constipation, diarrhea, nausea and vomiting.   Neurological: Positive for paresthesias and sensory change. Negative for brief paralysis, disturbances in coordination, focal weakness, numbness and tremors.           Objective:      Physical Exam   Constitutional: She appears well-developed and well-nourished. She is cooperative. No distress.   Cardiovascular:   Pulses:       Popliteal pulses are 1+ on the right side, and 1+ on the left side.        Dorsalis pedis pulses are 1+ on the right side, and 1+ on the left side.        Posterior tibial pulses are 1+ on the right side, and 1+ on the left side.   Capillary refill 3 seconds all toes/distal feet, all toes/both feet warm to touch.      Negative lymphadenopathy bilateral popliteal fossa and tarsal tunnel.     Pulses trace right foot.     <2+ pitting lower extremity edema bilateral.     Musculoskeletal:        Right ankle: Normal. She exhibits normal range of motion, no swelling, no ecchymosis, no  deformity, no laceration and normal pulse. Achilles tendon normal. Achilles tendon exhibits no pain, no defect and normal Cox's test results.    All ten toes without clubbing, cyanosis, or signs of ischemia.  No pain to palpation bilateral lower extremities.  Range of motion, stability, muscle strength, and muscle tone age and health appropriate normal bilateral feet and legs.     Lymphadenopathy:   Negative lymphadenopathy bilateral popliteal fossa and tarsal tunnel.   Neurological: She is alert. She has normal strength. She displays no atrophy and no tremor. No sensory deficit. She exhibits normal muscle tone. She displays no seizure activity. Gait normal.   Reflex Scores:       Patellar reflexes are 2+ on the right side and 2+ on the left side.       Achilles reflexes are 2+ on the right side and 2+ on the left side.  Negative tinel sign to percussion sural, superficial peroneal, deep peroneal, saphenous, and posterior tibial nerves right and left ankles and feet.    Decreased/absent vibratory sensation bilateral feet to 128Hz tuning fork.       Skin: Skin is warm, dry and intact. No abrasion, no bruising, no burn, no ecchymosis, no laceration, no lesion and no rash noted. She is not diaphoretic. No cyanosis or erythema. No pallor. Nails show no clubbing.     Wound:  Lateral right ankle    Size:    Measurement: 11.3x3.9x0.7 cm    Base:  Granular,  with moderate serosanaguinous drainage only.  No pus, tracking, fluctuance, malodor, or cardinal signs infection.  Mild peroneal tendon exposed in central proximal wound bed, with decreased exposure, granulating. Incorporating wound graft in distal 2/3 of wound.    Borders:  Atrophic, flat, pink, blanchable skin edges without undermining.    Wound:  Medial right ankle    Size:    Measurement:3.2x1.3x0.3 cm      Base:  Granular, pink and tan yellow fibrous about 40/60 mix with moderate serous/serosanaguinous drainage only.  No pus, tracking, fluctuance, malodor, or  cardinal signs infection.    Borders:  Atrophic flat, pink, blanchable skin edges without undermining.               Assessment:       Encounter Diagnoses   Name Primary?    Ankle ulcer, right, with unspecified severity Yes    Edema, unspecified type     PAD (peripheral artery disease)     Type 2 diabetes mellitus with diabetic neuropathy, unspecified long term insulin use status          Plan:       Diagnoses and all orders for this visit:    Ankle ulcer, right, with unspecified severity    Edema, unspecified type    PAD (peripheral artery disease)    Type 2 diabetes mellitus with diabetic neuropathy, unspecified long term insulin use status      I counseled the patient on her conditions, their implications and medical management.    Dressed lateral wound with adaptic, triple antibiotic ointment,  and christin foam. Pt instructed not to remove lateral wound dressing and to keep it clean, dry, and intact until next office visit.    Continue santyl, aubrey foam, kerlix every other day medial right ankle wound.    Continue light compression toes to above knee 12 hours daily LLE for edema control.    Adequate vitamin supplementation, protein intake, and hydration - discussed with patient.    Continue PT to begin ambulation rle with assistance and walker only in fx boot right.          Return in about 1 week (around 11/6/2017).

## 2017-10-31 ENCOUNTER — TELEPHONE (OUTPATIENT)
Dept: PODIATRY | Facility: CLINIC | Age: 67
End: 2017-10-31

## 2017-10-31 NOTE — TELEPHONE ENCOUNTER
----- Message from Maxwell Hoffman sent at 10/30/2017  4:15 PM CDT -----  Contact: Pham with [facility]   Pham with [facility] need to speak with a nurse regarding some orders that she did not get. Fax number [phone] or please call back [phone]

## 2017-11-06 ENCOUNTER — OFFICE VISIT (OUTPATIENT)
Dept: PODIATRY | Facility: CLINIC | Age: 67
End: 2017-11-06
Payer: MEDICARE

## 2017-11-06 VITALS — HEIGHT: 67 IN | BODY MASS INDEX: 33.27 KG/M2 | WEIGHT: 212 LBS

## 2017-11-06 DIAGNOSIS — I73.9 PAD (PERIPHERAL ARTERY DISEASE): ICD-10-CM

## 2017-11-06 DIAGNOSIS — E11.40 TYPE 2 DIABETES MELLITUS WITH DIABETIC NEUROPATHY, UNSPECIFIED LONG TERM INSULIN USE STATUS: ICD-10-CM

## 2017-11-06 DIAGNOSIS — R60.9 EDEMA, UNSPECIFIED TYPE: ICD-10-CM

## 2017-11-06 DIAGNOSIS — L97.319 ANKLE ULCER, RIGHT, WITH UNSPECIFIED SEVERITY: Primary | ICD-10-CM

## 2017-11-06 PROCEDURE — 99212 OFFICE O/P EST SF 10 MIN: CPT | Mod: PBBFAC,PO | Performed by: PODIATRIST

## 2017-11-06 PROCEDURE — 99999 PR PBB SHADOW E&M-EST. PATIENT-LVL II: CPT | Mod: PBBFAC,,, | Performed by: PODIATRIST

## 2017-11-06 PROCEDURE — 15272 SKIN SUB GRAFT T/A/L ADD-ON: CPT | Mod: S$PBB,,, | Performed by: PODIATRIST

## 2017-11-06 PROCEDURE — 15271 SKIN SUB GRAFT TRNK/ARM/LEG: CPT | Mod: S$PBB,,, | Performed by: PODIATRIST

## 2017-11-06 PROCEDURE — 15275 SKIN SUB GRAFT FACE/NK/HF/G: CPT | Mod: PBBFAC,PO | Performed by: PODIATRIST

## 2017-11-06 PROCEDURE — 99499 UNLISTED E&M SERVICE: CPT | Mod: S$PBB,,, | Performed by: PODIATRIST

## 2017-11-06 RX ORDER — OXYCODONE AND ACETAMINOPHEN 10; 325 MG/1; MG/1
TABLET ORAL
COMMUNITY
Start: 2017-10-24 | End: 2018-02-05 | Stop reason: ALTCHOICE

## 2017-11-06 NOTE — PROGRESS NOTES
Subjective:      Patient ID: Kateryna Connolly is a 67 y.o. female.    Chief Complaint: Foot Ulcer (right)    Pt 68 y/o female  has a past medical history of Arthritis; Diabetes mellitus; Heart attack; Hypertension; Neuropathy; and Occasional tremors. Wound right ankle medial and lateral right ankle.  Using santyl to both daily due to debridement being too painful to endure.  Dressings medial and lateral right ankle CDI.  Ambulates with walker.    Review of Systems   Constitution: Negative for chills, diaphoresis, fever, malaise/fatigue and night sweats.   Cardiovascular: Positive for leg swelling. Negative for claudication, cyanosis and syncope.   Skin: Positive for poor wound healing. Negative for color change, dry skin, nail changes, rash, suspicious lesions and unusual hair distribution.   Musculoskeletal: Negative for falls, joint pain, joint swelling, muscle cramps, muscle weakness and stiffness.   Gastrointestinal: Negative for constipation, diarrhea, nausea and vomiting.   Neurological: Positive for paresthesias and sensory change. Negative for brief paralysis, disturbances in coordination, focal weakness, numbness and tremors.           Objective:      Physical Exam   Constitutional: She appears well-developed and well-nourished. She is cooperative. No distress.   Cardiovascular:   Pulses:       Popliteal pulses are 1+ on the right side, and 1+ on the left side.        Dorsalis pedis pulses are 1+ on the right side, and 1+ on the left side.        Posterior tibial pulses are 1+ on the right side, and 1+ on the left side.   Capillary refill 3 seconds all toes/distal feet, all toes/both feet warm to touch.      Negative lymphadenopathy bilateral popliteal fossa and tarsal tunnel.     Pulses trace right foot.     <2+ pitting lower extremity edema bilateral.     Musculoskeletal:        Right ankle: Normal. She exhibits normal range of motion, no swelling, no ecchymosis, no deformity, no laceration and normal  pulse. Achilles tendon normal. Achilles tendon exhibits no pain, no defect and normal Cox's test results.    All ten toes without clubbing, cyanosis, or signs of ischemia.  No pain to palpation bilateral lower extremities.  Range of motion, stability, muscle strength, and muscle tone age and health appropriate normal bilateral feet and legs.     Lymphadenopathy:   Negative lymphadenopathy bilateral popliteal fossa and tarsal tunnel.   Neurological: She is alert. She has normal strength. She displays no atrophy and no tremor. No sensory deficit. She exhibits normal muscle tone. She displays no seizure activity. Gait normal.   Reflex Scores:       Patellar reflexes are 2+ on the right side and 2+ on the left side.       Achilles reflexes are 2+ on the right side and 2+ on the left side.  Negative tinel sign to percussion sural, superficial peroneal, deep peroneal, saphenous, and posterior tibial nerves right and left ankles and feet.    Decreased/absent vibratory sensation bilateral feet to 128Hz tuning fork.       Skin: Skin is warm, dry and intact. No abrasion, no bruising, no burn, no ecchymosis, no laceration, no lesion and no rash noted. She is not diaphoretic. No cyanosis or erythema. No pallor. Nails show no clubbing.     Wound:  Lateral right ankle    Size:    Measurement: 10.1x4.3x0.9 cm    Base:  Granular, about 90/10 with moderate serosanaguinous drainage only.  No pus, tracking, fluctuance, malodor, or cardinal signs infection.  Mild peroneal tendon exposed in central proximal wound bed, with decreased exposure, granulating.     Borders:  Atrophic, flat, pink, blanchable skin edges without undermining.    Wound:  Medial right ankle    Size:    Measurement:3.6x1.8x0.3 cm      Base:  Granular, pink and tan yellow fibrous about 75/25 mix with moderate serous/serosanaguinous drainage only.  No pus, tracking, fluctuance, malodor, or cardinal signs infection.    Borders:  Atrophic flat, pink, blanchable  skin edges without undermining.               Assessment:       Encounter Diagnoses   Name Primary?    Ankle ulcer, right, with unspecified severity Yes    Edema, unspecified type     PAD (peripheral artery disease)     Type 2 diabetes mellitus with diabetic neuropathy, unspecified long term insulin use status          Plan:       Kateryna was seen today for foot ulcer.    Diagnoses and all orders for this visit:    Ankle ulcer, right, with unspecified severity    Edema, unspecified type    PAD (peripheral artery disease)    Type 2 diabetes mellitus with diabetic neuropathy, unspecified long term insulin use status      I counseled the patient on her conditions, their implications and medical management.    Applied dermagraft to proximal half of lateral wound. Dermagraft was cut to size with overlap circumferentially such that total contact between wound surface and deep product surface were in apposition , secured in place with steri strips, and covered with adaptic, wound gel, mepilex foam, and 4x4 kerlix, and football type dressing.  Ambulate minimally in sx shoe.  Lot #: [id number], EXP: 2018-02-04.  All unused product was discarded in red bag container - entire product used no discarded portion.    Dressed lateral wound with adaptic, steri strips, and christin foam. Pt instructed not to remove lateral wound dressing and to keep it clean, dry, and intact until next office visit.         Continue santyl, aubrey foam, kerlix every other day medial right ankle wound.    Continue light compression toes to above knee 12 hours daily LLE for edema control.    Discussed HBOT - transportation prevents.    Adequate vitamin supplementation, protein intake, and hydration - discussed with patient.    Continue PT to continue ambulation rle with assistance and walker only in fx boot right.          Return in about 1 week (around 11/13/2017).

## 2017-11-13 ENCOUNTER — TELEPHONE (OUTPATIENT)
Dept: PODIATRY | Facility: CLINIC | Age: 67
End: 2017-11-13

## 2017-11-13 ENCOUNTER — OFFICE VISIT (OUTPATIENT)
Dept: PODIATRY | Facility: CLINIC | Age: 67
End: 2017-11-13
Payer: MEDICARE

## 2017-11-13 VITALS — HEIGHT: 67 IN | BODY MASS INDEX: 33.28 KG/M2 | WEIGHT: 212.06 LBS

## 2017-11-13 DIAGNOSIS — Z98.890 POST-OPERATIVE STATE: Primary | ICD-10-CM

## 2017-11-13 DIAGNOSIS — R60.9 EDEMA, UNSPECIFIED TYPE: ICD-10-CM

## 2017-11-13 DIAGNOSIS — L97.319 ANKLE ULCER, RIGHT, WITH UNSPECIFIED SEVERITY: ICD-10-CM

## 2017-11-13 PROCEDURE — 99212 OFFICE O/P EST SF 10 MIN: CPT | Mod: PBBFAC,PO | Performed by: PODIATRIST

## 2017-11-13 PROCEDURE — 99024 POSTOP FOLLOW-UP VISIT: CPT | Mod: ,,, | Performed by: PODIATRIST

## 2017-11-13 PROCEDURE — 99999 PR PBB SHADOW E&M-EST. PATIENT-LVL II: CPT | Mod: PBBFAC,,, | Performed by: PODIATRIST

## 2017-11-13 NOTE — TELEPHONE ENCOUNTER
Spoke with Ms Kateryna she stated that she need to reschedule her appointment with Dr Alexander for 11/20/2017, Rescheduled to 11/20/2017 at am at her convenience.

## 2017-11-13 NOTE — PROGRESS NOTES
Subjective:      Patient ID: Kateryna Connolly is a 67 y.o. female.    Chief Complaint: Follow-up (1 week f/u dermagraft per Dr. Alexander); Other Misc (PCP Dr. Soares); and Foot Pain (patient states that the left foot is more painful than the treated right foot)    Pt 68 y/o female  has a past medical history of Arthritis; Diabetes mellitus; Heart attack; Hypertension; Neuropathy; and Occasional tremors. Wound right ankle medial and lateral right ankle.  Using santyl to medial wound daily due to debridement being too painful to endure. S/p 1 week dermagraft application to the lateral ankle wound.  Dressings medial and lateral right ankle CDI.  Ambulates with walker. No acute changes noted.    Review of Systems   Constitution: Negative for chills, diaphoresis, fever, malaise/fatigue and night sweats.   Cardiovascular: Positive for leg swelling. Negative for claudication, cyanosis and syncope.   Skin: Positive for poor wound healing. Negative for color change, dry skin, nail changes, rash, suspicious lesions and unusual hair distribution.   Musculoskeletal: Negative for falls, joint pain, joint swelling, muscle cramps, muscle weakness and stiffness.   Gastrointestinal: Negative for constipation, diarrhea, nausea and vomiting.   Neurological: Positive for paresthesias and sensory change. Negative for brief paralysis, disturbances in coordination, focal weakness, numbness and tremors.           Objective:      Physical Exam   Constitutional: She appears well-developed and well-nourished. She is cooperative. No distress.   Cardiovascular:   Pulses:       Popliteal pulses are 1+ on the right side, and 1+ on the left side.        Dorsalis pedis pulses are 1+ on the right side, and 1+ on the left side.        Posterior tibial pulses are 1+ on the right side, and 1+ on the left side.   Capillary refill 3 seconds all toes/distal feet, all toes/both feet warm to touch.      Negative lymphadenopathy bilateral popliteal fossa and  tarsal tunnel.     Pulses trace right foot.     <2+ pitting lower extremity edema bilateral.     Musculoskeletal:        Right ankle: Normal. She exhibits normal range of motion, no swelling, no ecchymosis, no deformity, no laceration and normal pulse. Achilles tendon normal. Achilles tendon exhibits no pain, no defect and normal Cox's test results.    All ten toes without clubbing, cyanosis, or signs of ischemia.  No pain to palpation bilateral lower extremities.  Range of motion, stability, muscle strength, and muscle tone age and health appropriate normal bilateral feet and legs.     Lymphadenopathy:   Negative lymphadenopathy bilateral popliteal fossa and tarsal tunnel.   Neurological: She is alert. She has normal strength. She displays no atrophy and no tremor. No sensory deficit. She exhibits normal muscle tone. She displays no seizure activity. Gait normal.   Reflex Scores:       Patellar reflexes are 2+ on the right side and 2+ on the left side.       Achilles reflexes are 2+ on the right side and 2+ on the left side.  Negative tinel sign to percussion sural, superficial peroneal, deep peroneal, saphenous, and posterior tibial nerves right and left ankles and feet.    Decreased/absent vibratory sensation bilateral feet to 128Hz tuning fork.       Skin: Skin is warm and dry. No abrasion, no bruising, no burn, no ecchymosis, no laceration, no lesion and no rash noted. She is not diaphoretic. No cyanosis or erythema. No pallor. Nails show no clubbing.     Wound:  Lateral right ankle    Size:    Measurement: 10.8x3.5x0.7 cm    Base:  Granular, about 80/20 with moderate serosanaguinous drainage only.  No pus, tracking, fluctuance, malodor, or cardinal signs infection.  Mild peroneal tendon exposed in central proximal wound bed, with decreased exposure, granulating.     Borders:  Atrophic, flat, pink, blanchable skin edges without undermining.    Wound:  Medial right ankle    Size:    Measurement:3.2x1.2x0.3  cm      Base:  Granular, pink and tan yellow fibrous about 75/25 mix with moderate serous/serosanaguinous drainage only.  No pus, tracking, fluctuance, malodor, or cardinal signs infection.    Borders:  Atrophic flat, pink, blanchable skin edges without undermining.               Assessment:       Encounter Diagnoses   Name Primary?    Post-operative state Yes    Edema, unspecified type     Ankle ulcer, right, with unspecified severity      S/p one week derma graft application      Plan:       Kateryna was seen today for follow-up, other misc and foot pain.    Diagnoses and all orders for this visit:    Post-operative state    Edema, unspecified type    Ankle ulcer, right, with unspecified severity      I counseled the patient on her conditions, their implications and medical management.    Wounds inspected, healthy with no acute soi, graft to lateral ankle incorporating well.    Continue santyl medial ankle, aubrey foam, kerlix every other day medial right ankle wound.    Continue light compression toes to above knee 12 hours daily LLE for edema control.    Adequate vitamin supplementation, protein intake, and hydration - discussed with patient.    Continue PT to continue ambulation rle with assistance and walker only in fx boot right.          No Follow-up on file.  Follow up 1 week with Dr. Catherine Foster, DPM

## 2017-11-13 NOTE — TELEPHONE ENCOUNTER
----- Message from Ana María Salcedo sent at 11/13/2017  8:18 AM CST -----  Contact: self   Patient is requesting to speak to Meryl. Please call back [phone] (home)

## 2017-11-20 ENCOUNTER — OFFICE VISIT (OUTPATIENT)
Dept: PODIATRY | Facility: CLINIC | Age: 67
End: 2017-11-20
Payer: MEDICARE

## 2017-11-20 VITALS — HEIGHT: 67 IN | WEIGHT: 212 LBS | BODY MASS INDEX: 33.27 KG/M2

## 2017-11-20 DIAGNOSIS — L97.319 ANKLE ULCER, RIGHT, WITH UNSPECIFIED SEVERITY: ICD-10-CM

## 2017-11-20 DIAGNOSIS — Z98.890 POST-OPERATIVE STATE: Primary | ICD-10-CM

## 2017-11-20 DIAGNOSIS — I73.9 PAD (PERIPHERAL ARTERY DISEASE): ICD-10-CM

## 2017-11-20 DIAGNOSIS — E11.40 TYPE 2 DIABETES MELLITUS WITH DIABETIC NEUROPATHY, UNSPECIFIED LONG TERM INSULIN USE STATUS: ICD-10-CM

## 2017-11-20 PROCEDURE — 99999 PR PBB SHADOW E&M-EST. PATIENT-LVL II: CPT | Mod: PBBFAC,,, | Performed by: PODIATRIST

## 2017-11-20 PROCEDURE — 15275 SKIN SUB GRAFT FACE/NK/HF/G: CPT | Mod: S$PBB,,, | Performed by: PODIATRIST

## 2017-11-20 PROCEDURE — 15275 SKIN SUB GRAFT FACE/NK/HF/G: CPT | Mod: PBBFAC,PO | Performed by: PODIATRIST

## 2017-11-20 PROCEDURE — 99499 UNLISTED E&M SERVICE: CPT | Mod: S$PBB,,, | Performed by: PODIATRIST

## 2017-11-20 PROCEDURE — 99212 OFFICE O/P EST SF 10 MIN: CPT | Mod: PBBFAC,PO | Performed by: PODIATRIST

## 2017-11-20 NOTE — PROGRESS NOTES
Reviewed resident note, exam and procedures were performed under my direct supervision.  Agree with note and care.  Discrepancies discussed.    dermagraft today following limited debridement and wound base preparation.    rtc 1 week, sooner prn.

## 2017-11-20 NOTE — PROGRESS NOTES
Subjective:      Patient ID: Kateryna Connolly is a 67 y.o. female.    Chief Complaint: Foot Ulcer (right foot f/u and she would like you to check sore on left foot)    Pt 68 y/o female  has a past medical history of Arthritis; Diabetes mellitus; Heart attack; Hypertension; Neuropathy; and Occasional tremors. Wound right ankle medial and lateral right ankle.  Using santyl to medial wound daily due to debridement being too painful to endure. S/p 2 week dermagraft application to the lateral ankle wound.  Dressings medial and lateral right ankle CDI.  Ambulates with walker. No acute changes noted.    Review of Systems   Constitution: Negative for chills, diaphoresis, fever, malaise/fatigue and night sweats.   Cardiovascular: Positive for leg swelling. Negative for claudication, cyanosis and syncope.   Skin: Positive for poor wound healing. Negative for color change, dry skin, nail changes, rash, suspicious lesions and unusual hair distribution.   Musculoskeletal: Negative for falls, joint pain, joint swelling, muscle cramps, muscle weakness and stiffness.   Gastrointestinal: Negative for constipation, diarrhea, nausea and vomiting.   Neurological: Positive for paresthesias and sensory change. Negative for brief paralysis, disturbances in coordination, focal weakness, numbness and tremors.           Objective:      Physical Exam   Constitutional: She appears well-developed and well-nourished. She is cooperative. No distress.   Cardiovascular:   Pulses:       Popliteal pulses are 1+ on the right side, and 1+ on the left side.        Dorsalis pedis pulses are 1+ on the right side, and 1+ on the left side.        Posterior tibial pulses are 1+ on the right side, and 1+ on the left side.   Capillary refill 3 seconds all toes/distal feet, all toes/both feet warm to touch.      Negative lymphadenopathy bilateral popliteal fossa and tarsal tunnel.     Pulses trace right foot.     <2+ pitting lower extremity edema bilateral.      Musculoskeletal:        Right ankle: Normal. She exhibits normal range of motion, no swelling, no ecchymosis, no deformity, no laceration and normal pulse. Achilles tendon normal. Achilles tendon exhibits no pain, no defect and normal Cox's test results.    All ten toes without clubbing, cyanosis, or signs of ischemia.  No pain to palpation bilateral lower extremities.  Range of motion, stability, muscle strength, and muscle tone age and health appropriate normal bilateral feet and legs.     Lymphadenopathy:   Negative lymphadenopathy bilateral popliteal fossa and tarsal tunnel.   Neurological: She is alert. She has normal strength. She displays no atrophy and no tremor. No sensory deficit. She exhibits normal muscle tone. She displays no seizure activity. Gait normal.   Reflex Scores:       Patellar reflexes are 2+ on the right side and 2+ on the left side.       Achilles reflexes are 2+ on the right side and 2+ on the left side.  Negative tinel sign to percussion sural, superficial peroneal, deep peroneal, saphenous, and posterior tibial nerves right and left ankles and feet.    Decreased/absent vibratory sensation bilateral feet to 128Hz tuning fork.       Skin: Skin is warm and dry. No abrasion, no bruising, no burn, no ecchymosis, no laceration, no lesion and no rash noted. She is not diaphoretic. No cyanosis or erythema. No pallor. Nails show no clubbing.     Wound:  Lateral right ankle    Size:    Measurement: 10.2x3.9x0.5 cm    Base:  Granular, about 80/20 with moderate serosanaguinous drainage only.  No pus, tracking, fluctuance, malodor, or cardinal signs infection.  Mild peroneal tendon exposed in central proximal wound bed, with decreased exposure, granulating.     Borders:  Atrophic, flat, pink, blanchable skin edges without undermining.    Wound:  Medial right ankle    Size:    Measurement:3.2x1.4x0.3 cm      Base:  Granular, pink and tan yellow fibrous about 75/25 mix with moderate  serous/serosanaguinous drainage only.  No pus, tracking, fluctuance, malodor, or cardinal signs infection.    Borders:  Atrophic flat, pink, blanchable skin edges without undermining.               Assessment:       Encounter Diagnoses   Name Primary?    Post-operative state Yes    Ankle ulcer, right, with unspecified severity     PAD (peripheral artery disease)     Type 2 diabetes mellitus with diabetic neuropathy, unspecified long term insulin use status      S/p 2 week derma graft application      Plan:       Kateryna was seen today for foot ulcer.    Diagnoses and all orders for this visit:    Post-operative state    Ankle ulcer, right, with unspecified severity    PAD (peripheral artery disease)    Type 2 diabetes mellitus with diabetic neuropathy, unspecified long term insulin use status      I counseled the patient on her conditions, their implications and medical management.    Wounds inspected, healthy with no acute soi, graft to lateral ankle incorporating well.    Applied dermagraft to proximal half of lateral wound. Dermagraft was cut to size with overlap circumferentially such that total contact between wound surface and deep product surface were in apposition , secured in place with steri strips, and covered with adaptic, wound gel, mepilex foam, and 4x4 kerlix, and football type dressing.  Ambulate minimally in sx shoe.  Lot #: [id number], EXP: 2018-03-29..  All unused product was discarded in red bag container - entire product used no discarded portion.  Continue santyl medial ankle, aubrey foam, kerlix every other day medial right ankle wound.    Continue light compression toes to above knee 12 hours daily LLE for edema control.    Adequate vitamin supplementation, protein intake, and hydration - discussed with patient.    Continue PT to continue ambulation rle with assistance and walker only in fx boot right.          Return in about 1 week (around 11/27/2017).  Follow up 1 week with   Catherine

## 2017-11-27 ENCOUNTER — OFFICE VISIT (OUTPATIENT)
Dept: PODIATRY | Facility: CLINIC | Age: 67
End: 2017-11-27
Payer: MEDICARE

## 2017-11-27 VITALS — WEIGHT: 212 LBS | BODY MASS INDEX: 33.27 KG/M2 | HEIGHT: 67 IN

## 2017-11-27 DIAGNOSIS — I73.9 PAD (PERIPHERAL ARTERY DISEASE): ICD-10-CM

## 2017-11-27 DIAGNOSIS — L97.319 ANKLE ULCER, RIGHT, WITH UNSPECIFIED SEVERITY: Primary | ICD-10-CM

## 2017-11-27 DIAGNOSIS — E11.40 TYPE 2 DIABETES MELLITUS WITH DIABETIC NEUROPATHY, UNSPECIFIED LONG TERM INSULIN USE STATUS: ICD-10-CM

## 2017-11-27 DIAGNOSIS — R60.9 EDEMA, UNSPECIFIED TYPE: ICD-10-CM

## 2017-11-27 PROCEDURE — 99213 OFFICE O/P EST LOW 20 MIN: CPT | Mod: PBBFAC,PO | Performed by: PODIATRIST

## 2017-11-27 PROCEDURE — 99212 OFFICE O/P EST SF 10 MIN: CPT | Mod: S$PBB,,, | Performed by: PODIATRIST

## 2017-11-27 PROCEDURE — 99999 PR PBB SHADOW E&M-EST. PATIENT-LVL III: CPT | Mod: PBBFAC,,, | Performed by: PODIATRIST

## 2017-11-27 NOTE — PROGRESS NOTES
Subjective:      Patient ID: Kateryna Connolly is a 67 y.o. female.    Chief Complaint: Foot Ulcer (right )    Pt 68 y/o female  has a past medical history of Arthritis; Diabetes mellitus; Heart attack; Hypertension; Neuropathy; and Occasional tremors. Wound right ankle medial and lateral right ankle.  Using santyl to medial daily due to debridement being too painful to endure.  Dressings medial and lateral right ankle CDI.  Ambulates with walker.  Lateral wound about 1 week s/p dermagraft.    Review of Systems   Constitution: Negative for chills, diaphoresis, fever, malaise/fatigue and night sweats.   Cardiovascular: Positive for leg swelling. Negative for claudication, cyanosis and syncope.   Skin: Positive for poor wound healing. Negative for color change, dry skin, nail changes, rash, suspicious lesions and unusual hair distribution.   Musculoskeletal: Negative for falls, joint pain, joint swelling, muscle cramps, muscle weakness and stiffness.   Gastrointestinal: Negative for constipation, diarrhea, nausea and vomiting.   Neurological: Positive for paresthesias and sensory change. Negative for brief paralysis, disturbances in coordination, focal weakness, numbness and tremors.           Objective:      Physical Exam   Constitutional: She appears well-developed and well-nourished. She is cooperative. No distress.   Cardiovascular:   Pulses:       Popliteal pulses are 1+ on the right side, and 1+ on the left side.        Dorsalis pedis pulses are 1+ on the right side, and 1+ on the left side.        Posterior tibial pulses are 1+ on the right side, and 1+ on the left side.   Capillary refill 3 seconds all toes/distal feet, all toes/both feet warm to touch.      Negative lymphadenopathy bilateral popliteal fossa and tarsal tunnel.     Pulses trace right foot.     <2+ pitting lower extremity edema bilateral.     Musculoskeletal:        Right ankle: Normal. She exhibits normal range of motion, no swelling, no  ecchymosis, no deformity, no laceration and normal pulse. Achilles tendon normal. Achilles tendon exhibits no pain, no defect and normal Cox's test results.    All ten toes without clubbing, cyanosis, or signs of ischemia.  No pain to palpation bilateral lower extremities.  Range of motion, stability, muscle strength, and muscle tone age and health appropriate normal bilateral feet and legs.     Lymphadenopathy:   Negative lymphadenopathy bilateral popliteal fossa and tarsal tunnel.   Neurological: She is alert. She has normal strength. She displays no atrophy and no tremor. No sensory deficit. She exhibits normal muscle tone. She displays no seizure activity. Gait normal.   Reflex Scores:       Patellar reflexes are 2+ on the right side and 2+ on the left side.       Achilles reflexes are 2+ on the right side and 2+ on the left side.  Negative tinel sign to percussion sural, superficial peroneal, deep peroneal, saphenous, and posterior tibial nerves right and left ankles and feet.    Decreased/absent vibratory sensation bilateral feet to 128Hz tuning fork.       Skin: Skin is warm, dry and intact. No abrasion, no bruising, no burn, no ecchymosis, no laceration, no lesion and no rash noted. She is not diaphoretic. No cyanosis or erythema. No pallor. Nails show no clubbing.     Wound:  Lateral right ankle    Size:    Measurement: 10.9x3.3x0.7 cm    Base:  Granular, about 90/10 with moderate serosanaguinous drainage only.  No pus, tracking, fluctuance, malodor, or cardinal signs infection.  Mild peroneal tendon exposed in central proximal wound bed, with decreased exposure, granulating.     Borders:  Atrophic, flat, pink, blanchable skin edges without undermining.    Wound:  Medial right ankle    Size:    Measurement:3.5x1.3x0.3 cm      Base:  Granular, pink and tan yellow fibrous about 75/25 mix with moderate serous/serosanaguinous drainage only.  No pus, tracking, fluctuance, malodor, or cardinal signs  infection.    Borders:  Atrophic flat, pink, blanchable skin edges without undermining.               Assessment:       Encounter Diagnoses   Name Primary?    Ankle ulcer, right, with unspecified severity Yes    PAD (peripheral artery disease)     Type 2 diabetes mellitus with diabetic neuropathy, unspecified long term insulin use status     Edema, unspecified type          Plan:       Kateryna was seen today for foot ulcer.    Diagnoses and all orders for this visit:    Ankle ulcer, right, with unspecified severity    PAD (peripheral artery disease)    Type 2 diabetes mellitus with diabetic neuropathy, unspecified long term insulin use status    Edema, unspecified type      I counseled the patient on her conditions, their implications and medical management.    Applied dermagraft to proximal half of lateral wound. Dermagraft was cut to size with overlap circumferentially such that total contact between wound surface and deep product surface were in apposition , secured in place with steri strips, and covered with adaptic, wound gel, mepilex foam, and 4x4 kerlix, and football type dressing.  Ambulate minimally in sx shoe.  Lot #: [id number], EXP: 2018-02-04.  All unused product was discarded in red bag container - entire product used no discarded portion.    Dressed medial wound with santyl, bunny foam, kerlix - change same daily.    Dressed right ankle wound with adaptic, wound gel, bunny foam, kerlix - do not remove.    Continue light compression toes to above knee 12 hours daily LLE for edema control.    Discussed HBOT - transportation prevents.    Adequate vitamin supplementation, protein intake, and hydration - discussed with patient.    Continue PT to continue ambulation rle with assistance and walker only in fx boot right.          Return in about 1 week (around 12/4/2017).

## 2017-12-04 ENCOUNTER — OFFICE VISIT (OUTPATIENT)
Dept: PODIATRY | Facility: CLINIC | Age: 67
End: 2017-12-04
Payer: MEDICARE

## 2017-12-04 DIAGNOSIS — I73.9 PAD (PERIPHERAL ARTERY DISEASE): ICD-10-CM

## 2017-12-04 DIAGNOSIS — L97.319 ANKLE ULCER, RIGHT, WITH UNSPECIFIED SEVERITY: Primary | ICD-10-CM

## 2017-12-04 DIAGNOSIS — E11.40 TYPE 2 DIABETES MELLITUS WITH DIABETIC NEUROPATHY, UNSPECIFIED LONG TERM INSULIN USE STATUS: ICD-10-CM

## 2017-12-04 DIAGNOSIS — R60.9 EDEMA, UNSPECIFIED TYPE: ICD-10-CM

## 2017-12-04 PROCEDURE — 15275 SKIN SUB GRAFT FACE/NK/HF/G: CPT | Mod: S$PBB,,, | Performed by: PODIATRIST

## 2017-12-04 PROCEDURE — 99211 OFF/OP EST MAY X REQ PHY/QHP: CPT | Mod: PBBFAC,PO,25 | Performed by: PODIATRIST

## 2017-12-04 PROCEDURE — 99499 UNLISTED E&M SERVICE: CPT | Mod: S$PBB,,, | Performed by: PODIATRIST

## 2017-12-04 PROCEDURE — 15275 SKIN SUB GRAFT FACE/NK/HF/G: CPT | Mod: PBBFAC,PO | Performed by: PODIATRIST

## 2017-12-04 PROCEDURE — 99999 PR PBB SHADOW E&M-EST. PATIENT-LVL I: CPT | Mod: PBBFAC,,, | Performed by: PODIATRIST

## 2017-12-04 NOTE — PROGRESS NOTES
Subjective:      Patient ID: Kateryna Connolly is a 67 y.o. female.    Chief Complaint: No chief complaint on file.    Pt 68 y/o female  has a past medical history of Arthritis; Diabetes mellitus; Heart attack; Hypertension; Neuropathy; and Occasional tremors. Wound right ankle medial and lateral right ankle.  Using santyl to medial daily due to debridement being too painful to endure.  Dressings medial and lateral right ankle CDI.  Ambulates with walker.  Lateral wound about 1 week s/p dermagraft.    Review of Systems   Constitution: Negative for chills, diaphoresis, fever, malaise/fatigue and night sweats.   Cardiovascular: Positive for leg swelling. Negative for claudication, cyanosis and syncope.   Skin: Positive for poor wound healing. Negative for color change, dry skin, nail changes, rash, suspicious lesions and unusual hair distribution.   Musculoskeletal: Negative for falls, joint pain, joint swelling, muscle cramps, muscle weakness and stiffness.   Gastrointestinal: Negative for constipation, diarrhea, nausea and vomiting.   Neurological: Positive for paresthesias and sensory change. Negative for brief paralysis, disturbances in coordination, focal weakness, numbness and tremors.           Objective:      Physical Exam   Constitutional: She appears well-developed and well-nourished. She is cooperative. No distress.   Cardiovascular:   Pulses:       Popliteal pulses are 1+ on the right side, and 1+ on the left side.        Dorsalis pedis pulses are 1+ on the right side, and 1+ on the left side.        Posterior tibial pulses are 1+ on the right side, and 1+ on the left side.   Capillary refill 3 seconds all toes/distal feet, all toes/both feet warm to touch.      Negative lymphadenopathy bilateral popliteal fossa and tarsal tunnel.     Pulses trace right foot.     <2+ pitting lower extremity edema bilateral.     Musculoskeletal:        Right ankle: Normal. She exhibits normal range of motion, no swelling,  no ecchymosis, no deformity, no laceration and normal pulse. Achilles tendon normal. Achilles tendon exhibits no pain, no defect and normal Cox's test results.    All ten toes without clubbing, cyanosis, or signs of ischemia.  No pain to palpation bilateral lower extremities.  Range of motion, stability, muscle strength, and muscle tone age and health appropriate normal bilateral feet and legs.     Lymphadenopathy:   Negative lymphadenopathy bilateral popliteal fossa and tarsal tunnel.   Neurological: She is alert. She has normal strength. She displays no atrophy and no tremor. No sensory deficit. She exhibits normal muscle tone. She displays no seizure activity. Gait normal.   Reflex Scores:       Patellar reflexes are 2+ on the right side and 2+ on the left side.       Achilles reflexes are 2+ on the right side and 2+ on the left side.  Negative tinel sign to percussion sural, superficial peroneal, deep peroneal, saphenous, and posterior tibial nerves right and left ankles and feet.    Decreased/absent vibratory sensation bilateral feet to 128Hz tuning fork.       Skin: Skin is warm, dry and intact. No abrasion, no bruising, no burn, no ecchymosis, no laceration, no lesion and no rash noted. She is not diaphoretic. No cyanosis or erythema. No pallor. Nails show no clubbing.     Wound:  Lateral right ankle    Size:    Measurement: 9.4x2.8x0.7 cm    Base:  Granular, about 90/10 with moderate serosanaguinous drainage only.  No pus, tracking, fluctuance, malodor, or cardinal signs infection.  Mild peroneal tendon exposed in central proximal wound bed, with decreased exposure, granulating.     Borders:  Atrophic, flat, pink, blanchable skin edges without undermining.    Wound:  Medial right ankle    Size:    Measurement:3.7x1.4x0.3 cm      Base:  Granular, pink and tan yellow fibrous about 75/25 mix with moderate serous/serosanaguinous drainage only.  No pus, tracking, fluctuance, malodor, or cardinal signs  infection.    Borders:  Atrophic flat, pink, blanchable skin edges without undermining.               Assessment:       Encounter Diagnoses   Name Primary?    Ankle ulcer, right, with unspecified severity Yes    PAD (peripheral artery disease)     Type 2 diabetes mellitus with diabetic neuropathy, unspecified long term insulin use status     Edema, unspecified type          Plan:       Diagnoses and all orders for this visit:    Ankle ulcer, right, with unspecified severity    PAD (peripheral artery disease)    Type 2 diabetes mellitus with diabetic neuropathy, unspecified long term insulin use status    Edema, unspecified type      I counseled the patient on her conditions, their implications and medical management.    Applied dermagraft to proximal half of lateral wound. Dermagraft was cut to size with overlap circumferentially such that total contact between wound surface and deep product surface were in apposition , secured in place with steri strips, and covered with adaptic, wound gel, mepilex foam, and 4x4 kerlix, and football type dressing.  Ambulate minimally in sx shoe.  Lot #: [id number], EXP: 2018-03-29.  All unused product was discarded in red bag container - entire product used no discarded portion.    Dressed medial wound with santyl, bunny foam, kerlix - change same daily.    Dressed right ankle wound with adaptic, wound gel, bunny foam, kerlix - do not remove.    Continue light compression toes to above knee 12 hours daily LLE for edema control.    Discussed HBOT - transportation prevents.    Adequate vitamin supplementation, protein intake, and hydration - discussed with patient.    Continue PT to continue ambulation rle with assistance and walker only in fx boot right.      keep all vascular surgery follow up.    Return in about 1 week (around 12/11/2017).

## 2017-12-11 ENCOUNTER — OFFICE VISIT (OUTPATIENT)
Dept: PODIATRY | Facility: CLINIC | Age: 67
End: 2017-12-11
Payer: MEDICARE

## 2017-12-11 VITALS — HEIGHT: 67 IN | BODY MASS INDEX: 33.57 KG/M2 | WEIGHT: 213.88 LBS

## 2017-12-11 DIAGNOSIS — I73.9 PAD (PERIPHERAL ARTERY DISEASE): ICD-10-CM

## 2017-12-11 DIAGNOSIS — E11.40 TYPE 2 DIABETES MELLITUS WITH DIABETIC NEUROPATHY, UNSPECIFIED LONG TERM INSULIN USE STATUS: ICD-10-CM

## 2017-12-11 DIAGNOSIS — L97.319 ANKLE ULCER, RIGHT, WITH UNSPECIFIED SEVERITY: Primary | ICD-10-CM

## 2017-12-11 PROCEDURE — 99999 PR PBB SHADOW E&M-EST. PATIENT-LVL III: CPT | Mod: PBBFAC,,, | Performed by: PODIATRIST

## 2017-12-11 PROCEDURE — 99213 OFFICE O/P EST LOW 20 MIN: CPT | Mod: S$PBB,,, | Performed by: PODIATRIST

## 2017-12-11 PROCEDURE — 99213 OFFICE O/P EST LOW 20 MIN: CPT | Mod: PBBFAC,PO | Performed by: PODIATRIST

## 2017-12-11 NOTE — PROGRESS NOTES
Subjective:      Patient ID: Kateryna Connolly is a 67 y.o. female.    Chief Complaint: Foot Ulcer (right leg)    Pt 68 y/o female  has a past medical history of Arthritis; Diabetes mellitus; Heart attack; Hypertension; Neuropathy; and Occasional tremors. Wound right ankle medial and lateral right ankle.  Using santyl to medial daily due to debridement being too painful to endure.  Dressings medial and lateral right ankle CDI.  Ambulates with walker.  Lateral wound about 1 week s/p dermagraft.  Malodor present on dressing removal greatly improved with debridement/dressing.  Review of Systems   Constitution: Negative for chills, diaphoresis, fever, malaise/fatigue and night sweats.   Cardiovascular: Positive for leg swelling. Negative for claudication, cyanosis and syncope.   Skin: Positive for poor wound healing. Negative for color change, dry skin, nail changes, rash, suspicious lesions and unusual hair distribution.   Musculoskeletal: Negative for falls, joint pain, joint swelling, muscle cramps, muscle weakness and stiffness.   Gastrointestinal: Negative for constipation, diarrhea, nausea and vomiting.   Neurological: Positive for paresthesias and sensory change. Negative for brief paralysis, disturbances in coordination, focal weakness, numbness and tremors.           Objective:      Physical Exam   Constitutional: She appears well-developed and well-nourished. She is cooperative. No distress.   Cardiovascular:   Pulses:       Popliteal pulses are 1+ on the right side, and 1+ on the left side.        Dorsalis pedis pulses are 1+ on the right side, and 1+ on the left side.        Posterior tibial pulses are 1+ on the right side, and 1+ on the left side.   Capillary refill 3 seconds all toes/distal feet, all toes/both feet warm to touch.      Negative lymphadenopathy bilateral popliteal fossa and tarsal tunnel.     Pulses trace right foot.     <2+ pitting lower extremity edema bilateral.     Musculoskeletal:         Right ankle: Normal. She exhibits normal range of motion, no swelling, no ecchymosis, no deformity, no laceration and normal pulse. Achilles tendon normal. Achilles tendon exhibits no pain, no defect and normal Cox's test results.    All ten toes without clubbing, cyanosis, or signs of ischemia.  No pain to palpation bilateral lower extremities.  Range of motion, stability, muscle strength, and muscle tone age and health appropriate normal bilateral feet and legs.     Lymphadenopathy:   Negative lymphadenopathy bilateral popliteal fossa and tarsal tunnel.   Neurological: She is alert. She has normal strength. She displays no atrophy and no tremor. No sensory deficit. She exhibits normal muscle tone. She displays no seizure activity. Gait normal.   Reflex Scores:       Patellar reflexes are 2+ on the right side and 2+ on the left side.       Achilles reflexes are 2+ on the right side and 2+ on the left side.  Negative tinel sign to percussion sural, superficial peroneal, deep peroneal, saphenous, and posterior tibial nerves right and left ankles and feet.    Decreased/absent vibratory sensation bilateral feet to 128Hz tuning fork.       Skin: Skin is warm, dry and intact. No abrasion, no bruising, no burn, no ecchymosis, no laceration, no lesion and no rash noted. She is not diaphoretic. No cyanosis or erythema. No pallor. Nails show no clubbing.     Wound:  Lateral right ankle    Size:    Measurement: 9x3.3x2.6 cm    Base:  Granular, pink and black gray necrotic superficial tissue about 70/30 with moderate serosanaguinous drainage only.  No pus, tracking, fluctuance,  or cardinal signs infection.  Mild peroneal tendon exposed in central proximal wound bed, with decreased exposure, granulating. Wound tracks to bone of fibula 2.6 cm superior margin/anterior margin Peroneal longus tendon right.    Borders:  Atrophic, flat, pink, blanchable skin edges without undermining.    Wound:  Medial right ankle    Size:     Measurement:3.8x1.5x0.3 cm      Base:  Granular, pink and tan yellow fibrous about 80/20 mix with moderate serous/serosanaguinous drainage only.  No pus, tracking, fluctuance, malodor, or cardinal signs infection.    Borders:  Atrophic flat, pink, blanchable skin edges without undermining.               Assessment:       No diagnosis found.      Plan:       There are no diagnoses linked to this encounter.  I counseled the patient on her conditions, their implications and medical management.    Applied dermagraft to proximal half of lateral wound. Dermagraft was cut to size with overlap circumferentially such that total contact between wound surface and deep product surface were in apposition , secured in place with steri strips, and covered with adaptic, wound gel, mepilex foam, and 4x4 kerlix, and football type dressing.  Ambulate minimally in sx shoe.  Lot #: [id number], EXP: 2018-03-29.  All unused product was discarded in red bag container - entire product used no discarded portion.    Dressed medial wound with santyl,  kerlix - change same daily.    Dressed right ankle wound with santyl kerlix packed to track then wrapped - do not remove until Dr. Coppola visit.  Santyl/kerlix dressings must be changed daily.    Keep vascular visit today 13:00.    Then ED at [facility] for vascular and ID work up with comprehensive wound care.      Consider amputation BKA/AKA pending work up and response to treatment.        No Follow-up on file.

## 2017-12-12 ENCOUNTER — TELEPHONE (OUTPATIENT)
Dept: PODIATRY | Facility: CLINIC | Age: 67
End: 2017-12-12

## 2017-12-12 NOTE — TELEPHONE ENCOUNTER
Spoke with nurse from [facility] regarding wound care order. Read off orders from clinical notes.

## 2017-12-18 ENCOUNTER — TELEPHONE (OUTPATIENT)
Dept: PODIATRY | Facility: CLINIC | Age: 67
End: 2017-12-18

## 2017-12-18 ENCOUNTER — OFFICE VISIT (OUTPATIENT)
Dept: PODIATRY | Facility: CLINIC | Age: 67
End: 2017-12-18
Payer: MEDICARE

## 2017-12-18 VITALS — BODY MASS INDEX: 33.43 KG/M2 | HEIGHT: 67 IN | WEIGHT: 213 LBS

## 2017-12-18 DIAGNOSIS — E11.40 TYPE 2 DIABETES MELLITUS WITH DIABETIC NEUROPATHY, UNSPECIFIED LONG TERM INSULIN USE STATUS: ICD-10-CM

## 2017-12-18 DIAGNOSIS — I73.9 PAD (PERIPHERAL ARTERY DISEASE): ICD-10-CM

## 2017-12-18 DIAGNOSIS — L97.319 ANKLE ULCER, RIGHT, WITH UNSPECIFIED SEVERITY: Primary | ICD-10-CM

## 2017-12-18 PROCEDURE — 99213 OFFICE O/P EST LOW 20 MIN: CPT | Mod: PBBFAC,PO | Performed by: PODIATRIST

## 2017-12-18 PROCEDURE — 11042 DBRDMT SUBQ TIS 1ST 20SQCM/<: CPT | Mod: PBBFAC,PO | Performed by: PODIATRIST

## 2017-12-18 PROCEDURE — 97598 DBRDMT OPN WND ADDL 20CM/<: CPT | Mod: S$PBB,,, | Performed by: PODIATRIST

## 2017-12-18 PROCEDURE — 99499 UNLISTED E&M SERVICE: CPT | Mod: S$PBB,,, | Performed by: PODIATRIST

## 2017-12-18 PROCEDURE — 97597 DBRDMT OPN WND 1ST 20 CM/<: CPT | Mod: S$PBB,,, | Performed by: PODIATRIST

## 2017-12-18 PROCEDURE — 99999 PR PBB SHADOW E&M-EST. PATIENT-LVL III: CPT | Mod: PBBFAC,,, | Performed by: PODIATRIST

## 2017-12-18 PROCEDURE — 97597 DBRDMT OPN WND 1ST 20 CM/<: CPT | Mod: PBBFAC,PO | Performed by: PODIATRIST

## 2017-12-18 NOTE — TELEPHONE ENCOUNTER
----- Message from Wilton Maldonado sent at 12/18/2017  1:09 PM CST -----  Contact: Pham Nath with [facility] called to clarify wound care orders on right ankle.requesting a call back at [phone]. Thanks,

## 2017-12-18 NOTE — PROGRESS NOTES
Reviewed resident note, exam and procedures were performed under my direct supervision.  Agree with note and care.  Discrepancies discussed.    Home health:    cleanse wound with wound  medial and lateral right ankle and blot dry.    Apply santyl to wound base - safe gel to wound track anterior proximal margin of wound.    Cover woundA with adaptic.    Pack wounds to level with kerlix and wrap same.

## 2017-12-18 NOTE — PROCEDURES
Wound Debridement  Date/Time: 12/18/2017 8:20 AM  Performed by: BRUNO GUY  Authorized by: BRUNO GUY     Consent Done?:  Yes (Verbal)  Local anesthesia used?: No      Wound Details:    Location:  Right leg    Type of Debridement:  Excisional       Length (cm):  9.2       Area (sq cm):  25.76       Width (cm):  2.8       Percent Debrided (%):  100       Depth (cm):  1       Total Area Debrided (sq cm):  25.76    Depth of debridement:  Epidermis/Dermis    Tissue debrided:  Epidermis and Dermis    Devitalized tissue debrided:  Biofilm, Exudate and Fibrin    Instruments:  Curette    Additional wounds:  1    2nd Wound Details:     Location:  Right leg    Type of Debridement:  Excisional       Length (cm):  3       Area (sq cm):  2.7       Width (cm):  0.9       Percent Debrided (%):  100       Depth (cm):  0.2       Total Area Debrided (sq cm):  2.7    Depth of debridement:  Epidermis/Dermis    Tissue debrided:  Epidermis and Dermis    Devitalized tissue debrided:  Biofilm, Exudate and Fibrin    Instruments:  Curette    Bleeding:  Minimal  Hemostasis Achieved: Yes    Patient tolerance:  Patient tolerated the procedure well with no immediate complications     Santyl covered with adaptic applied to both medial and lateral ankle ulceration

## 2017-12-18 NOTE — TELEPHONE ENCOUNTER
----- Message from Wilton Maldonado sent at 12/18/2017  1:09 PM CST -----  Contact: Pham Naht with [facility] called to clarify wound care orders on right ankle.requesting a call back at [phone]. Thanks,

## 2017-12-18 NOTE — TELEPHONE ENCOUNTER
Talked to Pham. Informed her that wounds do indeed need to be be changed daily as stated in the clinical notes. Pham stated home health will cover wounds daily until next appt.

## 2017-12-18 NOTE — TELEPHONE ENCOUNTER
Talked to Pham informed her that wound does indeed need to be changed daily as stated in the clinical notes. Pham verbalized understanding and agreement.

## 2017-12-18 NOTE — PROGRESS NOTES
Subjective:      Patient ID: Kateryna Connolly is a 67 y.o. female.    Chief Complaint: Foot Ulcer    Pt 66 y/o female  has a past medical history of Arthritis; Diabetes mellitus; Heart attack; Hypertension; Neuropathy; and Occasional tremors. Wound right ankle medial and lateral right ankle.  Using santyl to medial daily due to debridement being too painful to endure. S/p revascularization per Dr. Coppola.   Review of Systems   Constitution: Negative for chills, diaphoresis, fever, malaise/fatigue and night sweats.   Cardiovascular: Positive for leg swelling. Negative for claudication, cyanosis and syncope.   Skin: Positive for poor wound healing. Negative for color change, dry skin, nail changes, rash, suspicious lesions and unusual hair distribution.   Musculoskeletal: Negative for falls, joint pain, joint swelling, muscle cramps, muscle weakness and stiffness.   Gastrointestinal: Negative for constipation, diarrhea, nausea and vomiting.   Neurological: Positive for paresthesias and sensory change. Negative for brief paralysis, disturbances in coordination, focal weakness, numbness and tremors.           Objective:      Physical Exam   Constitutional: She appears well-developed and well-nourished. She is cooperative. No distress.   Cardiovascular:   Pulses:       Popliteal pulses are 1+ on the right side, and 1+ on the left side.        Dorsalis pedis pulses are 1+ on the right side, and 1+ on the left side.        Posterior tibial pulses are 1+ on the right side, and 1+ on the left side.   Capillary refill 3 seconds all toes/distal feet, all toes/both feet warm to touch.      Negative lymphadenopathy bilateral popliteal fossa and tarsal tunnel.     Pulses trace right foot.     <2+ pitting lower extremity edema bilateral.     Musculoskeletal:        Right ankle: Normal. She exhibits normal range of motion, no swelling, no ecchymosis, no deformity, no laceration and normal pulse. Achilles tendon normal. Achilles  tendon exhibits no pain, no defect and normal Cox's test results.    All ten toes without clubbing, cyanosis, or signs of ischemia.  No pain to palpation bilateral lower extremities.  Range of motion, stability, muscle strength, and muscle tone age and health appropriate normal bilateral feet and legs.     Lymphadenopathy:   Negative lymphadenopathy bilateral popliteal fossa and tarsal tunnel.   Neurological: She is alert. She has normal strength. She displays no atrophy and no tremor. No sensory deficit. She exhibits normal muscle tone. She displays no seizure activity. Gait normal.   Reflex Scores:       Patellar reflexes are 2+ on the right side and 2+ on the left side.       Achilles reflexes are 2+ on the right side and 2+ on the left side.  Negative tinel sign to percussion sural, superficial peroneal, deep peroneal, saphenous, and posterior tibial nerves right and left ankles and feet.    Decreased/absent vibratory sensation bilateral feet to 128Hz tuning fork.       Skin: Skin is warm, dry and intact. No abrasion, no bruising, no burn, no ecchymosis, no laceration, no lesion and no rash noted. She is not diaphoretic. No cyanosis or erythema. No pallor. Nails show no clubbing.     Wound:  Lateral right ankle    Size:    Measurement: 9.2x2.8x1.0cm    Base:  Fibrotic/granular base about 70/30 with moderate serosanaguinous drainage only.  No pus, tracking, fluctuance,  or cardinal signs infection.  Mild peroneal tendon exposed in central proximal wound bed, with decreased exposure, granulating. Wound tracks to bone of fibula 2.6 cm superior margin/anterior margin Peroneal longus tendon right.    Borders:  Atrophic, flat, pink, blanchable skin edges without undermining.    Wound:  Medial right ankle    Size:    Measurement:3.0x0.9x0.2 cm      Base:  Granular, pink and tan yellow fibrous about 80/20 mix with moderate serous/serosanaguinous drainage only.  No pus, tracking, fluctuance, malodor, or cardinal  signs infection.    Borders:  Atrophic flat, pink, blanchable skin edges without undermining.               Assessment:       Encounter Diagnoses   Name Primary?    Ankle ulcer, right, with unspecified severity Yes    PAD (peripheral artery disease)     Type 2 diabetes mellitus with diabetic neuropathy, unspecified long term insulin use status          Plan:       Kateyrna was seen today for foot ulcer.    Diagnoses and all orders for this visit:    Ankle ulcer, right, with unspecified severity  -     Debridement    PAD (peripheral artery disease)    Type 2 diabetes mellitus with diabetic neuropathy, unspecified long term insulin use status      I counseled the patient on her conditions, their implications and medical management.    Debridement done see procedure note.     Dressed medial wound with santyl  kerlix - change same daily.   Plan for dermagraft application next week     Sobia Cleary DPM PGY-3          Return in about 1 week (around 12/25/2017).

## 2017-12-27 ENCOUNTER — OFFICE VISIT (OUTPATIENT)
Dept: PODIATRY | Facility: CLINIC | Age: 67
End: 2017-12-27
Payer: MEDICARE

## 2017-12-27 VITALS — WEIGHT: 213 LBS | BODY MASS INDEX: 33.43 KG/M2 | HEIGHT: 67 IN

## 2017-12-27 DIAGNOSIS — I73.9 PAD (PERIPHERAL ARTERY DISEASE): ICD-10-CM

## 2017-12-27 DIAGNOSIS — E11.40 TYPE 2 DIABETES MELLITUS WITH DIABETIC NEUROPATHY, UNSPECIFIED LONG TERM INSULIN USE STATUS: ICD-10-CM

## 2017-12-27 DIAGNOSIS — L97.319 ANKLE ULCER, RIGHT, WITH UNSPECIFIED SEVERITY: Primary | ICD-10-CM

## 2017-12-27 PROCEDURE — 99213 OFFICE O/P EST LOW 20 MIN: CPT | Mod: PBBFAC,PO | Performed by: PODIATRIST

## 2017-12-27 PROCEDURE — 99212 OFFICE O/P EST SF 10 MIN: CPT | Mod: S$PBB,,, | Performed by: PODIATRIST

## 2017-12-27 PROCEDURE — 11042 DBRDMT SUBQ TIS 1ST 20SQCM/<: CPT | Mod: PBBFAC,PO | Performed by: PODIATRIST

## 2017-12-27 PROCEDURE — 99999 PR PBB SHADOW E&M-EST. PATIENT-LVL III: CPT | Mod: PBBFAC,,, | Performed by: PODIATRIST

## 2017-12-27 NOTE — PROGRESS NOTES
Subjective:      Patient ID: Kateryna Connolly is a 67 y.o. female.    Chief Complaint: Foot Ulcer (right)    Pt 68 y/o female  has a past medical history of Arthritis; Diabetes mellitus; Heart attack; Hypertension; Neuropathy; and Occasional tremors. Wound right ankle medial and lateral right ankle.  Using santyl to medial and lateral wound daily due to debridement being too painful to endure. S/p revascularization per Dr. Coppola.       Review of Systems   Constitution: Negative for chills, diaphoresis, fever, malaise/fatigue and night sweats.   Cardiovascular: Positive for leg swelling. Negative for claudication, cyanosis and syncope.   Skin: Positive for poor wound healing. Negative for color change, dry skin, nail changes, rash, suspicious lesions and unusual hair distribution.   Musculoskeletal: Negative for falls, joint pain, joint swelling, muscle cramps, muscle weakness and stiffness.   Gastrointestinal: Negative for constipation, diarrhea, nausea and vomiting.   Neurological: Positive for paresthesias and sensory change. Negative for brief paralysis, disturbances in coordination, focal weakness, numbness and tremors.           Objective:      Physical Exam   Constitutional: She appears well-developed and well-nourished. She is cooperative. No distress.   Cardiovascular:   Pulses:       Popliteal pulses are 1+ on the right side, and 1+ on the left side.        Dorsalis pedis pulses are 1+ on the right side, and 1+ on the left side.        Posterior tibial pulses are 1+ on the right side, and 1+ on the left side.   Capillary refill 3 seconds all toes/distal feet, all toes/both feet warm to touch.      Negative lymphadenopathy bilateral popliteal fossa and tarsal tunnel.     Pulses trace right foot.     <2+ pitting lower extremity edema bilateral.     Musculoskeletal:        Right ankle: Normal. She exhibits normal range of motion, no swelling, no ecchymosis, no deformity, no laceration and normal pulse.  Achilles tendon normal. Achilles tendon exhibits no pain, no defect and normal Cox's test results.    All ten toes without clubbing, cyanosis, or signs of ischemia.  No pain to palpation bilateral lower extremities.  Range of motion, stability, muscle strength, and muscle tone age and health appropriate normal bilateral feet and legs.     Lymphadenopathy:   Negative lymphadenopathy bilateral popliteal fossa and tarsal tunnel.   Neurological: She is alert. She has normal strength. She displays no atrophy and no tremor. A sensory deficit is present. She exhibits normal muscle tone. She displays no seizure activity. Gait normal.   Reflex Scores:       Patellar reflexes are 2+ on the right side and 2+ on the left side.       Achilles reflexes are 2+ on the right side and 2+ on the left side.  Negative tinel sign to percussion sural, superficial peroneal, deep peroneal, saphenous, and posterior tibial nerves right and left ankles and feet.    Decreased/absent vibratory sensation bilateral feet to 128Hz tuning fork.       Skin: Skin is warm, dry and intact. No abrasion, no bruising, no burn, no ecchymosis, no laceration, no lesion and no rash noted. She is not diaphoretic. No cyanosis or erythema. No pallor. Nails show no clubbing.     Wound:  Lateral right ankle    Size:    Measurement: 8.3x2.5x0.4cm    Base: granular base with tan fibrous tissue mix about 90/10 with moderate serosanaguinous drainage only.  No pus, tracking, fluctuance,  or cardinal signs infection.  Mild peroneal tendon exposed in central proximal wound bed, with decreased exposure, granulating. Wound no longer tracks to bone of fibula.    Borders:  Atrophic, flat, pink, blanchable skin edges without undermining.    Wound:  Medial right ankle    Size:    Measurement:2.8x0.9x0.3 cm      Base:  Granular, pink and tan yellow fibrous about 90/10 mix with moderate serous/serosanaguinous drainage only.  No pus, tracking, fluctuance, malodor, or cardinal  signs infection.    Borders:  Atrophic flat, pink, blanchable skin edges without undermining.               Assessment:       Encounter Diagnoses   Name Primary?    Ankle ulcer, right, with unspecified severity Yes    PAD (peripheral artery disease)     Type 2 diabetes mellitus with diabetic neuropathy, unspecified long term insulin use status          Plan:       Kateryna was seen today for foot ulcer.    Diagnoses and all orders for this visit:    Ankle ulcer, right, with unspecified severity    PAD (peripheral artery disease)    Type 2 diabetes mellitus with diabetic neuropathy, unspecified long term insulin use status      I counseled the patient on her conditions, their implications and medical management.    Dressed lateral and medial wound with santyl,  kerlix - change same daily.   Plan for dermagraft application next week     Home health:    Clean with saline and blot dry.    Apply santyl to base.    Cover with wound foam, kerlix.    Repeat daily.    Adequate vitamin supplementation, protein intake, and hydration - discussed with patient.          Return in about 1 week (around 1/3/2018).

## 2018-01-08 ENCOUNTER — OFFICE VISIT (OUTPATIENT)
Dept: PODIATRY | Facility: CLINIC | Age: 68
End: 2018-01-08
Payer: MEDICARE

## 2018-01-08 VITALS — HEIGHT: 67 IN | WEIGHT: 213.88 LBS | BODY MASS INDEX: 33.57 KG/M2

## 2018-01-08 DIAGNOSIS — L97.319 ANKLE ULCER, RIGHT, WITH UNSPECIFIED SEVERITY: Primary | ICD-10-CM

## 2018-01-08 DIAGNOSIS — L97.529 TOE ULCER, LEFT, WITH UNSPECIFIED SEVERITY: ICD-10-CM

## 2018-01-08 DIAGNOSIS — E11.40 TYPE 2 DIABETES MELLITUS WITH DIABETIC NEUROPATHY, UNSPECIFIED LONG TERM INSULIN USE STATUS: ICD-10-CM

## 2018-01-08 DIAGNOSIS — I73.9 PAD (PERIPHERAL ARTERY DISEASE): ICD-10-CM

## 2018-01-08 PROCEDURE — 99213 OFFICE O/P EST LOW 20 MIN: CPT | Mod: PBBFAC,PO | Performed by: PODIATRIST

## 2018-01-08 PROCEDURE — 99212 OFFICE O/P EST SF 10 MIN: CPT | Mod: S$PBB,,, | Performed by: PODIATRIST

## 2018-01-08 PROCEDURE — 99999 PR PBB SHADOW E&M-EST. PATIENT-LVL III: CPT | Mod: PBBFAC,,, | Performed by: PODIATRIST

## 2018-01-08 RX ORDER — GABAPENTIN 300 MG/1
CAPSULE ORAL
COMMUNITY
Start: 2017-12-28 | End: 2019-12-13 | Stop reason: DRUGHIGH

## 2018-01-08 RX ORDER — GABAPENTIN 600 MG/1
TABLET ORAL
COMMUNITY
Start: 2017-12-28 | End: 2019-12-13 | Stop reason: SDUPTHER

## 2018-01-08 NOTE — PROGRESS NOTES
Subjective:      Patient ID: Kateryna Connolly is a 67 y.o. female.    Chief Complaint: Dressing Change (right leg)    Pt 66 y/o female  has a past medical history of Arthritis; Diabetes mellitus; Heart attack; Hypertension; Neuropathy; and Occasional tremors. Wound right ankle medial and lateral right ankle.  Using santyl to medial and lateral wound daily due to debridement being too painful to endure. S/p revascularization per Dr. Coppola.       Review of Systems   Constitution: Negative for chills, diaphoresis, fever, malaise/fatigue and night sweats.   Cardiovascular: Positive for leg swelling. Negative for claudication, cyanosis and syncope.   Skin: Positive for poor wound healing. Negative for color change, dry skin, nail changes, rash, suspicious lesions and unusual hair distribution.   Musculoskeletal: Negative for falls, joint pain, joint swelling, muscle cramps, muscle weakness and stiffness.   Gastrointestinal: Negative for constipation, diarrhea, nausea and vomiting.   Neurological: Positive for paresthesias and sensory change. Negative for brief paralysis, disturbances in coordination, focal weakness, numbness and tremors.           Objective:      Physical Exam   Constitutional: She appears well-developed and well-nourished. She is cooperative. No distress.   Cardiovascular:   Pulses:       Popliteal pulses are 1+ on the right side, and 1+ on the left side.        Dorsalis pedis pulses are 1+ on the right side, and 1+ on the left side.        Posterior tibial pulses are 1+ on the right side, and 1+ on the left side.   Capillary refill 3 seconds all toes/distal feet, all toes/both feet warm to touch.      Negative lymphadenopathy bilateral popliteal fossa and tarsal tunnel.     Pulses trace right foot.     <2+ pitting lower extremity edema bilateral.     Musculoskeletal:        Right ankle: Normal. She exhibits normal range of motion, no swelling, no ecchymosis, no deformity, no laceration and normal  pulse. Achilles tendon normal. Achilles tendon exhibits no pain, no defect and normal Cox's test results.    All ten toes without clubbing, cyanosis, or signs of ischemia.  No pain to palpation bilateral lower extremities.  Range of motion, stability, muscle strength, and muscle tone age and health appropriate normal bilateral feet and legs.     Lymphadenopathy:   Negative lymphadenopathy bilateral popliteal fossa and tarsal tunnel.   Neurological: She is alert. She has normal strength. She displays no atrophy and no tremor. A sensory deficit is present. She exhibits normal muscle tone. She displays no seizure activity. Gait normal.   Reflex Scores:       Patellar reflexes are 2+ on the right side and 2+ on the left side.       Achilles reflexes are 2+ on the right side and 2+ on the left side.  Negative tinel sign to percussion sural, superficial peroneal, deep peroneal, saphenous, and posterior tibial nerves right and left ankles and feet.    Decreased/absent vibratory sensation bilateral feet to 128Hz tuning fork.       Skin: Skin is warm, dry and intact. No abrasion, no bruising, no burn, no ecchymosis, no laceration, no lesion and no rash noted. She is not diaphoretic. No cyanosis or erythema. No pallor. Nails show no clubbing.     Wound:  Lateral right ankle    Size:    Measurement: 8.3x2.5x0.4cm    Base: granular base with tan fibrous tissue mix about 95/5 with moderate serosanaguinous drainage only.  No pus, tracking, fluctuance,  or cardinal signs infection.  Mild peroneal tendon exposed in central proximal wound bed, with decreased exposure, granulating. Wound no longer tracks to bone of fibula.    Borders:  Atrophic, flat, pink, blanchable skin edges without undermining.    Wound:  Medial right ankle    Size:    Measurement:2.8x0.9x0.3 cm      Base:  Granular, pink and tan yellow fibrous about 90/10 mix with moderate serous/serosanaguinous drainage only.  No pus, tracking, fluctuance, malodor, or  cardinal signs infection.    Borders:  Atrophic flat, pink, blanchable skin edges without undermining.               Assessment:       Encounter Diagnoses   Name Primary?    Ankle ulcer, right, with unspecified severity Yes    PAD (peripheral artery disease)     Type 2 diabetes mellitus with diabetic neuropathy, unspecified long term insulin use status     Toe ulcer, left, with unspecified severity          Plan:       Kateryna was seen today for dressing change.    Diagnoses and all orders for this visit:    Ankle ulcer, right, with unspecified severity    PAD (peripheral artery disease)    Type 2 diabetes mellitus with diabetic neuropathy, unspecified long term insulin use status    Toe ulcer, left, with unspecified severity      I counseled the patient on her conditions, their implications and medical management.    Dressed lateral and medial wound with santyl,  kerlix - change same daily.   Plan for dermagraft application next week     Home health:    Clean with saline and blot dry.    Apply santyl to base.    Cover with wound foam, kerlix.  Pack lateral wound with small amount of 4x4 prior to wound foam application.    Light compression toes to knee right with coban over kerlix.    Repeat daily.    Adequate vitamin supplementation, protein intake, and hydration - discussed with patient.          Return in about 1 week (around 1/15/2018).

## 2018-01-10 ENCOUNTER — TELEPHONE (OUTPATIENT)
Dept: PODIATRY | Facility: CLINIC | Age: 68
End: 2018-01-10

## 2018-01-10 NOTE — TELEPHONE ENCOUNTER
Physical therapy is wanting to have patient use pedals (bicycle) can she do this without the [employer]? She wants ok from you if patient is able to start?

## 2018-01-10 NOTE — TELEPHONE ENCOUNTER
----- Message from Ana María Salcedo sent at 1/10/2018  3:09 PM CST -----  Contact: self   Patient is needing to speak to the nurse about doing Physical therapy  Please call back [phone] (home)     Please call to advise

## 2018-01-10 NOTE — TELEPHONE ENCOUNTER
----- Message from Ana María Salcedo sent at 1/10/2018  3:09 PM CST -----  Contact: self   Patient is needing to speak to the nurse about doing Physical therapy  Please call back [phone] (home)     Please call to advise

## 2018-01-12 NOTE — TELEPHONE ENCOUNTER
Instructed Ms Lai to start physical therapy, (Pedaling) to start slow, since our bodies do not like change. If her wound was ever to show signs of infection, Redness, Swelling, Pain, or Pus. She is to stop. Verbalized understanding.

## 2018-01-15 ENCOUNTER — OFFICE VISIT (OUTPATIENT)
Dept: PODIATRY | Facility: CLINIC | Age: 68
End: 2018-01-15
Payer: MEDICARE

## 2018-01-15 VITALS — BODY MASS INDEX: 33.43 KG/M2 | WEIGHT: 213 LBS | HEIGHT: 67 IN

## 2018-01-15 DIAGNOSIS — I73.9 PAD (PERIPHERAL ARTERY DISEASE): ICD-10-CM

## 2018-01-15 DIAGNOSIS — E11.40 TYPE 2 DIABETES MELLITUS WITH DIABETIC NEUROPATHY, UNSPECIFIED LONG TERM INSULIN USE STATUS: ICD-10-CM

## 2018-01-15 DIAGNOSIS — L97.319 ANKLE ULCER, RIGHT, WITH UNSPECIFIED SEVERITY: Primary | ICD-10-CM

## 2018-01-15 PROCEDURE — 99213 OFFICE O/P EST LOW 20 MIN: CPT | Mod: PBBFAC,PO | Performed by: PODIATRIST

## 2018-01-15 PROCEDURE — 99999 PR PBB SHADOW E&M-EST. PATIENT-LVL III: CPT | Mod: PBBFAC,,, | Performed by: PODIATRIST

## 2018-01-15 PROCEDURE — 15275 SKIN SUB GRAFT FACE/NK/HF/G: CPT | Mod: PBBFAC,PO | Performed by: PODIATRIST

## 2018-01-15 PROCEDURE — 15271 SKIN SUB GRAFT TRNK/ARM/LEG: CPT | Mod: S$PBB,,, | Performed by: PODIATRIST

## 2018-01-15 PROCEDURE — 99499 UNLISTED E&M SERVICE: CPT | Mod: S$PBB,,, | Performed by: PODIATRIST

## 2018-01-15 NOTE — PROGRESS NOTES
Subjective:      Patient ID: Kateryna Connolly is a 67 y.o. female.    Chief Complaint: Foot Ulcer (right)    Pt 68 y/o female  has a past medical history of Arthritis; Diabetes mellitus; Heart attack; Hypertension; Neuropathy; and Occasional tremors. Wound right ankle medial and lateral right ankle.  Using santyl to medial and lateral wound daily due to debridement being too painful to endure. S/p revascularization per Dr. Coppola.       Dermagraft ready today.    Review of Systems   Constitution: Negative for chills, diaphoresis, fever, malaise/fatigue and night sweats.   Cardiovascular: Positive for leg swelling. Negative for claudication, cyanosis and syncope.   Skin: Positive for poor wound healing. Negative for color change, dry skin, nail changes, rash, suspicious lesions and unusual hair distribution.   Musculoskeletal: Negative for falls, joint pain, joint swelling, muscle cramps, muscle weakness and stiffness.   Gastrointestinal: Negative for constipation, diarrhea, nausea and vomiting.   Neurological: Positive for paresthesias and sensory change. Negative for brief paralysis, disturbances in coordination, focal weakness, numbness and tremors.           Objective:      Physical Exam   Constitutional: She appears well-developed and well-nourished. She is cooperative. No distress.   Cardiovascular:   Pulses:       Popliteal pulses are 1+ on the right side, and 1+ on the left side.        Dorsalis pedis pulses are 1+ on the right side, and 1+ on the left side.        Posterior tibial pulses are 1+ on the right side, and 1+ on the left side.   Capillary refill 3 seconds all toes/distal feet, all toes/both feet warm to touch.      Negative lymphadenopathy bilateral popliteal fossa and tarsal tunnel.     Pulses trace right foot.     <2+ pitting lower extremity edema bilateral.     Musculoskeletal:        Right ankle: Normal. She exhibits normal range of motion, no swelling, no ecchymosis, no deformity, no  laceration and normal pulse. Achilles tendon normal. Achilles tendon exhibits no pain, no defect and normal Cox's test results.    All ten toes without clubbing, cyanosis, or signs of ischemia.  No pain to palpation bilateral lower extremities.  Range of motion, stability, muscle strength, and muscle tone age and health appropriate normal bilateral feet and legs.     Lymphadenopathy:   Negative lymphadenopathy bilateral popliteal fossa and tarsal tunnel.   Neurological: She is alert. She has normal strength. She displays no atrophy and no tremor. A sensory deficit is present. She exhibits normal muscle tone. She displays no seizure activity. Gait normal.   Reflex Scores:       Patellar reflexes are 2+ on the right side and 2+ on the left side.       Achilles reflexes are 2+ on the right side and 2+ on the left side.  Negative tinel sign to percussion sural, superficial peroneal, deep peroneal, saphenous, and posterior tibial nerves right and left ankles and feet.    Decreased/absent vibratory sensation bilateral feet to 128Hz tuning fork.       Skin: Skin is warm, dry and intact. No abrasion, no bruising, no burn, no ecchymosis, no laceration, no lesion and no rash noted. She is not diaphoretic. No cyanosis or erythema. No pallor. Nails show no clubbing.     Wound:  Lateral right ankle    Size:    Measurement: 7x2.3x0.4cm    Base: granular base with tan fibrous tissue mix about 95/5 with moderate serosanaguinous drainage only.  No pus, tracking, fluctuance,  or cardinal signs infection.  Mild peroneal tendon exposed in central proximal wound bed, with decreased exposure, granulating. Wound no longer tracks to bone of fibula.    Borders:  Atrophic, flat, pink, blanchable skin edges without undermining.    Wound:  Medial right ankle    Size:    Measurement:2.5x0.7x0.3 cm      Base:  Granular, pink and tan yellow fibrous about 90/10 mix with moderate serous/serosanaguinous drainage only.  No pus, tracking,  fluctuance, malodor, or cardinal signs infection.    Borders:  Atrophic flat, pink, blanchable skin edges without undermining.               Assessment:       Encounter Diagnoses   Name Primary?    Ankle ulcer, right, with unspecified severity Yes    PAD (peripheral artery disease)     Type 2 diabetes mellitus with diabetic neuropathy, unspecified long term insulin use status          Plan:       Kateryna was seen today for foot ulcer.    Diagnoses and all orders for this visit:    Ankle ulcer, right, with unspecified severity    PAD (peripheral artery disease)    Type 2 diabetes mellitus with diabetic neuropathy, unspecified long term insulin use status      I counseled the patient on her conditions, their implications and medical management.    Wound right ankle laterally was prepped with removal of all non viable tissue and minimal peripheral bleeding of edges and crosshatched wound base.    Dermagraft was cut to size with about peripheral overlap circumferentially such that total contact between wound surface and deep product surface were in apposition , secured in place with steri strips, and covered with adaptic, wound gel, mepilex foam, and [id number], and football dressing.  Ambulate minimally in sx shoe.  Lot #:  [id number], EXP:  2018-05-03.  All unused product was discarded in red bag container - there was no unused product.    Home health every other day:    Medial right ankle wound:  Cleanse with saline and blot dry.  Apply santyl to base.  Cover with wound foam, kerlix.  Repeat every other day.    Follow-up in about 1 week (around 1/22/2018).

## 2018-01-17 ENCOUNTER — TELEPHONE (OUTPATIENT)
Dept: PODIATRY | Facility: CLINIC | Age: 68
End: 2018-01-17

## 2018-01-17 NOTE — TELEPHONE ENCOUNTER
Ms Mohsen called to let Dr Alexander know that Home health will not be able to change dressing until Friday. I educated on signs of infection and  that if she had any Redness, swelling, pain or fever to go to the emergency Department for evaluation immediately.

## 2018-01-17 NOTE — TELEPHONE ENCOUNTER
----- Message from Misty Wang RT sent at 1/17/2018  2:37 PM CST -----  Contact: pt    pt , requesting a call back soon concerning due to the weather home health services have not been to her home to change her dressing, thanks.

## 2018-01-22 ENCOUNTER — OFFICE VISIT (OUTPATIENT)
Dept: PODIATRY | Facility: CLINIC | Age: 68
End: 2018-01-22
Payer: MEDICARE

## 2018-01-22 VITALS — WEIGHT: 213 LBS | HEIGHT: 67 IN | BODY MASS INDEX: 33.43 KG/M2

## 2018-01-22 DIAGNOSIS — I73.9 PAD (PERIPHERAL ARTERY DISEASE): ICD-10-CM

## 2018-01-22 DIAGNOSIS — L97.319 ANKLE ULCER, RIGHT, WITH UNSPECIFIED SEVERITY: Primary | ICD-10-CM

## 2018-01-22 DIAGNOSIS — E11.40 TYPE 2 DIABETES MELLITUS WITH DIABETIC NEUROPATHY, UNSPECIFIED LONG TERM INSULIN USE STATUS: ICD-10-CM

## 2018-01-22 PROCEDURE — 99999 PR PBB SHADOW E&M-EST. PATIENT-LVL III: CPT | Mod: PBBFAC,,, | Performed by: PODIATRIST

## 2018-01-22 PROCEDURE — 99213 OFFICE O/P EST LOW 20 MIN: CPT | Mod: PBBFAC,PO | Performed by: PODIATRIST

## 2018-01-22 PROCEDURE — 99212 OFFICE O/P EST SF 10 MIN: CPT | Mod: S$PBB,,, | Performed by: PODIATRIST

## 2018-01-22 NOTE — PROGRESS NOTES
Subjective:      Patient ID: Kateryna Connolly is a 67 y.o. female.    Chief Complaint: Foot Ulcer (right)    Pt 68 y/o female  has a past medical history of Arthritis; Diabetes mellitus; Heart attack; Hypertension; Neuropathy; and Occasional tremors. Wound right ankle medial and lateral right ankle.  Using santyl to medial and lateral wound daily due to debridement being too painful to endure. S/p revascularization per Dr. oCppola.       Dermagraft  Last week - dressing unchanged CDI.    Review of Systems   Constitution: Negative for chills, diaphoresis, fever, malaise/fatigue and night sweats.   Cardiovascular: Positive for leg swelling. Negative for claudication, cyanosis and syncope.   Skin: Positive for poor wound healing. Negative for color change, dry skin, nail changes, rash, suspicious lesions and unusual hair distribution.   Musculoskeletal: Negative for falls, joint pain, joint swelling, muscle cramps, muscle weakness and stiffness.   Gastrointestinal: Negative for constipation, diarrhea, nausea and vomiting.   Neurological: Positive for paresthesias and sensory change. Negative for brief paralysis, disturbances in coordination, focal weakness, numbness and tremors.           Objective:      Physical Exam   Constitutional: She appears well-developed and well-nourished. She is cooperative. No distress.   Cardiovascular:   Pulses:       Popliteal pulses are 1+ on the right side, and 1+ on the left side.        Dorsalis pedis pulses are 1+ on the right side, and 1+ on the left side.        Posterior tibial pulses are 1+ on the right side, and 1+ on the left side.   Capillary refill 3 seconds all toes/distal feet, all toes/both feet warm to touch.      Negative lymphadenopathy bilateral popliteal fossa and tarsal tunnel.     Pulses trace right foot.     <2+ pitting lower extremity edema bilateral.     Musculoskeletal:        Right ankle: Normal. She exhibits normal range of motion, no swelling, no ecchymosis,  no deformity, no laceration and normal pulse. Achilles tendon normal. Achilles tendon exhibits no pain, no defect and normal Cox's test results.    All ten toes without clubbing, cyanosis, or signs of ischemia.  No pain to palpation bilateral lower extremities.  Range of motion, stability, muscle strength, and muscle tone age and health appropriate normal bilateral feet and legs.     Lymphadenopathy:   Negative lymphadenopathy bilateral popliteal fossa and tarsal tunnel.   Neurological: She is alert. She has normal strength. She displays no atrophy and no tremor. A sensory deficit is present. She exhibits normal muscle tone. She displays no seizure activity. Gait normal.   Reflex Scores:       Patellar reflexes are 2+ on the right side and 2+ on the left side.       Achilles reflexes are 2+ on the right side and 2+ on the left side.  Negative tinel sign to percussion sural, superficial peroneal, deep peroneal, saphenous, and posterior tibial nerves right and left ankles and feet.    Decreased/absent vibratory sensation bilateral feet to 128Hz tuning fork.       Skin: Skin is warm, dry and intact. No abrasion, no bruising, no burn, no ecchymosis, no laceration, no lesion and no rash noted. She is not diaphoretic. No cyanosis or erythema. No pallor. Nails show no clubbing.     Wound:  Lateral right ankle    Size:    Measurement: 6.6x2.7x0.3cm    Base: granular base with moderate serosanaguinous drainage only.  No pus, tracking, fluctuance,  or cardinal signs infection.       Borders:  Atrophic, flat, pink, blanchable skin edges without undermining.    Wound:  Medial right ankle    Size:    Measurement:2.6x0.7x0.3 cm      Base:  Granular, pink with moderate serous/serosanaguinous drainage only.  No pus, tracking, fluctuance, malodor, or cardinal signs infection.    Borders:  Atrophic flat, pink, blanchable skin edges without undermining.               Assessment:       Encounter Diagnoses   Name Primary?    Ankle  ulcer, right, with unspecified severity Yes    PAD (peripheral artery disease)     Type 2 diabetes mellitus with diabetic neuropathy, unspecified long term insulin use status          Plan:       Kateryna was seen today for foot ulcer.    Diagnoses and all orders for this visit:    Ankle ulcer, right, with unspecified severity    PAD (peripheral artery disease)    Type 2 diabetes mellitus with diabetic neuropathy, unspecified long term insulin use status      I counseled the patient on her conditions, their implications and medical management.    Wound right ankle laterally was cross hatched and dressed with thin layer triple antibiotic ointment, covered with wound foam, kerlix and football of ankle/foot - do not wet or change unless strike through occurs.    Home health every other day:    Medial right ankle wound:  Cleanse with saline and blot dry.  Cover with wound foam, kerlix.  Repeat every other day.    Follow-up in about 1 week (around 1/29/2018).

## 2018-01-24 ENCOUNTER — TELEPHONE (OUTPATIENT)
Dept: PODIATRY | Facility: CLINIC | Age: 68
End: 2018-01-24

## 2018-01-24 NOTE — TELEPHONE ENCOUNTER
----- Message from Leonela Zamora sent at 1/24/2018  9:49 AM CST -----  Contact: Sarah with [facility] [phone]  Sarah with [facility] [phone] calling to speak with office regarding demographics and payer information that needs to be faxed over to [phone]. Please advise. Thanks.

## 2018-01-24 NOTE — TELEPHONE ENCOUNTER
Notified Sarah with [facility] that Ms Avendaño is a current patient with the [place] location. However the [place] location is saying they are not receiving the orders. She will fax them to the [facility].

## 2018-01-24 NOTE — TELEPHONE ENCOUNTER
----- Message from Kirstie Banerjee sent at 1/23/2018  4:36 PM CST -----  Patient is calling concerning office sending an order to [facility]. [facility] said that they have an order for wound care from 1/16/18. Office was supposed to fax the order for wound care for every other day starting tomorrow. Please fax Mayela and call patient when completed at [phone]. She is stating she needs them for tomorrow.

## 2018-01-29 ENCOUNTER — OFFICE VISIT (OUTPATIENT)
Dept: PODIATRY | Facility: CLINIC | Age: 68
End: 2018-01-29
Payer: MEDICARE

## 2018-01-29 VITALS — WEIGHT: 213 LBS | HEIGHT: 67 IN | BODY MASS INDEX: 33.43 KG/M2

## 2018-01-29 DIAGNOSIS — L97.319 ANKLE ULCER, RIGHT, WITH UNSPECIFIED SEVERITY: Primary | ICD-10-CM

## 2018-01-29 DIAGNOSIS — I73.9 PAD (PERIPHERAL ARTERY DISEASE): ICD-10-CM

## 2018-01-29 DIAGNOSIS — E11.40 TYPE 2 DIABETES MELLITUS WITH DIABETIC NEUROPATHY, UNSPECIFIED LONG TERM INSULIN USE STATUS: ICD-10-CM

## 2018-01-29 PROCEDURE — 99499 UNLISTED E&M SERVICE: CPT | Mod: S$PBB,,, | Performed by: PODIATRIST

## 2018-01-29 PROCEDURE — 11042 DBRDMT SUBQ TIS 1ST 20SQCM/<: CPT | Mod: PBBFAC,PO | Performed by: PODIATRIST

## 2018-01-29 PROCEDURE — 99213 OFFICE O/P EST LOW 20 MIN: CPT | Mod: PBBFAC,PO,25 | Performed by: PODIATRIST

## 2018-01-29 PROCEDURE — 99999 PR PBB SHADOW E&M-EST. PATIENT-LVL III: CPT | Mod: PBBFAC,,, | Performed by: PODIATRIST

## 2018-01-29 NOTE — PROGRESS NOTES
Subjective:      Patient ID: Kateryna Connolly is a 67 y.o. female.    Chief Complaint: Foot Ulcer (right leg)    Pt 66 y/o female  has a past medical history of Arthritis; Diabetes mellitus; Heart attack; Hypertension; Neuropathy; and Occasional tremors. Wound right ankle medial and lateral right ankle.  Using santyl to medial and lateral wound daily due to debridement being too painful to endure. S/p revascularization per Dr. Coppola.       Dermagraft  Last week - dressing unchanged CDI.    Review of Systems   Constitution: Negative for chills, diaphoresis, fever, malaise/fatigue and night sweats.   Cardiovascular: Positive for leg swelling. Negative for claudication, cyanosis and syncope.   Skin: Positive for poor wound healing. Negative for color change, dry skin, nail changes, rash, suspicious lesions and unusual hair distribution.   Musculoskeletal: Negative for falls, joint pain, joint swelling, muscle cramps, muscle weakness and stiffness.   Gastrointestinal: Negative for constipation, diarrhea, nausea and vomiting.   Neurological: Positive for paresthesias and sensory change. Negative for brief paralysis, disturbances in coordination, focal weakness, numbness and tremors.           Objective:      Physical Exam   Constitutional: She appears well-developed and well-nourished. She is cooperative. No distress.   Cardiovascular:   Pulses:       Popliteal pulses are 1+ on the right side, and 1+ on the left side.        Dorsalis pedis pulses are 1+ on the right side, and 1+ on the left side.        Posterior tibial pulses are 1+ on the right side, and 1+ on the left side.   Capillary refill 3 seconds all toes/distal feet, all toes/both feet warm to touch.      Negative lymphadenopathy bilateral popliteal fossa and tarsal tunnel.     Pulses trace right foot.     <2+ pitting lower extremity edema bilateral.     Musculoskeletal:        Right ankle: Normal. She exhibits normal range of motion, no swelling, no  ecchymosis, no deformity, no laceration and normal pulse. Achilles tendon normal. Achilles tendon exhibits no pain, no defect and normal Cox's test results.    All ten toes without clubbing, cyanosis, or signs of ischemia.  No pain to palpation bilateral lower extremities.  Range of motion, stability, muscle strength, and muscle tone age and health appropriate normal bilateral feet and legs.     Lymphadenopathy:   Negative lymphadenopathy bilateral popliteal fossa and tarsal tunnel.   Neurological: She is alert. She has normal strength. She displays no atrophy and no tremor. A sensory deficit is present. She exhibits normal muscle tone. She displays no seizure activity. Gait normal.   Reflex Scores:       Patellar reflexes are 2+ on the right side and 2+ on the left side.       Achilles reflexes are 2+ on the right side and 2+ on the left side.  Negative tinel sign to percussion sural, superficial peroneal, deep peroneal, saphenous, and posterior tibial nerves right and left ankles and feet.    Decreased/absent vibratory sensation bilateral feet to 128Hz tuning fork.       Skin: Skin is warm, dry and intact. No abrasion, no bruising, no burn, no ecchymosis, no laceration, no lesion and no rash noted. She is not diaphoretic. No cyanosis or erythema. No pallor. Nails show no clubbing.     Wound:  Lateral right ankle    Size:    Pre: 6.5x1.5x0.3cm  Post:6.7x1.8x0.4 cm  Base: granular base with moderate serosanaguinous drainage only.  No pus, tracking, fluctuance,  or cardinal signs infection.       Borders:  Atrophic, flat, pink, blanchable skin edges without undermining.    Wound:  Medial right ankle    Size:    Pre:2.3x0.7x0.2 cm  Post:  2.5x1x0.2 cm    Base:  Granular, pink with moderate serous/serosanaguinous drainage only.  No pus, tracking, fluctuance, malodor, or cardinal signs infection.    Borders:  Atrophic flat, pink, blanchable skin edges without undermining.               Assessment:       Encounter  Diagnoses   Name Primary?    Ankle ulcer, right, with unspecified severity Yes    PAD (peripheral artery disease)     Type 2 diabetes mellitus with diabetic neuropathy, unspecified long term insulin use status          Plan:       Kateryna was seen today for foot ulcer.    Diagnoses and all orders for this visit:    Ankle ulcer, right, with unspecified severity    PAD (peripheral artery disease)    Type 2 diabetes mellitus with diabetic neuropathy, unspecified long term insulin use status      I counseled the patient on her conditions, their implications and medical management.    Debride wound.    .    Home health every other day:    Medial and lateral right ankle wounds:  Cleanse with saline and blot dry.  Cover with wound foam, ABD, kerlix.  Repeat every other day.    Adequate vitamin supplementation, protein intake, and hydration - discussed with patient.    Continue minimal ambulation in sx shoe right, casual shoe left.    Follow-up in about 1 week (around 2/5/2018).

## 2018-01-29 NOTE — PROCEDURES
"Wound Debridement  Date/Time: 1/29/2018 9:32 AM  Performed by: DEMOND ROBISON  Authorized by: DEMOND ROBISON     Time out: Immediately prior to procedure a "time out" was called to verify the correct patient, procedure, equipment, support staff and site/side marked as required.    Consent Done?:  Yes (Verbal)  Local anesthesia used?: No      Wound Details:    Location:  Right foot    Location:  Right Ankle    Type of Debridement:  Excisional (lateral)       Length (cm):  6.7       Area (sq cm):  12.06       Width (cm):  1.8       Percent Debrided (%):  90       Depth (cm):  0.4       Total Area Debrided (sq cm):  10.85    Depth of debridement:  Subcutaneous tissue    Tissue debrided:  Dermis, Epidermis and Subcutaneous    Devitalized tissue debrided:  Biofilm, Exudate, Sough and Fibrin    Instruments:  Nippers    Additional wounds:  1    2nd Wound Details:     Location:  Right foot    Location:  Right Ankle    Location:  Right Ankle    Type of Debridement:  Excisional (medial)       Length (cm):  2.5       Area (sq cm):  2.5       Width (cm):  1       Percent Debrided (%):  90       Depth (cm):  0.3       Total Area Debrided (sq cm):  2.25    Depth of debridement:  Subcutaneous tissue    Tissue debrided:  Subcutaneous, Epidermis and Dermis    Devitalized tissue debrided:  Biofilm, Exudate and Sough    Instruments:  Nippers    Bleeding:  Minimal  Hemostasis Achieved: Yes    Method Used:  Pressure  Patient tolerance:  Patient tolerated the procedure well with no immediate complications      "

## 2018-02-05 ENCOUNTER — OFFICE VISIT (OUTPATIENT)
Dept: PODIATRY | Facility: CLINIC | Age: 68
End: 2018-02-05
Payer: MEDICARE

## 2018-02-05 VITALS — BODY MASS INDEX: 33.43 KG/M2 | WEIGHT: 213 LBS | HEIGHT: 67 IN

## 2018-02-05 DIAGNOSIS — E11.40 TYPE 2 DIABETES MELLITUS WITH DIABETIC NEUROPATHY, UNSPECIFIED LONG TERM INSULIN USE STATUS: ICD-10-CM

## 2018-02-05 DIAGNOSIS — I73.9 PAD (PERIPHERAL ARTERY DISEASE): ICD-10-CM

## 2018-02-05 DIAGNOSIS — L97.319 ANKLE ULCER, RIGHT, WITH UNSPECIFIED SEVERITY: Primary | ICD-10-CM

## 2018-02-05 PROCEDURE — 1159F MED LIST DOCD IN RCRD: CPT | Mod: ,,, | Performed by: PODIATRIST

## 2018-02-05 PROCEDURE — 99213 OFFICE O/P EST LOW 20 MIN: CPT | Mod: S$PBB,,, | Performed by: PODIATRIST

## 2018-02-05 PROCEDURE — 99999 PR PBB SHADOW E&M-EST. PATIENT-LVL II: CPT | Mod: PBBFAC,,, | Performed by: PODIATRIST

## 2018-02-05 PROCEDURE — 99212 OFFICE O/P EST SF 10 MIN: CPT | Mod: PBBFAC,PO | Performed by: PODIATRIST

## 2018-02-05 PROCEDURE — 1125F AMNT PAIN NOTED PAIN PRSNT: CPT | Mod: ,,, | Performed by: PODIATRIST

## 2018-02-05 RX ORDER — BETAMETHASONE DIPROPIONATE 0.5 MG/G
CREAM TOPICAL 2 TIMES DAILY
Qty: 45 G | Refills: 1 | Status: ON HOLD | OUTPATIENT
Start: 2018-02-05 | End: 2021-01-30 | Stop reason: HOSPADM

## 2018-02-05 RX ORDER — CICLOPIROX OLAMINE 7.7 MG/G
CREAM TOPICAL 2 TIMES DAILY
Qty: 90 G | Refills: 2 | Status: SHIPPED | OUTPATIENT
Start: 2018-02-05 | End: 2018-06-11

## 2018-02-05 NOTE — PROGRESS NOTES
Subjective:      Patient ID: Kateryna Connolly is a 67 y.o. female.    Chief Complaint: Foot Ulcer (right foot)    Pt 68 y/o female  has a past medical history of Arthritis; Diabetes mellitus; Heart attack; Hypertension; Neuropathy; and Occasional tremors. Wound right ankle medial and lateral right ankle.  Using santyl to medial and lateral wound daily due to debridement being too painful to endure. S/p revascularization per Dr. Coppola.     Dressing CDI, wound foam, kerlix.      Review of Systems   Constitution: Negative for chills, diaphoresis, fever, malaise/fatigue and night sweats.   Cardiovascular: Positive for leg swelling. Negative for claudication, cyanosis and syncope.   Skin: Positive for poor wound healing. Negative for color change, dry skin, nail changes, rash, suspicious lesions and unusual hair distribution.   Musculoskeletal: Negative for falls, joint pain, joint swelling, muscle cramps, muscle weakness and stiffness.   Gastrointestinal: Negative for constipation, diarrhea, nausea and vomiting.   Neurological: Positive for paresthesias and sensory change. Negative for brief paralysis, disturbances in coordination, focal weakness, numbness and tremors.           Objective:      Physical Exam   Constitutional: She appears well-developed and well-nourished. She is cooperative. No distress.   Cardiovascular:   Pulses:       Popliteal pulses are 1+ on the right side, and 1+ on the left side.        Dorsalis pedis pulses are 1+ on the right side, and 1+ on the left side.        Posterior tibial pulses are 1+ on the right side, and 1+ on the left side.   Capillary refill 3 seconds all toes/distal feet, all toes/both feet warm to touch.      Negative lymphadenopathy bilateral popliteal fossa and tarsal tunnel.     Pulses trace right foot.     <2+ pitting lower extremity edema bilateral.     Musculoskeletal:        Right ankle: Normal. She exhibits normal range of motion, no swelling, no ecchymosis, no  deformity, no laceration and normal pulse. Achilles tendon normal. Achilles tendon exhibits no pain, no defect and normal Cox's test results.    All ten toes without clubbing, cyanosis, or signs of ischemia.  No pain to palpation bilateral lower extremities.  Range of motion, stability, muscle strength, and muscle tone age and health appropriate normal bilateral feet and legs.     Lymphadenopathy:   Negative lymphadenopathy bilateral popliteal fossa and tarsal tunnel.   Neurological: She is alert. She has normal strength. She displays no atrophy and no tremor. A sensory deficit is present. She exhibits normal muscle tone. She displays no seizure activity. Gait normal.   Reflex Scores:       Patellar reflexes are 2+ on the right side and 2+ on the left side.       Achilles reflexes are 2+ on the right side and 2+ on the left side.  Negative tinel sign to percussion sural, superficial peroneal, deep peroneal, saphenous, and posterior tibial nerves right and left ankles and feet.    Decreased/absent vibratory sensation bilateral feet to 128Hz tuning fork.       Skin: Skin is warm, dry and intact. No abrasion, no bruising, no burn, no ecchymosis, no laceration, no lesion and no rash noted. She is not diaphoretic. No cyanosis or erythema. No pallor. Nails show no clubbing.     Wound:  Lateral right ankle    Size:    Pre: 6.2x2.2x0.5cm    Base: granular base with moderate serosanaguinous drainage only.  No pus, tracking, fluctuance,  or cardinal signs infection.       Borders:  Atrophic, flat, pink, blanchable skin edges without undermining.    Wound:  Medial right ankle    Size:    Pre:2.5x0.8x0.3 cm      Base:  Granular, pink with moderate serous/serosanaguinous drainage only.  No pus, tracking, fluctuance, malodor, or cardinal signs infection.    Borders:  Atrophic flat, pink, blanchable skin edges without undermining.    Periwound areas both wound has dry scale with superficial flakes over an erythematous base   without , pus, tracking, fluctuance, malodor, or cardinal signs infection.               Assessment:       Encounter Diagnoses   Name Primary?    Ankle ulcer, right, with unspecified severity Yes    PAD (peripheral artery disease)     Type 2 diabetes mellitus with diabetic neuropathy, unspecified long term insulin use status          Plan:       Kateryna was seen today for foot ulcer.    Diagnoses and all orders for this visit:    Ankle ulcer, right, with unspecified severity    PAD (peripheral artery disease)    Type 2 diabetes mellitus with diabetic neuropathy, unspecified long term insulin use status    Other orders  -     betamethasone dipropionate (DIPROLENE) 0.05 % cream; Apply topically 2 (two) times daily.  -     ciclopirox (LOPROX) 0.77 % Crea; Apply topically 2 (two) times daily.      I counseled the patient on her conditions, their implications and medical management.    Rx loprox gel, betamethazone cream.      Order dermagraft for next visit.  This was working well and must be resumed.  .    Home health every other day:    Medial and lateral right ankle wounds:  Cleanse with saline and blot dry.  Cover with wound foam, ABD, kerlix.  Repeat every other day.    Apply loprox gel and diprolene cream to periwound area both wounds at dressing changes.    Adequate vitamin supplementation, protein intake, and hydration - discussed with patient.    Continue minimal ambulation in sx shoe right, casual shoe left.    Follow-up in about 1 week (around 2/12/2018).

## 2018-02-06 ENCOUNTER — TELEPHONE (OUTPATIENT)
Dept: PODIATRY | Facility: CLINIC | Age: 68
End: 2018-02-06

## 2018-02-06 NOTE — TELEPHONE ENCOUNTER
Clinical note faxed to [facility]. Discussed Rx loprox gel, betamethazone cream to be used on medial ankle. Verbalized understanding.

## 2018-02-06 NOTE — TELEPHONE ENCOUNTER
----- Message from Yasmeen Wilson sent at 2/6/2018  3:27 PM CST -----  Patient stated that [facility] has not received orders/stated that they are coming out tomorrow/please call patient back at [phone] to advise.

## 2018-02-14 ENCOUNTER — OFFICE VISIT (OUTPATIENT)
Dept: PODIATRY | Facility: CLINIC | Age: 68
End: 2018-02-14
Payer: MEDICARE

## 2018-02-14 VITALS — WEIGHT: 213 LBS | HEIGHT: 67 IN | BODY MASS INDEX: 33.43 KG/M2

## 2018-02-14 DIAGNOSIS — L97.319 ANKLE ULCER, RIGHT, WITH UNSPECIFIED SEVERITY: Primary | ICD-10-CM

## 2018-02-14 DIAGNOSIS — I73.9 PAD (PERIPHERAL ARTERY DISEASE): ICD-10-CM

## 2018-02-14 DIAGNOSIS — E11.40 TYPE 2 DIABETES MELLITUS WITH DIABETIC NEUROPATHY, UNSPECIFIED LONG TERM INSULIN USE STATUS: ICD-10-CM

## 2018-02-14 PROCEDURE — 99499 UNLISTED E&M SERVICE: CPT | Mod: S$PBB,,, | Performed by: PODIATRIST

## 2018-02-14 PROCEDURE — 99212 OFFICE O/P EST SF 10 MIN: CPT | Mod: PBBFAC,PO | Performed by: PODIATRIST

## 2018-02-14 PROCEDURE — 15275 SKIN SUB GRAFT FACE/NK/HF/G: CPT | Mod: PBBFAC,PO | Performed by: PODIATRIST

## 2018-02-14 PROCEDURE — 99999 PR PBB SHADOW E&M-EST. PATIENT-LVL II: CPT | Mod: PBBFAC,,, | Performed by: PODIATRIST

## 2018-02-14 PROCEDURE — 15275 SKIN SUB GRAFT FACE/NK/HF/G: CPT | Mod: S$PBB,,, | Performed by: PODIATRIST

## 2018-02-14 RX ORDER — INSULIN DEGLUDEC 100 U/ML
5 INJECTION, SOLUTION SUBCUTANEOUS DAILY
COMMUNITY
Start: 2018-02-12 | End: 2020-03-16 | Stop reason: SDUPTHER

## 2018-02-14 NOTE — PROGRESS NOTES
Subjective:      Patient ID: Kateryna Connolly is a 67 y.o. female.    Chief Complaint: Foot Ulcer (right)    Pt 66 y/o female  has a past medical history of Arthritis; Diabetes mellitus; Heart attack; Hypertension; Neuropathy; and Occasional tremors. Wound right ankle medial and lateral right ankle.  Using santyl to medial and lateral wound daily due to debridement being too painful to endure. S/p revascularization per Dr. Coppola.     Loprox and diprolene greatly improved periwound erythema.    Dermagraft ready today.    Review of Systems   Constitution: Negative for chills, diaphoresis, fever, malaise/fatigue and night sweats.   Cardiovascular: Positive for leg swelling. Negative for claudication, cyanosis and syncope.   Skin: Positive for poor wound healing. Negative for color change, dry skin, nail changes, rash, suspicious lesions and unusual hair distribution.   Musculoskeletal: Negative for falls, joint pain, joint swelling, muscle cramps, muscle weakness and stiffness.   Gastrointestinal: Negative for constipation, diarrhea, nausea and vomiting.   Neurological: Positive for paresthesias and sensory change. Negative for brief paralysis, disturbances in coordination, focal weakness, numbness and tremors.           Objective:      Physical Exam   Constitutional: She appears well-developed and well-nourished. She is cooperative. No distress.   Cardiovascular:   Pulses:       Popliteal pulses are 1+ on the right side, and 1+ on the left side.        Dorsalis pedis pulses are 1+ on the right side, and 1+ on the left side.        Posterior tibial pulses are 1+ on the right side, and 1+ on the left side.   Capillary refill 3 seconds all toes/distal feet, all toes/both feet warm to touch.      Negative lymphadenopathy bilateral popliteal fossa and tarsal tunnel.     Pulses trace right foot.     <2+ pitting lower extremity edema bilateral.     Musculoskeletal:        Right ankle: Normal. She exhibits normal range of  motion, no swelling, no ecchymosis, no deformity, no laceration and normal pulse. Achilles tendon normal. Achilles tendon exhibits no pain, no defect and normal Cox's test results.    All ten toes without clubbing, cyanosis, or signs of ischemia.  No pain to palpation bilateral lower extremities.  Range of motion, stability, muscle strength, and muscle tone age and health appropriate normal bilateral feet and legs.     Lymphadenopathy:   Negative lymphadenopathy bilateral popliteal fossa and tarsal tunnel.   Neurological: She is alert. She has normal strength. She displays no atrophy and no tremor. A sensory deficit is present. She exhibits normal muscle tone. She displays no seizure activity. Gait normal.   Reflex Scores:       Patellar reflexes are 2+ on the right side and 2+ on the left side.       Achilles reflexes are 2+ on the right side and 2+ on the left side.  Negative tinel sign to percussion sural, superficial peroneal, deep peroneal, saphenous, and posterior tibial nerves right and left ankles and feet.    Decreased/absent vibratory sensation bilateral feet to 128Hz tuning fork.       Skin: Skin is warm, dry and intact. No abrasion, no bruising, no burn, no ecchymosis, no laceration, no lesion and no rash noted. She is not diaphoretic. No cyanosis or erythema. No pallor. Nails show no clubbing.     Wound:  Lateral right ankle    Size:    Measurement: 5.4x1.7x0.3cm    Base: granular base moderate serosanaguinous drainage only.  No pus, tracking, fluctuance,  or cardinal signs infection.  No visible tendon or bone presently.    Borders:  Atrophic, flat, pink, blanchable skin edges without undermining.    Wound:  Medial right ankle    Size:    Measurement:1.5x0.4x0.3 cm      Base:  Granular, pink with moderate serous/serosanaguinous drainage only.  No pus, tracking, fluctuance, malodor, or cardinal signs infection.    Borders:  Atrophic flat, pink, blanchable skin edges without undermining.                Assessment:       Encounter Diagnoses   Name Primary?    Ankle ulcer, right, with unspecified severity Yes    PAD (peripheral artery disease)     Type 2 diabetes mellitus with diabetic neuropathy, unspecified long term insulin use status          Plan:       Kateryna was seen today for foot ulcer.    Diagnoses and all orders for this visit:    Ankle ulcer, right, with unspecified severity    PAD (peripheral artery disease)    Type 2 diabetes mellitus with diabetic neuropathy, unspecified long term insulin use status      I counseled the patient on her conditions, their implications and medical management.    Wound right ankle laterally was prepped with removal of all non viable tissue and minimal peripheral bleeding of edges and crosshatched wound base.    Dermagraft was cut to size with  peripheral overlap circumferentially such that total contact between wound surface and deep product surface were in apposition , secured in place with steri strips, and covered with adaptic, wound gel, mepilex foam, and [id number], and football dressing.  Ambulate minimally in sx shoe.  Lot #:  [id number], EXP:  2018-05-24.  All unused product was discarded in red bag container - there was no unused product.    Home health every other day:    Medial right ankle wound:  Hold santyl.  Cleanse with saline and blot dry.  Apply richard to base.  Cover with wound foam, kerlix.  Repeat every other day.    Follow-up in about 1 week (around 2/21/2018).

## 2018-02-19 ENCOUNTER — TELEPHONE (OUTPATIENT)
Dept: PODIATRY | Facility: CLINIC | Age: 68
End: 2018-02-19

## 2018-02-19 ENCOUNTER — OFFICE VISIT (OUTPATIENT)
Dept: PODIATRY | Facility: CLINIC | Age: 68
End: 2018-02-19
Payer: MEDICARE

## 2018-02-19 VITALS — WEIGHT: 213 LBS | BODY MASS INDEX: 33.43 KG/M2 | HEIGHT: 67 IN

## 2018-02-19 DIAGNOSIS — L97.319 ANKLE ULCER, RIGHT, WITH UNSPECIFIED SEVERITY: Primary | ICD-10-CM

## 2018-02-19 DIAGNOSIS — E11.40 TYPE 2 DIABETES MELLITUS WITH DIABETIC NEUROPATHY, UNSPECIFIED LONG TERM INSULIN USE STATUS: ICD-10-CM

## 2018-02-19 DIAGNOSIS — I73.9 PAD (PERIPHERAL ARTERY DISEASE): ICD-10-CM

## 2018-02-19 PROCEDURE — 1159F MED LIST DOCD IN RCRD: CPT | Mod: ,,, | Performed by: PODIATRIST

## 2018-02-19 PROCEDURE — 1125F AMNT PAIN NOTED PAIN PRSNT: CPT | Mod: ,,, | Performed by: PODIATRIST

## 2018-02-19 PROCEDURE — 99212 OFFICE O/P EST SF 10 MIN: CPT | Mod: S$PBB,,, | Performed by: PODIATRIST

## 2018-02-19 PROCEDURE — 99213 OFFICE O/P EST LOW 20 MIN: CPT | Mod: PBBFAC,PO | Performed by: PODIATRIST

## 2018-02-19 PROCEDURE — 99999 PR PBB SHADOW E&M-EST. PATIENT-LVL III: CPT | Mod: PBBFAC,,, | Performed by: PODIATRIST

## 2018-02-19 NOTE — TELEPHONE ENCOUNTER
Spoke with Rosemarie, wound care orders:  Wound gel, 4x4 filler, aubrey foam, kerlix, lateral right ankle.  Do not change or wet same.   Home health every other day:  Medial right ankle wound:   Cleanse with saline and blot dry.  Apply richard to base.  Cover with wound foam, kerlix.  Repeat every other day.  Follow-up in about 1 week (around 2/26/2018).  Adequate vitamin supplementation, protein intake, and hydration - discussed with patient.   Advised that coban can be used however to not stretch because it can become tight on patients leg.

## 2018-02-19 NOTE — PROGRESS NOTES
Subjective:      Patient ID: Kateryna Connolly is a 67 y.o. female.    Chief Complaint: Foot Ulcer (right leg, S/P Dermagraft)    Pt 68 y/o female  has a past medical history of Arthritis; Diabetes mellitus; Heart attack; Hypertension; Neuropathy; and Occasional tremors. Wound right ankle medial and lateral right ankle.  Using santyl to medial and lateral wound daily due to debridement being too painful to endure. S/p revascularization per Dr. Coppola.     Loprox and diprolene greatly improved periwound erythema.    Dermagraft ready today.    Review of Systems   Constitution: Negative for chills, diaphoresis, fever, malaise/fatigue and night sweats.   Cardiovascular: Positive for leg swelling. Negative for claudication, cyanosis and syncope.   Skin: Positive for poor wound healing. Negative for color change, dry skin, nail changes, rash, suspicious lesions and unusual hair distribution.   Musculoskeletal: Negative for falls, joint pain, joint swelling, muscle cramps, muscle weakness and stiffness.   Gastrointestinal: Negative for constipation, diarrhea, nausea and vomiting.   Neurological: Positive for paresthesias and sensory change. Negative for brief paralysis, disturbances in coordination, focal weakness, numbness and tremors.           Objective:      Physical Exam   Constitutional: She appears well-developed and well-nourished. She is cooperative. No distress.   Cardiovascular:   Pulses:       Popliteal pulses are 1+ on the right side, and 1+ on the left side.        Dorsalis pedis pulses are 1+ on the right side, and 1+ on the left side.        Posterior tibial pulses are 1+ on the right side, and 1+ on the left side.   Capillary refill 3 seconds all toes/distal feet, all toes/both feet warm to touch.      Negative lymphadenopathy bilateral popliteal fossa and tarsal tunnel.     Pulses trace right foot.     <2+ pitting lower extremity edema bilateral.     Musculoskeletal:        Right ankle: Normal. She  exhibits normal range of motion, no swelling, no ecchymosis, no deformity, no laceration and normal pulse. Achilles tendon normal. Achilles tendon exhibits no pain, no defect and normal Cox's test results.    All ten toes without clubbing, cyanosis, or signs of ischemia.  No pain to palpation bilateral lower extremities.  Range of motion, stability, muscle strength, and muscle tone age and health appropriate normal bilateral feet and legs.     Lymphadenopathy:   Negative lymphadenopathy bilateral popliteal fossa and tarsal tunnel.   Neurological: She is alert. She has normal strength. She displays no atrophy and no tremor. A sensory deficit is present. She exhibits normal muscle tone. She displays no seizure activity. Gait normal.   Reflex Scores:       Patellar reflexes are 2+ on the right side and 2+ on the left side.       Achilles reflexes are 2+ on the right side and 2+ on the left side.  Negative tinel sign to percussion sural, superficial peroneal, deep peroneal, saphenous, and posterior tibial nerves right and left ankles and feet.    Decreased/absent vibratory sensation bilateral feet to 128Hz tuning fork.         Skin: Skin is warm, dry and intact. No abrasion, no bruising, no burn, no ecchymosis, no laceration, no lesion and no rash noted. She is not diaphoretic. No cyanosis or erythema. No pallor. Nails show no clubbing.     Wound:  Lateral right ankle    Size:    Measurement: 5.8x2x0.3cm    Base: granular base moderate serosanaguinous drainage only.  No pus, tracking, fluctuance,  or cardinal signs infection.  No visible tendon or bone presently.    Borders:  Atrophic, flat, pink, blanchable skin edges without undermining.    Wound:  Medial right ankle    Size:    Measurement:1.5x0.6x0.2 cm      Base:  Granular, pink with moderate serous/serosanaguinous drainage only.  No pus, tracking, fluctuance, malodor, or cardinal signs infection.    Borders:  Atrophic flat, pink, blanchable skin edges without  undermining.               Assessment:       Encounter Diagnoses   Name Primary?    Ankle ulcer, right, with unspecified severity Yes    PAD (peripheral artery disease)     Type 2 diabetes mellitus with diabetic neuropathy, unspecified long term insulin use status          Plan:       Kateryna was seen today for foot ulcer.    Diagnoses and all orders for this visit:    Ankle ulcer, right, with unspecified severity    PAD (peripheral artery disease)    Type 2 diabetes mellitus with diabetic neuropathy, unspecified long term insulin use status      I counseled the patient on her conditions, their implications and medical management.    Wound gel, 4x4 filler, aubrey foam, kerlix, lateral right ankle.  Do not change or wet same.    Home health every other day:    Medial right ankle wound:   Cleanse with saline and blot dry.  Apply richard to base.  Cover with wound foam, kerlix.  Repeat every other day.    Follow-up in about 1 week (around 2/26/2018).    Adequate vitamin supplementation, protein intake, and hydration - discussed with patient.

## 2018-02-26 ENCOUNTER — OFFICE VISIT (OUTPATIENT)
Dept: PODIATRY | Facility: CLINIC | Age: 68
End: 2018-02-26
Payer: MEDICARE

## 2018-02-26 VITALS — HEIGHT: 67 IN | WEIGHT: 213 LBS | BODY MASS INDEX: 33.43 KG/M2

## 2018-02-26 DIAGNOSIS — E11.40 TYPE 2 DIABETES MELLITUS WITH DIABETIC NEUROPATHY, UNSPECIFIED LONG TERM INSULIN USE STATUS: ICD-10-CM

## 2018-02-26 DIAGNOSIS — L97.319 ANKLE ULCER, RIGHT, WITH UNSPECIFIED SEVERITY: Primary | ICD-10-CM

## 2018-02-26 DIAGNOSIS — I73.9 PAD (PERIPHERAL ARTERY DISEASE): ICD-10-CM

## 2018-02-26 PROCEDURE — 11042 DBRDMT SUBQ TIS 1ST 20SQCM/<: CPT | Mod: PBBFAC,PO | Performed by: PODIATRIST

## 2018-02-26 PROCEDURE — 99499 UNLISTED E&M SERVICE: CPT | Mod: S$PBB,,, | Performed by: PODIATRIST

## 2018-02-26 PROCEDURE — 11042 DBRDMT SUBQ TIS 1ST 20SQCM/<: CPT | Mod: S$PBB,,, | Performed by: PODIATRIST

## 2018-02-26 PROCEDURE — 99213 OFFICE O/P EST LOW 20 MIN: CPT | Mod: PBBFAC,PO,25 | Performed by: PODIATRIST

## 2018-02-26 PROCEDURE — 99999 PR PBB SHADOW E&M-EST. PATIENT-LVL III: CPT | Mod: PBBFAC,,, | Performed by: PODIATRIST

## 2018-02-26 NOTE — PROCEDURES
"Wound Debridement  Date/Time: 2/26/2018 10:52 AM  Performed by: DEMOND ROBISON  Authorized by: DEMOND ROBISON     Time out: Immediately prior to procedure a "time out" was called to verify the correct patient, procedure, equipment, support staff and site/side marked as required.    Consent Done?:  Yes (Verbal)  Local anesthesia used?: No      Wound Details:    Location:  Right foot    Location:  Right Ankle    Type of Debridement:  Excisional       Length (cm):  1.8       Area (sq cm):  1.26       Width (cm):  0.7       Percent Debrided (%):  100       Depth (cm):  0.3       Total Area Debrided (sq cm):  1.26    Depth of debridement:  Subcutaneous tissue    Tissue debrided:  Dermis, Epidermis and Subcutaneous    Devitalized tissue debrided:  Biofilm, Callus, Fibrin and Sough    Instruments:  Blade, Curette and Nippers    Bleeding:  Minimal  Hemostasis Achieved: Yes    Method Used:  Pressure  Patient tolerance:  Patient tolerated the procedure well with no immediate complications      "

## 2018-02-26 NOTE — PROGRESS NOTES
Subjective:      Patient ID: Kateryna Connolly is a 67 y.o. female.    Chief Complaint: Foot Ulcer (right)    Pt 68 y/o female  has a past medical history of Arthritis; Diabetes mellitus; Heart attack; Hypertension; Neuropathy; and Occasional tremors. Wound right ankle medial and lateral right ankle.  Using richard to medial  wound daily due to debridement being too painful to endure. S/p revascularization per Dr. Coppola.     Right wound lateral side not being changed by HH due to ongoing dermagrafting.    Loprox and diprolene greatly improved periwound erythema.    Dermagraft not ready today.    Review of Systems   Constitution: Negative for chills, diaphoresis, fever, malaise/fatigue and night sweats.   Cardiovascular: Positive for leg swelling. Negative for claudication, cyanosis and syncope.   Skin: Positive for poor wound healing. Negative for color change, dry skin, nail changes, rash, suspicious lesions and unusual hair distribution.   Musculoskeletal: Negative for falls, joint pain, joint swelling, muscle cramps, muscle weakness and stiffness.   Gastrointestinal: Negative for constipation, diarrhea, nausea and vomiting.   Neurological: Positive for paresthesias and sensory change. Negative for brief paralysis, disturbances in coordination, focal weakness, numbness and tremors.           Objective:      Physical Exam   Constitutional: She appears well-developed and well-nourished. She is cooperative. No distress.   Cardiovascular:   Pulses:       Popliteal pulses are 1+ on the right side, and 1+ on the left side.        Dorsalis pedis pulses are 1+ on the right side, and 1+ on the left side.        Posterior tibial pulses are 1+ on the right side, and 1+ on the left side.   Capillary refill 3 seconds all toes/distal feet, all toes/both feet warm to touch.      Negative lymphadenopathy bilateral popliteal fossa and tarsal tunnel.     Pulses trace right foot.     <2+ pitting lower extremity edema bilateral.      Musculoskeletal:        Right ankle: Normal. She exhibits normal range of motion, no swelling, no ecchymosis, no deformity, no laceration and normal pulse. Achilles tendon normal. Achilles tendon exhibits no pain, no defect and normal Cox's test results.    All ten toes without clubbing, cyanosis, or signs of ischemia.  No pain to palpation bilateral lower extremities.  Range of motion, stability, muscle strength, and muscle tone age and health appropriate normal bilateral feet and legs.     Lymphadenopathy:   Negative lymphadenopathy bilateral popliteal fossa and tarsal tunnel.   Neurological: She is alert. She has normal strength. She displays no atrophy and no tremor. A sensory deficit is present. She exhibits normal muscle tone. She displays no seizure activity. Gait normal.   Reflex Scores:       Patellar reflexes are 2+ on the right side and 2+ on the left side.       Achilles reflexes are 2+ on the right side and 2+ on the left side.  Negative tinel sign to percussion sural, superficial peroneal, deep peroneal, saphenous, and posterior tibial nerves right and left ankles and feet.    Decreased/absent vibratory sensation bilateral feet to 128Hz tuning fork.         Skin: Skin is warm, dry and intact. No abrasion, no bruising, no burn, no ecchymosis, no laceration, no lesion and no rash noted. She is not diaphoretic. No cyanosis or erythema. No pallor. Nails show no clubbing.     Wound:  Lateral right ankle    Size:    Measurement: 5.5x2x0.2cm    Base: granular base moderate serosanaguinous drainage only.  No pus, tracking, fluctuance,  or cardinal signs infection.  No visible tendon or bone presently.    Borders:  Atrophic, flat, pink, blanchable skin edges without undermining.    Wound:  Medial right ankle    Size:    Measurement pre debridement:1.5x0.6x0.2 cm  Post debridement:  1.8x0.7x0.3 cm    Base:  Granular, pink with moderate serous/serosanaguinous drainage only.  No pus, tracking, fluctuance,  malodor, or cardinal signs infection.    Borders:  Atrophic flat, pink, blanchable skin edges without undermining.               Assessment:       Encounter Diagnoses   Name Primary?    Ankle ulcer, right, with unspecified severity Yes    PAD (peripheral artery disease)     Type 2 diabetes mellitus with diabetic neuropathy, unspecified long term insulin use status          Plan:       Kateryna was seen today for foot ulcer.    Diagnoses and all orders for this visit:    Ankle ulcer, right, with unspecified severity    PAD (peripheral artery disease)    Type 2 diabetes mellitus with diabetic neuropathy, unspecified long term insulin use status      I counseled the patient on her conditions, their implications and medical management.    Debride wound medial right ankle.    Wound gel, 4x4 filler, aubrey foam, kerlix, lateral right ankle.  Do not change or wet same.    Home health every other day:    Medial and lateral right ankle wounds:   Cleanse with saline and blot dry.  Apply richard to base.  Cover with 4x4, gauze, wound foam, kerlix.  Repeat every other day.    Follow-up in about 1 week (around 3/5/2018).    Adequate vitamin supplementation, protein intake, and hydration - discussed with patient.

## 2018-03-05 ENCOUNTER — OFFICE VISIT (OUTPATIENT)
Dept: PODIATRY | Facility: CLINIC | Age: 68
End: 2018-03-05
Payer: MEDICARE

## 2018-03-05 VITALS — BODY MASS INDEX: 30.27 KG/M2 | WEIGHT: 192.88 LBS | HEIGHT: 67 IN

## 2018-03-05 DIAGNOSIS — L97.319 ANKLE ULCER, RIGHT, WITH UNSPECIFIED SEVERITY: Primary | ICD-10-CM

## 2018-03-05 DIAGNOSIS — E11.40 TYPE 2 DIABETES MELLITUS WITH DIABETIC NEUROPATHY, UNSPECIFIED LONG TERM INSULIN USE STATUS: ICD-10-CM

## 2018-03-05 DIAGNOSIS — I73.9 PAD (PERIPHERAL ARTERY DISEASE): ICD-10-CM

## 2018-03-05 PROCEDURE — 99499 UNLISTED E&M SERVICE: CPT | Mod: S$PBB,,, | Performed by: PODIATRIST

## 2018-03-05 PROCEDURE — 99999 PR PBB SHADOW E&M-EST. PATIENT-LVL III: CPT | Mod: PBBFAC,,, | Performed by: PODIATRIST

## 2018-03-05 PROCEDURE — 15271 SKIN SUB GRAFT TRNK/ARM/LEG: CPT | Mod: S$PBB,,, | Performed by: PODIATRIST

## 2018-03-05 PROCEDURE — 15275 SKIN SUB GRAFT FACE/NK/HF/G: CPT | Mod: PBBFAC,PO | Performed by: PODIATRIST

## 2018-03-05 PROCEDURE — 99213 OFFICE O/P EST LOW 20 MIN: CPT | Mod: PBBFAC,PO,25 | Performed by: PODIATRIST

## 2018-03-05 NOTE — PROGRESS NOTES
Subjective:      Patient ID: Kateryna Connolly is a 67 y.o. female.    Chief Complaint: Foot Ulcer (right )    Pt 68 y/o female  has a past medical history of Arthritis; Diabetes mellitus; Heart attack; Hypertension; Neuropathy; and Occasional tremors. Wound right ankle medial and lateral right ankle.  Using richard to medial  wound daily due to debridement being too painful to endure. S/p revascularization per Dr. Coppola.     Right wound lateral side not being changed by HH due to ongoing dermagrafting.    Loprox and diprolene greatly improved periwound erythema.    Dermagraft not ready today.    Review of Systems   Constitution: Negative for chills, diaphoresis, fever, malaise/fatigue and night sweats.   Cardiovascular: Positive for leg swelling. Negative for claudication, cyanosis and syncope.   Skin: Positive for poor wound healing. Negative for color change, dry skin, nail changes, rash, suspicious lesions and unusual hair distribution.   Musculoskeletal: Negative for falls, joint pain, joint swelling, muscle cramps, muscle weakness and stiffness.   Gastrointestinal: Negative for constipation, diarrhea, nausea and vomiting.   Neurological: Positive for paresthesias and sensory change. Negative for brief paralysis, disturbances in coordination, focal weakness, numbness and tremors.           Objective:      Physical Exam   Constitutional: She appears well-developed and well-nourished. She is cooperative. No distress.   Cardiovascular:   Pulses:       Popliteal pulses are 1+ on the right side, and 1+ on the left side.        Dorsalis pedis pulses are 1+ on the right side, and 1+ on the left side.        Posterior tibial pulses are 1+ on the right side, and 1+ on the left side.   Capillary refill 3 seconds all toes/distal feet, all toes/both feet warm to touch.      Negative lymphadenopathy bilateral popliteal fossa and tarsal tunnel.     Pulses trace right foot.     <2+ pitting lower extremity edema bilateral.      Musculoskeletal:        Right ankle: Normal. She exhibits normal range of motion, no swelling, no ecchymosis, no deformity, no laceration and normal pulse. Achilles tendon normal. Achilles tendon exhibits no pain, no defect and normal Cox's test results.    All ten toes without clubbing, cyanosis, or signs of ischemia.  No pain to palpation bilateral lower extremities.  Range of motion, stability, muscle strength, and muscle tone age and health appropriate normal bilateral feet and legs.     Lymphadenopathy:   Negative lymphadenopathy bilateral popliteal fossa and tarsal tunnel.   Neurological: She is alert. She has normal strength. She displays no atrophy and no tremor. A sensory deficit is present. She exhibits normal muscle tone. She displays no seizure activity. Gait normal.   Reflex Scores:       Patellar reflexes are 2+ on the right side and 2+ on the left side.       Achilles reflexes are 2+ on the right side and 2+ on the left side.  Negative tinel sign to percussion sural, superficial peroneal, deep peroneal, saphenous, and posterior tibial nerves right and left ankles and feet.    Decreased/absent vibratory sensation bilateral feet to 128Hz tuning fork.      Paresthesias, and burning bilateral feet with no clearly identified trigger or source.     Skin: Skin is warm, dry and intact. No abrasion, no bruising, no burn, no ecchymosis, no laceration, no lesion and no rash noted. She is not diaphoretic. No cyanosis or erythema. No pallor. Nails show no clubbing.     Wound:  Lateral right ankle    Size:    Measurement: 5.7x1.9x0.2cm    Base: granular base moderate serosanaguinous drainage only.  No pus, tracking, fluctuance,  or cardinal signs infection.  No visible tendon or bone presently.    Borders:  Atrophic, flat, pink, blanchable skin edges without undermining.    Wound:  Medial right ankle    Size:    Measurement pre debridement:0x0.5x0.1 cm  Post debridement:  1.3x0.7x0.2 cm    Base:  Granular,  pink with moderate serous/serosanaguinous drainage only.  No pus, tracking, fluctuance, malodor, or cardinal signs infection.    Borders:  Atrophic flat, pink, blanchable skin edges without undermining.               Assessment:       Encounter Diagnoses   Name Primary?    Ankle ulcer, right, with unspecified severity Yes    PAD (peripheral artery disease)     Type 2 diabetes mellitus with diabetic neuropathy, unspecified long term insulin use status          Plan:       Kateryna was seen today for foot ulcer.    Diagnoses and all orders for this visit:    Ankle ulcer, right, with unspecified severity    PAD (peripheral artery disease)    Type 2 diabetes mellitus with diabetic neuropathy, unspecified long term insulin use status      I counseled the patient on her conditions, their implications and medical management.    Debride wound medial right ankle.  Prepared tissue and wound base right lateral ankle with crosshatching.    Dermagraft was shaped to size with overlap circumferentially such that total contact between wound surface and deep product surface were in apposition , secured in place with steri strips, and covered with adaptic, wound gel, mepilex foam, and [id number], and football dressing.  Ambulate minimally in sx shoe.  Lot #:  [id number], EXP: 2018/06/08. No unused product.      Wound gel,  aubrey foam, kerlix, medial right ankle.      Home health every other day:    Medial and lateral right ankle wounds:   Cleanse with saline and blot dry.  Apply richard to base.  Cover with 4x4, gauze, wound foam, kerlix.  Repeat every other day.    Do not change dressing lateral right ankle.    Follow-up in about 1 week (around 3/12/2018).    Adequate vitamin supplementation, protein intake, and hydration - discussed with patient.

## 2018-03-12 ENCOUNTER — OFFICE VISIT (OUTPATIENT)
Dept: PODIATRY | Facility: CLINIC | Age: 68
End: 2018-03-12
Payer: MEDICARE

## 2018-03-12 ENCOUNTER — TELEPHONE (OUTPATIENT)
Dept: PODIATRY | Facility: CLINIC | Age: 68
End: 2018-03-12

## 2018-03-12 VITALS — HEIGHT: 67 IN | WEIGHT: 192 LBS | BODY MASS INDEX: 30.13 KG/M2

## 2018-03-12 DIAGNOSIS — I73.9 PAD (PERIPHERAL ARTERY DISEASE): ICD-10-CM

## 2018-03-12 DIAGNOSIS — E11.40 TYPE 2 DIABETES MELLITUS WITH DIABETIC NEUROPATHY, UNSPECIFIED LONG TERM INSULIN USE STATUS: ICD-10-CM

## 2018-03-12 DIAGNOSIS — L97.319 ANKLE ULCER, RIGHT, WITH UNSPECIFIED SEVERITY: Primary | ICD-10-CM

## 2018-03-12 PROCEDURE — 99999 PR PBB SHADOW E&M-EST. PATIENT-LVL III: CPT | Mod: PBBFAC,,, | Performed by: PODIATRIST

## 2018-03-12 PROCEDURE — 99212 OFFICE O/P EST SF 10 MIN: CPT | Mod: S$PBB,,, | Performed by: PODIATRIST

## 2018-03-12 PROCEDURE — 99213 OFFICE O/P EST LOW 20 MIN: CPT | Mod: PBBFAC,PO | Performed by: PODIATRIST

## 2018-03-12 NOTE — PROGRESS NOTES
Subjective:      Patient ID: Kateryna Connolly is a 67 y.o. female.    Chief Complaint: Foot Ulcer (right ankle)    Pt 66 y/o female  has a past medical history of Arthritis; Diabetes mellitus; Heart attack; Hypertension; Neuropathy; and Occasional tremors. Wound right ankle medial and lateral right ankle.   S/p revascularization per Dr. Coppola.     Right wound lateral side not being changed by HH due to ongoing dermagrafting.    Loprox and diprolene greatly improved periwound erythema.    Review of Systems   Constitution: Negative for chills, diaphoresis, fever, malaise/fatigue and night sweats.   Cardiovascular: Positive for leg swelling. Negative for claudication, cyanosis and syncope.   Skin: Positive for poor wound healing. Negative for color change, dry skin, nail changes, rash, suspicious lesions and unusual hair distribution.   Musculoskeletal: Negative for falls, joint pain, joint swelling, muscle cramps, muscle weakness and stiffness.   Gastrointestinal: Negative for constipation, diarrhea, nausea and vomiting.   Neurological: Positive for paresthesias and sensory change. Negative for brief paralysis, disturbances in coordination, focal weakness, numbness and tremors.           Objective:      Physical Exam   Constitutional: She appears well-developed and well-nourished. She is cooperative. No distress.   Cardiovascular:   Pulses:       Popliteal pulses are 1+ on the right side, and 1+ on the left side.        Dorsalis pedis pulses are 1+ on the right side, and 1+ on the left side.        Posterior tibial pulses are 1+ on the right side, and 1+ on the left side.   Capillary refill 3 seconds all toes/distal feet, all toes/both feet warm to touch.      Negative lymphadenopathy bilateral popliteal fossa and tarsal tunnel.     Pulses trace right foot.     <2+ pitting lower extremity edema bilateral.     Musculoskeletal:        Right ankle: Normal. She exhibits normal range of motion, no swelling, no  ecchymosis, no deformity, no laceration and normal pulse. Achilles tendon normal. Achilles tendon exhibits no pain, no defect and normal Cox's test results.    All ten toes without clubbing, cyanosis, or signs of ischemia.  No pain to palpation bilateral lower extremities.  Range of motion, stability, muscle strength, and muscle tone age and health appropriate normal bilateral feet and legs.     Lymphadenopathy:   Negative lymphadenopathy bilateral popliteal fossa and tarsal tunnel.   Neurological: She is alert. She has normal strength. She displays no atrophy and no tremor. A sensory deficit is present. She exhibits normal muscle tone. She displays no seizure activity. Gait normal.   Reflex Scores:       Patellar reflexes are 2+ on the right side and 2+ on the left side.       Achilles reflexes are 2+ on the right side and 2+ on the left side.  Negative tinel sign to percussion sural, superficial peroneal, deep peroneal, saphenous, and posterior tibial nerves right and left ankles and feet.    Decreased/absent vibratory sensation bilateral feet to 128Hz tuning fork.    Paresthesias, and burning bilateral feet with no clearly identified trigger or source.     Skin: Skin is warm, dry and intact. No abrasion, no bruising, no burn, no ecchymosis, no laceration, no lesion and no rash noted. She is not diaphoretic. No cyanosis or erythema. No pallor. Nails show no clubbing.     Wound:  Lateral right ankle    Size:    Measurement: 2.5x1.3x0.2cm    Base: granular base moderate serosanaguinous drainage only.  No pus, tracking, fluctuance,  or cardinal signs infection.  No visible tendon or bone presently.  Graft in wound base.    Borders:  Atrophic, flat, pink, blanchable skin edges without undermining.    Wound medial right ankle closed today, epithelialized  without ulceration, drainage, pus, tracking, fluctuance, malodor, or cardinal signs infection.                 Assessment:       Encounter Diagnoses   Name  Primary?    Ankle ulcer, right, with unspecified severity Yes    PAD (peripheral artery disease)     Type 2 diabetes mellitus with diabetic neuropathy, unspecified long term insulin use status          Plan:       Kateryna was seen today for foot ulcer.    Diagnoses and all orders for this visit:    Ankle ulcer, right, with unspecified severity    PAD (peripheral artery disease)    Type 2 diabetes mellitus with diabetic neuropathy, unspecified long term insulin use status      I counseled the patient on her conditions, their implications and medical management.    Dressed medial wound with aubrey foam, kerlix, medial right ankle.      Lateral right ankle dressed with wound gel, adaptic, aubrey foam, kerlix.    Do not change dressing lateral right ankle.    Follow-up in about 1 week (around 3/19/2018).    Adequate vitamin supplementation, protein intake, and hydration - discussed with patient.

## 2018-03-19 ENCOUNTER — OFFICE VISIT (OUTPATIENT)
Dept: PODIATRY | Facility: CLINIC | Age: 68
End: 2018-03-19
Payer: MEDICARE

## 2018-03-19 VITALS — HEIGHT: 67 IN | WEIGHT: 192 LBS | BODY MASS INDEX: 30.13 KG/M2

## 2018-03-19 DIAGNOSIS — I73.9 PAD (PERIPHERAL ARTERY DISEASE): ICD-10-CM

## 2018-03-19 DIAGNOSIS — E11.40 TYPE 2 DIABETES MELLITUS WITH DIABETIC NEUROPATHY, UNSPECIFIED LONG TERM INSULIN USE STATUS: ICD-10-CM

## 2018-03-19 DIAGNOSIS — L97.319 ANKLE ULCER, RIGHT, WITH UNSPECIFIED SEVERITY: Primary | ICD-10-CM

## 2018-03-19 DIAGNOSIS — R60.9 EDEMA, UNSPECIFIED TYPE: ICD-10-CM

## 2018-03-19 PROCEDURE — 11042 DBRDMT SUBQ TIS 1ST 20SQCM/<: CPT | Mod: PBBFAC,PO | Performed by: PODIATRIST

## 2018-03-19 PROCEDURE — 99999 PR PBB SHADOW E&M-EST. PATIENT-LVL III: CPT | Mod: PBBFAC,,, | Performed by: PODIATRIST

## 2018-03-19 PROCEDURE — 11042 DBRDMT SUBQ TIS 1ST 20SQCM/<: CPT | Mod: S$PBB,,, | Performed by: PODIATRIST

## 2018-03-19 PROCEDURE — 99499 UNLISTED E&M SERVICE: CPT | Mod: S$PBB,,, | Performed by: PODIATRIST

## 2018-03-19 PROCEDURE — 99213 OFFICE O/P EST LOW 20 MIN: CPT | Mod: PBBFAC,PO | Performed by: PODIATRIST

## 2018-03-19 RX ORDER — AMITRIPTYLINE HYDROCHLORIDE 25 MG/1
25 TABLET, FILM COATED ORAL NIGHTLY
Status: ON HOLD | COMMUNITY
Start: 2018-03-13 | End: 2021-01-30 | Stop reason: HOSPADM

## 2018-03-19 RX ORDER — OXYCODONE AND ACETAMINOPHEN 10; 325 MG/1; MG/1
TABLET ORAL
COMMUNITY
Start: 2018-03-14 | End: 2019-10-04

## 2018-03-19 RX ORDER — PROMETHAZINE HYDROCHLORIDE 25 MG/1
12.5 TABLET ORAL EVERY 4 HOURS PRN
COMMUNITY
Start: 2018-03-13

## 2018-03-19 NOTE — PROGRESS NOTES
Subjective:      Patient ID: Kateryna Connolly is a 67 y.o. female.    Chief Complaint: Foot Ulcer (right)    Pt 68 y/o female  has a past medical history of Arthritis; Diabetes mellitus; Heart attack; Hypertension; Neuropathy; and Occasional tremors. Wound right ankle medial and lateral right ankle.   S/p revascularization per Dr. Coppola - having US today.     Right wound lateral side not being changed by HH due to ongoing dermagrafting.    Medial ankle wound right closed last visit.    Loprox and diprolene greatly improved periwound erythema.    Review of Systems   Constitution: Negative for chills, diaphoresis, fever, malaise/fatigue and night sweats.   Cardiovascular: Positive for leg swelling. Negative for claudication, cyanosis and syncope.   Skin: Positive for poor wound healing. Negative for color change, dry skin, nail changes, rash, suspicious lesions and unusual hair distribution.   Musculoskeletal: Negative for falls, joint pain, joint swelling, muscle cramps, muscle weakness and stiffness.   Gastrointestinal: Negative for constipation, diarrhea, nausea and vomiting.   Neurological: Positive for paresthesias and sensory change. Negative for brief paralysis, disturbances in coordination, focal weakness, numbness and tremors.           Objective:      Physical Exam   Constitutional: She appears well-developed and well-nourished. She is cooperative. No distress.   Cardiovascular:   Pulses:       Popliteal pulses are 1+ on the right side, and 1+ on the left side.        Dorsalis pedis pulses are 1+ on the right side, and 1+ on the left side.        Posterior tibial pulses are 1+ on the right side, and 1+ on the left side.   Capillary refill 3 seconds all toes/distal feet, all toes/both feet warm to touch.      Negative lymphadenopathy bilateral popliteal fossa and tarsal tunnel.     Pulses trace right foot.     <2+ pitting lower extremity edema bilateral.     Musculoskeletal:        Right ankle: Normal. She  exhibits normal range of motion, no swelling, no ecchymosis, no deformity, no laceration and normal pulse. Achilles tendon normal. Achilles tendon exhibits no pain, no defect and normal Cox's test results.    All ten toes without clubbing, cyanosis, or signs of ischemia.  No pain to palpation bilateral lower extremities.  Range of motion, stability, muscle strength, and muscle tone age and health appropriate normal bilateral feet and legs.     Lymphadenopathy:   Negative lymphadenopathy bilateral popliteal fossa and tarsal tunnel.   Neurological: She is alert. She has normal strength. She displays no atrophy and no tremor. A sensory deficit is present. She exhibits normal muscle tone. She displays no seizure activity. Gait normal.   Reflex Scores:       Patellar reflexes are 2+ on the right side and 2+ on the left side.       Achilles reflexes are 2+ on the right side and 2+ on the left side.  Negative tinel sign to percussion sural, superficial peroneal, deep peroneal, saphenous, and posterior tibial nerves right and left ankles and feet.    Decreased/absent vibratory sensation bilateral feet to 128Hz tuning fork.    Paresthesias, and burning bilateral feet with no clearly identified trigger or source.     Skin: Skin is warm, dry and intact. No abrasion, no bruising, no burn, no ecchymosis, no laceration, no lesion and no rash noted. She is not diaphoretic. No cyanosis or erythema. No pallor. Nails show no clubbing.     Wound:  Lateral right ankle    Size:    Measurement:Pre: 2.5x1.3x0.1cm  Post:  3.4x1.6x0.2 cm    Base: granular base with thick hypertrophic biofilm moderate serosanaguinous drainage only.  No pus, tracking, fluctuance,  or cardinal signs infection.  No visible tendon or bone presently.  Graft in wound base.    Borders:  Atrophic, flat, pink, blanchable skin edges without undermining.    Wound:  Medial right ankle    Size:    Pre:1.6x0.4x0.1 cm      Base:  Granular, pink with moderate  serous/serosanaguinous drainage only.  No pus, tracking, fluctuance, malodor, or cardinal signs infection.    Borders:  Hyperkeratotic, debriding to flat, pink, blanchable skin edges without undermining.                   Assessment:       Encounter Diagnoses   Name Primary?    Ankle ulcer, right, with unspecified severity Yes    PAD (peripheral artery disease)     Type 2 diabetes mellitus with diabetic neuropathy, unspecified long term insulin use status     Edema, unspecified type          Plan:       Kateryna was seen today for foot ulcer.    Diagnoses and all orders for this visit:    Ankle ulcer, right, with unspecified severity    PAD (peripheral artery disease)    Type 2 diabetes mellitus with diabetic neuropathy, unspecified long term insulin use status    Edema, unspecified type      I counseled the patient on her conditions, their implications and medical management.    Dressed medial wound with hydrofera blue, kerlix, medial right ankle.      Lateral right ankle dressed with santyl gel, aubrey foam, kerlix.    HH:  Change both dressings as above with tubigrip to lightly evenly compress every other day.    Follow-up in about 1 week (around 3/26/2018).    Adequate vitamin supplementation, protein intake, and hydration - discussed with patient.

## 2018-03-19 NOTE — PROCEDURES
"Wound Debridement  Date/Time: 3/19/2018 10:47 AM  Performed by: DEMOND ROBISON  Authorized by: DEMOND ROBISON     Time out: Immediately prior to procedure a "time out" was called to verify the correct patient, procedure, equipment, support staff and site/side marked as required.    Consent Done?:  Yes (Verbal)  Local anesthesia used?: No      Wound Details:    Location:  Right foot    Location:  Right Ankle (lateral)    Type of Debridement:  Excisional       Length (cm):  3.4       Area (sq cm):  5.44       Width (cm):  1.6       Percent Debrided (%):  100       Depth (cm):  0.2       Total Area Debrided (sq cm):  5.44    Depth of debridement:  Subcutaneous tissue    Tissue debrided:  Dermis, Epidermis and Subcutaneous    Devitalized tissue debrided:  Biofilm, Exudate, Necrotic/Eschar and Sough    Instruments:  Nippers    Additional wounds:  1    2nd Wound Details:     Location:  Right foot    Location:  Right Ankle (medial)    Location:  Right Ankle (medial)       Length (cm):  1.6       Area (sq cm):  0.64       Width (cm):  0.4       Percent Debrided (%):  0       Depth (cm):  0.1       Total Area Debrided (sq cm):  0    Depth of debridement:  Epidermis/Dermis    Tissue debrided:  Epidermis    Devitalized tissue debrided:  Sough    Instruments:  Nippers    Bleeding:  Minimal  Hemostasis Achieved: Yes    Method Used:  Pressure  Patient tolerance:  Patient tolerated the procedure well with no immediate complications      "

## 2018-03-26 ENCOUNTER — TELEPHONE (OUTPATIENT)
Dept: PODIATRY | Facility: CLINIC | Age: 68
End: 2018-03-26

## 2018-03-26 ENCOUNTER — OFFICE VISIT (OUTPATIENT)
Dept: PODIATRY | Facility: CLINIC | Age: 68
End: 2018-03-26
Payer: MEDICARE

## 2018-03-26 VITALS — WEIGHT: 192 LBS | HEIGHT: 67 IN | BODY MASS INDEX: 30.13 KG/M2

## 2018-03-26 DIAGNOSIS — L97.319 ANKLE ULCER, RIGHT, WITH UNSPECIFIED SEVERITY: Primary | ICD-10-CM

## 2018-03-26 DIAGNOSIS — I73.9 PAD (PERIPHERAL ARTERY DISEASE): ICD-10-CM

## 2018-03-26 DIAGNOSIS — E11.40 TYPE 2 DIABETES MELLITUS WITH DIABETIC NEUROPATHY, UNSPECIFIED LONG TERM INSULIN USE STATUS: ICD-10-CM

## 2018-03-26 PROCEDURE — 99999 PR PBB SHADOW E&M-EST. PATIENT-LVL III: CPT | Mod: PBBFAC,,, | Performed by: PODIATRIST

## 2018-03-26 PROCEDURE — 99213 OFFICE O/P EST LOW 20 MIN: CPT | Mod: PBBFAC,PO | Performed by: PODIATRIST

## 2018-03-26 PROCEDURE — 11042 DBRDMT SUBQ TIS 1ST 20SQCM/<: CPT | Mod: S$PBB,,, | Performed by: PODIATRIST

## 2018-03-26 PROCEDURE — 11042 DBRDMT SUBQ TIS 1ST 20SQCM/<: CPT | Mod: PBBFAC,PO | Performed by: PODIATRIST

## 2018-03-26 PROCEDURE — 99499 UNLISTED E&M SERVICE: CPT | Mod: S$PBB,,, | Performed by: PODIATRIST

## 2018-03-26 NOTE — PROGRESS NOTES
Subjective:      Patient ID: Kateryna Connolly is a 67 y.o. female.    Chief Complaint: Foot Ulcer (right)    Pt 68 y/o female  has a past medical history of Arthritis; Diabetes mellitus; Heart attack; Hypertension; Neuropathy; and Occasional tremors. Wound right ankle medial and lateral right ankle.   S/p revascularization per Dr. Coppola - good  last week - next appt. 6 months.     Right wound lateral side not being changed by HH due to ongoing dermagrafting.        Loprox and diprolene greatly improved periwound erythema.    Review of Systems   Constitution: Negative for chills, diaphoresis, fever, malaise/fatigue and night sweats.   Cardiovascular: Positive for leg swelling. Negative for claudication, cyanosis and syncope.   Skin: Positive for poor wound healing. Negative for color change, dry skin, nail changes, rash, suspicious lesions and unusual hair distribution.   Musculoskeletal: Negative for falls, joint pain, joint swelling, muscle cramps, muscle weakness and stiffness.   Gastrointestinal: Negative for constipation, diarrhea, nausea and vomiting.   Neurological: Positive for paresthesias and sensory change. Negative for brief paralysis, disturbances in coordination, focal weakness, numbness and tremors.           Objective:      Physical Exam   Constitutional: She appears well-developed and well-nourished. She is cooperative. No distress.   Cardiovascular:   Pulses:       Popliteal pulses are 1+ on the right side, and 1+ on the left side.        Dorsalis pedis pulses are 1+ on the right side, and 1+ on the left side.        Posterior tibial pulses are 1+ on the right side, and 1+ on the left side.   Capillary refill 3 seconds all toes/distal feet, all toes/both feet warm to touch.      Negative lymphadenopathy bilateral popliteal fossa and tarsal tunnel.     Pulses trace right foot.     <2+ pitting lower extremity edema bilateral.     Musculoskeletal:        Right ankle: Normal. She exhibits normal  range of motion, no swelling, no ecchymosis, no deformity, no laceration and normal pulse. Achilles tendon normal. Achilles tendon exhibits no pain, no defect and normal Cox's test results.    All ten toes without clubbing, cyanosis, or signs of ischemia.  No pain to palpation bilateral lower extremities.  Range of motion, stability, muscle strength, and muscle tone age and health appropriate normal bilateral feet and legs.     Lymphadenopathy:   Negative lymphadenopathy bilateral popliteal fossa and tarsal tunnel.   Neurological: She is alert. She has normal strength. She displays no atrophy and no tremor. A sensory deficit is present. She exhibits normal muscle tone. She displays no seizure activity. Gait normal.   Reflex Scores:       Patellar reflexes are 2+ on the right side and 2+ on the left side.       Achilles reflexes are 2+ on the right side and 2+ on the left side.  Negative tinel sign to percussion sural, superficial peroneal, deep peroneal, saphenous, and posterior tibial nerves right and left ankles and feet.    Decreased/absent vibratory sensation bilateral feet to 128Hz tuning fork.    Paresthesias, and burning bilateral feet with no clearly identified trigger or source.     Skin: Skin is warm, dry and intact. No abrasion, no bruising, no burn, no ecchymosis, no laceration, no lesion and no rash noted. She is not diaphoretic. No cyanosis or erythema. No pallor. Nails show no clubbing.     Wound:  Lateral right ankle    Size:    Measurement:Pre: 2x1x0.1cm  Post:  2.4x1.4x0.2 cm    Base: granular base with thick hypertrophic biofilm moderate serosanaguinous drainage only.  No pus, tracking, fluctuance,  or cardinal signs infection.  No visible tendon or bone presently.  Graft in wound base.    Borders:  Atrophic, flat, pink, blanchable skin edges without undermining.    Wound:  Medial right ankle    Size:    Pre:0.8x0.7x0.1 cm      Base:  Granular, pink with moderate serous/serosanaguinous  drainage only.  No pus, tracking, fluctuance, malodor, or cardinal signs infection.    Borders:  Hyperkeratotic, debriding to flat, pink, blanchable skin edges without undermining.                   Assessment:       Encounter Diagnoses   Name Primary?    Ankle ulcer, right, with unspecified severity Yes    PAD (peripheral artery disease)     Type 2 diabetes mellitus with diabetic neuropathy, unspecified long term insulin use status          Plan:       Kateryna was seen today for foot ulcer.    Diagnoses and all orders for this visit:    Ankle ulcer, right, with unspecified severity    PAD (peripheral artery disease)    Type 2 diabetes mellitus with diabetic neuropathy, unspecified long term insulin use status      I counseled the patient on her conditions, their implications and medical management.    Debrided lateral wound right ankle.    Dressed medial wound with aubrey foam, kerlix, medial right ankle.      Lateral right ankle dressed with aubrey foam, kerlix.    HH:  Change both dressings as above with tubigrip to lightly evenly compress every other day.    Follow-up in about 1 week (around 4/2/2018).    Adequate vitamin supplementation, protein intake, and hydration - discussed with patient.

## 2018-03-26 NOTE — TELEPHONE ENCOUNTER
----- Message from Trista Fang sent at 3/23/2018  4:20 PM CDT -----  Contact: Damian (physical therapist w/ [facility])  Damian (Physical Therapist w/ [facility]) calling to request an order to extend Physical Therapy for 4 more weeks. Please advise.  Call back Damian   Thanks!

## 2018-03-26 NOTE — TELEPHONE ENCOUNTER
----- Message from Maxwell Hoffman sent at 3/23/2018  4:33 PM CDT -----  Contact: self   Patient want to speak with a nurse regarding wound care appt for Monday. Please call back at [phone] (home)

## 2018-03-26 NOTE — TELEPHONE ENCOUNTER
----- Message from Mis Chase sent at 3/26/2018  8:37 AM CDT -----  Contact: Damian RM nurse called regarding the patient's extension on physical therapy, will take a verbal. Please contact [phone]

## 2018-03-26 NOTE — PROCEDURES
"Wound Debridement  Date/Time: 3/26/2018 9:16 AM  Performed by: DEMOND ROBISON  Authorized by: DEMOND ROBISON     Time out: Immediately prior to procedure a "time out" was called to verify the correct patient, procedure, equipment, support staff and site/side marked as required.    Consent Done?:  Yes (Verbal)  Local anesthesia used?: No      Wound Details:    Location:  Right foot    Location:  Right Ankle (lateral side)    Type of Debridement:  Excisional       Length (cm):  2.4       Area (sq cm):  3.36       Width (cm):  1.4       Percent Debrided (%):  100       Depth (cm):  0.2       Total Area Debrided (sq cm):  3.36    Depth of debridement:  Subcutaneous tissue    Tissue debrided:  Dermis and Epidermis    Devitalized tissue debrided:  Biofilm, Exudate and Sough    Instruments:  Blade and Curette    Bleeding:  Minimal  Hemostasis Achieved: Yes    Method Used:  Pressure  Patient tolerance:  Patient tolerated the procedure well with no immediate complications      "

## 2018-04-02 ENCOUNTER — OFFICE VISIT (OUTPATIENT)
Dept: PODIATRY | Facility: CLINIC | Age: 68
End: 2018-04-02
Payer: MEDICARE

## 2018-04-02 VITALS — BODY MASS INDEX: 30.13 KG/M2 | WEIGHT: 192 LBS | HEIGHT: 67 IN

## 2018-04-02 DIAGNOSIS — L97.319 ANKLE ULCER, RIGHT, WITH UNSPECIFIED SEVERITY: Primary | ICD-10-CM

## 2018-04-02 DIAGNOSIS — E11.40 TYPE 2 DIABETES MELLITUS WITH DIABETIC NEUROPATHY, UNSPECIFIED LONG TERM INSULIN USE STATUS: ICD-10-CM

## 2018-04-02 DIAGNOSIS — I73.9 PAD (PERIPHERAL ARTERY DISEASE): ICD-10-CM

## 2018-04-02 PROCEDURE — 99212 OFFICE O/P EST SF 10 MIN: CPT | Mod: PBBFAC,PO | Performed by: PODIATRIST

## 2018-04-02 PROCEDURE — 15275 SKIN SUB GRAFT FACE/NK/HF/G: CPT | Mod: S$PBB,,, | Performed by: PODIATRIST

## 2018-04-02 PROCEDURE — 99999 PR PBB SHADOW E&M-EST. PATIENT-LVL II: CPT | Mod: PBBFAC,,, | Performed by: PODIATRIST

## 2018-04-02 PROCEDURE — 99499 UNLISTED E&M SERVICE: CPT | Mod: S$PBB,,, | Performed by: PODIATRIST

## 2018-04-02 PROCEDURE — 15275 SKIN SUB GRAFT FACE/NK/HF/G: CPT | Mod: PBBFAC,PO | Performed by: PODIATRIST

## 2018-04-02 NOTE — PROGRESS NOTES
Subjective:      Patient ID: Kateryna Connolly is a 67 y.o. female.    Chief Complaint: Foot Ulcer (right ankle possible derma graft )    Wounds medial and lateral right ankle.  Dressings with aubrey foam, kerlix, light compression.  immobilizing in night splint right, shoe left.  Denies trauma, signs infection.    Review of Systems   Constitution: Negative for chills, fever, malaise/fatigue and night sweats.   Cardiovascular: Positive for leg swelling. Negative for claudication and cyanosis.   Skin: Positive for poor wound healing. Negative for color change, itching, rash and suspicious lesions.   Musculoskeletal: Negative for falls, gout, joint pain and myalgias.   Neurological: Positive for numbness and sensory change.           Objective:      Physical Exam   Constitutional: She appears well-developed and well-nourished. She is cooperative. No distress.   Cardiovascular:   Pulses:       Popliteal pulses are 1+ on the right side, and 1+ on the left side.        Dorsalis pedis pulses are 1+ on the right side, and 1+ on the left side.        Posterior tibial pulses are 1+ on the right side, and 1+ on the left side.   Capillary refill 3 seconds all toes/distal feet, all toes/both feet warm to touch.      Negative lymphadenopathy bilateral popliteal fossa and tarsal tunnel.     Pulses trace right foot.     <2+ pitting lower extremity edema bilateral.     Musculoskeletal:        Right ankle: Normal. She exhibits normal range of motion, no swelling, no ecchymosis, no deformity, no laceration and normal pulse. Achilles tendon normal. Achilles tendon exhibits no pain, no defect and normal Cox's test results.    All ten toes without clubbing, cyanosis, or signs of ischemia.  No pain to palpation bilateral lower extremities.  Range of motion, stability, muscle strength, and muscle tone age and health appropriate normal bilateral feet and legs.     Lymphadenopathy:   Negative lymphadenopathy bilateral popliteal fossa  and tarsal tunnel.   Neurological: She is alert. She has normal strength. She displays no atrophy and no tremor. A sensory deficit is present. She exhibits normal muscle tone. She displays no seizure activity. Gait normal.   Reflex Scores:       Patellar reflexes are 2+ on the right side and 2+ on the left side.       Achilles reflexes are 2+ on the right side and 2+ on the left side.    Decreased/absent vibratory sensation bilateral feet to 128Hz tuning fork.         Skin: Skin is warm, dry and intact. No abrasion, no bruising, no burn, no ecchymosis, no laceration, no lesion and no rash noted. She is not diaphoretic. No cyanosis or erythema. No pallor. Nails show no clubbing.     Wound:  Lateral right ankle    Size:    Measurement:Pre: 1.9x1.6x0.2cm      Base: granular base with moderate serosanaguinous drainage only.  No pus, tracking, fluctuance,  or cardinal signs infection.  No visible tendon or bone presently.     Borders:  Atrophic, flat, pink, blanchable skin edges without undermining.    Wound medial right ankle closed today, epithelialized  without ulceration, drainage, pus, tracking, fluctuance, malodor, or cardinal signs infection.                   Assessment:       Encounter Diagnoses   Name Primary?    Ankle ulcer, right, with unspecified severity Yes    PAD (peripheral artery disease)     Type 2 diabetes mellitus with diabetic neuropathy, unspecified long term insulin use status          Plan:       Kateryna was seen today for foot ulcer.    Diagnoses and all orders for this visit:    Ankle ulcer, right, with unspecified severity    PAD (peripheral artery disease)    Type 2 diabetes mellitus with diabetic neuropathy, unspecified long term insulin use status      I counseled the patient on her conditions, their implications and medical management.    Medial ankle wound right closed - padded with aubrey foam.    Wound lateral right ankle was prepped with sterile curette to clean red granualr base and  capillary ooze at the periphery right lateral ankle.    Dermagraft was placed in the wound with overlap circumferentially such that total contact between wound surface and deep product surface were in apposition , secured in place with steri strips, and covered with adaptic, wound gel, aperture pad, mepilex foam, and [id number], and football type dressing.  Do not change.  Ambulate minimally in sx shoe.  Lot #:  [id number], EXP: 2018-06-30.  No unused product.    Rx fx boot right.  Protect wound/dressing and facilitate return to ambulation soon.        Follow-up in about 1 week (around 4/9/2018).

## 2018-04-09 ENCOUNTER — OFFICE VISIT (OUTPATIENT)
Dept: PODIATRY | Facility: CLINIC | Age: 68
End: 2018-04-09
Payer: MEDICARE

## 2018-04-09 VITALS — BODY MASS INDEX: 30.13 KG/M2 | WEIGHT: 192 LBS | HEIGHT: 67 IN

## 2018-04-09 DIAGNOSIS — E11.40 TYPE 2 DIABETES MELLITUS WITH DIABETIC NEUROPATHY, UNSPECIFIED LONG TERM INSULIN USE STATUS: ICD-10-CM

## 2018-04-09 DIAGNOSIS — I73.9 PAD (PERIPHERAL ARTERY DISEASE): ICD-10-CM

## 2018-04-09 DIAGNOSIS — L97.319 ANKLE ULCER, RIGHT, WITH UNSPECIFIED SEVERITY: Primary | ICD-10-CM

## 2018-04-09 PROCEDURE — 11042 DBRDMT SUBQ TIS 1ST 20SQCM/<: CPT | Mod: S$PBB,,, | Performed by: PODIATRIST

## 2018-04-09 PROCEDURE — 99213 OFFICE O/P EST LOW 20 MIN: CPT | Mod: PBBFAC,PO | Performed by: PODIATRIST

## 2018-04-09 PROCEDURE — 99212 OFFICE O/P EST SF 10 MIN: CPT | Mod: S$PBB,25,, | Performed by: PODIATRIST

## 2018-04-09 PROCEDURE — 11042 DBRDMT SUBQ TIS 1ST 20SQCM/<: CPT | Mod: PBBFAC,PO | Performed by: PODIATRIST

## 2018-04-09 PROCEDURE — 99999 PR PBB SHADOW E&M-EST. PATIENT-LVL III: CPT | Mod: PBBFAC,,, | Performed by: PODIATRIST

## 2018-04-09 NOTE — PROGRESS NOTES
Subjective:      Patient ID: Kateryna Connolly is a 67 y.o. female.    Chief Complaint: Foot Ulcer    Wounds medial and lateral right ankle.  Dressings with aubrey foam, kerlix, light compression.  immobilizing in fracture boot right, shoe left.  Denies trauma, signs infection.    Review of Systems   Constitution: Negative for chills, fever, malaise/fatigue and night sweats.   Cardiovascular: Positive for leg swelling. Negative for claudication and cyanosis.   Skin: Positive for poor wound healing. Negative for color change, itching, rash and suspicious lesions.   Musculoskeletal: Negative for falls, gout, joint pain and myalgias.   Neurological: Positive for numbness and sensory change.           Objective:      Physical Exam   Constitutional: She appears well-developed and well-nourished. She is cooperative. No distress.   Cardiovascular:   Pulses:       Popliteal pulses are 1+ on the right side, and 1+ on the left side.        Dorsalis pedis pulses are 1+ on the right side, and 1+ on the left side.        Posterior tibial pulses are 1+ on the right side, and 1+ on the left side.   Capillary refill 3 seconds all toes/distal feet, all toes/both feet warm to touch.      Negative lymphadenopathy bilateral popliteal fossa and tarsal tunnel.     Pulses trace right foot.     <2+ pitting lower extremity edema bilateral.     Musculoskeletal:        Right ankle: Normal. She exhibits normal range of motion, no swelling, no ecchymosis, no deformity, no laceration and normal pulse. Achilles tendon normal. Achilles tendon exhibits no pain, no defect and normal Cox's test results.    All ten toes without clubbing, cyanosis, or signs of ischemia.  No pain to palpation bilateral lower extremities.  Range of motion, stability, muscle strength, and muscle tone age and health appropriate normal bilateral feet and legs.     Lymphadenopathy:   Negative lymphadenopathy bilateral popliteal fossa and tarsal tunnel.   Neurological:  She is alert. She has normal strength. She displays no atrophy and no tremor. A sensory deficit is present. She exhibits normal muscle tone. She displays no seizure activity. Gait normal.   Reflex Scores:       Patellar reflexes are 2+ on the right side and 2+ on the left side.       Achilles reflexes are 2+ on the right side and 2+ on the left side.    Decreased/absent vibratory sensation bilateral feet to 128Hz tuning fork.         Skin: Skin is warm, dry and intact. No abrasion, no bruising, no burn, no ecchymosis, no laceration, no lesion and no rash noted. She is not diaphoretic. No cyanosis or erythema. No pallor. Nails show no clubbing.     Wound:  Lateral right ankle    Size:    Measurement:Pre: 2.3x1.4x0.1cm      Base: granular base with graft incorporated in the base and moderate serosanaguinous drainage only.  No pus, tracking, fluctuance,  or cardinal signs infection.  No visible tendon or bone presently.     Borders:  Atrophic, flat, pink, blanchable skin edges without undermining.    Wound medial right ankle remains closed today, epithelialized  without ulceration, drainage, pus, tracking, fluctuance, malodor, or cardinal signs infection.                   Assessment:       Encounter Diagnoses   Name Primary?    Ankle ulcer, right, with unspecified severity Yes    PAD (peripheral artery disease)     Type 2 diabetes mellitus with diabetic neuropathy, unspecified long term insulin use status          Plan:       Kateryna was seen today for foot ulcer.    Diagnoses and all orders for this visit:    Ankle ulcer, right, with unspecified severity    PAD (peripheral artery disease)    Type 2 diabetes mellitus with diabetic neuropathy, unspecified long term insulin use status      I counseled the patient on her conditions, their implications and medical management.    Medial ankle wound right closed - padded with aubrey foam.    Dressed lateral right ankle wound with triple antibiotic ointment, adaptic, wound  foam, and football dressing beneath tubigrip- do not remove or wet.    Hold home health.    continue fx boot right.  Protect wound/dressing and facilitate return to ambulation soon.        Follow-up in about 1 week (around 4/16/2018).

## 2018-04-16 ENCOUNTER — OFFICE VISIT (OUTPATIENT)
Dept: PODIATRY | Facility: CLINIC | Age: 68
End: 2018-04-16
Payer: MEDICARE

## 2018-04-16 VITALS — HEIGHT: 67 IN | WEIGHT: 192 LBS | BODY MASS INDEX: 30.13 KG/M2

## 2018-04-16 DIAGNOSIS — L97.319 ANKLE ULCER, RIGHT, WITH UNSPECIFIED SEVERITY: Primary | ICD-10-CM

## 2018-04-16 DIAGNOSIS — E11.40 TYPE 2 DIABETES MELLITUS WITH DIABETIC NEUROPATHY, UNSPECIFIED LONG TERM INSULIN USE STATUS: ICD-10-CM

## 2018-04-16 DIAGNOSIS — I73.9 PAD (PERIPHERAL ARTERY DISEASE): ICD-10-CM

## 2018-04-16 PROCEDURE — 11042 DBRDMT SUBQ TIS 1ST 20SQCM/<: CPT | Mod: PBBFAC,PO | Performed by: PODIATRIST

## 2018-04-16 PROCEDURE — 11042 DBRDMT SUBQ TIS 1ST 20SQCM/<: CPT | Mod: S$PBB,,, | Performed by: PODIATRIST

## 2018-04-16 PROCEDURE — 99499 UNLISTED E&M SERVICE: CPT | Mod: S$PBB,,, | Performed by: PODIATRIST

## 2018-04-16 PROCEDURE — 99213 OFFICE O/P EST LOW 20 MIN: CPT | Mod: PBBFAC,PO | Performed by: PODIATRIST

## 2018-04-16 PROCEDURE — 99999 PR PBB SHADOW E&M-EST. PATIENT-LVL III: CPT | Mod: PBBFAC,,, | Performed by: PODIATRIST

## 2018-04-16 NOTE — PROGRESS NOTES
Subjective:      Patient ID: Kateryna Connolly is a 67 y.o. female.    Chief Complaint: Foot Ulcer (right )    Wounds medial and lateral right ankle.  Dressings with aubrey foam, kerlix, light compression.  immobilizing in fracture boot right, shoe left.  Denies trauma, signs infection.    Review of Systems   Constitution: Negative for chills, diaphoresis, fever, malaise/fatigue and night sweats.   Cardiovascular: Positive for leg swelling. Negative for claudication, cyanosis and syncope.   Skin: Positive for poor wound healing. Negative for color change, dry skin, itching, nail changes, rash, suspicious lesions and unusual hair distribution.   Musculoskeletal: Negative for falls, gout, joint pain, joint swelling, muscle cramps, muscle weakness, myalgias and stiffness.   Gastrointestinal: Negative for constipation, diarrhea, nausea and vomiting.   Neurological: Positive for numbness and sensory change. Negative for brief paralysis, disturbances in coordination, focal weakness, paresthesias and tremors.           Objective:      Physical Exam   Constitutional: She appears well-developed and well-nourished. She is cooperative. No distress.   Cardiovascular:   Pulses:       Popliteal pulses are 1+ on the right side, and 1+ on the left side.        Dorsalis pedis pulses are 1+ on the right side, and 1+ on the left side.        Posterior tibial pulses are 1+ on the right side, and 1+ on the left side.   Capillary refill 3 seconds all toes/distal feet, all toes/both feet warm to touch.      Negative lymphadenopathy bilateral popliteal fossa and tarsal tunnel.     Pulses trace right foot.     <2+ pitting lower extremity edema bilateral.     Musculoskeletal:        Right ankle: Normal. She exhibits normal range of motion, no swelling, no ecchymosis, no deformity, no laceration and normal pulse. Achilles tendon normal. Achilles tendon exhibits no pain, no defect and normal Cox's test results.    All ten toes without  clubbing, cyanosis, or signs of ischemia.  No pain to palpation bilateral lower extremities.  Range of motion, stability, muscle strength, and muscle tone age and health appropriate normal bilateral feet and legs.     Lymphadenopathy:   Negative lymphadenopathy bilateral popliteal fossa and tarsal tunnel.   Neurological: She is alert. She has normal strength. She displays no atrophy and no tremor. A sensory deficit is present. She exhibits normal muscle tone. She displays no seizure activity. Gait normal.   Reflex Scores:       Patellar reflexes are 2+ on the right side and 2+ on the left side.       Achilles reflexes are 2+ on the right side and 2+ on the left side.    Decreased/absent vibratory sensation bilateral feet to 128Hz tuning fork.         Skin: Skin is warm, dry and intact. No abrasion, no bruising, no burn, no ecchymosis, no laceration, no lesion and no rash noted. She is not diaphoretic. No cyanosis or erythema. No pallor. Nails show no clubbing.     Wound:  Lateral right ankle    Size:    Measurement:Pre: 1.5x1x0.2cm      Base: granular base and moderate serosanaguinous drainage only.  No pus, tracking, fluctuance,  or cardinal signs infection.  No visible tendon or bone presently.     Borders:  Atrophic, flat, pink, blanchable skin edges without undermining.    Wound medial right ankle remains closed today, epithelialized  without ulceration, drainage, pus, tracking, fluctuance, malodor, or cardinal signs infection.                   Assessment:       Encounter Diagnoses   Name Primary?    Ankle ulcer, right, with unspecified severity Yes    PAD (peripheral artery disease)     Type 2 diabetes mellitus with diabetic neuropathy, unspecified long term insulin use status          Plan:       Kateryna was seen today for foot ulcer.    Diagnoses and all orders for this visit:    Ankle ulcer, right, with unspecified severity    PAD (peripheral artery disease)    Type 2 diabetes mellitus with diabetic  neuropathy, unspecified long term insulin use status      I counseled the patient on her conditions, their implications and medical management.    Medial ankle wound right closed - padded with aubrey foam.    Dressed lateral right ankle wound with richard, wound foam, and kerlix dressing beneath tubigrip.    Home health:    Cleanse wound base with wound .  Blot dry.  Apply richard to wound base.  Cover with aubrey foam.  Secure with kerlix.  Light compression with ACE or tubigrip from toes to knee right.  Repeat every other day.    continue fx boot right.  Protect wound/dressing and facilitate return to ambulation soon.        Follow-up in about 1 week (around 4/23/2018).

## 2018-04-23 ENCOUNTER — OFFICE VISIT (OUTPATIENT)
Dept: PODIATRY | Facility: CLINIC | Age: 68
End: 2018-04-23
Payer: MEDICARE

## 2018-04-23 ENCOUNTER — INITIAL CONSULT (OUTPATIENT)
Dept: PHYSICAL MEDICINE AND REHAB | Facility: CLINIC | Age: 68
End: 2018-04-23
Payer: MEDICARE

## 2018-04-23 VITALS
BODY MASS INDEX: 30.13 KG/M2 | HEART RATE: 94 BPM | SYSTOLIC BLOOD PRESSURE: 129 MMHG | WEIGHT: 192 LBS | DIASTOLIC BLOOD PRESSURE: 70 MMHG | HEIGHT: 67 IN

## 2018-04-23 VITALS — WEIGHT: 192 LBS | BODY MASS INDEX: 30.13 KG/M2 | HEIGHT: 67 IN

## 2018-04-23 DIAGNOSIS — L97.319 ANKLE ULCER, RIGHT, WITH UNSPECIFIED SEVERITY: Primary | ICD-10-CM

## 2018-04-23 DIAGNOSIS — M54.50 CHRONIC RIGHT-SIDED LOW BACK PAIN WITHOUT SCIATICA: Primary | ICD-10-CM

## 2018-04-23 DIAGNOSIS — I73.9 PAD (PERIPHERAL ARTERY DISEASE): ICD-10-CM

## 2018-04-23 DIAGNOSIS — R60.9 EDEMA, UNSPECIFIED TYPE: ICD-10-CM

## 2018-04-23 DIAGNOSIS — M54.50 LOW BACK PAIN, NON-SPECIFIC: ICD-10-CM

## 2018-04-23 DIAGNOSIS — G89.29 CHRONIC RIGHT-SIDED LOW BACK PAIN WITHOUT SCIATICA: Primary | ICD-10-CM

## 2018-04-23 PROCEDURE — 99212 OFFICE O/P EST SF 10 MIN: CPT | Mod: PBBFAC,27,PO | Performed by: PODIATRIST

## 2018-04-23 PROCEDURE — 15275 SKIN SUB GRAFT FACE/NK/HF/G: CPT | Mod: PBBFAC,PO | Performed by: PODIATRIST

## 2018-04-23 PROCEDURE — 99214 OFFICE O/P EST MOD 30 MIN: CPT | Mod: PBBFAC,PN,25 | Performed by: PHYSICAL MEDICINE & REHABILITATION

## 2018-04-23 PROCEDURE — 99499 UNLISTED E&M SERVICE: CPT | Mod: S$PBB,,, | Performed by: PODIATRIST

## 2018-04-23 PROCEDURE — 99999 PR PBB SHADOW E&M-EST. PATIENT-LVL IV: CPT | Mod: PBBFAC,,, | Performed by: PHYSICAL MEDICINE & REHABILITATION

## 2018-04-23 PROCEDURE — 15275 SKIN SUB GRAFT FACE/NK/HF/G: CPT | Mod: S$PBB,,, | Performed by: PODIATRIST

## 2018-04-23 PROCEDURE — 20552 NJX 1/MLT TRIGGER POINT 1/2: CPT | Mod: S$PBB,,, | Performed by: PHYSICAL MEDICINE & REHABILITATION

## 2018-04-23 PROCEDURE — 20552 NJX 1/MLT TRIGGER POINT 1/2: CPT | Mod: PBBFAC,PN | Performed by: PHYSICAL MEDICINE & REHABILITATION

## 2018-04-23 PROCEDURE — 99999 PR PBB SHADOW E&M-EST. PATIENT-LVL II: CPT | Mod: PBBFAC,,, | Performed by: PODIATRIST

## 2018-04-23 PROCEDURE — 99204 OFFICE O/P NEW MOD 45 MIN: CPT | Mod: 25,S$PBB,, | Performed by: PHYSICAL MEDICINE & REHABILITATION

## 2018-04-23 RX ORDER — LIDOCAINE HYDROCHLORIDE 10 MG/ML
3 INJECTION INFILTRATION; PERINEURAL ONCE
Status: COMPLETED | OUTPATIENT
Start: 2018-04-23 | End: 2018-04-23

## 2018-04-23 RX ADMIN — LIDOCAINE HYDROCHLORIDE 3 ML: 10 INJECTION INFILTRATION; PERINEURAL at 09:04

## 2018-04-23 NOTE — PROGRESS NOTES
Subjective:      Patient ID: Kateryna Connolly is a 67 y.o. female.    Chief Complaint: Foot Ulcer (right)    Wounds medial and lateral right ankle.  Dressings with aubrey foam, kerlix, light compression.  immobilizing in night splint right, shoe left.  Denies trauma, signs infection.    Review of Systems   Constitution: Negative for chills, fever, malaise/fatigue and night sweats.   Cardiovascular: Positive for leg swelling. Negative for claudication and cyanosis.   Skin: Positive for poor wound healing. Negative for color change, itching, rash and suspicious lesions.   Musculoskeletal: Negative for falls, gout, joint pain and myalgias.   Neurological: Positive for numbness and sensory change.           Objective:      Physical Exam   Constitutional: She appears well-developed and well-nourished. She is cooperative. No distress.   Cardiovascular:   Pulses:       Popliteal pulses are 1+ on the right side, and 1+ on the left side.        Dorsalis pedis pulses are 1+ on the right side, and 1+ on the left side.        Posterior tibial pulses are 1+ on the right side, and 1+ on the left side.   Capillary refill 3 seconds all toes/distal feet, all toes/both feet warm to touch.      Negative lymphadenopathy bilateral popliteal fossa and tarsal tunnel.     Pulses trace right foot.     <2+ pitting lower extremity edema bilateral.     Musculoskeletal:        Right ankle: Normal. She exhibits normal range of motion, no swelling, no ecchymosis, no deformity, no laceration and normal pulse. Achilles tendon normal. Achilles tendon exhibits no pain, no defect and normal Cox's test results.    All ten toes without clubbing, cyanosis, or signs of ischemia.  No pain to palpation bilateral lower extremities.  Range of motion, stability, muscle strength, and muscle tone age and health appropriate normal bilateral feet and legs.     Lymphadenopathy:   Negative lymphadenopathy bilateral popliteal fossa and tarsal tunnel.    Neurological: She is alert. She has normal strength. She displays no atrophy and no tremor. A sensory deficit is present. She exhibits normal muscle tone. She displays no seizure activity. Gait normal.   Reflex Scores:       Patellar reflexes are 2+ on the right side and 2+ on the left side.       Achilles reflexes are 2+ on the right side and 2+ on the left side.    Decreased/absent vibratory sensation bilateral feet to 128Hz tuning fork.         Skin: Skin is warm, dry and intact. No abrasion, no bruising, no burn, no ecchymosis, no laceration, no lesion and no rash noted. She is not diaphoretic. No cyanosis or erythema. No pallor. Nails show no clubbing.     Wound:  Lateral right ankle    Size:    Measurement:Pre: 0.9x0.8x0.1cm                         Post:  1.1x1x0.2 cm    Base: granular base with moderate serosanaguinous drainage only.  No pus, tracking, fluctuance,  or cardinal signs infection.       Borders:  Atrophic, flat, pink, blanchable skin edges without undermining.    Wound medial right ankle closed today, epithelialized  without ulceration, drainage, pus, tracking, fluctuance, malodor, or cardinal signs infection.                   Assessment:       No diagnosis found.      Plan:       There are no diagnoses linked to this encounter.  I counseled the patient on her conditions, their implications and medical management.    Medial ankle wound right closed - padded with aubrey foam.    Wound lateral right ankle was prepped with sterile curette to clean red granualr base and capillary ooze at the periphery right lateral ankle.    Dermagraft was placed in the wound with overlap circumferentially such that total contact between wound surface and deep product surface were in apposition , secured in place with steri strips, and covered with adaptic, wound gel, aperture pad, mepilex foam, and [id number], and football type dressing.  Do not change.  Ambulate minimally in sx shoe.  Lot #:  [id number], EXP:  2018-07-12.  No unused product.    Change fracture boots per Dr. Hyman and follow her recommendations for therapy and ambulation return.  Boot will also protect wound/dressing and facilitate return to ambulation soon.        No Follow-up on file.

## 2018-04-23 NOTE — PROGRESS NOTES
HPI:  Patient is a 67 y.o. year old female w. Right sided low back pain radiating to hip for the last year and 4 mos. Pt. Is s/p 3 foot surgeries last year by an outside surgeion d/t foot fracture from a fall. She is s/p ORIF distal fibula w. Good healing. She does have slow healing wounds from surgery for which she receives wound care home health and debridement by dr Alexander. Her home health physical therapy was recently stopped.She was mostly wheelchair bound for a year d/t wounds in foot, she has been weight bearing the last couple of months. Wound doing much better.      Findings: There are 2 cancellous screws coursing through the medial malleolus. There has been interval removal of right distal fibular hardware and syndesmotic screws. Soft tissue swelling is seen in the right ankle. Skin defect noted along the lateral aspect of the lower leg and ankle, similar to previous. Generalized loss of bone mineral density is seen. Calcaneal enthesophyte formation is identified. The talar dome is intact.   Impression       1.  Interval removal of right distal fibular hardware and distal tibiofibular syndesmotic screws. Persistent soft tissue swelling and soft tissue irregularity along the lateral aspect of the distal leg and ankle noted     Findings: There is generalized loss of bone mineral density. An acute, nondisplaced fracture involving the proximal right fibular metaphysis is identified. A severe right ankle fracture is noted. Comminuted, obliquely oriented fracture of the right distal fibula is noted with the medial extent of the fracture line at the level of the tibial plafond. Transversely oriented medial malleolar fracture noted. There is lateral displacement of the fibular fragment and lateral tibiotalar dislocation. Associated eversion of the right ankle is identified. There is soft tissue swelling overlying the lateral right ankle. No soft tissue gas is identified. Calcaneal enthesophyte formation is  identified. No radiopaque foreign body is evident. No definite posterior malleolar fracture identified radiographically.    Lisfranc articulations are maintained. Mild degenerative changes are seen in the midfoot. There is a subtle lucency involving the lateral base of the right first distal phalanx on the AP view, not well demonstrated on the other views. Degenerative changes are seen in the toes. An os trigonum is noted. There is atherosclerosis. There is soft tissue swelling involving the dorsal aspect of the forefoot.   Impression       1.  Acute, severe Mathews B type fracture of the right distal fibula along with a transverse medial malleolar fracture, with associated lateral displacement of the distal fragments and lateral tibiotalar dislocation. Overlying soft tissue swelling is noted.  2. Nondisplaced right proximal fibular metaphyseal fracture.  3. Subtle lucency involving the lateral base of the right first distal phalanx which is favored to represent trabecular variation. Correlation for localized tenderness is recommended, however, as a nondisplaced fracture is possible.  4. Soft tissue swelling involving the lateral ankle and dorsal aspect of the foot     Labs  egfr cr gluc nl    Past Medical History:   Diagnosis Date    Arthritis     Diabetes mellitus     Heart attack     Hypertension     Neuropathy     Occasional tremors      Past Surgical History:   Procedure Laterality Date    CARPAL TUNNEL RELEASE      bilateral    EYE SURGERY      bilat cataract removal with iol implants    GANGLION CYST EXCISION      heart stents      HYSTERECTOMY      KNEE SURGERY Left     SKIN BIOPSY      on side of nose     No family history on file.  Social History     Social History    Marital status: Single     Spouse name: N/A    Number of children: N/A    Years of education: N/A     Social History Main Topics    Smoking status: Current Every Day Smoker    Smokeless tobacco: Never Used    Alcohol use Yes       Comment: drinks one wine cooler daily    Drug use: No    Sexual activity: Not on file     Other Topics Concern    Not on file     Social History Narrative    ** Merged History Encounter **            Review of patient's allergies indicates:   Allergen Reactions    Hydrocodone Hives    Pcn [penicillins] Hives    Pcn [penicillins] Rash       Current Outpatient Prescriptions:     amitriptyline (ELAVIL) 25 MG tablet, , Disp: , Rfl:     apixaban 5 mg Tab, Take 2 tablets (10 mg total) by mouth 2 (two) times daily. (Patient taking differently: Take 5 mg by mouth 2 (two) times daily. ), Disp: 26 tablet, Rfl: 0    atorvastatin (LIPITOR) 80 MG tablet, Take 1 tablet (80 mg total) by mouth every evening., Disp: 30 tablet, Rfl: 11    blood-glucose meter Misc, Use the meter as directed, Disp: , Rfl:     ciclopirox (LOPROX) 0.77 % Crea, Apply topically 2 (two) times daily., Disp: 90 g, Rfl: 2    clopidogrel (PLAVIX) 75 mg tablet, Take 1 tablet (75 mg total) by mouth once daily., Disp: 30 tablet, Rfl: 11    collagenase ointment, Apply topically once daily., Disp: 90 g, Rfl: 0    gabapentin (NEURONTIN) 300 MG capsule, , Disp: , Rfl:     gabapentin (NEURONTIN) 600 MG tablet, , Disp: , Rfl:     insulin aspart (NOVOLOG) 100 unit/mL injection, Inject into the skin as needed for High Blood Sugar., Disp: , Rfl:     insulin glargine (LANTUS) 100 unit/mL injection, Inject 25 Units into the skin every morning. , Disp: , Rfl:     lidocaine HCL 2% (XYLOCAINE) 2 % jelly, Apply topically as needed. Apply once topically every dressing change to right ankle prior to mechanical debridement of wound with coarse gausze., Disp: 30 mL, Rfl: 2    lisinopril (PRINIVIL,ZESTRIL) 2.5 MG tablet, Take 1 tablet (2.5 mg total) by mouth once daily., Disp: 30 tablet, Rfl: 11    metformin (GLUCOPHAGE) 1000 MG tablet, Take 1,000 mg by mouth 2 (two) times daily with meals., Disp: , Rfl:     metoprolol succinate (TOPROL-XL) 25 MG 24 hr  "tablet, Take 1 tablet (25 mg total) by mouth once daily., Disp: 30 tablet, Rfl: 11    oxyCODONE-acetaminophen (PERCOCET)  mg per tablet, , Disp: , Rfl:     promethazine (PHENERGAN) 25 MG tablet, , Disp: , Rfl:     TRESIBA FLEXTOUCH U-100 100 unit/mL (3 mL) InPn, , Disp: , Rfl:     betamethasone dipropionate (DIPROLENE) 0.05 % cream, Apply topically 2 (two) times daily., Disp: 45 g, Rfl: 1      Review of Systems:    No nausea, vomiting, fevers, chills , contipation, diarrhea or sweats,no weight change, occ back stiffness, no chest pain, no sob, no change of bowel or bladder habits,no coordination issues      Physical Exam:      Vitals:    04/23/18 0843   BP: 129/70   Pulse: 94     alert and oriented ×4 follows commands answers all questions appropriately,affect wnl  Manual muscle test 5 out of 5 sensation to light touch grossly intact  +mild tenderness R QL, unlevelled iliac crest +hip hiking  Fair dynamic balance w. walker    No C/C/E lle  Right lower extremity covered w. Bandages and using walking shoe      Assessment:  Lumbar strain d/t gait abnormality  S/p foot surgery complicated by open wounds    Plan:  Lumbar spine xray  Right hip xray  tpi's to right low back today to address musc spasm  Try switching during the daytime to air cam walker boot as it will even out her pelvis more, continue current boot for night time- will make sure this is ok w. Podiatrist   She would benefit from restarting physical therapy through home health, will try to find out the reason why this was stopped. They will work on strengthening, inc rom of ankle and gait training w. Assistive device      pROCEDURE NOTE:  Risk and benefit of trigger point injections given to pt. Injections performed w. A 1.5" 25G needle after sterile prep w. Betadine, verbal consent obtained . NO complications. Right Quadratus Lumborum  AND ILIOCOSTALIS were inJected with a total of 3ML of 1% Lidocaine          Thank you for this interesting " referral.

## 2018-04-30 ENCOUNTER — TELEPHONE (OUTPATIENT)
Dept: PODIATRY | Facility: CLINIC | Age: 68
End: 2018-04-30

## 2018-04-30 ENCOUNTER — OFFICE VISIT (OUTPATIENT)
Dept: PODIATRY | Facility: CLINIC | Age: 68
End: 2018-04-30
Payer: MEDICARE

## 2018-04-30 VITALS — BODY MASS INDEX: 30.13 KG/M2 | HEIGHT: 67 IN | WEIGHT: 192 LBS

## 2018-04-30 DIAGNOSIS — L97.319 ANKLE ULCER, RIGHT, WITH UNSPECIFIED SEVERITY: Primary | ICD-10-CM

## 2018-04-30 DIAGNOSIS — I73.9 PAD (PERIPHERAL ARTERY DISEASE): ICD-10-CM

## 2018-04-30 PROCEDURE — 99212 OFFICE O/P EST SF 10 MIN: CPT | Mod: PBBFAC,PO | Performed by: PODIATRIST

## 2018-04-30 PROCEDURE — 99212 OFFICE O/P EST SF 10 MIN: CPT | Mod: S$PBB,,, | Performed by: PODIATRIST

## 2018-04-30 PROCEDURE — 99999 PR PBB SHADOW E&M-EST. PATIENT-LVL II: CPT | Mod: PBBFAC,,, | Performed by: PODIATRIST

## 2018-04-30 NOTE — TELEPHONE ENCOUNTER
----- Message from Fabiolaeveline Decker sent at 4/30/2018 11:18 AM CDT -----  Confirm if this is a referral that was sent. If so need a face sheet with name, address and phone number, also need an order for home health.  Please fax to [phone].  Any questions call Sol Jane/Jennifer at [phone].

## 2018-04-30 NOTE — PROGRESS NOTES
Subjective:      Patient ID: Kateryna Connolly is a 67 y.o. female.    Chief Complaint: Foot Ulcer (right ankle)    Wound lateral right ankle.  Dressings with dermagraft last week, aubrey foam, kerlix, light compression.  immobilizing in night splint right, shoe left.  Denies trauma, signs infection.    Review of Systems   Constitution: Negative for chills, fever, malaise/fatigue and night sweats.   Cardiovascular: Negative for claudication, cyanosis and leg swelling.   Skin: Positive for poor wound healing. Negative for color change, itching, rash and suspicious lesions.   Musculoskeletal: Negative for falls, gout, joint pain and myalgias.   Neurological: Positive for numbness and sensory change.           Objective:      Physical Exam   Constitutional: She appears well-developed and well-nourished. She is cooperative. No distress.   Cardiovascular:   Pulses:       Popliteal pulses are 1+ on the right side, and 1+ on the left side.        Dorsalis pedis pulses are 1+ on the right side, and 1+ on the left side.        Posterior tibial pulses are 1+ on the right side, and 1+ on the left side.   Capillary refill 3 seconds all toes/distal feet, all toes/both feet warm to touch.      Negative lymphadenopathy bilateral popliteal fossa and tarsal tunnel.     Pulses trace right foot.     <2+ pitting lower extremity edema bilateral.     Musculoskeletal:        Right ankle: Normal. She exhibits normal range of motion, no swelling, no ecchymosis, no deformity, no laceration and normal pulse. Achilles tendon normal. Achilles tendon exhibits no pain, no defect and normal Cox's test results.    All ten toes without clubbing, cyanosis, or signs of ischemia.  No pain to palpation bilateral lower extremities.  Range of motion, stability, muscle strength, and muscle tone age and health appropriate normal bilateral feet and legs.     Lymphadenopathy:   Negative lymphadenopathy bilateral popliteal fossa and tarsal tunnel.    Neurological: She is alert. She has normal strength. She displays no atrophy and no tremor. A sensory deficit is present. She exhibits normal muscle tone. She displays no seizure activity. Gait normal.   Reflex Scores:       Patellar reflexes are 2+ on the right side and 2+ on the left side.       Achilles reflexes are 2+ on the right side and 2+ on the left side.    Decreased/absent vibratory sensation bilateral feet to 128Hz tuning fork.         Skin: Skin is warm, dry and intact. No abrasion, no bruising, no burn, no ecchymosis, no laceration, no lesion and no rash noted. She is not diaphoretic. No cyanosis or erythema. No pallor. Nails show no clubbing.     Wound:  Lateral right ankle    Size:    Measurement:Pre:  1x1x0.2 cm    Base: granular base with moderate serosanaguinous drainage only.  No pus, tracking, fluctuance,  or cardinal signs infection.       Borders:  Atrophic, flat, pink, blanchable skin edges without undermining.    Wound medial right ankle remains closed today, epithelialized  without ulceration, drainage, pus, tracking, fluctuance, malodor, or cardinal signs infection.                   Assessment:       Encounter Diagnoses   Name Primary?    Ankle ulcer, right, with unspecified severity Yes    PAD (peripheral artery disease)          Plan:       Kateryna was seen today for foot ulcer.    Diagnoses and all orders for this visit:    Ankle ulcer, right, with unspecified severity    PAD (peripheral artery disease)      I counseled the patient on her conditions, their implications and medical management.    Medial ankle wound right closed - padded with aubrey foam.    Wound lateral right ankle was cleaned of debris with sterile curette to clean red granualr base and capillary ooze at the periphery right lateral ankle.    Wound was dressed with triple antibiotic, adaptic, aubrey foam, and kerlix with tubi  RLE.    Change fracture boots per Dr. Hyman and follow her recommendations for  therapy and ambulation return.  Boot will also protect wound/dressing and facilitate return to ambulation soon.        Follow-up in about 1 week (around 5/7/2018).

## 2018-05-07 ENCOUNTER — OFFICE VISIT (OUTPATIENT)
Dept: PODIATRY | Facility: CLINIC | Age: 68
End: 2018-05-07
Payer: MEDICARE

## 2018-05-07 VITALS — BODY MASS INDEX: 30.79 KG/M2 | HEIGHT: 67 IN | WEIGHT: 196.19 LBS

## 2018-05-07 DIAGNOSIS — I73.9 PAD (PERIPHERAL ARTERY DISEASE): ICD-10-CM

## 2018-05-07 DIAGNOSIS — L97.319 ANKLE ULCER, RIGHT, WITH UNSPECIFIED SEVERITY: Primary | ICD-10-CM

## 2018-05-07 PROCEDURE — 99212 OFFICE O/P EST SF 10 MIN: CPT | Mod: S$PBB,,, | Performed by: PODIATRIST

## 2018-05-07 PROCEDURE — 99999 PR PBB SHADOW E&M-EST. PATIENT-LVL III: CPT | Mod: PBBFAC,,, | Performed by: PODIATRIST

## 2018-05-07 PROCEDURE — 99213 OFFICE O/P EST LOW 20 MIN: CPT | Mod: PBBFAC,PO | Performed by: PODIATRIST

## 2018-05-07 NOTE — PROGRESS NOTES
Subjective:      Patient ID: Kateryna Connolly is a 67 y.o. female.    Chief Complaint: Foot Ulcer (right ankle)    Wound lateral right ankle.  Dressings with dermagraft 2 weejs ago, aubrey foam, kerlix, light compression last week - CDI.  immobilizing in night splint right, shoe left.  Denies trauma, signs infection.    Review of Systems   Constitution: Negative for chills, fever, malaise/fatigue and night sweats.   Cardiovascular: Negative for claudication, cyanosis and leg swelling.   Skin: Positive for poor wound healing. Negative for color change, itching, rash and suspicious lesions.   Musculoskeletal: Negative for falls, gout, joint pain and myalgias.   Neurological: Positive for numbness and sensory change.           Objective:      Physical Exam   Constitutional: She appears well-developed and well-nourished. She is cooperative. No distress.   Cardiovascular:   Pulses:       Popliteal pulses are 1+ on the right side, and 1+ on the left side.        Dorsalis pedis pulses are 1+ on the right side, and 1+ on the left side.        Posterior tibial pulses are 1+ on the right side, and 1+ on the left side.   Capillary refill 3 seconds all toes/distal feet, all toes/both feet warm to touch.      Negative lymphadenopathy bilateral popliteal fossa and tarsal tunnel.     Pulses trace right foot.     <2+ pitting lower extremity edema bilateral.     Musculoskeletal:        Right ankle: Normal. She exhibits normal range of motion, no swelling, no ecchymosis, no deformity, no laceration and normal pulse. Achilles tendon normal. Achilles tendon exhibits no pain, no defect and normal Cox's test results.    All ten toes without clubbing, cyanosis, or signs of ischemia.  No pain to palpation bilateral lower extremities.  Range of motion, stability, muscle strength, and muscle tone age and health appropriate normal bilateral feet and legs.     Lymphadenopathy:   Negative lymphadenopathy bilateral popliteal fossa and  tarsal tunnel.   Neurological: She is alert. She has normal strength. She displays no atrophy and no tremor. She exhibits normal muscle tone. She displays no seizure activity. Gait normal.   Reflex Scores:       Patellar reflexes are 2+ on the right side and 2+ on the left side.       Achilles reflexes are 2+ on the right side and 2+ on the left side.             Skin: Skin is warm, dry and intact. No abrasion, no bruising, no burn, no ecchymosis, no laceration, no lesion and no rash noted. She is not diaphoretic. No cyanosis or erythema. No pallor. Nails show no clubbing.     Wound:  Lateral right ankle    Size:    Measurement:Pre:  0.8x0.7x0.1 cm    Base: granular base with moderate serosanaguinous drainage only.  No pus, tracking, fluctuance,  or cardinal signs infection.       Borders:  Atrophic, flat, pink, blanchable skin edges without undermining.                       Assessment:       Encounter Diagnoses   Name Primary?    Ankle ulcer, right, with unspecified severity Yes    PAD (peripheral artery disease)          Plan:       Kateryna was seen today for foot ulcer.    Diagnoses and all orders for this visit:    Ankle ulcer, right, with unspecified severity    PAD (peripheral artery disease)      I counseled the patient on her conditions, their implications and medical management.    Medial ankle wound right closed - padded with aubrey foam.    Wound lateral right ankle was cleaned of debris with sterile curette to clean red granualr base and capillary ooze at the periphery right lateral ankle.    Wound was dressed with richard, aubrey foam, and kerlix with tubi  RLE - do not wet or change.    Change fracture boots per Dr. Hyman and follow her recommendations for therapy and ambulation return.  Boot will also protect wound/dressing and facilitate return to ambulation soon.        Follow-up in about 1 week (around 5/14/2018).

## 2018-05-14 ENCOUNTER — OFFICE VISIT (OUTPATIENT)
Dept: PODIATRY | Facility: CLINIC | Age: 68
End: 2018-05-14
Payer: MEDICARE

## 2018-05-14 VITALS — WEIGHT: 196 LBS | BODY MASS INDEX: 30.76 KG/M2 | HEIGHT: 67 IN

## 2018-05-14 DIAGNOSIS — I73.9 PAD (PERIPHERAL ARTERY DISEASE): ICD-10-CM

## 2018-05-14 DIAGNOSIS — L97.319 ANKLE ULCER, RIGHT, WITH UNSPECIFIED SEVERITY: Primary | ICD-10-CM

## 2018-05-14 PROCEDURE — 99212 OFFICE O/P EST SF 10 MIN: CPT | Mod: S$PBB,,, | Performed by: PODIATRIST

## 2018-05-14 PROCEDURE — 99999 PR PBB SHADOW E&M-EST. PATIENT-LVL III: CPT | Mod: PBBFAC,,, | Performed by: PODIATRIST

## 2018-05-14 PROCEDURE — 99213 OFFICE O/P EST LOW 20 MIN: CPT | Mod: PBBFAC,PO | Performed by: PODIATRIST

## 2018-05-14 NOTE — PROGRESS NOTES
Subjective:      Patient ID: Kateryna Connolly is a 67 y.o. female.    Chief Complaint: Foot Ulcer (right)    Wound lateral right ankle.  Dressings with richard, aubrey foam, kerlix, light compression last week - CDI.  immobilizing in night splint right, shoe left.  Denies trauma, signs infection.    Review of Systems   Constitution: Negative for chills, fever, malaise/fatigue and night sweats.   Cardiovascular: Negative for claudication, cyanosis and leg swelling.   Skin: Positive for poor wound healing. Negative for color change, itching, rash and suspicious lesions.   Musculoskeletal: Negative for falls, gout, joint pain and myalgias.   Neurological: Positive for numbness and sensory change.           Objective:      Physical Exam   Constitutional: She appears well-developed and well-nourished. She is cooperative. No distress.   Cardiovascular:   Pulses:       Popliteal pulses are 1+ on the right side, and 1+ on the left side.        Dorsalis pedis pulses are 1+ on the right side, and 1+ on the left side.        Posterior tibial pulses are 1+ on the right side, and 1+ on the left side.   Capillary refill 3 seconds all toes/distal feet, all toes/both feet warm to touch.      Negative lymphadenopathy bilateral popliteal fossa and tarsal tunnel.     Pulses trace right foot.     <2+ pitting lower extremity edema bilateral.     Musculoskeletal:        Right ankle: Normal. She exhibits normal range of motion, no swelling, no ecchymosis, no deformity, no laceration and normal pulse. Achilles tendon normal. Achilles tendon exhibits no pain, no defect and normal Cox's test results.    All ten toes without clubbing, cyanosis, or signs of ischemia.  No pain to palpation bilateral lower extremities.  Range of motion, stability, muscle strength, and muscle tone age and health appropriate normal bilateral feet and legs.     Lymphadenopathy:   Negative lymphadenopathy bilateral popliteal fossa and tarsal tunnel.    Neurological: She is alert. She has normal strength. She displays no atrophy and no tremor. She exhibits normal muscle tone. She displays no seizure activity. Gait normal.   Reflex Scores:       Patellar reflexes are 2+ on the right side and 2+ on the left side.       Achilles reflexes are 2+ on the right side and 2+ on the left side.             Skin: Skin is warm, dry and intact. No abrasion, no bruising, no burn, no ecchymosis, no laceration, no lesion and no rash noted. She is not diaphoretic. No cyanosis or erythema. No pallor. Nails show no clubbing.     Wound:  Lateral right ankle    Size:    Measurement:Pre:  0.6x0.5x0.1 cm    Base: granular base with moderate serosanaguinous drainage only.  No pus, tracking, fluctuance,  or cardinal signs infection.       Borders:  Atrophic, flat, pink, blanchable skin edges without undermining.                       Assessment:       Encounter Diagnoses   Name Primary?    Ankle ulcer, right, with unspecified severity Yes    PAD (peripheral artery disease)          Plan:       Kateryna was seen today for foot ulcer.    Diagnoses and all orders for this visit:    Ankle ulcer, right, with unspecified severity    PAD (peripheral artery disease)      I counseled the patient on her conditions, their implications and medical management.    Medial ankle wound right closed - padded with aubrey foam.    Wound lateral right ankle was cleaned of debris with sterile curette to clean red granualr base and capillary ooze at the periphery right lateral ankle.    Wound was dressed with richard, aubrey foam, and kerlix with tubi  RLE - do not wet or change.        Follow-up in about 1 week (around 5/21/2018).

## 2018-05-21 ENCOUNTER — TELEPHONE (OUTPATIENT)
Dept: PODIATRY | Facility: CLINIC | Age: 68
End: 2018-05-21

## 2018-05-21 ENCOUNTER — OFFICE VISIT (OUTPATIENT)
Dept: PODIATRY | Facility: CLINIC | Age: 68
End: 2018-05-21
Payer: MEDICARE

## 2018-05-21 DIAGNOSIS — Z87.898 HISTORY OF ULCERATION: ICD-10-CM

## 2018-05-21 DIAGNOSIS — R60.9 EDEMA, UNSPECIFIED TYPE: ICD-10-CM

## 2018-05-21 DIAGNOSIS — I73.9 PAD (PERIPHERAL ARTERY DISEASE): Primary | ICD-10-CM

## 2018-05-21 PROCEDURE — 99999 PR PBB SHADOW E&M-EST. PATIENT-LVL II: CPT | Mod: PBBFAC,,, | Performed by: PODIATRIST

## 2018-05-21 PROCEDURE — 99212 OFFICE O/P EST SF 10 MIN: CPT | Mod: PBBFAC,PO | Performed by: PODIATRIST

## 2018-05-21 PROCEDURE — 99212 OFFICE O/P EST SF 10 MIN: CPT | Mod: S$PBB,,, | Performed by: PODIATRIST

## 2018-05-21 NOTE — PROGRESS NOTES
Subjective:      Patient ID: Kateryna Connolly is a 67 y.o. female.    Chief Complaint: Foot Ulcer (right)    Wound lateral right ankle.  Dressings with richard, aubrey foam, kerlix, light compression last week - CDI.  immobilizing in night splint right, shoe left.  Denies trauma, signs infection.    Review of Systems   Constitution: Negative for chills, fever, malaise/fatigue and night sweats.   Cardiovascular: Negative for claudication, cyanosis and leg swelling.   Skin: Positive for poor wound healing. Negative for color change, itching, rash and suspicious lesions.   Musculoskeletal: Negative for falls, gout, joint pain and myalgias.   Neurological: Positive for numbness and sensory change.           Objective:      Physical Exam   Constitutional: She appears well-developed and well-nourished. She is cooperative. No distress.   Cardiovascular:   Pulses:       Popliteal pulses are 1+ on the right side, and 1+ on the left side.        Dorsalis pedis pulses are 1+ on the right side, and 1+ on the left side.        Posterior tibial pulses are 1+ on the right side, and 1+ on the left side.   Capillary refill 3 seconds all toes/distal feet, all toes/both feet warm to touch.      Negative lymphadenopathy bilateral popliteal fossa and tarsal tunnel.     Pulses trace right foot.     <2+ pitting lower extremity edema bilateral.     Musculoskeletal:        Right ankle: Normal. She exhibits normal range of motion, no swelling, no ecchymosis, no deformity, no laceration and normal pulse. Achilles tendon normal. Achilles tendon exhibits no pain, no defect and normal Cox's test results.    All ten toes without clubbing, cyanosis, or signs of ischemia.  No pain to palpation bilateral lower extremities.  Range of motion, stability, muscle strength, and muscle tone age and health appropriate normal bilateral feet and legs.     Lymphadenopathy:   Negative lymphadenopathy bilateral popliteal fossa and tarsal tunnel.    Neurological: She is alert. She has normal strength. She displays no atrophy and no tremor. She exhibits normal muscle tone. She displays no seizure activity. Gait normal.   Reflex Scores:       Patellar reflexes are 2+ on the right side and 2+ on the left side.       Achilles reflexes are 2+ on the right side and 2+ on the left side.             Skin: Skin is warm, dry and intact. No abrasion, no bruising, no burn, no ecchymosis, no laceration, no lesion and no rash noted. She is not diaphoretic. No cyanosis or erythema. No pallor. Nails show no clubbing.     Wound lateral right ankle closed today, epithelialized  without ulceration, drainage, pus, tracking, fluctuance, malodor, or cardinal signs infection.                         Assessment:       Encounter Diagnoses   Name Primary?    PAD (peripheral artery disease) Yes    Edema, unspecified type     History of ulceration          Plan:       Kateryna was seen today for foot ulcer.    Diagnoses and all orders for this visit:    PAD (peripheral artery disease)    Edema, unspecified type    History of ulceration      I counseled the patient on her conditions, their implications and medical management.    Medial ankle wound right closed - padded with aubrey foam.    Wound area right ankle laterally was dressed with  aubrey foam, and kerlix with tubi  RLE - do not wet or change.        Follow-up in about 1 week (around 5/28/2018).

## 2018-05-21 NOTE — TELEPHONE ENCOUNTER
----- Message from Misty Wang RT sent at 5/21/2018  7:10 AM CDT -----  Contact: pt    pt , requesting to check the status of the skin graft, to confirm what time she needs to come in today, thanks.

## 2018-05-29 ENCOUNTER — OFFICE VISIT (OUTPATIENT)
Dept: PODIATRY | Facility: CLINIC | Age: 68
End: 2018-05-29
Payer: MEDICARE

## 2018-05-29 VITALS — HEIGHT: 67 IN | BODY MASS INDEX: 30.76 KG/M2 | WEIGHT: 196 LBS

## 2018-05-29 DIAGNOSIS — I73.9 PAD (PERIPHERAL ARTERY DISEASE): Primary | ICD-10-CM

## 2018-05-29 DIAGNOSIS — Z87.898 HISTORY OF ULCERATION: ICD-10-CM

## 2018-05-29 DIAGNOSIS — R60.9 EDEMA, UNSPECIFIED TYPE: ICD-10-CM

## 2018-05-29 PROCEDURE — 99999 PR PBB SHADOW E&M-EST. PATIENT-LVL III: CPT | Mod: PBBFAC,,, | Performed by: PODIATRIST

## 2018-05-29 PROCEDURE — 99213 OFFICE O/P EST LOW 20 MIN: CPT | Mod: PBBFAC,PN | Performed by: PODIATRIST

## 2018-05-29 PROCEDURE — 99212 OFFICE O/P EST SF 10 MIN: CPT | Mod: S$PBB,,, | Performed by: PODIATRIST

## 2018-05-29 NOTE — PROGRESS NOTES
Subjective:      Patient ID: Kateryna Connolly is a 67 y.o. female.    Chief Complaint: Foot Ulcer (Right ankle )    Wound lateral right ankle.  Dressings with aubrey foam, kerlix, tubigrip light compression last week - CDI.  immobilizing in night splint right, shoe left.  Denies trauma, signs infection.    Review of Systems   Constitution: Negative for chills, fever, malaise/fatigue and night sweats.   Cardiovascular: Negative for claudication, cyanosis and leg swelling.   Skin: Positive for poor wound healing. Negative for color change, itching, rash and suspicious lesions.   Musculoskeletal: Negative for falls, gout, joint pain and myalgias.   Neurological: Positive for numbness and sensory change.           Objective:      Physical Exam   Constitutional: She appears well-developed and well-nourished. She is cooperative. No distress.   Cardiovascular:   Pulses:       Popliteal pulses are 1+ on the right side, and 1+ on the left side.        Dorsalis pedis pulses are 1+ on the right side, and 1+ on the left side.        Posterior tibial pulses are 1+ on the right side, and 1+ on the left side.   Capillary refill 3 seconds all toes/distal feet, all toes/both feet warm to touch.      Negative lymphadenopathy bilateral popliteal fossa and tarsal tunnel.     Pulses trace right foot.     <2+ pitting lower extremity edema bilateral.     Musculoskeletal:        Right ankle: Normal. She exhibits normal range of motion, no swelling, no ecchymosis, no deformity, no laceration and normal pulse. Achilles tendon normal. Achilles tendon exhibits no pain, no defect and normal Cox's test results.    All ten toes without clubbing, cyanosis, or signs of ischemia.  No pain to palpation bilateral lower extremities.  Range of motion, stability, muscle strength, and muscle tone age and health appropriate normal bilateral feet and legs.     Lymphadenopathy:   Negative lymphadenopathy bilateral popliteal fossa and tarsal tunnel.    Neurological: She is alert. She has normal strength. She displays no atrophy and no tremor. She exhibits normal muscle tone. She displays no seizure activity. Gait normal.   Reflex Scores:       Patellar reflexes are 2+ on the right side and 2+ on the left side.       Achilles reflexes are 2+ on the right side and 2+ on the left side.    Diminished/loss of protective sensation all toes bilateral to 10 gram monofilament.       Skin: Skin is warm, dry and intact. No abrasion, no bruising, no burn, no ecchymosis, no laceration, no lesion and no rash noted. She is not diaphoretic. No cyanosis or erythema. No pallor. Nails show no clubbing.     Wound lateral right ankle remains closed today, epithelialized  without ulceration, drainage, pus, tracking, fluctuance, malodor, or cardinal signs infection.                         Assessment:       Encounter Diagnoses   Name Primary?    PAD (peripheral artery disease) Yes    Edema, unspecified type     History of ulceration          Plan:       Kateryna was seen today for foot ulcer.    Diagnoses and all orders for this visit:    PAD (peripheral artery disease)    Edema, unspecified type    History of ulceration      I counseled the patient on her conditions, their implications and medical management.    Medial ankle wound right closed - padded with aubrey foam.    Wound area right ankle laterally was dressed with  aubrey foam, and kerlix with tubi  RLE - do not wet or change.        Follow-up in about 1 week (around 6/5/2018).

## 2018-06-04 ENCOUNTER — OFFICE VISIT (OUTPATIENT)
Dept: PODIATRY | Facility: CLINIC | Age: 68
End: 2018-06-04
Payer: MEDICARE

## 2018-06-04 VITALS — HEIGHT: 67 IN | WEIGHT: 196 LBS | BODY MASS INDEX: 30.76 KG/M2

## 2018-06-04 DIAGNOSIS — Z87.898 HISTORY OF ULCERATION: Primary | ICD-10-CM

## 2018-06-04 DIAGNOSIS — I73.9 PAD (PERIPHERAL ARTERY DISEASE): ICD-10-CM

## 2018-06-04 DIAGNOSIS — R60.9 EDEMA, UNSPECIFIED TYPE: ICD-10-CM

## 2018-06-04 PROCEDURE — 99213 OFFICE O/P EST LOW 20 MIN: CPT | Mod: PBBFAC,PO | Performed by: PODIATRIST

## 2018-06-04 PROCEDURE — 99999 PR PBB SHADOW E&M-EST. PATIENT-LVL III: CPT | Mod: PBBFAC,,, | Performed by: PODIATRIST

## 2018-06-04 PROCEDURE — 99212 OFFICE O/P EST SF 10 MIN: CPT | Mod: S$PBB,,, | Performed by: PODIATRIST

## 2018-06-04 NOTE — PROGRESS NOTES
Subjective:      Patient ID: Kateryna Connolly is a 67 y.o. female.    Chief Complaint: Foot Ulcer (right ankle )    Wound lateral right ankle.  Dressings with aubrey foam, kerlix, tubigrip light compression last week - CDI.  immobilizing in short leg fx boot right, shoe left.  Denies trauma, signs infection.    Review of Systems   Constitution: Negative for chills, fever, malaise/fatigue and night sweats.   Cardiovascular: Positive for leg swelling. Negative for claudication and cyanosis.   Skin: Positive for poor wound healing. Negative for color change, itching, rash and suspicious lesions.   Musculoskeletal: Negative for falls, gout, joint pain and myalgias.   Neurological: Positive for numbness and sensory change.           Objective:      Physical Exam   Constitutional: She appears well-developed and well-nourished. She is cooperative. No distress.   Cardiovascular:   Pulses:       Popliteal pulses are 1+ on the right side, and 1+ on the left side.        Dorsalis pedis pulses are 1+ on the right side, and 1+ on the left side.        Posterior tibial pulses are 1+ on the right side, and 1+ on the left side.   Capillary refill 3 seconds all toes/distal feet, all toes/both feet warm to touch.      Negative lymphadenopathy bilateral popliteal fossa and tarsal tunnel.     Pulses trace right foot.     <2+ pitting lower extremity edema bilateral.     Musculoskeletal:        Right ankle: Normal. She exhibits normal range of motion, no swelling, no ecchymosis, no deformity, no laceration and normal pulse. Achilles tendon normal. Achilles tendon exhibits no pain, no defect and normal Cox's test results.    All ten toes without clubbing, cyanosis, or signs of ischemia.  No pain to palpation bilateral lower extremities.  Range of motion, stability, muscle strength, and muscle tone age and health appropriate normal bilateral feet and legs.     Lymphadenopathy:   Negative lymphadenopathy bilateral popliteal fossa and  tarsal tunnel.   Neurological: She is alert. She has normal strength. She displays no atrophy and no tremor. A sensory deficit is present. She exhibits normal muscle tone. She displays no seizure activity. Gait normal.   Reflex Scores:       Patellar reflexes are 2+ on the right side and 2+ on the left side.       Achilles reflexes are 2+ on the right side and 2+ on the left side.    Diminished/loss of protective sensation all toes bilateral to 10 gram monofilament.       Skin: Skin is warm, dry and intact. No abrasion, no bruising, no burn, no ecchymosis, no laceration, no lesion and no rash noted. She is not diaphoretic. No cyanosis or erythema. No pallor. Nails show no clubbing.     Wound lateral right ankle remains closed today, epithelialized  without ulceration, drainage, pus, tracking, fluctuance, malodor, or cardinal signs infection.                         Assessment:       Encounter Diagnoses   Name Primary?    History of ulceration Yes    PAD (peripheral artery disease)     Edema, unspecified type          Plan:       Kateryna was seen today for foot ulcer.    Diagnoses and all orders for this visit:    History of ulceration    PAD (peripheral artery disease)    Edema, unspecified type      I counseled the patient on her conditions, their implications and medical management.    Medial ankle wound right closed - padded with aubrey foam.    Wound area right ankle laterally was dressed with  aubrey foam, and kerlix with tubi  RLE -change same every other day to protect.     Ambulation to tolerance in fx boot right, shoe left with walker for stability.    Continue follow up with Dr. Hyman.        Follow-up in about 2 weeks (around 6/18/2018).

## 2018-06-07 ENCOUNTER — TELEPHONE (OUTPATIENT)
Dept: PODIATRY | Facility: CLINIC | Age: 68
End: 2018-06-07

## 2018-06-07 NOTE — TELEPHONE ENCOUNTER
"----- Message from Meg Domingo sent at 6/7/2018  1:04 PM CDT -----  Contact: Kateryna  Type: Needs Medical Advice    Who Called:  patient  Symptoms (please be specific):  Asking if can change "dressing"  How long has patient had these symptoms:  manuel  Pharmacy name and phone #:  [facility]  Best Call Back Number: [phone]  Additional Information: states to do this for two weeks and does not have enough supply of Hydrophilic Foam dressing. Asking if can use guaze. Thanks!     "

## 2018-06-08 ENCOUNTER — TELEPHONE (OUTPATIENT)
Dept: PODIATRY | Facility: CLINIC | Age: 68
End: 2018-06-08

## 2018-06-08 NOTE — TELEPHONE ENCOUNTER
----- Message from Yung Huffman sent at 6/7/2018  6:10 PM CDT -----  Contact: PT  Pt is requesting a call back in regards to being seen asap.    Pt can be reached at [phone].    Thank you!

## 2018-06-08 NOTE — TELEPHONE ENCOUNTER
Ms Avendaño stated that her right leg is red and inflamed.  Offered appointment today in [place], She refused. Ms Avendaño stated that she only wanted to see Dr Alexander. Appointment scheduled for 06/11/2018 at 7:45 am. Advised that she should go to the hospital if she has any signs of a fever. Ms Avendaño verbalized understanding.

## 2018-06-11 ENCOUNTER — OFFICE VISIT (OUTPATIENT)
Dept: PODIATRY | Facility: CLINIC | Age: 68
End: 2018-06-11
Payer: MEDICARE

## 2018-06-11 ENCOUNTER — HOSPITAL ENCOUNTER (OUTPATIENT)
Dept: RADIOLOGY | Facility: CLINIC | Age: 68
Discharge: HOME | End: 2018-06-11
Attending: PODIATRIST
Payer: MEDICARE

## 2018-06-11 DIAGNOSIS — M25.571 ACUTE RIGHT ANKLE PAIN: Primary | ICD-10-CM

## 2018-06-11 DIAGNOSIS — I73.9 PVD (PERIPHERAL VASCULAR DISEASE): ICD-10-CM

## 2018-06-11 DIAGNOSIS — R60.9 EDEMA, UNSPECIFIED TYPE: ICD-10-CM

## 2018-06-11 DIAGNOSIS — M25.571 ACUTE RIGHT ANKLE PAIN: ICD-10-CM

## 2018-06-11 DIAGNOSIS — Z87.898 HISTORY OF ULCERATION: ICD-10-CM

## 2018-06-11 DIAGNOSIS — B35.3 TINEA PEDIS OF RIGHT FOOT: ICD-10-CM

## 2018-06-11 PROCEDURE — 99999 PR PBB SHADOW E&M-EST. PATIENT-LVL II: CPT | Mod: PBBFAC,,, | Performed by: PODIATRIST

## 2018-06-11 PROCEDURE — 73610 X-RAY EXAM OF ANKLE: CPT | Mod: TC,FY,PO,RT

## 2018-06-11 PROCEDURE — 73610 X-RAY EXAM OF ANKLE: CPT | Mod: 26,RT,S$GLB, | Performed by: RADIOLOGY

## 2018-06-11 PROCEDURE — 99213 OFFICE O/P EST LOW 20 MIN: CPT | Mod: S$PBB,,, | Performed by: PODIATRIST

## 2018-06-11 PROCEDURE — 99212 OFFICE O/P EST SF 10 MIN: CPT | Mod: PBBFAC,PO,25 | Performed by: PODIATRIST

## 2018-06-11 RX ORDER — CICLOPIROX OLAMINE 7.7 MG/G
CREAM TOPICAL 2 TIMES DAILY
Qty: 90 G | Refills: 2 | Status: ON HOLD | OUTPATIENT
Start: 2018-06-11 | End: 2021-01-30 | Stop reason: HOSPADM

## 2018-06-11 NOTE — PROGRESS NOTES
Subjective:      Patient ID: Kateryna Connolly is a 67 y.o. female.    Chief Complaint: Follow-up (Left leg red and irritated)    Red tender skin and swelling with some red bumps right ankle/leg in area of former ulceration.  Denies trauma, signs infection systemically.    Review of Systems   Constitution: Negative for chills, diaphoresis, fever, malaise/fatigue and night sweats.   Cardiovascular: Positive for leg swelling. Negative for claudication, cyanosis and syncope.   Skin: Positive for poor wound healing. Negative for color change, dry skin, nail changes, rash, suspicious lesions and unusual hair distribution.   Musculoskeletal: Negative for falls, joint pain, joint swelling, muscle cramps, muscle weakness and stiffness.   Gastrointestinal: Negative for constipation, diarrhea, nausea and vomiting.   Neurological: Positive for sensory change. Negative for brief paralysis, disturbances in coordination, focal weakness, numbness, paresthesias and tremors.           Objective:      Physical Exam   Constitutional: She appears well-developed and well-nourished. She is cooperative. No distress.   Cardiovascular:   Pulses:       Popliteal pulses are 1+ on the right side, and 1+ on the left side.        Dorsalis pedis pulses are 1+ on the right side, and 1+ on the left side.        Posterior tibial pulses are 1+ on the right side, and 1+ on the left side.   Capillary refill 3 seconds all toes/distal feet, all toes/both feet warm to touch.      Negative lymphadenopathy bilateral popliteal fossa and tarsal tunnel.     Pulses trace right foot.     <2+ pitting lower extremity edema bilateral.     Musculoskeletal:        Right ankle: Normal. She exhibits normal range of motion, no swelling, no ecchymosis, no deformity, no laceration and normal pulse. Achilles tendon normal. Achilles tendon exhibits no pain, no defect and normal Cox's test results.    All ten toes without clubbing, cyanosis, or signs of ischemia.  No  pain to palpation bilateral lower extremities.  Range of motion, stability, muscle strength, and muscle tone age and health appropriate normal bilateral feet and legs.     Lymphadenopathy:   Negative lymphadenopathy bilateral popliteal fossa and tarsal tunnel.   Neurological: She is alert. She has normal strength. She displays no atrophy and no tremor. A sensory deficit is present. She exhibits normal muscle tone. She displays no seizure activity. Gait normal.   Reflex Scores:       Patellar reflexes are 2+ on the right side and 2+ on the left side.       Achilles reflexes are 2+ on the right side and 2+ on the left side.    Diminished/loss of protective sensation all toes bilateral to 10 gram monofilament.       Skin: Skin is warm, dry and intact. No abrasion, no bruising, no burn, no ecchymosis, no laceration, no lesion and no rash noted. She is not diaphoretic. No cyanosis or erythema. No pallor. Nails show no clubbing.     Wound lateral right ankle remains closed today, epithelialized  without ulceration, drainage, pus, tracking, fluctuance, malodor, or cardinal signs infection.      Moderate dependent rubor both lets/ankle right  without ulceration, drainage, pus, tracking, fluctuance, malodor, or cardinal signs infection.     Dry scale with superficial flakes over an erythematous base  without ulceration, drainage, pus, tracking, fluctuance, malodor, or cardinal signs infection right foot/ankle.                   Assessment:       Encounter Diagnoses   Name Primary?    Acute right ankle pain Yes    PVD (peripheral vascular disease)     History of ulceration     Edema, unspecified type     Tinea pedis of right foot          Plan:       Kateryna was seen today for follow-up.    Diagnoses and all orders for this visit:    Acute right ankle pain  -     X-Ray Ankle Complete Right; Future    PVD (peripheral vascular disease)    History of ulceration    Edema, unspecified type    Tinea pedis of right foot    Other  orders  -     ciclopirox (LOPROX) 0.77 % Crea; Apply topically 2 (two) times daily.      I counseled the patient on her conditions, their implications and medical management.        Use topical betamethazone and loprox cream bid.  Continue compression.    Inspect feet / ankles multiple times daily for signs of occurrence/recurrence ulceration.            Follow-up in about 1 week (around 6/18/2018).

## 2018-06-18 ENCOUNTER — OFFICE VISIT (OUTPATIENT)
Dept: PODIATRY | Facility: CLINIC | Age: 68
End: 2018-06-18
Payer: MEDICARE

## 2018-06-18 ENCOUNTER — OFFICE VISIT (OUTPATIENT)
Dept: FAMILY MEDICINE | Facility: CLINIC | Age: 68
End: 2018-06-18
Payer: MEDICARE

## 2018-06-18 ENCOUNTER — DOCUMENTATION ONLY (OUTPATIENT)
Dept: FAMILY MEDICINE | Facility: CLINIC | Age: 68
End: 2018-06-18

## 2018-06-18 VITALS
RESPIRATION RATE: 16 BRPM | TEMPERATURE: 98 F | DIASTOLIC BLOOD PRESSURE: 64 MMHG | BODY MASS INDEX: 32.11 KG/M2 | HEIGHT: 67 IN | WEIGHT: 204.56 LBS | OXYGEN SATURATION: 99 % | HEART RATE: 89 BPM | SYSTOLIC BLOOD PRESSURE: 126 MMHG

## 2018-06-18 VITALS
WEIGHT: 198.19 LBS | HEART RATE: 96 BPM | SYSTOLIC BLOOD PRESSURE: 130 MMHG | BODY MASS INDEX: 31.11 KG/M2 | DIASTOLIC BLOOD PRESSURE: 74 MMHG | HEIGHT: 67 IN

## 2018-06-18 DIAGNOSIS — L08.9 INFECTED WOUND: Primary | ICD-10-CM

## 2018-06-18 DIAGNOSIS — R60.9 EDEMA, UNSPECIFIED TYPE: ICD-10-CM

## 2018-06-18 DIAGNOSIS — I73.9 PVD (PERIPHERAL VASCULAR DISEASE): ICD-10-CM

## 2018-06-18 DIAGNOSIS — Z87.898 HISTORY OF ULCERATION: Primary | ICD-10-CM

## 2018-06-18 DIAGNOSIS — T14.8XXA INFECTED WOUND: Primary | ICD-10-CM

## 2018-06-18 DIAGNOSIS — M81.0 OSTEOPOROSIS, UNSPECIFIED OSTEOPOROSIS TYPE, UNSPECIFIED PATHOLOGICAL FRACTURE PRESENCE: ICD-10-CM

## 2018-06-18 PROCEDURE — 99999 PR PBB SHADOW E&M-EST. PATIENT-LVL III: CPT | Mod: PBBFAC,,, | Performed by: PODIATRIST

## 2018-06-18 PROCEDURE — 99213 OFFICE O/P EST LOW 20 MIN: CPT | Mod: PBBFAC,PO | Performed by: PODIATRIST

## 2018-06-18 PROCEDURE — 99213 OFFICE O/P EST LOW 20 MIN: CPT | Mod: S$GLB,,, | Performed by: INTERNAL MEDICINE

## 2018-06-18 PROCEDURE — 99212 OFFICE O/P EST SF 10 MIN: CPT | Mod: S$PBB,,, | Performed by: PODIATRIST

## 2018-06-18 RX ORDER — DOXYCYCLINE 100 MG/1
100 CAPSULE ORAL 2 TIMES DAILY
Qty: 14 CAPSULE | Refills: 0 | Status: SHIPPED | OUTPATIENT
Start: 2018-06-18 | End: 2018-07-09

## 2018-06-18 RX ORDER — ERGOCALCIFEROL 1.25 MG/1
50000 CAPSULE ORAL WEEKLY
Qty: 4 CAPSULE | Refills: 11 | Status: SHIPPED | OUTPATIENT
Start: 2018-06-18 | End: 2019-05-08 | Stop reason: SDUPTHER

## 2018-06-18 RX ORDER — ALENDRONATE SODIUM 70 MG/1
70 TABLET ORAL
Qty: 4 TABLET | Refills: 11 | Status: SHIPPED | OUTPATIENT
Start: 2018-06-18 | End: 2019-05-08 | Stop reason: SDUPTHER

## 2018-06-18 NOTE — PROGRESS NOTES
"Subjective:       Patient ID: Kateryna Connolly is a 67 y.o. female.    Chief Complaint: ulcer on leg    HPI         CHIEF COMPLAINT: ulcer r calf  HPI:  Hx of broken ankle 18 mo still in a boot.     ONSET/TIMING: Onset     14 d      ago. Sudden: no.. Work related: no. Similar_problems_in_the_past: no.    DURATION:  Continuous..    QUALITY/COURSE:   worse.     LOCATION:     . r upper calf    INTENSITY/SEVERITY:  Severity is #   8    (10 point scale).    CONTEXT/WHEN: .--Similar problems: no . .  Past treatments: none  . Exposure_to_others_with_similar_symptoms: no . . Exposure_to_poison _ivy: no. .   New exposures (soaps, lotions, laundry detergents, foods, medications, plants, insects or animals). no    SYMPTOMS/RELATED: .--Possible medication side effect:    The following symptoms are positive if BOLD, negative otherwise.     REVIEW OF SYMPTOMS:  Itching.  Pain. Sharp_pain. Dull_pain. Burning_pain.  Erythema-Skin. Hypopigmentation.  hyperpigmentation . Inflammation. Herald_Patch.. fixed . evanescent.  Blisters. Purulence. Fever. Fatigue. Tick_Bites.       Diagnosed with osteoporosis 40 years ago.  She doesn't go out in the sun.  Has had multiple fractures and x-rays show that she is osteopenic at least.        Review of Systems      Objective:      Vitals:    06/18/18 0954   BP: 126/64   Pulse: 89   Resp: 16   Temp: 97.9 °F (36.6 °C)   TempSrc: Oral   SpO2: 99%   Weight: 92.8 kg (204 lb 9.4 oz)   Height: 5' 7" (1.702 m)   PainSc: 0-No pain     Physical Exam   Constitutional: She appears well-developed and well-nourished.   Cardiovascular: Normal rate, regular rhythm and normal heart sounds.    Pulmonary/Chest: Effort normal and breath sounds normal.   Abdominal: Soft. There is no tenderness. A hernia (large left lower quadrant ventral hernia.) is present.   Neurological: She is alert.   Skin:        Psychiatric: She has a normal mood and affect. Her behavior is normal. Thought content normal.   Nursing note and " vitals reviewed.        Assessment:       1. Infected wound          Plan:   Duoderm placed    Infected wound  -     doxycycline (MONODOX) 100 MG capsule; Take 1 capsule (100 mg total) by mouth 2 (two) times daily.  Dispense: 14 capsule; Refill: 0      Follow-up in about 1 week (around 6/25/2018).

## 2018-06-18 NOTE — PATIENT INSTRUCTIONS
Leave the duoderm on for 1 week.    Make a big effort to keep your sugars under control    Warm compresses frequently    Smart temp cold pack for compressing your hernia.    Calcium 600 mg twice a day      Take doxycycline with a piece of bread and drink a full glass of water

## 2018-06-19 DIAGNOSIS — Z13.5 DIABETIC RETINOPATHY SCREENING: ICD-10-CM

## 2018-06-22 DIAGNOSIS — Z12.11 COLON CANCER SCREENING: ICD-10-CM

## 2018-06-22 DIAGNOSIS — Z12.39 BREAST CANCER SCREENING: ICD-10-CM

## 2018-06-22 DIAGNOSIS — Z79.4 TYPE 2 DIABETES MELLITUS WITH COMPLICATION, WITH LONG-TERM CURRENT USE OF INSULIN: Primary | ICD-10-CM

## 2018-06-22 DIAGNOSIS — E11.8 TYPE 2 DIABETES MELLITUS WITH COMPLICATION, WITH LONG-TERM CURRENT USE OF INSULIN: Primary | ICD-10-CM

## 2018-06-25 ENCOUNTER — OFFICE VISIT (OUTPATIENT)
Dept: FAMILY MEDICINE | Facility: CLINIC | Age: 68
End: 2018-06-25
Payer: MEDICARE

## 2018-06-25 VITALS
HEIGHT: 67 IN | OXYGEN SATURATION: 99 % | TEMPERATURE: 98 F | WEIGHT: 203.25 LBS | SYSTOLIC BLOOD PRESSURE: 126 MMHG | HEART RATE: 90 BPM | BODY MASS INDEX: 31.9 KG/M2 | DIASTOLIC BLOOD PRESSURE: 67 MMHG

## 2018-06-25 DIAGNOSIS — L97.911 ULCER OF RIGHT LEG, LIMITED TO BREAKDOWN OF SKIN: Primary | ICD-10-CM

## 2018-06-25 DIAGNOSIS — Z79.01 ANTICOAGULATION MANAGEMENT ENCOUNTER: ICD-10-CM

## 2018-06-25 DIAGNOSIS — I82.401 ACUTE DEEP VEIN THROMBOSIS (DVT) OF RIGHT LOWER EXTREMITY, UNSPECIFIED VEIN: ICD-10-CM

## 2018-06-25 DIAGNOSIS — Z51.81 ANTICOAGULATION MANAGEMENT ENCOUNTER: ICD-10-CM

## 2018-06-25 PROCEDURE — 99213 OFFICE O/P EST LOW 20 MIN: CPT | Mod: S$GLB,,, | Performed by: INTERNAL MEDICINE

## 2018-06-25 RX ORDER — WARFARIN 2 MG/1
5 TABLET ORAL DAILY
Qty: 225 TABLET | Refills: 3 | Status: SHIPPED | OUTPATIENT
Start: 2018-06-25 | End: 2019-06-25

## 2018-06-25 NOTE — PROGRESS NOTES
"Subjective:       Patient ID: Kateryna Connolly is a 67 y.o. female.    Chief Complaint: wound care follow up    HPI     Patient had an ulcer on her right calf.  It's no longer bothering her.  She is a duoderm on it.    Patient has had a history of DVT on the right leg and also left leg in the past..  She is on Eliquis but the price is gone up over $100 she is in the donut hole.  She wants to go back on Coumadin  Review of Systems      Objective:      Vitals:    06/25/18 1045   BP: 126/67   Pulse: 90   Temp: 98.2 °F (36.8 °C)   TempSrc: Oral   SpO2: 99%   Weight: 92.2 kg (203 lb 4.2 oz)   Height: 5' 7" (1.702 m)   PainSc:   2   PainLoc: Hip     Physical Exam   Constitutional: She appears well-developed and well-nourished.   Cardiovascular: Normal rate, regular rhythm and normal heart sounds.    Pulmonary/Chest: Effort normal and breath sounds normal.   Abdominal: Soft. There is no tenderness.   Neurological: She is alert.   Skin:   Ulcer on right calf is completely healed   Psychiatric: She has a normal mood and affect. Her behavior is normal. Thought content normal.   Nursing note and vitals reviewed.        Assessment:       1. Ulcer of right leg, limited to breakdown of skin    2. Acute deep vein thrombosis (DVT) of right lower extremity, unspecified vein    3. Anticoagulation management encounter          Plan:       Ulcer of right leg, limited to breakdown of skin    Acute deep vein thrombosis (DVT) of right lower extremity, unspecified vein  -     warfarin (COUMADIN) 2 MG tablet; Take 2.5 tablets (5 mg total) by mouth Daily.  Dispense: 225 tablet; Refill: 3  -     Protime-INR; Future; Expected date: 07/02/2018    Anticoagulation management encounter  -     warfarin (COUMADIN) 2 MG tablet; Take 2.5 tablets (5 mg total) by mouth Daily.  Dispense: 225 tablet; Refill: 3  -     Protime-INR; Future; Expected date: 07/02/2018      Follow-up in about 1 month (around 7/25/2018).      "

## 2018-07-02 ENCOUNTER — LAB VISIT (OUTPATIENT)
Dept: LAB | Facility: HOSPITAL | Age: 68
End: 2018-07-02
Attending: INTERNAL MEDICINE
Payer: MEDICARE

## 2018-07-02 ENCOUNTER — OFFICE VISIT (OUTPATIENT)
Dept: PODIATRY | Facility: CLINIC | Age: 68
End: 2018-07-02
Payer: MEDICARE

## 2018-07-02 VITALS
WEIGHT: 203.25 LBS | BODY MASS INDEX: 31.9 KG/M2 | HEIGHT: 67 IN | HEART RATE: 82 BPM | DIASTOLIC BLOOD PRESSURE: 61 MMHG | SYSTOLIC BLOOD PRESSURE: 122 MMHG

## 2018-07-02 DIAGNOSIS — Z51.81 ANTICOAGULATION MANAGEMENT ENCOUNTER: ICD-10-CM

## 2018-07-02 DIAGNOSIS — R60.9 EDEMA, UNSPECIFIED TYPE: ICD-10-CM

## 2018-07-02 DIAGNOSIS — I73.9 PVD (PERIPHERAL VASCULAR DISEASE): ICD-10-CM

## 2018-07-02 DIAGNOSIS — I82.401 ACUTE DEEP VEIN THROMBOSIS (DVT) OF RIGHT LOWER EXTREMITY, UNSPECIFIED VEIN: ICD-10-CM

## 2018-07-02 DIAGNOSIS — Z79.01 ANTICOAGULATION MANAGEMENT ENCOUNTER: ICD-10-CM

## 2018-07-02 DIAGNOSIS — Z87.898 HISTORY OF ULCERATION: Primary | ICD-10-CM

## 2018-07-02 LAB
INR PPP: 1.4
PROTHROMBIN TIME: 14.1 SEC

## 2018-07-02 PROCEDURE — 99213 OFFICE O/P EST LOW 20 MIN: CPT | Mod: PBBFAC,PO | Performed by: PODIATRIST

## 2018-07-02 PROCEDURE — 99999 PR PBB SHADOW E&M-EST. PATIENT-LVL III: CPT | Mod: PBBFAC,,, | Performed by: PODIATRIST

## 2018-07-02 PROCEDURE — 85610 PROTHROMBIN TIME: CPT

## 2018-07-02 PROCEDURE — 36415 COLL VENOUS BLD VENIPUNCTURE: CPT | Mod: PO

## 2018-07-02 PROCEDURE — 99212 OFFICE O/P EST SF 10 MIN: CPT | Mod: S$PBB,,, | Performed by: PODIATRIST

## 2018-07-02 RX ORDER — WARFARIN 1 MG/1
1 TABLET ORAL DAILY
Qty: 30 TABLET | Refills: 0 | Status: SHIPPED | OUTPATIENT
Start: 2018-07-02 | End: 2018-08-01 | Stop reason: SDUPTHER

## 2018-07-02 NOTE — PROGRESS NOTES
Subjective:      Patient ID: Kateryna Connolly is a 67 y.o. female.    Chief Complaint: Foot Ulcer (right)    Red tender skin and swelling with some red bumps right ankle/leg in area of former ulceration.  Improved well with topical antifungal.  Denies trauma, signs infection systemically.  xrays - no acute injury - syndesmotic instability s/p multiple orif.      Review of Systems   Constitution: Negative for chills, diaphoresis, fever, malaise/fatigue and night sweats.   Cardiovascular: Positive for leg swelling. Negative for claudication, cyanosis and syncope.   Skin: Positive for poor wound healing. Negative for color change, dry skin, nail changes, rash, suspicious lesions and unusual hair distribution.   Musculoskeletal: Positive for joint pain. Negative for falls, joint swelling, muscle cramps, muscle weakness and stiffness.   Gastrointestinal: Negative for constipation, diarrhea, nausea and vomiting.   Neurological: Positive for sensory change. Negative for brief paralysis, disturbances in coordination, focal weakness, numbness, paresthesias and tremors.           Objective:      Physical Exam   Constitutional: She appears well-developed and well-nourished. She is cooperative. No distress.   Cardiovascular:   Pulses:       Popliteal pulses are 1+ on the right side, and 1+ on the left side.        Dorsalis pedis pulses are 1+ on the right side, and 1+ on the left side.        Posterior tibial pulses are 1+ on the right side, and 1+ on the left side.   Capillary refill 3 seconds all toes/distal feet, all toes/both feet warm to touch.      Negative lymphadenopathy bilateral popliteal fossa and tarsal tunnel.     Pulses trace right foot.     <2+ pitting lower extremity edema bilateral.     Musculoskeletal:        Right ankle: Normal. She exhibits normal range of motion, no swelling, no ecchymosis, no deformity, no laceration and normal pulse. Achilles tendon normal. Achilles tendon exhibits no pain, no defect and  normal Cox's test results.    All ten toes without clubbing, cyanosis, or signs of ischemia.  No pain to palpation bilateral lower extremities.  Range of motion, stability, muscle strength, and muscle tone age and health appropriate normal bilateral feet and legs.     Lymphadenopathy:   Negative lymphadenopathy bilateral popliteal fossa and tarsal tunnel.   Neurological: She is alert. She has normal strength. She displays no atrophy and no tremor. A sensory deficit is present. She exhibits normal muscle tone. She displays no seizure activity. Gait normal.   Reflex Scores:       Patellar reflexes are 2+ on the right side and 2+ on the left side.       Achilles reflexes are 2+ on the right side and 2+ on the left side.    Diminished/loss of protective sensation all toes bilateral to 10 gram monofilament.       Skin: Skin is warm, dry and intact. No abrasion, no bruising, no burn, no ecchymosis, no laceration, no lesion and no rash noted. She is not diaphoretic. No cyanosis or erythema. No pallor. Nails show no clubbing.     Wound lateral right ankle remains closed today, epithelialized  without ulceration, drainage, pus, tracking, fluctuance, malodor, or cardinal signs infection.      Moderate dependent rubor both lets/ankle right  without ulceration, drainage, pus, tracking, fluctuance, malodor, or cardinal signs infection.                        Assessment:       Encounter Diagnoses   Name Primary?    History of ulceration Yes    Edema, unspecified type     PVD (peripheral vascular disease)          Plan:       Kateryna was seen today for foot ulcer.    Diagnoses and all orders for this visit:    History of ulceration    Edema, unspecified type    PVD (peripheral vascular disease)      I counseled the patient on her conditions, their implications and medical management.    Continue topical dressings right ankle with wound foam, kerlix to protect until skin full strength.     Continue compression.    Inspect  feet / ankles multiple times daily for signs of occurrence/recurrence ulceration.    Follow with Dr. Hyman to maximize mobility.            Follow-up if symptoms worsen or fail to improve.

## 2018-07-06 ENCOUNTER — TELEPHONE (OUTPATIENT)
Dept: FAMILY MEDICINE | Facility: CLINIC | Age: 68
End: 2018-07-06

## 2018-07-06 ENCOUNTER — LAB VISIT (OUTPATIENT)
Dept: LAB | Facility: HOSPITAL | Age: 68
End: 2018-07-06
Attending: INTERNAL MEDICINE
Payer: MEDICARE

## 2018-07-06 DIAGNOSIS — Z12.11 COLON CANCER SCREENING: ICD-10-CM

## 2018-07-06 LAB — HEMOCCULT STL QL IA: NEGATIVE

## 2018-07-06 PROCEDURE — 82274 ASSAY TEST FOR BLOOD FECAL: CPT

## 2018-07-06 NOTE — TELEPHONE ENCOUNTER
Spoke to patient and gave her instructions on her warfarin dose.She understood and will do another INR on 07/16/2018 with her other labs.

## 2018-07-06 NOTE — TELEPHONE ENCOUNTER
----- Message from Samantha Sims MA sent at 7/6/2018  2:02 PM CDT -----      ----- Message -----  From: Yasmeen Nettles  Sent: 7/6/2018  10:08 AM  To: Bryn GONZALES Staff    Type: Needs Medical Advice    Who Called:  Patient   Symptoms (please be specific):  n/a  How long has patient had these symptoms:  n/a  Pharmacy name and phone #:  n/a  Best Call Back Number: [phone]  Additional Information:  Contact patient to advise why warfarin (COUMADIN) 1 MG tablet was order for patient ,she is not confused on the dosage, she normally takes a total of 5 mg per day.     Thank you

## 2018-07-09 ENCOUNTER — OFFICE VISIT (OUTPATIENT)
Dept: PHYSICAL MEDICINE AND REHAB | Facility: CLINIC | Age: 68
End: 2018-07-09
Payer: MEDICARE

## 2018-07-09 VITALS
HEIGHT: 67 IN | BODY MASS INDEX: 31.83 KG/M2 | SYSTOLIC BLOOD PRESSURE: 128 MMHG | WEIGHT: 202.81 LBS | HEART RATE: 90 BPM | DIASTOLIC BLOOD PRESSURE: 72 MMHG | RESPIRATION RATE: 15 BRPM

## 2018-07-09 DIAGNOSIS — M53.3 SACROILIAC DYSFUNCTION: Primary | ICD-10-CM

## 2018-07-09 PROCEDURE — 99214 OFFICE O/P EST MOD 30 MIN: CPT | Mod: S$PBB,,, | Performed by: PHYSICAL MEDICINE & REHABILITATION

## 2018-07-09 PROCEDURE — 99215 OFFICE O/P EST HI 40 MIN: CPT | Mod: PBBFAC,PN | Performed by: PHYSICAL MEDICINE & REHABILITATION

## 2018-07-09 PROCEDURE — 99999 PR PBB SHADOW E&M-EST. PATIENT-LVL V: CPT | Mod: PBBFAC,,, | Performed by: PHYSICAL MEDICINE & REHABILITATION

## 2018-07-09 NOTE — PROGRESS NOTES
HPI:  Patient is a 67 y.o. year old female w. Right hip pain. She is s/p foot surgery on the right and has been wearing a boot for over a year. Her surgery was complicated by poor healing and chronic wounds. These wounds were followed by dr Alexander and are now healed. She continues to wear a walking boot for fear of hurting her foot. She walks w. A walker. She has finished home health physical therapy.    Imaging  None (pt. Did not get previous ordered xrays)    Labs  egfr cr alt gluc nl  ast elev 72      Past Medical History:   Diagnosis Date    Arthritis     Diabetes mellitus     Heart attack     Hypertension     Neuropathy     Occasional tremors      Past Surgical History:   Procedure Laterality Date    CARPAL TUNNEL RELEASE      bilateral    EYE SURGERY      bilat cataract removal with iol implants    GANGLION CYST EXCISION      heart stents      HYSTERECTOMY      KNEE SURGERY Left     SKIN BIOPSY      on side of nose     No family history on file.  Social History     Social History    Marital status: Single     Spouse name: N/A    Number of children: N/A    Years of education: N/A     Social History Main Topics    Smoking status: Current Every Day Smoker    Smokeless tobacco: Never Used    Alcohol use Yes      Comment: drinks one wine cooler daily    Drug use: No    Sexual activity: Not Asked     Other Topics Concern    None     Social History Narrative    ** Merged History Encounter **            Review of patient's allergies indicates:   Allergen Reactions    Hydrocodone Hives    Pcn [penicillins] Hives    Pcn [penicillins] Rash       Current Outpatient Prescriptions:     alendronate (FOSAMAX) 70 MG tablet, Take 1 tablet (70 mg total) by mouth every 7 days., Disp: 4 tablet, Rfl: 11    amitriptyline (ELAVIL) 25 MG tablet, , Disp: , Rfl:     apixaban 5 mg Tab, Take 2 tablets (10 mg total) by mouth 2 (two) times daily. (Patient taking differently: Take 5 mg by mouth 2 (two) times daily.  ), Disp: 26 tablet, Rfl: 0    blood-glucose meter Misc, Use the meter as directed, Disp: , Rfl:     ciclopirox (LOPROX) 0.77 % Crea, Apply topically 2 (two) times daily., Disp: 90 g, Rfl: 2    collagenase ointment, Apply topically once daily., Disp: 90 g, Rfl: 0    ergocalciferol (ERGOCALCIFEROL) 50,000 unit Cap, Take 1 capsule (50,000 Units total) by mouth once a week., Disp: 4 capsule, Rfl: 11    gabapentin (NEURONTIN) 300 MG capsule, , Disp: , Rfl:     gabapentin (NEURONTIN) 600 MG tablet, , Disp: , Rfl:     insulin aspart (NOVOLOG) 100 unit/mL injection, Inject into the skin as needed for High Blood Sugar., Disp: , Rfl:     insulin glargine (LANTUS) 100 unit/mL injection, Inject 25 Units into the skin every morning. , Disp: , Rfl:     lidocaine HCL 2% (XYLOCAINE) 2 % jelly, Apply topically as needed. Apply once topically every dressing change to right ankle prior to mechanical debridement of wound with coarse gausze., Disp: 30 mL, Rfl: 2    metformin (GLUCOPHAGE) 1000 MG tablet, Take 1,000 mg by mouth 2 (two) times daily with meals., Disp: , Rfl:     oxyCODONE-acetaminophen (PERCOCET)  mg per tablet, , Disp: , Rfl:     promethazine (PHENERGAN) 25 MG tablet, , Disp: , Rfl:     TRESIBA FLEXTOUCH U-100 100 unit/mL (3 mL) InPn, , Disp: , Rfl:     warfarin (COUMADIN) 1 MG tablet, Take 1 tablet (1 mg total) by mouth Daily., Disp: 30 tablet, Rfl: 0    warfarin (COUMADIN) 2 MG tablet, Take 2.5 tablets (5 mg total) by mouth Daily., Disp: 225 tablet, Rfl: 3    atorvastatin (LIPITOR) 80 MG tablet, Take 1 tablet (80 mg total) by mouth every evening., Disp: 30 tablet, Rfl: 11    betamethasone dipropionate (DIPROLENE) 0.05 % cream, Apply topically 2 (two) times daily., Disp: 45 g, Rfl: 1    lisinopril (PRINIVIL,ZESTRIL) 2.5 MG tablet, Take 1 tablet (2.5 mg total) by mouth once daily., Disp: 30 tablet, Rfl: 11    metoprolol succinate (TOPROL-XL) 25 MG 24 hr tablet, Take 1 tablet (25 mg total) by mouth  once daily., Disp: 30 tablet, Rfl: 11      Review of Systems  No nausea, vomiting, fevers, Chills , contipation, diarrhea or sweats      Physical Exam:      Vitals:    07/09/18 1025   BP: 128/72   Pulse: 90   Resp: 15     alert and oriented ×4 follows commands answers all questions appropriately,affect wnl  Manual muscle test 5 out of 5 sensation to light touch grossly intact  +excruciate tenderness b/l greater troch and R QL  Hip hiking w. Walking Boot , using walker  antalgic gait  No C/C/E      Assessment:  S/p foot surgery now healed-still using walking boot +walker  B/l gluteus medius tendinopathies d/t gait deviation  sijt dysfunction    Plan:  Therapy focused on pelvic realignment, getting of walking boot, inc rom of ankle, gait devieation

## 2018-07-16 ENCOUNTER — LAB VISIT (OUTPATIENT)
Dept: LAB | Facility: HOSPITAL | Age: 68
End: 2018-07-16
Attending: INTERNAL MEDICINE
Payer: MEDICARE

## 2018-07-16 DIAGNOSIS — Z51.81 ANTICOAGULATION MANAGEMENT ENCOUNTER: ICD-10-CM

## 2018-07-16 DIAGNOSIS — E11.8 TYPE 2 DIABETES MELLITUS WITH COMPLICATION, WITH LONG-TERM CURRENT USE OF INSULIN: ICD-10-CM

## 2018-07-16 DIAGNOSIS — Z79.01 ANTICOAGULATION MANAGEMENT ENCOUNTER: ICD-10-CM

## 2018-07-16 DIAGNOSIS — I82.401 ACUTE DEEP VEIN THROMBOSIS (DVT) OF RIGHT LOWER EXTREMITY, UNSPECIFIED VEIN: ICD-10-CM

## 2018-07-16 DIAGNOSIS — Z79.01 ANTICOAGULATION MONITORING BY PHARMACIST: Primary | ICD-10-CM

## 2018-07-16 DIAGNOSIS — Z79.4 TYPE 2 DIABETES MELLITUS WITH COMPLICATION, WITH LONG-TERM CURRENT USE OF INSULIN: ICD-10-CM

## 2018-07-16 LAB
CHOLEST SERPL-MCNC: 117 MG/DL
CHOLEST/HDLC SERPL: 3.3 {RATIO}
ESTIMATED AVG GLUCOSE: 123 MG/DL
HBA1C MFR BLD HPLC: 5.9 %
HDLC SERPL-MCNC: 35 MG/DL
HDLC SERPL: 29.9 %
INR PPP: 1.9
LDLC SERPL CALC-MCNC: 63.2 MG/DL
NONHDLC SERPL-MCNC: 82 MG/DL
PROTHROMBIN TIME: 18.4 SEC
TRIGL SERPL-MCNC: 94 MG/DL

## 2018-07-16 PROCEDURE — 83036 HEMOGLOBIN GLYCOSYLATED A1C: CPT

## 2018-07-16 PROCEDURE — 36415 COLL VENOUS BLD VENIPUNCTURE: CPT | Mod: PO

## 2018-07-16 PROCEDURE — 85610 PROTHROMBIN TIME: CPT

## 2018-07-16 PROCEDURE — 80061 LIPID PANEL: CPT

## 2018-07-20 DIAGNOSIS — E11.8 TYPE 2 DIABETES MELLITUS WITH COMPLICATION, WITHOUT LONG-TERM CURRENT USE OF INSULIN: Primary | ICD-10-CM

## 2018-07-24 ENCOUNTER — OFFICE VISIT (OUTPATIENT)
Dept: FAMILY MEDICINE | Facility: CLINIC | Age: 68
End: 2018-07-24
Payer: MEDICARE

## 2018-07-24 VITALS
OXYGEN SATURATION: 94 % | WEIGHT: 203.94 LBS | HEIGHT: 67 IN | BODY MASS INDEX: 32.01 KG/M2 | RESPIRATION RATE: 16 BRPM | SYSTOLIC BLOOD PRESSURE: 124 MMHG | TEMPERATURE: 98 F | HEART RATE: 95 BPM | DIASTOLIC BLOOD PRESSURE: 64 MMHG

## 2018-07-24 DIAGNOSIS — Z79.01 ENCOUNTER FOR MONITORING COUMADIN THERAPY: Primary | ICD-10-CM

## 2018-07-24 DIAGNOSIS — E78.5 HYPERLIPIDEMIA ASSOCIATED WITH TYPE 2 DIABETES MELLITUS: ICD-10-CM

## 2018-07-24 DIAGNOSIS — E11.69 HYPERLIPIDEMIA ASSOCIATED WITH TYPE 2 DIABETES MELLITUS: ICD-10-CM

## 2018-07-24 DIAGNOSIS — Z51.81 ENCOUNTER FOR MONITORING COUMADIN THERAPY: Primary | ICD-10-CM

## 2018-07-24 DIAGNOSIS — I82.5Y9 CHRONIC DEEP VEIN THROMBOSIS (DVT) OF PROXIMAL VEIN OF LOWER EXTREMITY, UNSPECIFIED LATERALITY: ICD-10-CM

## 2018-07-24 DIAGNOSIS — E11.40 TYPE 2 DIABETES, CONTROLLED, WITH NEUROPATHY: ICD-10-CM

## 2018-07-24 PROCEDURE — 99214 OFFICE O/P EST MOD 30 MIN: CPT | Mod: S$GLB,,, | Performed by: INTERNAL MEDICINE

## 2018-07-24 RX ORDER — CLOPIDOGREL BISULFATE 75 MG/1
1 TABLET ORAL DAILY
COMMUNITY
Start: 2018-07-23 | End: 2020-01-21 | Stop reason: SDUPTHER

## 2018-07-24 NOTE — PATIENT INSTRUCTIONS
Coumadin 6 mg daily except 7 on MWF.    Warfarin is a blood-thinning medication that helps treat and prevent blood clots. There is no specific warfarin diet. However, certain foods and beverages can make warfarin less effective in preventing blood clots. It's important to pay attention to what you eat while taking warfarin.    One nutrient that can lessen warfarin's effectiveness is vitamin K. It's important to be consistent in how much vitamin K you get daily. The adequate intake level of vitamin K for adult men is 120 micrograms (mcg). For adult women, it's 90 mcg. While eating small amounts of foods that are rich in vitamin K shouldn't cause a problem, avoid consuming large amounts of certain foods or drinks, including:    Kale  Spinach  Ringling sprouts  Collards  Mustard greens  Chard  Broccoli  Asparagus  Green tea  Certain drinks can increase the effect of warfarin, leading to bleeding problems. Avoid or consume only small amounts of these drinks when taking warfarin:    Cranberry juice  Alcohol

## 2018-07-24 NOTE — PROGRESS NOTES
Subjective:       Patient ID: Kateryna Connolly is a 67 y.o. female.    Chief Complaint: Diabetes (labs)    HPI         Coumadin monitoring:  Lab Results   Component Value Date    INR 1.9 (H) 07/16/2018    INR 1.4 (H) 07/02/2018    INR 1.0 02/02/2015       ANTICOAGULATION DX: (The following diagnoses are positive if BOLD, negative otherwise.)  Atrial_fibirillation.  . DVT. Pulmonary_embolism.  Aortic_valve_replacement.  TIA/CVA.     Eating spinach.     Recently switched to  Coumadin.     Current Dosage:   6 mg  Compliance with medication - good  Diet changes: no.     watches diet: yes.  .  Pending medical/dental procedure: no  Concomitant medication changes (including over the counter/herbs): no  Alcohol use:  no        The following symptoms are positive if BOLD, negative otherwise.    Bleeding episodes:    Gingival_bleeding., epistaxis ., ecchymoses, hematuria . , melena, blood_per_rectum  Thrombotic event:    shortness_of_breath .  pain/swelling_of_extremity . , numbness  , tingling . , headache .   Intolerance of drug:  nausea/vomiting/diarrhea . , rash . , skin necrosis .       CHIEF COMPLAINT: Diabetes.  HPI:     ONSET/TIMING:     QUALITY/COURSE:   unchanged..      INTENSITY/SEVERITY: . Measures blood sugar : 0    CONTEXT/WHEN: last HgbA1c    Lab Results   Component Value Date    HGBA1C 5.9 (H) 07/16/2018      weights:    Wt Readings from Last 1 Encounters:   07/24/18 1130 92.5 kg (203 lb 14.8 oz)         SYMPTOMS/RELATED: . . Possible medication side effects include:     The following symptoms/statements  are present IF IN BOLD, negative otherwise.         MODIFIERS/TREATMENTS: Not_taking_medications:  .  Non-compliance_with_Diabetic_diet  .  No_eye_exam_within_last_year.  Not_practicing_good_foot_care.    REVIEW OF SYMPTOMS: . Weight_gain. Weight_loss. Neuropathy. Recurrent_infections. Skin_ulcers      CHIEF COMPLAINT: Hyperlipidemia. cholesterol screening: no.   HPI:     ONSET:    MODIFIERS/TREATMENTS: .  "Taking medications: yes. . Non-compliance with following diet: no. .     SYMPTOMS/RELATED:Possible medication side effects include:   Myalgia: no.  .     REVIEW OF SYMPTOMS: past weights:   Wt Readings from Last 1 Encounters:   07/24/18 1130 92.5 kg (203 lb 14.8 oz)                                                     Last lipids: total   Lab Results   Component Value Date    CHOL 117 (L) 07/16/2018    CHOL 265 (H) 01/06/2015                                                                     HDL   Lab Results   Component Value Date    HDL 35 (L) 07/16/2018    HDL 42 01/06/2015                                                                     LDL   Lab Results   Component Value Date    LDLCALC 63.2 07/16/2018    LDLCALC 195.0 (H) 01/06/2015                                                                     TRIG   Lab Results   Component Value Date    TRIG 94 07/16/2018    TRIG 140 01/06/2015                                                                         Review of Systems      Objective:      Vitals:    07/24/18 1130   BP: 124/64   Pulse: 95   Resp: 16   Temp: 98.4 °F (36.9 °C)   TempSrc: Oral   SpO2: (!) 94%   Weight: 92.5 kg (203 lb 14.8 oz)   Height: 5' 7" (1.702 m)   PainSc: 0-No pain     Physical Exam   Constitutional: She appears well-developed and well-nourished.   Cardiovascular: Normal rate, regular rhythm and normal heart sounds.    Pulmonary/Chest: Effort normal and breath sounds normal.   Abdominal: Soft. There is no tenderness.   Neurological: She is alert.   Psychiatric: She has a normal mood and affect. Her behavior is normal. Thought content normal.   Nursing note and vitals reviewed.        Assessment:       1. Encounter for monitoring coumadin therapy    2. Chronic deep vein thrombosis (DVT) of proximal vein of lower extremity, unspecified laterality    3. Type 2 diabetes, controlled, with neuropathy    4. Hyperlipidemia associated with type 2 diabetes mellitus          Plan: "       Encounter for monitoring coumadin therapy  -     CBC auto differential; Future; Expected date: 07/24/2018  -     POCT INR    Chronic deep vein thrombosis (DVT) of proximal vein of lower extremity, unspecified laterality  -     POCT INR    Type 2 diabetes, controlled, with neuropathy  -     Comprehensive metabolic panel; Future; Expected date: 07/24/2018  -     Hemoglobin A1c; Future; Expected date: 07/24/2018  -     Lipid panel; Future; Expected date: 07/24/2018  -     Microalbumin/creatinine urine ratio; Future    Hyperlipidemia associated with type 2 diabetes mellitus  -     Lipid panel; Future; Expected date: 07/24/2018      Follow-up in about 2 weeks (around 8/7/2018).

## 2018-08-01 DIAGNOSIS — Z51.81 ANTICOAGULATION MANAGEMENT ENCOUNTER: ICD-10-CM

## 2018-08-01 DIAGNOSIS — I82.401 ACUTE DEEP VEIN THROMBOSIS (DVT) OF RIGHT LOWER EXTREMITY, UNSPECIFIED VEIN: ICD-10-CM

## 2018-08-01 DIAGNOSIS — Z79.01 ANTICOAGULATION MANAGEMENT ENCOUNTER: ICD-10-CM

## 2018-08-03 RX ORDER — WARFARIN 1 MG/1
TABLET ORAL
Qty: 30 TABLET | Refills: 0 | Status: SHIPPED | OUTPATIENT
Start: 2018-08-03 | End: 2018-08-29 | Stop reason: SDUPTHER

## 2018-08-06 NOTE — TELEPHONE ENCOUNTER
----- Message from Sisi King sent at 8/6/2018  8:19 AM CDT -----  Contact: [facility]  [facility] to speak with nurse regarding medication warfarin (COUMADIN) 1 MG tablet     Pharmacy disconnected before I could get full details    Please call [facility] at [phone]

## 2018-08-06 NOTE — TELEPHONE ENCOUNTER
Pharmacy called to verify the ok to fill the coumadin since she is on plavix. Advised pharmacist ok to fill, she is already taking coumadin but her dose was increased. Pharmacist verbalized understanding.

## 2018-08-07 ENCOUNTER — DOCUMENTATION ONLY (OUTPATIENT)
Dept: FAMILY MEDICINE | Facility: CLINIC | Age: 68
End: 2018-08-07

## 2018-08-07 NOTE — PROGRESS NOTES
Health Maintenance Due   Topic Date Due    Hepatitis C Screening  1950    Eye Exam  08/18/1960    Urine Microalbumin  08/18/1960    Mammogram  08/18/1990    DEXA SCAN  08/18/1990    Colonoscopy  08/18/2000    Zoster Vaccine  08/18/2010    Pneumococcal (65+) (1 of 2 - PCV13) 08/18/2015    TETANUS VACCINE  06/06/2016    Foot Exam  07/06/2018    Influenza Vaccine  08/01/2018

## 2018-08-08 ENCOUNTER — TELEPHONE (OUTPATIENT)
Dept: FAMILY MEDICINE | Facility: CLINIC | Age: 68
End: 2018-08-08

## 2018-08-29 DIAGNOSIS — I82.401 ACUTE DEEP VEIN THROMBOSIS (DVT) OF RIGHT LOWER EXTREMITY, UNSPECIFIED VEIN: ICD-10-CM

## 2018-08-29 DIAGNOSIS — Z51.81 ANTICOAGULATION MANAGEMENT ENCOUNTER: ICD-10-CM

## 2018-08-29 DIAGNOSIS — Z79.01 ANTICOAGULATION MANAGEMENT ENCOUNTER: ICD-10-CM

## 2018-08-30 RX ORDER — WARFARIN 1 MG/1
TABLET ORAL
Qty: 30 TABLET | Refills: 0 | Status: SHIPPED | OUTPATIENT
Start: 2018-08-30 | End: 2019-10-08 | Stop reason: SDUPTHER

## 2018-09-13 DIAGNOSIS — Z11.59 NEED FOR HEPATITIS C SCREENING TEST: ICD-10-CM

## 2018-10-02 DIAGNOSIS — Z79.01 ANTICOAGULATION MANAGEMENT ENCOUNTER: ICD-10-CM

## 2018-10-02 DIAGNOSIS — I82.401 ACUTE DEEP VEIN THROMBOSIS (DVT) OF RIGHT LOWER EXTREMITY, UNSPECIFIED VEIN: ICD-10-CM

## 2018-10-02 DIAGNOSIS — Z51.81 ANTICOAGULATION MANAGEMENT ENCOUNTER: ICD-10-CM

## 2018-10-03 RX ORDER — WARFARIN 1 MG/1
TABLET ORAL
Qty: 30 TABLET | Refills: 0 | OUTPATIENT
Start: 2018-10-03

## 2019-05-08 DIAGNOSIS — M81.0 OSTEOPOROSIS, UNSPECIFIED OSTEOPOROSIS TYPE, UNSPECIFIED PATHOLOGICAL FRACTURE PRESENCE: ICD-10-CM

## 2019-05-08 RX ORDER — ALENDRONATE SODIUM 70 MG/1
TABLET ORAL
Qty: 4 TABLET | Refills: 11 | Status: SHIPPED | OUTPATIENT
Start: 2019-05-08 | End: 2019-10-04

## 2019-05-08 RX ORDER — ERGOCALCIFEROL 1.25 MG/1
CAPSULE ORAL
Qty: 4 CAPSULE | Refills: 11 | Status: SHIPPED | OUTPATIENT
Start: 2019-05-08 | End: 2019-10-02

## 2019-06-03 ENCOUNTER — PATIENT OUTREACH (OUTPATIENT)
Dept: ADMINISTRATIVE | Facility: HOSPITAL | Age: 69
End: 2019-06-03

## 2019-06-18 DIAGNOSIS — S09.90XA HEAD TRAUMA, INITIAL ENCOUNTER: ICD-10-CM

## 2019-06-18 DIAGNOSIS — M62.838 MUSCLE SPASM OF RIGHT LEG: Primary | ICD-10-CM

## 2019-07-17 ENCOUNTER — HOSPITAL ENCOUNTER (OUTPATIENT)
Dept: RADIOLOGY | Facility: HOSPITAL | Age: 69
Discharge: HOME | End: 2019-07-17
Attending: FAMILY MEDICINE
Payer: MEDICARE

## 2019-07-17 DIAGNOSIS — S09.90XA HEAD TRAUMA, INITIAL ENCOUNTER: ICD-10-CM

## 2019-07-17 DIAGNOSIS — M62.838 MUSCLE SPASM OF RIGHT LEG: ICD-10-CM

## 2019-07-17 PROCEDURE — 70450 CT HEAD/BRAIN W/O DYE: CPT | Mod: TC

## 2019-07-17 PROCEDURE — 70450 CT HEAD WITHOUT CONTRAST: ICD-10-PCS | Mod: 26,,, | Performed by: RADIOLOGY

## 2019-07-17 PROCEDURE — 70450 CT HEAD/BRAIN W/O DYE: CPT | Mod: 26,,, | Performed by: RADIOLOGY

## 2019-07-17 NOTE — PROCEDURES
"Wound Debridement  Date/Time: 4/16/2018 10:47 AM  Performed by: DEMOND ROBISON  Authorized by: DEMOND ROBISON     Time out: Immediately prior to procedure a "time out" was called to verify the correct patient, procedure, equipment, support staff and site/side marked as required.    Consent Done?:  Yes (Verbal)  Local anesthesia used?: No      Wound Details:    Location:  Right foot    Location:  Right Ankle    Type of Debridement:  Excisional       Length (cm):  1.5       Area (sq cm):  1.5       Width (cm):  1       Percent Debrided (%):  100       Depth (cm):  0.2       Total Area Debrided (sq cm):  1.5    Depth of debridement:  Subcutaneous tissue    Tissue debrided:  Dermis, Epidermis and Subcutaneous    Devitalized tissue debrided:  Biofilm, Exudate and Sough    Instruments:  Curette    Bleeding:  Minimal  Hemostasis Achieved: Yes    Method Used:  Pressure  Patient tolerance:  Patient tolerated the procedure well with no immediate complications      "
repaired in the usual fashion, excellent hemostasis noted

## 2019-08-26 DIAGNOSIS — Z12.39 BREAST CANCER SCREENING: ICD-10-CM

## 2019-08-27 ENCOUNTER — PATIENT OUTREACH (OUTPATIENT)
Dept: ADMINISTRATIVE | Facility: HOSPITAL | Age: 69
End: 2019-08-27

## 2019-08-27 NOTE — LETTER
August 27, 2019    Kateryna Connolly  [address] [facility] [place] [address]             [facility]  [address]  [facility] [address]  Phone: [phone] Kateryna Connolly  [address] [facility] [place] [address]        Dear, Marcia Ann Robert [facility] is committed to your overall health.  To help you get the most out of each of your visits, we will review your information to make sure you are up to date on all of your recommended tests and/or procedures.      Dr. Khanh Rodriguez MD  has found that your chart shows you may be due for     Mammogram     If you have had any of the above done at another facility, please bring the records or information with you so that your record at [facility] will be complete.  If you would like to schedule any of these, please contact the clinic at [phone].    If you are currently taking medication, please bring it with you to your appointment for review.    Also, if you have any type of Advanced Directives, please bring them with you to your office visit so we may scan them into your chart.    Thank You,    Your [facility] Team,  MD Paige Meade,  Panel Care Coordinator  [facility] [address]  [place] [address]  Phone [phone]  Fax [phone]

## 2019-09-04 ENCOUNTER — HOSPITAL ENCOUNTER (EMERGENCY)
Facility: HOSPITAL | Age: 69
Discharge: HOME | End: 2019-09-04
Attending: EMERGENCY MEDICINE
Payer: MEDICARE

## 2019-09-04 VITALS
BODY MASS INDEX: 31.48 KG/M2 | RESPIRATION RATE: 16 BRPM | TEMPERATURE: 99 F | WEIGHT: 201 LBS | DIASTOLIC BLOOD PRESSURE: 65 MMHG | SYSTOLIC BLOOD PRESSURE: 128 MMHG | OXYGEN SATURATION: 96 % | HEART RATE: 101 BPM
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
## 2019-09-04 DIAGNOSIS — E16.2 HYPOGLYCEMIA: ICD-10-CM

## 2019-09-04 LAB — POCT GLUCOSE: 131 MG/DL (ref 70–110)

## 2019-09-04 PROCEDURE — 99284 EMERGENCY DEPT VISIT MOD MDM: CPT | Mod: 25

## 2019-09-04 PROCEDURE — 82962 GLUCOSE BLOOD TEST: CPT

## 2019-09-04 NOTE — ED NOTES
Pt out of bed in wheelchair.  Pt wants to wait for discharge papers outside.  States she wants to go smoke a cigarette.

## 2019-09-04 NOTE — ED PROVIDER NOTES
"Encounter Date: 9/4/2019    SCRIBE #1 NOTE: I, Montserrat Spencer, am scribing for, and in the presence of, Lorenzo Valentine MD.       History     Chief Complaint   Patient presents with    Hypoglycemia     having issues with blood sugar dropping    Fall     "I have tremors and my doctor isnt helping"     Time seen by provider: 1:16 PM on 09/04/2019    Kateryna Connolly is a 69 y.o. female with a PMHx of DM (insulin dependent), HTN, arthritis, occasional tremors, neuropathy, and heart attack who presents to the ED with an onset of neck pain s/p fall which occurred 2 hours PTA. Pt reports feeling tremors and generalized weakness, which caused her to fall on to her knees. She reports having small abrasions on her knees. Pt states she didn't hit her head or LOC. She poorly manages her DM. She takes an insulin injection once a day. They state that this morning her sugar was 79 before eating and 179 after eating. Family reports she's had several falls within the last month.The patient denies fever or any other symptoms at this time. PSHx of hysterectomy, left knee surgery, ganglion cyst excision, eye surgery, skin biopsy, carpal tunnel release, and heart stents. Hydrocodone and Pcn drug allergies.    The history is provided by the patient and a relative.     Review of patient's allergies indicates:   Allergen Reactions    Hydrocodone Hives    Pcn [penicillins] Hives    Pcn [penicillins] Rash     Past Medical History:   Diagnosis Date    Arthritis     Diabetes mellitus     Heart attack     Hypertension     Neuropathy     Occasional tremors      Past Surgical History:   Procedure Laterality Date    CARPAL TUNNEL RELEASE      bilateral    EYE SURGERY      bilat cataract removal with iol implants    GANGLION CYST EXCISION      heart stents      HYSTERECTOMY      KNEE SURGERY Left     REMOVAL-HARDWARE-ANKLE right lateral only Right 9/27/2017    Performed by Iglesia [facility]    SKIN BIOPSY      on " side of nose     History reviewed. No pertinent family history.  Social History     Tobacco Use    Smoking status: Current Every Day Smoker    Smokeless tobacco: Never Used   Substance Use Topics    Alcohol use: Yes     Comment: drinks one wine cooler daily    Drug use: No     Review of Systems   Constitutional: Negative for activity change, diaphoresis and fever.   HENT: Negative for ear pain, rhinorrhea, sore throat and trouble swallowing.    Eyes: Negative for pain and visual disturbance.   Respiratory: Negative for cough, shortness of breath and stridor.    Cardiovascular: Negative for chest pain.   Gastrointestinal: Negative for abdominal pain, blood in stool, diarrhea, nausea and vomiting.   Genitourinary: Negative for dysuria, hematuria, vaginal bleeding and vaginal discharge.   Musculoskeletal: Positive for neck pain. Negative for gait problem.   Skin: Positive for wound (abrasions on bilateral knees). Negative for rash.   Neurological: Positive for tremors and weakness (generalized). Negative for seizures, syncope and headaches.   Psychiatric/Behavioral: Negative for hallucinations and suicidal ideas.       Physical Exam     Initial Vitals [09/04/19 1239]   BP Pulse Resp Temp SpO2   (!) 116/59 (!) 114 16 99.1 °F (37.3 °C) 96 %      MAP       --         Physical Exam    Nursing note and vitals reviewed.  Constitutional: She appears well-developed. She is not diaphoretic. No distress.   Obese. Elderly appearing.   HENT:   Head: Normocephalic and atraumatic.   Nose: Nose normal.   Eyes: EOM are normal. No scleral icterus.   Neck: Normal range of motion. Neck supple.   Cardiovascular: Normal rate, regular rhythm, normal heart sounds and intact distal pulses. Exam reveals no gallop and no friction rub.    No murmur heard.  Pulmonary/Chest: Breath sounds normal. No stridor. No respiratory distress. She has no wheezes. She has no rhonchi. She has no rales.   Abdominal: Soft. Bowel sounds are normal. There is  no tenderness.   Musculoskeletal: Normal range of motion.   Neurological: She is alert and oriented to person, place, and time. No cranial nerve deficit.   Non focal neurological exam.   Skin: Skin is warm and dry. Capillary refill takes less than 2 seconds. No rash noted.   Psychiatric: She has a normal mood and affect.         ED Course   Procedures  Labs Reviewed   POCT GLUCOSE - Abnormal; Notable for the following components:       Result Value    POCT Glucose 131 (*)     All other components within normal limits          Imaging Results          CT Cervical Spine Without Contrast (Final result)  Result time 09/04/19 14:06:41    Final result by Yuri Moreno MD (09/04/19 14:06:41)                 Impression:      No displaced fracture or traumatic malalignment.  Mild degenerative changes as described.      Electronically signed by: Yuri Moreno  Date:    09/04/2019  Time:    14:06             Narrative:    EXAMINATION:  CT CERVICAL SPINE WITHOUT CONTRAST    CLINICAL HISTORY:  C-spine trauma, NEXUS/CCR positive, low risk;    TECHNIQUE:  Low dose axial images, sagittal and coronal reformations were performed though the cervical spine.  Contrast was not administered.    COMPARISON:  None    FINDINGS:  No malalignment. No displaced fracture.  Preserved vertebral body heights.  Degenerative change mild at the anterior C1-2 articulation as well as the C4-5 and C5-6 discs.  The C4-5 disc is prominent posteriorly.  No bony neural foraminal narrowing.  No bony spinal canal stenosis.  Included soft tissues are unremarkable.                                 Medical Decision Making:   History:   Old Medical Records: I decided to obtain old medical records.  Clinical Tests:   Lab Tests: Ordered and Reviewed  Radiological Study: Ordered and Reviewed            Scribe Attestation:   Scribe #1: I performed the above scribed service and the documentation accurately describes the services I performed. I attest to the  accuracy of the note.      Attending Attestation:     Physician Attestation for Scribe:    I, Dr. Lorenzo Valentine, personally performed the services described in this documentation.   All medical record entries made by the scribe were at my direction and in my presence.   I have reviewed the chart and agree that the record is accurate and complete.   Lorenzo Valentine MD  6:30 PM 09/05/2019     DISCLAIMER: This note was prepared with [facility] [employer] voice recognition transcription software. Garbled syntax, mangled pronouns, and other bizarre constructions may be attributed to that software system.            ED Course as of Sep 04 1636   Wed Sep 04, 2019   1312 68 yo F pmhx of poorly controlled DM pw hypoglycemia and fall now with mild neck pain. Did not hit head. No LOC. Did not come directly after incident as it happened early this morning. Afebrile. Tachcyardic. No external signs of trauma. No C spine ttp. No signs of basilar skull fx and did not hit head. Glc 131 on arrival. Eating food. No other symptoms at this time. Given patients age CT cspine done and neg. Pt requesting discharge as she feels fine but wants referral to endocrinology which was given. Pt understands and agrees with discharge instructions. Pt also given strict return precautions for any new or worsening symptoms and plans to follow up closely with PCP.      [BD]   1411 CT c-spine Impression     No displaced fracture or traumatic malalignment.  Mild degenerative changes as described.    Electronically signed by: Yuri Moreno  Date: 09/04/2019  Time: 14:06    [BD]      ED Course User Index  [BD] Lorenzo Valentine MD     Clinical Impression:       ICD-10-CM ICD-9-CM   1. Fall, initial encounter W19.XXXA E888.9   2. Hypoglycemia E16.2 251.2         Disposition:   Disposition: Discharged  Condition: Stable                        Lorenzo Valentine MD  09/05/19 1833

## 2019-09-04 NOTE — ED NOTES
Care assumed.  Pt in room 6 for evaluation of hypoglycemia and falls.  Pt is awake, alert and oriented. Resp even and unlabored. Pt denies chest pain or sob.  Abd large, soft and non-tender. Pt has abrasions to mili knees.  Pt reports increase in tremors and weakness.  Pt states blood sugar fluctuates.  Family at bedside.

## 2019-09-09 ENCOUNTER — OUTPATIENT CASE MANAGEMENT (OUTPATIENT)
Dept: ADMINISTRATIVE | Facility: OTHER | Age: 69
End: 2019-09-09

## 2019-09-09 NOTE — PROGRESS NOTES
09/09/19-SUMMARY-  Called and spoke to Kateryna Connolly, 69 year old female with a PMhx of Type 2 diabetes, NSTEMI,coronary stent placement, HTN,and PAD. Patient was referred by the ER on 09/04/19 after a DX of fall and hypoglycemia. Patient lives alone. Her daughter lives next door and provides transportation. Patient' daughter provides all her meals. Patient does not cook. Patient eats two meals a day. Encouraged patient to drink Glucerna for a  meal. Patient uses a cane, walker or w/c as needed. Patient retired at 63 years old. Patient checks her CBG daily and in the afternoon.  Patient is suppose to be on a low salt/diabetic diet but does not follow a plan. Patient is having tremors but does not have an appointment with a neurologist. Patient with coronary stent placement over five years ago but does not have a cardiologist. Patient does not have a blood pressure cuff. Patient states that her blood pressure is usually good. Patient with diabetes over 25 years. Reviewed upcoming appointment with patient, she has an appointment with Dr. Cordero in November to establish a new PCP. Patient wants to change her PCP. Patient has not seen Dr. Rodriguez since 07/24/18. Will mail to patient/daughter diabetic grocery list. Continue to educate about falls/safety and diabetes. Review signs and symptoms of hypoglycemia andhyperglycemia.Encourage patient to follow medication and treatment regiment. Encourage patient to maintain follow up with doctors.   Called patient back and she would like to see Dr. Brar as soon as she can but her daughter brings her to all her doctor appointments. She scheduled an appointment with the endocrinologist in October for her diabetes.  Patient is in agreement for the [facility].       INTERVENTIONS-  Med Rec done-Patient not taking-fosamax, elavil, loprox, collagenase ointment, novolog insulin, lantus insulin, lidocaine jelly, metformin, oxycodone, and vitamin D 2  Patient is taking  coumadin 6 mg daily but is not having labs drawn.  Refer to [facility] -support, advance directive, community resources  and diaster plan       PLAN-  Follow up in two weeks  Mailed diabetic grocery list, falls/safety, diabetic, fire safety  and nutrition education       Todays OPCM Self-Management Care Plan was developed with the patients/caregivers input and was based on identified barriers from todays assessment.  Goals were written today with the patient/caregiver and the patient has agreed to work towards these goals to improve his/her overall well-being. Patient verbalized understanding of the care plan, goals, and all of today's instructions. Encouraged patient/caregiver to communicate with his/her physician and health care team about health conditions and the treatment plan.  Provided my contact information today and encouraged patient/caregiver to call me with any questions as needed.

## 2019-09-09 NOTE — LETTER
September 9, 2019    Kateryna Connolly  [address] [address] [place] [address] [place] [id number]             [facility] [address] [facility] [address] Dear [place] Connolly:    Welcome to [facility]s Complex Care Management Program.  It was a pleasure talking with you today.  My name is Vida Berry RN [facility]  and I look forward to being your Care Manager.  My goal is to help you function at the healthiest and highest level possible.  You can contact me directly at [phone].    As an [facility] patient, some of the services we may be able to provide include:      Development of an individualized care plan with a Registered Nurse    Connection with a    Connection with available resources and services     Coordinate communication among your care team members    Provide coaching and education    Help you understand your doctors treatment plan   Help you obtain information about your insurance coverage.     All services provided by [facility]s Complex Care Managers and other care team members are coordinated with and communicated to your primary care team.    As part of your enrollment, you will be receiving education materials and more information about these services in your [facility] account, by phone or through the mail.  If you do not wish to participate or receive information, please contact our office at [phone].      Sincerely,        Vida Berry RN CCM [facility]   Out-patient RN Complex Care Manager

## 2019-09-10 ENCOUNTER — TELEPHONE (OUTPATIENT)
Dept: FAMILY MEDICINE | Facility: CLINIC | Age: 69
End: 2019-09-10

## 2019-09-10 NOTE — PROGRESS NOTES
Subjective:      Patient ID: Kateryna Connolly is a 67 y.o. female.    Chief Complaint: Foot Ulcer (right ankle f/u closed ulcer)    Red tender skin and swelling with some red bumps right ankle/leg in area of former ulceration.  Improved well with topical antifungal.  Denies trauma, signs infection systemically.  xrays - no acute injury - syndesmotic instability s/p multiple orif.    Knee redness/skin pustule right - needs work up.  Review of Systems   Constitution: Negative for chills, diaphoresis, fever, malaise/fatigue and night sweats.   Cardiovascular: Positive for leg swelling. Negative for claudication, cyanosis and syncope.   Skin: Positive for poor wound healing. Negative for color change, dry skin, nail changes, rash, suspicious lesions and unusual hair distribution.   Musculoskeletal: Positive for joint pain. Negative for falls, joint swelling, muscle cramps, muscle weakness and stiffness.   Gastrointestinal: Negative for constipation, diarrhea, nausea and vomiting.   Neurological: Positive for sensory change. Negative for brief paralysis, disturbances in coordination, focal weakness, numbness, paresthesias and tremors.           Objective:      Physical Exam   Constitutional: She appears well-developed and well-nourished. She is cooperative. No distress.   Cardiovascular:   Pulses:       Popliteal pulses are 1+ on the right side, and 1+ on the left side.        Dorsalis pedis pulses are 1+ on the right side, and 1+ on the left side.        Posterior tibial pulses are 1+ on the right side, and 1+ on the left side.   Capillary refill 3 seconds all toes/distal feet, all toes/both feet warm to touch.      Negative lymphadenopathy bilateral popliteal fossa and tarsal tunnel.     Pulses trace right foot.     <2+ pitting lower extremity edema bilateral.     Musculoskeletal:        Right ankle: Normal. She exhibits normal range of motion, no swelling, no ecchymosis, no deformity, no laceration and normal pulse.  Achilles tendon normal. Achilles tendon exhibits no pain, no defect and normal Cox's test results.    All ten toes without clubbing, cyanosis, or signs of ischemia.  No pain to palpation bilateral lower extremities.  Range of motion, stability, muscle strength, and muscle tone age and health appropriate normal bilateral feet and legs.     Lymphadenopathy:   Negative lymphadenopathy bilateral popliteal fossa and tarsal tunnel.   Neurological: She is alert. She has normal strength. She displays no atrophy and no tremor. A sensory deficit is present. She exhibits normal muscle tone. She displays no seizure activity. Gait normal.   Reflex Scores:       Patellar reflexes are 2+ on the right side and 2+ on the left side.       Achilles reflexes are 2+ on the right side and 2+ on the left side.    Diminished/loss of protective sensation all toes bilateral to 10 gram monofilament.       Skin: Skin is warm, dry and intact. No abrasion, no bruising, no burn, no ecchymosis, no laceration, no lesion and no rash noted. She is not diaphoretic. No cyanosis or erythema. No pallor. Nails show no clubbing.     Wound lateral right ankle remains closed today, epithelialized  without ulceration, drainage, pus, tracking, fluctuance, malodor, or cardinal signs infection.      Moderate dependent rubor both lets/ankle right  without ulceration, drainage, pus, tracking, fluctuance, malodor, or cardinal signs infection.                        Assessment:       Encounter Diagnoses   Name Primary?    History of ulceration Yes    Edema, unspecified type     PVD (peripheral vascular disease)          Plan:       Kateryna was seen today for foot ulcer.    Diagnoses and all orders for this visit:    History of ulceration    Edema, unspecified type    PVD (peripheral vascular disease)      I counseled the patient on her conditions, their implications and medical management.    Continue topical dressings right ankle with wound foam, kerlix to  protect until skin full strength.     Continue compression.    Inspect feet / ankles multiple times daily for signs of occurrence/recurrence ulceration.    Follow with Dr. Hyman to maximize mobility.    PCP as soon as possible - right knee skin lesion/pustule.        Follow-up in about 2 weeks (around 7/2/2018).       0 = independent

## 2019-09-19 ENCOUNTER — OUTPATIENT CASE MANAGEMENT (OUTPATIENT)
Dept: ADMINISTRATIVE | Facility: OTHER | Age: 69
End: 2019-09-19

## 2019-09-23 NOTE — PROGRESS NOTES
Summary:  LCSW received a referral from OPCM RN for the following patient identified barriers: support at home, Advance Directives, disaster plan, community resources.  Completed assessment with pt telephonically.  Pt is a 68 yo  female who lives alone.  Pt ambulates primarily with a cane and is independent with ADL's.  Pts daughter and daughter in law live next door and provide meals and transportation.  Pt spends most of her time at home; she is not interested in attending the local senior center.  Pt denies the need for additional support at home.  Educated pt on Advance Directives; pt is not interested in completing forms.  Pt has a disaster plan.  Pt denies any needs at this time.  Pt agrees to case closure and understands she can call should any needs arise in the future.      Interventions:  Mailed business card  Notified OPCM RN of case closure    Plan:  Close case     [facility] Self-Management Care Plan was developed with the patients/caregivers input and was based on identified barriers from todays assessment.  Goals were written today with the patient/caregiver and the patient has agreed to work towards these goals to improve his/her overall well-being. Patient verbalized understanding of the care plan, goals, and all of today's instructions. Encouraged patient/caregiver to communicate with his/her physician and health care team about health conditions and the treatment plan.  Provided my contact information today and encouraged patient/caregiver to call me with any questions as needed.

## 2019-09-25 ENCOUNTER — OUTPATIENT CASE MANAGEMENT (OUTPATIENT)
Dept: ADMINISTRATIVE | Facility: OTHER | Age: 69
End: 2019-09-25

## 2019-09-25 NOTE — PROGRESS NOTES
09/25/19-SUMMARY-  Attempt to follow up with  patient for outpatient case management. No answer and unable to leave a message. RN OPCM 1'st follow up attempt.   10/01/19-  Called and spoke to patient. Patient's daughter is the back ground speaking to her during the follow up phone call. Patient has not received the educational material mailed to her. Patient states that her CBG is running around 60 when her glucose monitor works. Reviewed with patient signs and symptoms of hypoglycemia. Encouraged patient to have peppermints, life savers  or glucose tablets on hand for hypoglycemia. Patient is going to bring the monitors to her appointment with endo tomorrow so they can look at them. Patient had ramen noodles and vegetables for lunch. Reviewed with patient and daughter that ramen noodles are high is sodium. Patient has an appointment with Dr. Brar, [facility] on 10/04/19. Patient is falling because her right legs starts to shake and then she will fall. Patient is not having her coumadin level monitored. She will talk to the doctor about this. Patient states that she is taking her medications. Reviewed fire safety and falls with patient. Will follow up with patient in two weeks.   10/02/19-  Met with patient and daughter in clinic. Gave patient my card and educational information on fire safety, hypoglycemia, hyperglycemia, diabetic grocery list and Diabetes and  healthy healthy. Patient has her blood glucose meters to show the NP. Will follow up with patient/caregiver in two weeks. Updated endo on patient.     INTERVENTIONS-  Follow up     PLAN-  Follow up in two weeks  Mailed diabetic grocery list, falls/safety, diabetic, fire safety  and nutrition education   Appts- 10/02/19-endo  10/04/19-[facility]  10/04/19-podiatry       Todays [facility] Self-Management Care Plan was developed with the patients/caregivers input and was based on identified barriers from todays assessment.  Goals were written  today with the patient/caregiver and the patient has agreed to work towards these goals to improve his/her overall well-being. Patient verbalized understanding of the care plan, goals, and all of today's instructions. Encouraged patient/caregiver to communicate with his/her physician and health care team about health conditions and the treatment plan.  Provided my contact information today and encouraged patient/caregiver to call me with any questions as needed.

## 2019-09-27 ENCOUNTER — TELEPHONE (OUTPATIENT)
Dept: FAMILY MEDICINE | Facility: CLINIC | Age: 69
End: 2019-09-27

## 2019-09-27 DIAGNOSIS — Z12.11 COLON CANCER SCREENING: ICD-10-CM

## 2019-09-27 NOTE — TELEPHONE ENCOUNTER
----- Message from Chelsea Brar MD sent at 9/27/2019  8:35 AM CDT -----  Please reach out to patient to see if she is willing to see us. She has an appt at 10 next Wednesday, Lien can see her at 9am that same day if she is willing. We both have 1pm appt available if she is willing to come back if 9am is too early.

## 2019-10-02 ENCOUNTER — LAB VISIT (OUTPATIENT)
Dept: LAB | Facility: HOSPITAL | Age: 69
End: 2019-10-02
Attending: PHYSICIAN ASSISTANT
Payer: MEDICARE

## 2019-10-02 ENCOUNTER — OFFICE VISIT (OUTPATIENT)
Dept: ENDOCRINOLOGY | Facility: CLINIC | Age: 69
End: 2019-10-02
Payer: MEDICARE

## 2019-10-02 VITALS
BODY MASS INDEX: 29.1 KG/M2 | TEMPERATURE: 99 F | HEART RATE: 91 BPM | DIASTOLIC BLOOD PRESSURE: 82 MMHG | WEIGHT: 185.44 LBS | SYSTOLIC BLOOD PRESSURE: 130 MMHG | HEIGHT: 67 IN

## 2019-10-02 DIAGNOSIS — Z78.0 POSTMENOPAUSAL: ICD-10-CM

## 2019-10-02 DIAGNOSIS — E16.2 HYPOGLYCEMIA: ICD-10-CM

## 2019-10-02 DIAGNOSIS — E55.9 HYPOVITAMINOSIS D: ICD-10-CM

## 2019-10-02 DIAGNOSIS — L97.509 TYPE 2 DIABETES MELLITUS WITH FOOT ULCER, WITH LONG-TERM CURRENT USE OF INSULIN: ICD-10-CM

## 2019-10-02 DIAGNOSIS — Z79.4 TYPE 2 DIABETES MELLITUS WITH FOOT ULCER, WITH LONG-TERM CURRENT USE OF INSULIN: ICD-10-CM

## 2019-10-02 DIAGNOSIS — F17.200 TOBACCO DEPENDENCE: ICD-10-CM

## 2019-10-02 DIAGNOSIS — E78.5 HYPERLIPIDEMIA, UNSPECIFIED HYPERLIPIDEMIA TYPE: Primary | ICD-10-CM

## 2019-10-02 DIAGNOSIS — M81.0 OSTEOPOROSIS, UNSPECIFIED OSTEOPOROSIS TYPE, UNSPECIFIED PATHOLOGICAL FRACTURE PRESENCE: ICD-10-CM

## 2019-10-02 DIAGNOSIS — E11.621 TYPE 2 DIABETES MELLITUS WITH FOOT ULCER, WITH LONG-TERM CURRENT USE OF INSULIN: ICD-10-CM

## 2019-10-02 LAB
25(OH)D3+25(OH)D2 SERPL-MCNC: 22 NG/ML (ref 30–96)
BASOPHILS # BLD AUTO: 0.03 K/UL (ref 0–0.2)
BASOPHILS NFR BLD: 0.5 % (ref 0–1.9)
CA-I BLDV-SCNC: 1.27 MMOL/L (ref 1.06–1.42)
DIFFERENTIAL METHOD: ABNORMAL
EOSINOPHIL # BLD AUTO: 0.2 K/UL (ref 0–0.5)
EOSINOPHIL NFR BLD: 2.7 % (ref 0–8)
ERYTHROCYTE [DISTWIDTH] IN BLOOD BY AUTOMATED COUNT: 14.5 % (ref 11.5–14.5)
HCT VFR BLD AUTO: 40 % (ref 37–48.5)
HGB BLD-MCNC: 12.7 G/DL (ref 12–16)
IMM GRANULOCYTES # BLD AUTO: 0.02 K/UL (ref 0–0.04)
IMM GRANULOCYTES NFR BLD AUTO: 0.3 % (ref 0–0.5)
LYMPHOCYTES # BLD AUTO: 1.3 K/UL (ref 1–4.8)
LYMPHOCYTES NFR BLD: 20.3 % (ref 18–48)
MCH RBC QN AUTO: 30.5 PG (ref 27–31)
MCHC RBC AUTO-ENTMCNC: 31.8 G/DL (ref 32–36)
MCV RBC AUTO: 96 FL (ref 82–98)
MONOCYTES # BLD AUTO: 0.4 K/UL (ref 0.3–1)
MONOCYTES NFR BLD: 6.6 % (ref 4–15)
NEUTROPHILS # BLD AUTO: 4.4 K/UL (ref 1.8–7.7)
NEUTROPHILS NFR BLD: 69.6 % (ref 38–73)
NRBC BLD-RTO: 0 /100 WBC
PLATELET # BLD AUTO: 262 K/UL (ref 150–350)
PMV BLD AUTO: 10.2 FL (ref 9.2–12.9)
PTH-INTACT SERPL-MCNC: 64 PG/ML (ref 9–77)
RBC # BLD AUTO: 4.16 M/UL (ref 4–5.4)
WBC # BLD AUTO: 6.36 K/UL (ref 3.9–12.7)

## 2019-10-02 PROCEDURE — 80053 COMPREHEN METABOLIC PANEL: CPT

## 2019-10-02 PROCEDURE — 84480 ASSAY TRIIODOTHYRONINE (T3): CPT

## 2019-10-02 PROCEDURE — 84443 ASSAY THYROID STIM HORMONE: CPT

## 2019-10-02 PROCEDURE — 36415 COLL VENOUS BLD VENIPUNCTURE: CPT | Mod: PO

## 2019-10-02 PROCEDURE — 82330 ASSAY OF CALCIUM: CPT

## 2019-10-02 PROCEDURE — 83735 ASSAY OF MAGNESIUM: CPT

## 2019-10-02 PROCEDURE — 84100 ASSAY OF PHOSPHORUS: CPT

## 2019-10-02 PROCEDURE — 99214 OFFICE O/P EST MOD 30 MIN: CPT | Mod: PBBFAC,PO | Performed by: PHYSICIAN ASSISTANT

## 2019-10-02 PROCEDURE — 99204 PR OFFICE/OUTPT VISIT, NEW, LEVL IV, 45-59 MIN: ICD-10-PCS | Mod: S$PBB,,, | Performed by: PHYSICIAN ASSISTANT

## 2019-10-02 PROCEDURE — 82306 VITAMIN D 25 HYDROXY: CPT

## 2019-10-02 PROCEDURE — 80061 LIPID PANEL: CPT

## 2019-10-02 PROCEDURE — 99204 OFFICE O/P NEW MOD 45 MIN: CPT | Mod: S$PBB,,, | Performed by: PHYSICIAN ASSISTANT

## 2019-10-02 PROCEDURE — 83970 ASSAY OF PARATHORMONE: CPT

## 2019-10-02 PROCEDURE — 99999 PR PBB SHADOW E&M-EST. PATIENT-LVL IV: CPT | Mod: PBBFAC,,, | Performed by: PHYSICIAN ASSISTANT

## 2019-10-02 PROCEDURE — 84439 ASSAY OF FREE THYROXINE: CPT

## 2019-10-02 PROCEDURE — 85025 COMPLETE CBC W/AUTO DIFF WBC: CPT

## 2019-10-02 PROCEDURE — 99999 PR PBB SHADOW E&M-EST. PATIENT-LVL IV: ICD-10-PCS | Mod: PBBFAC,,, | Performed by: PHYSICIAN ASSISTANT

## 2019-10-02 NOTE — PROGRESS NOTES
"CC: This 69 y.o. female presents for management of Diabetes    and chronic conditions pending review including HTN, HLP    HPI: was diagnosed with T2DM ~40 years ago. New to endocrine.  Hospitalized for hypoglycemia on 19.   Family hx of DM: no  Fhx of thyroid disease:no  Denies missing doses of DM medication.   hypoglycemia at home: Recently hospitalized for hypoglycemia in . Her BG is 60 in the morning. She will eat ice cream.   monitoring BG at home: BG is 66 in clinic.  Fastins    Diet:   BF-cereal, eggs  LH-soup, sandwich  DN-hamburger, french fries, chicken  Snacks  Avoids sugary beverages.     Exercise: none    CURRENT DM MEDS: metformin 2 g daily, Tresiba 25 units  Timing prandial insulin 5-15 minutes before meals:    Standards of Care:  Eye exam: She has an appointment on 10/23.  Podiatry exam: 10/19--Dr. Catherine ESTEBAN: years ago    DEXA scan: years ago. Prescribed fosamax but stopped taking.   PMHx, PSHx: reviewed in epic.  Social Hx: no ETOH use. Smokes     ROS:   Gen: Appetite good, + weight loss, denies fatigue and weakness.  Skin: Skin is intact and heals well, no rashes, no hair changes  Eyes: Denies visual disturbances  Resp: + KEITA, + chronic cough  Cardiac: No palpitations, chest pain, no edema   GI: + nausea, no vomiting, diarrhea, constipation, or abdominal pain.  /GYN: No nocturia, burning or pain.   MS/Neuro: + jt pain,  Left toe ulcer,  Denies numbness/ tingling in BLE;  speech clear  Psych: Denies drug/ETOH abuse, no hx of depression.  Other systems: negative.    /82 (BP Location: Right arm, Patient Position: Sitting, BP Method: Medium (Manual))   Pulse 91   Temp 98.5 °F (36.9 °C) (Oral)   Ht 5' 7" (1.702 m)   Wt 84.1 kg (185 lb 6.5 oz)   BMI 29.04 kg/m²      PE:  GENERAL: elderly female, well developed, well nourished.  PSYCH: AAOx3, appropriate mood and affect, pleasant expression, conversant, appears relaxed, well groomed.   EYES: Conjunctiva, corneas clear  NECK: " Supple, trachea midline,no thyromegaly or nodules  CHEST: Resp even and unlabored, CTA bilateral.  CARDIAC: RRR, S1, S2 heard, no murmurs  ABDOMEN: Soft, non-tender, non-distended   VASCULAR: DP pulses +2/4 bilaterally, no edema.  NEURO: she is in a wheelchair, CN ll-Xll grossly intact  SKIN: Skin warm and dry no acanthosis nigracans.  Foot Exam: appropriate footwear.     Personally reviewed labs below:    Admission on 09/04/2019, Discharged on 09/04/2019   Component Date Value Ref Range Status    POCT Glucose 09/04/2019 131* 70 - 110 mg/dL Final         ASSESSMENT and PLAN:    1. Hyperlipidemia, unspecified hyperlipidemia type     2. Type 2 diabetes mellitus with foot ulcer, with long-term current use of insulin  Ambulatory referral to Ophthalmology    Comprehensive metabolic panel    CBC auto differential    PTH, intact    Calcium, ionized    Lipid panel    Magnesium    Phosphorus    T4, free    T3    TSH    Glucose Monitoring Continuous Min 72 Hours   3. Osteoporosis, unspecified osteoporosis type, unspecified pathological fracture presence     4. Postmenopausal  DXA Bone Density Spine And Hip   5. Hypovitaminosis D  Vitamin D   6. Tobacco dependence     7. Hypoglycemia        T2DM with hyperglycemia-A1c is very low. Decrease Tresiba to 18 units in the morning. Continue metformin.   Discussed DM, progression of disease, long term complications, tx options.   Discussed A1c and BG goals.   Reviewed  hypoglycemia, s/s and appropriate tx.   Instructed to monitor BG and bring meter/ log to every clinic visit.   - takes ASA, ACEi, statin    HTN - controlled, continue meds as previously prescribed and monitor.     HLP - LDL , on statin therapy, LFTs WNL  Postmenopausal/osteoporosis-DEXA scan.  Hypovitaminosis d-low-increase intake by 2000 IU daily  Tobacco dependence-encouraged to quit smoking  Hypoglycemia-may benefit from a sensor     Follow-up: in 1 mth

## 2019-10-03 LAB
ALBUMIN SERPL BCP-MCNC: 3.9 G/DL (ref 3.5–5.2)
ALP SERPL-CCNC: 87 U/L (ref 55–135)
ALT SERPL W/O P-5'-P-CCNC: 24 U/L (ref 10–44)
ANION GAP SERPL CALC-SCNC: 11 MMOL/L (ref 8–16)
AST SERPL-CCNC: 23 U/L (ref 10–40)
BILIRUB SERPL-MCNC: 0.5 MG/DL (ref 0.1–1)
BUN SERPL-MCNC: 10 MG/DL (ref 8–23)
CALCIUM SERPL-MCNC: 9.7 MG/DL (ref 8.7–10.5)
CHLORIDE SERPL-SCNC: 101 MMOL/L (ref 95–110)
CHOLEST SERPL-MCNC: 111 MG/DL (ref 120–199)
CHOLEST/HDLC SERPL: 2.9 {RATIO} (ref 2–5)
CO2 SERPL-SCNC: 29 MMOL/L (ref 23–29)
CREAT SERPL-MCNC: 0.8 MG/DL (ref 0.5–1.4)
EST. GFR  (AFRICAN AMERICAN): >60 ML/MIN/1.73 M^2
EST. GFR  (NON AFRICAN AMERICAN): >60 ML/MIN/1.73 M^2
GLUCOSE SERPL-MCNC: 95 MG/DL (ref 70–110)
HDLC SERPL-MCNC: 38 MG/DL (ref 40–75)
HDLC SERPL: 34.2 % (ref 20–50)
LDLC SERPL CALC-MCNC: 54.2 MG/DL (ref 63–159)
MAGNESIUM SERPL-MCNC: 1.7 MG/DL (ref 1.6–2.6)
NONHDLC SERPL-MCNC: 73 MG/DL
PHOSPHATE SERPL-MCNC: 4 MG/DL (ref 2.7–4.5)
POTASSIUM SERPL-SCNC: 4.2 MMOL/L (ref 3.5–5.1)
PROT SERPL-MCNC: 7.3 G/DL (ref 6–8.4)
SODIUM SERPL-SCNC: 141 MMOL/L (ref 136–145)
T3 SERPL-MCNC: 92 NG/DL (ref 60–180)
T4 FREE SERPL-MCNC: 0.92 NG/DL (ref 0.71–1.51)
TRIGL SERPL-MCNC: 94 MG/DL (ref 30–150)
TSH SERPL DL<=0.005 MIU/L-ACNC: 1.12 UIU/ML (ref 0.4–4)

## 2019-10-04 ENCOUNTER — HOSPITAL ENCOUNTER (OUTPATIENT)
Dept: RADIOLOGY | Facility: CLINIC | Age: 69
Discharge: HOME | End: 2019-10-04
Attending: PODIATRIST
Payer: MEDICARE

## 2019-10-04 ENCOUNTER — TELEPHONE (OUTPATIENT)
Dept: FAMILY MEDICINE | Facility: CLINIC | Age: 69
End: 2019-10-04

## 2019-10-04 ENCOUNTER — OFFICE VISIT (OUTPATIENT)
Dept: PODIATRY | Facility: CLINIC | Age: 69
End: 2019-10-04
Payer: MEDICARE

## 2019-10-04 ENCOUNTER — OFFICE VISIT (OUTPATIENT)
Dept: FAMILY MEDICINE | Facility: CLINIC | Age: 69
End: 2019-10-04
Payer: MEDICARE

## 2019-10-04 VITALS
HEART RATE: 91 BPM | WEIGHT: 184.06 LBS | DIASTOLIC BLOOD PRESSURE: 56 MMHG | TEMPERATURE: 98 F | BODY MASS INDEX: 28.83 KG/M2 | SYSTOLIC BLOOD PRESSURE: 118 MMHG | OXYGEN SATURATION: 94 %

## 2019-10-04 VITALS — BODY MASS INDEX: 29.1 KG/M2 | WEIGHT: 185.44 LBS | HEIGHT: 67 IN

## 2019-10-04 DIAGNOSIS — E11.621 TYPE 2 DIABETES MELLITUS WITH FOOT ULCER, WITH LONG-TERM CURRENT USE OF INSULIN: ICD-10-CM

## 2019-10-04 DIAGNOSIS — Z23 ENCOUNTER FOR IMMUNIZATION: Primary | ICD-10-CM

## 2019-10-04 DIAGNOSIS — Z79.4 TYPE 2 DIABETES MELLITUS WITH FOOT ULCER, WITH LONG-TERM CURRENT USE OF INSULIN: ICD-10-CM

## 2019-10-04 DIAGNOSIS — E53.8 B12 DEFICIENCY: ICD-10-CM

## 2019-10-04 DIAGNOSIS — B35.1 ONYCHOMYCOSIS DUE TO DERMATOPHYTE: ICD-10-CM

## 2019-10-04 DIAGNOSIS — L97.509 TYPE 2 DIABETES MELLITUS WITH FOOT ULCER, WITH LONG-TERM CURRENT USE OF INSULIN: ICD-10-CM

## 2019-10-04 DIAGNOSIS — Z79.01 ANTICOAGULATED ON COUMADIN: ICD-10-CM

## 2019-10-04 DIAGNOSIS — E11.49 TYPE II DIABETES MELLITUS WITH NEUROLOGICAL MANIFESTATIONS: Primary | ICD-10-CM

## 2019-10-04 DIAGNOSIS — E55.9 VITAMIN D DEFICIENCY: ICD-10-CM

## 2019-10-04 DIAGNOSIS — Z87.898 HISTORY OF ULCERATION: ICD-10-CM

## 2019-10-04 DIAGNOSIS — E11.49 TYPE II DIABETES MELLITUS WITH NEUROLOGICAL MANIFESTATIONS: ICD-10-CM

## 2019-10-04 DIAGNOSIS — R63.4 UNINTENTIONAL WEIGHT LOSS OF MORE THAN 7.5% BODY WEIGHT WITHIN 3 MONTHS: ICD-10-CM

## 2019-10-04 DIAGNOSIS — I73.9 PAD (PERIPHERAL ARTERY DISEASE): ICD-10-CM

## 2019-10-04 DIAGNOSIS — L97.521 TOE ULCER, LEFT, LIMITED TO BREAKDOWN OF SKIN: ICD-10-CM

## 2019-10-04 DIAGNOSIS — J44.9 CHRONIC OBSTRUCTIVE PULMONARY DISEASE, UNSPECIFIED COPD TYPE: ICD-10-CM

## 2019-10-04 DIAGNOSIS — E11.42 DIABETIC POLYNEUROPATHY ASSOCIATED WITH TYPE 2 DIABETES MELLITUS: ICD-10-CM

## 2019-10-04 DIAGNOSIS — F17.200 TOBACCO DEPENDENCE: ICD-10-CM

## 2019-10-04 DIAGNOSIS — Z86.718 HISTORY OF DEEP VENOUS THROMBOSIS (DVT) OF DISTAL VEIN OF RIGHT LOWER EXTREMITY: ICD-10-CM

## 2019-10-04 DIAGNOSIS — Z11.59 NEED FOR HEPATITIS C SCREENING TEST: ICD-10-CM

## 2019-10-04 DIAGNOSIS — M81.0 AGE-RELATED OSTEOPOROSIS WITHOUT CURRENT PATHOLOGICAL FRACTURE: ICD-10-CM

## 2019-10-04 PROCEDURE — 99999 PR PBB SHADOW E&M-EST. PATIENT-LVL III: ICD-10-PCS | Mod: PBBFAC,,, | Performed by: PODIATRIST

## 2019-10-04 PROCEDURE — 99215 PR OFFICE/OUTPT VISIT, EST, LEVL V, 40-54 MIN: ICD-10-PCS | Mod: S$PBB,,, | Performed by: NURSE PRACTITIONER

## 2019-10-04 PROCEDURE — 90662 IIV NO PRSV INCREASED AG IM: CPT | Mod: PBBFAC,PO

## 2019-10-04 PROCEDURE — 73630 X-RAY EXAM OF FOOT: CPT | Mod: TC,FY,PO,LT

## 2019-10-04 PROCEDURE — 99999 PR PBB SHADOW E&M-EST. PATIENT-LVL III: ICD-10-PCS | Mod: PBBFAC,,, | Performed by: NURSE PRACTITIONER

## 2019-10-04 PROCEDURE — 11721 DEBRIDE NAIL 6 OR MORE: CPT | Mod: 59,Q8,S$PBB, | Performed by: PODIATRIST

## 2019-10-04 PROCEDURE — 99215 OFFICE O/P EST HI 40 MIN: CPT | Mod: S$PBB,,, | Performed by: NURSE PRACTITIONER

## 2019-10-04 PROCEDURE — 73630 XR FOOT COMPLETE 3 VIEW LEFT: ICD-10-PCS | Mod: 26,LT,S$GLB, | Performed by: RADIOLOGY

## 2019-10-04 PROCEDURE — 99214 OFFICE O/P EST MOD 30 MIN: CPT | Mod: 25,S$PBB,, | Performed by: PODIATRIST

## 2019-10-04 PROCEDURE — 99214 PR OFFICE/OUTPT VISIT, EST, LEVL IV, 30-39 MIN: ICD-10-PCS | Mod: 25,S$PBB,, | Performed by: PODIATRIST

## 2019-10-04 PROCEDURE — 99213 OFFICE O/P EST LOW 20 MIN: CPT | Mod: PBBFAC,PO,25 | Performed by: PODIATRIST

## 2019-10-04 PROCEDURE — 73630 X-RAY EXAM OF FOOT: CPT | Mod: 26,LT,S$GLB, | Performed by: RADIOLOGY

## 2019-10-04 PROCEDURE — 97597 DBRDMT OPN WND 1ST 20 CM/<: CPT | Mod: 59,PBBFAC,PO | Performed by: PODIATRIST

## 2019-10-04 PROCEDURE — 11721 DEBRIDE NAIL 6 OR MORE: CPT | Mod: 59,PBBFAC,PO | Performed by: PODIATRIST

## 2019-10-04 PROCEDURE — 97597 DBRDMT OPN WND 1ST 20 CM/<: CPT | Mod: PBBFAC,PO | Performed by: PODIATRIST

## 2019-10-04 PROCEDURE — 99213 OFFICE O/P EST LOW 20 MIN: CPT | Mod: PBBFAC,25,27,PO | Performed by: NURSE PRACTITIONER

## 2019-10-04 PROCEDURE — 11721 PR DEBRIDEMENT OF NAILS, 6 OR MORE: ICD-10-PCS | Mod: 59,Q8,S$PBB, | Performed by: PODIATRIST

## 2019-10-04 PROCEDURE — 99999 PR PBB SHADOW E&M-EST. PATIENT-LVL III: CPT | Mod: PBBFAC,,, | Performed by: NURSE PRACTITIONER

## 2019-10-04 PROCEDURE — 97597 WOUND DEBRIDEMENT: ICD-10-PCS | Mod: S$PBB,,, | Performed by: PODIATRIST

## 2019-10-04 PROCEDURE — 99999 PR PBB SHADOW E&M-EST. PATIENT-LVL III: CPT | Mod: PBBFAC,,, | Performed by: PODIATRIST

## 2019-10-04 RX ORDER — CICLOPIROX 80 MG/ML
SOLUTION TOPICAL NIGHTLY
Qty: 6.6 ML | Refills: 11 | Status: ON HOLD | OUTPATIENT
Start: 2019-10-04 | End: 2021-01-30 | Stop reason: HOSPADM

## 2019-10-04 NOTE — PATIENT INSTRUCTIONS
How to Quit Smoking  Smoking is one of the hardest habits to break. About half of all people who have ever smoked have been able to quit. Most people who still smoke want to quit. Here are some of the best ways to stop smoking.    Keep trying  Most smokers make many attempts at quitting before they are successful. Its important not to give up.  Go cold turkey  Most former smokers quit cold turkey (all at once). Trying to cut back gradually doesn't seem to work as well, perhaps because it continues the smoking habit. Also, it is possible to inhale more while smoking fewer cigarettes. This results in the same amount of nicotine in your body.  Get support  Support programs can be a big help, especially for heavy smokers. These groups offer lectures, ways to change behavior, and peer support. Here are some ways to find a support program:  · Free national quitline: 800-QUIT-NOW (865-352-4622).  · Hospital quit-smoking programs.  · American Lung Association: (263.337.2563).  · American Cancer Society (340-460-3624).  Support at home is important too. Nonsmokers can offer praise and encouragement. If the smoker in your life finds it hard to quit, encourage them to keep trying.  Over-the-counter medicines  Nicotine replacement therapy may make quitting easier. Certain aids, such as the nicotine patch, gum, and lozenges, are available without a prescription. It is best to use these under a doctors care, though. The skin patch provides a steady supply of nicotine. Nicotine gum and lozenges give temporary bursts of low levels of nicotine. Both methods reduce the craving for cigarettes. Warning: If you have nausea, vomiting, dizziness, weakness, or a fast heartbeat, stop using these products and see your doctor.  Prescription medicines  After reviewing your smoking patterns and past attempts to quit, your doctor may offer a prescription medicine such as bupropion, varenicline, a nicotine inhaler, or nasal spray. Each has  "advantages and side effects. Your doctor can review these with you.  Health benefits of quitting  The benefits of quitting start right away and keep improving the longer you go without smoking. These benefits occur at any age.  So whether you are 17 or 70, quitting is a good decision. Some of the benefits include:  · 20 minutes: Blood pressure and pulse return to normal.  · 8 hours: Oxygen levels return to normal.  · 2 days: Ability to smell and taste begin to improve as damaged nerves regrow.  · 2 to 3 weeks: Circulation and lung function improve.  · 1 to 9 months: Coughing, congestion, and shortness of breath decrease; tiredness decreases.  · 1 year: Risk of heart attack decreases by half.  · 5 years: Risk of lung cancer decreases by half; risk of stroke becomes the same as a nonsmokers.  For more on how to quit smoking, try these online resources:   · Smokefree.gov  · "Clearing the Air" booklet from the National Cancer [place]: smokefree.gov/sites/default/files/pdf/clearing-the-air-accessible.pdf  Date Last Reviewed: 3/1/2017  © 4536-5040 [employer]. [address] [place] [address]. All rights reserved. This information is not intended as a substitute for professional medical care. Always follow your healthcare professional's instructions.        "

## 2019-10-04 NOTE — ASSESSMENT & PLAN NOTE
Followed by podiatry   - DM well controlled, A1c 5.9 (7/16/19)  - currently has wounds on 4th and 5th toe, wound care by podiatry today.   - pain well controlled on gabapentin   - encourage tobacco cessation, encourage daily feet checks.

## 2019-10-04 NOTE — ASSESSMENT & PLAN NOTE
Current smoker, 3/4 ppd, 39 pack year.   - pt not ready to quit, counseling provided.   - encourage to cut back to 1/2 ppd.

## 2019-10-04 NOTE — ASSESSMENT & PLAN NOTE
Current smoker, 3/4 ppd, 39 pack year.   - Ct chest 4/5/17 Prior granulomatous disease with multiple calcified and noncalcified nodules within the lung fields bilaterally  - pt not ready to quit, counseling provided.   - no PFTs noted.   - Breath sounds diminished, chronic cough with mucus production daily.   - SOB with moderate exertion. Pt does not move around much.   - wants to defer PFTs until next month.   - flu vaccine today

## 2019-10-04 NOTE — TELEPHONE ENCOUNTER
Spoke with Anais. Cost with [facility] at [facility] is $25 and at [facility] is $32. May also try otc lamasil.     ----- Message from Abran Abraham RN sent at 10/4/2019  3:36 PM CDT -----  Contact: Patient      ----- Message -----  From: Jaqui Nielsen  Sent: 10/4/2019   3:31 PM CDT  To: Radha Snyder Staff    Type: Needs Medical Advice    Who Called:  Patient  Symptoms (please be specific):  Foot fungus  Pharmacy name and phone #:   [id number] - [place], [place] - [address]  [address]  [place] [address]  Phone: [phone] Fax: [phone]  Best Call Back Number:   Additional Information: Patient was called in ciclopirox (PENLAC) 8 % Soln by her pediatrist Dr Alexander but the cream is not covered by her insurance. Wanting to know if something else can be called in before the weekend, Dr Alexander is gone for the day.

## 2019-10-04 NOTE — ASSESSMENT & PLAN NOTE
A1c 5.9 7/16/18  - Recently hospitalized for hypoglycemia on 9/5/19.   - Now followed by endocrine, plans for continuous glucose monitor to be placed on 10/10.  - Tresiba decreased to 18 units in AM per endo  - Pt reports checking blood sugar BID, fbg since decreasing tresiba from 25 units to 18 has been in 130s. ppg 187 yesterday.   - Will cont. Metformin and tresiba as now.

## 2019-10-04 NOTE — ASSESSMENT & PLAN NOTE
Previously on Eliquis but now coumadin due to cost.   - hasn't had PT/INR checked since July, last INR 1.9  - on coumadin 6mg daily, labs today.   - denies significant bleeding or bruising.   - Will email pharmacist to explore assistance programs given pt's high fall risk.

## 2019-10-04 NOTE — PROCEDURES
"Wound Debridement  Date/Time: 10/4/2019 11:47 AM  Performed by: Iglesia Alexander DPM  Authorized by: Iglesia Alexander DPM     Time out: Immediately prior to procedure a "time out" was called to verify the correct patient, procedure, equipment, support staff and site/side marked as required.    Consent Done?:  Yes (Verbal)  Local anesthesia used?: No      Wound Details:    Location:  Left foot    Location:  Left 5th Toe    Type of Debridement:  Non-excisional       Length (cm):  0.8       Area (sq cm):  0.4       Width (cm):  0.5       Percent Debrided (%):  100       Depth (cm):  0.1       Total Area Debrided (sq cm):  0.4    Depth of debridement:  Epidermis/Dermis    Tissue debrided:  Dermis    Devitalized tissue debrided:  Biofilm, Callus, Slough and Necrotic/Eschar    Instruments:  Blade    Bleeding:  None  Patient tolerance:  Patient tolerated the procedure well with no immediate complications      "

## 2019-10-04 NOTE — PROGRESS NOTES
Flu vaccine admin as noted, aseptically.  Pt instructed to wait 15 min prior to leaving the clinic area.  Pt tolerated well, NAD noted

## 2019-10-04 NOTE — PROGRESS NOTES
Primary Care Provider Appointment    Subjective:      Patient ID: Kateryna Connolly is a 69 y.o. female with T2DM with neuropathy, COPD, HLD, CAD, HTN, PAD, h/o DVT, and NSTEMI (underwent PCI/ABBI of the RCA which was occluded (1/6/15))here to establish care.     Chief Complaint: Establish Care    Recently hospitalized for hypoglycemia on 9/5/19. Now followed by endocrine, plans for continuous glucose monitor to be placed on 10/10.     Endocrinology: ALHAJI Hopson. Last seen 10/2/19. Tresiba decreased to 18 units in AM.   Neurology: Dr. Althea Mann. Last seen 3/12/19. Will request records.  Podiatry: Dr. Iglesia Alexander. Last seen 7/2/18. Needs annual f/u.   Cardiology: Dr. Andrew Kwok. Last seen 1/20/18. Note reviewed. Pt was on eliquis at the time, lost f/u. Does not want to f/u with cardiology at the moment.     Accompanied by daughter Anais. Pt is dependent on family for transportation. Anais lives in [place]. Stepdaughter and her partner lives next door. Can usually bring her on Wednesdays and Thursdays when off of work. Lives in [place] alone with puppy dog. Retired since age 62, previously worked in retail. Pt is not very active. Ambulates with cane in the home, uses wheelchair when out in public     Pt reports checking blood sugar BID, fbg since decreasing tresiba from 25 units to 18 has been in 130s. ppg 187.     Past Surgical History:   Procedure Laterality Date    CARPAL TUNNEL RELEASE      bilateral    EYE SURGERY      bilat cataract removal with iol implants    GANGLION CYST EXCISION      heart stents      HYSTERECTOMY      KNEE SURGERY Left     SKIN BIOPSY      on side of nose       Past Medical History:   Diagnosis Date    Arthritis     Diabetes mellitus     Heart attack     Hyperlipidemia     Hypertension     Neuropathy     Occasional tremors        Social History     Socioeconomic History    Marital status:      Spouse name: Not on file    Number of children: 2    Years  of education: Not on file    Highest education level: 11th grade   Occupational History    Not on file   Social Needs    Financial resource strain: Not hard at all    Food insecurity:     Worry: Never true     Inability: Never true    Transportation needs:     Medical: Yes     Non-medical: Yes   Tobacco Use    Smoking status: Current Every Day Smoker     Packs/day: 0.75     Years: 39.00     Pack years: 29.25    Smokeless tobacco: Never Used   Substance and Sexual Activity    Alcohol use: Yes     Frequency: Monthly or less     Comment: rare    Drug use: No    Sexual activity: Not Currently   Lifestyle    Physical activity:     Days per week: 0 days     Minutes per session: 0 min    Stress: Only a little   Relationships    Social connections:     Talks on phone: More than three times a week     Gets together: More than three times a week     Attends Cheondoism service: Never     Active member of club or organization: No     Attends meetings of clubs or organizations: Never     Relationship status:    Other Topics Concern    Not on file   Social History Narrative    [place] with puppy dog.     Daughter-in-law and step daughter lives right next door.     Retired since age 62, previously worked in retail.     Voodoo: Mandaen     Hobbies: playing games on phone.             Review of Systems   Constitutional: Negative for appetite change, chills, diaphoresis, fever and unexpected weight change.   HENT: Negative for congestion, hearing loss, sinus pressure and sore throat.    Eyes: Negative for pain, discharge, redness and visual disturbance.   Respiratory: Positive for cough and shortness of breath. Negative for chest tightness, wheezing and stridor.         Chronic cough with mucus production every morning.   SOB with moderate exertion   Cardiovascular: Positive for leg swelling. Negative for chest pain and palpitations.   Gastrointestinal: Positive for nausea. Negative for abdominal  pain, constipation and diarrhea.        Occasional nausea    Endocrine: Negative for polydipsia, polyphagia and polyuria.   Genitourinary: Negative for difficulty urinating and hematuria.   Musculoskeletal: Positive for arthralgias, back pain and gait problem. Negative for joint swelling and myalgias.   Skin: Positive for wound. Negative for pallor and rash.   Allergic/Immunologic: Negative for food allergies.   Neurological: Positive for tremors and weakness. Negative for dizziness and light-headedness.        R leg tremors    Hematological: Bruises/bleeds easily.   Psychiatric/Behavioral: Negative for confusion and suicidal ideas.       Objective:   BP (!) 118/56 (BP Location: Right arm, Patient Position: Sitting)   Pulse 91   Temp 98.3 °F (36.8 °C) (Oral)   Wt 83.5 kg (184 lb 1.4 oz)   SpO2 (!) 94%   BMI 28.83 kg/m²     Physical Exam   Constitutional: She is oriented to person, place, and time. She appears well-developed and well-nourished. No distress.   HENT:   Right Ear: External ear normal.   Left Ear: External ear normal.   Eyes: Pupils are equal, round, and reactive to light. Conjunctivae are normal.   Neck: Normal range of motion. Neck supple. No JVD present.   Cardiovascular: Normal rate, regular rhythm and normal heart sounds.   No murmur heard.  Pulmonary/Chest: Effort normal. No stridor. No respiratory distress. She has no wheezes. She exhibits no tenderness.   Breath sounds diminished.    Abdominal: Soft. Bowel sounds are normal. There is no tenderness. There is no rebound.   Musculoskeletal: Normal range of motion. She exhibits edema.   1+ pitting edema BLE    Neurological: She is alert and oriented to person, place, and time. No cranial nerve deficit or sensory deficit.   Skin: Skin is warm and dry. Capillary refill takes less than 2 seconds. No rash noted. She is not diaphoretic. No erythema.   Psychiatric: She has a normal mood and affect. Her behavior is normal. Judgment and thought content  normal.           Lab Results   Component Value Date    WBC 6.36 10/02/2019    HGB 12.7 10/02/2019    HCT 40.0 10/02/2019     10/02/2019    CHOL 111 (L) 10/02/2019    TRIG 94 10/02/2019    HDL 38 (L) 10/02/2019    ALT 24 10/02/2019    AST 23 10/02/2019     10/02/2019    K 4.2 10/02/2019     10/02/2019    CREATININE 0.8 10/02/2019    BUN 10 10/02/2019    CO2 29 10/02/2019    TSH 1.119 10/02/2019    INR 1.9 (H) 07/16/2018    HGBA1C 5.9 (H) 07/16/2018       Current Outpatient Medications on File Prior to Visit   Medication Sig Dispense Refill    amitriptyline (ELAVIL) 25 MG tablet       atorvastatin (LIPITOR) 80 MG tablet Take 1 tablet (80 mg total) by mouth every evening. 30 tablet 11    betamethasone dipropionate (DIPROLENE) 0.05 % cream Apply topically 2 (two) times daily. (Patient not taking: Reported on 10/2/2019) 45 g 1    blood-glucose meter Misc Use the meter as directed      ciclopirox (LOPROX) 0.77 % Crea Apply topically 2 (two) times daily. 90 g 2    ciclopirox (PENLAC) 8 % Soln Apply topically nightly. 6.6 mL 11    clopidogrel (PLAVIX) 75 mg tablet Take 1 tablet by mouth once daily.      collagenase ointment Apply topically once daily. 90 g 0    gabapentin (NEURONTIN) 300 MG capsule       gabapentin (NEURONTIN) 600 MG tablet       lidocaine HCL 2% (XYLOCAINE) 2 % jelly Apply topically as needed. Apply once topically every dressing change to right ankle prior to mechanical debridement of wound with coarse gausze. 30 mL 2    lisinopril (PRINIVIL,ZESTRIL) 2.5 MG tablet Take 1 tablet (2.5 mg total) by mouth once daily. 30 tablet 11    metformin (GLUCOPHAGE) 1000 MG tablet Take 1,000 mg by mouth 2 (two) times daily with meals.      metoprolol succinate (TOPROL-XL) 25 MG 24 hr tablet Take 1 tablet (25 mg total) by mouth once daily. 30 tablet 11    promethazine (PHENERGAN) 25 MG tablet       TRESIBA FLEXTOUCH U-100 100 unit/mL (3 mL) InPn Inject 18 Units into the skin once daily.       warfarin (COUMADIN) 1 MG tablet TAKE 1 TABLET BY MOUTH ONCE DAILY 30 tablet 0    [DISCONTINUED] alendronate (FOSAMAX) 70 MG tablet TAKE 1 TABLET BY MOUTH EVERY 7 DAYS 4 tablet 11    [DISCONTINUED] apixaban 5 mg Tab Take 2 tablets (10 mg total) by mouth 2 (two) times daily. 26 tablet 0    [DISCONTINUED] oxyCODONE-acetaminophen (PERCOCET)  mg per tablet        No current facility-administered medications on file prior to visit.          Assessment:   69 y.o. female with multiple co-morbid illnesses here to establish care with new PCP and continue work-up of chronic issues notably T2DM with neuropathy, COPD, HLD, CAD, HTN, PAD, h/o DVT, and NSTEMI (underwent PCI/ABBI of the RCA which was occluded (1/6/15)).     Plan:     Problem List Items Addressed This Visit        Neuro    Diabetic polyneuropathy associated with type 2 diabetes mellitus     Followed by podiatry   - DM well controlled, A1c 5.9 (7/16/19)  - currently has wounds on 4th and 5th toe, wound care by podiatry today.   - pain well controlled on gabapentin   - encourage tobacco cessation, encourage daily feet checks.          Relevant Orders    Hemoglobin A1c    Vitamin B12    MICROALBUMIN / CREATININE RATIO URINE       Pulmonary    Chronic obstructive pulmonary disease     Current smoker, 3/4 ppd, 39 pack year.   - Ct chest 4/5/17 Prior granulomatous disease with multiple calcified and noncalcified nodules within the lung fields bilaterally  - pt not ready to quit, counseling provided.   - no PFTs noted.   - Breath sounds diminished, chronic cough with mucus production daily.   - SOB with moderate exertion. Pt does not move around much.   - wants to defer PFTs until next month.   - flu vaccine today             Hematology    History of deep venous thrombosis (DVT) of distal vein of right lower extremity     Previously on Eliquis but now coumadin due to cost.   - hasn't had PT/INR checked since July, last INR 1.9  - on coumadin 6mg daily, labs  today.   - denies significant bleeding or bruising.   - Will email pharmacist to explore assistance programs given pt's high fall risk.               Endocrine    Type 2 diabetes mellitus with skin complication, with long-term current use of insulin     A1c 5.9 7/16/18  - Recently hospitalized for hypoglycemia on 9/5/19.   - Now followed by endocrine, plans for continuous glucose monitor to be placed on 10/10.  - Tresiba decreased to 18 units in AM per endo  - Pt reports checking blood sugar BID, fbg since decreasing tresiba from 25 units to 18 has been in 130s. ppg 187 yesterday.   - Will cont. Metformin and tresiba as now.             Relevant Orders    Hemoglobin A1c    Vitamin B12    MICROALBUMIN / CREATININE RATIO URINE    Vitamin D deficiency     Level 22 (10/2/19)  - start vitamin D3 1000IU daily             Other    Tobacco dependence     Current smoker, 3/4 ppd, 39 pack year.   - pt not ready to quit, counseling provided.   - encourage to cut back to 1/2 ppd.            Other Visit Diagnoses     Encounter for immunization    -  Primary    Relevant Orders    Influenza - High Dose (65+) (PF) (IM)    Age-related osteoporosis without current pathological fracture        Anticoagulated on Coumadin        Relevant Orders    Protime-INR    B12 deficiency        Relevant Orders    Vitamin B12    Need for hepatitis C screening test        Relevant Orders    Hepatitis C antibody          Health Maintenance       Date Due Completion Date    Hepatitis C Screening 1950  ---    Eye Exam 08/18/1960 ---    Mammogram 08/18/1990 - declines ---    DEXA SCAN 08/18/1990 - scheduled ---    Shingles Vaccine (1 of 2) 08/18/2000 ---    TETANUS VACCINE 06/06/2016 6/6/2006    Urine Microalbumin 10/24/2017 10/24/2016    Pneumococcal Vaccine (65+ Low/Medium Risk) (2 of 2 - PPSV23) 11/08/2017 11/8/2016    Fecal Occult Blood Test (FOBT)/[employer]Kit 07/06/2019 7/6/2018    Hemoglobin A1c 08/05/2019 2/5/2019    Influenza Vaccine (1)  09/01/2019 - today  11/8/2016    Foot Exam 10/02/2020 10/2/2019    Lipid Panel 10/02/2020 10/2/2019          Follow up in about 3 weeks (around 10/25/2019) for dm, bring in log . Total face-to-face time was 60 min, 50% of this was spent on counseling and coordination of care. The following issues were discussed: T2DM with neuropathy, COPD, HLD, CAD, HTN, PAD, h/o DVT, and NSTEMI (underwent PCI/ABBI of the RCA which was occluded (1/6/15))      JOELLEN Allison  Internal Medicine  [facility] - [place]

## 2019-10-04 NOTE — ASSESSMENT & PLAN NOTE
Pt denies intentional weight loss   ~ 20# weight loss in 3 months.   - denies appetite change.   - FOBT negative 2018, declines colonoscopy, will bring in FOBT   - Declines mammogram  - Ct of chest in 2017 with multiple calcified, declines repeat for now.   - denies acute functional decline, no other associated symptoms.   - Will cont. To monitor.

## 2019-10-04 NOTE — PROGRESS NOTES
Subjective:      Patient ID: Kateryna Connolly is a 69 y.o. female.    Chief Complaint: Other Misc (nail fungus)    Kateryna is a 69 y.o. female who presents to the clinic for evaluation and treatment of high risk feet. Kateryna has a past medical history of Arthritis, Diabetes mellitus, Heart attack, Hyperlipidemia, Hypertension, Neuropathy, and Occasional tremors. The patient's chief complaint is long, thick toenails. Gradual onset, worsening over past several weeks, aggravated by increased weight bearing, shoe gear, pressure.  No previous medical treatment.  OTC pain med not helping. Denies trauma, surgery all toes.    PCP: Khanh Rodriguez MD    Date Last Seen by PCP:   Chief Complaint   Patient presents with    Other Misc     nail fungus         Current shoe gear:  Affected Foot: Casual shoes     Unaffected Foot: Casual shoes    Hemoglobin A1C   Date Value Ref Range Status   07/16/2018 5.9 (H) 4.0 - 5.6 % Final     Comment:     ADA Screening Guidelines:  5.7-6.4%  Consistent with prediabetes  >or=6.5%  Consistent with diabetes  High levels of fetal hemoglobin interfere with the HbA1C  assay. Heterozygous hemoglobin variants (HbS, HgC, etc)do  not significantly interfere with this assay.   However, presence of multiple variants may affect accuracy.         Review of Systems   Constitution: Negative for chills, diaphoresis, fever, malaise/fatigue and night sweats.   Cardiovascular: Positive for leg swelling. Negative for claudication, cyanosis and syncope.   Skin: Positive for nail changes. Negative for color change, dry skin, rash, suspicious lesions and unusual hair distribution.   Musculoskeletal: Negative for falls, joint pain, joint swelling, muscle cramps, muscle weakness and stiffness.   Gastrointestinal: Negative for constipation, diarrhea, nausea and vomiting.   Neurological: Positive for paresthesias and sensory change. Negative for brief paralysis, disturbances in coordination, focal weakness, numbness and  tremors.           Objective:      Physical Exam   Constitutional: She is oriented to person, place, and time. She appears well-developed and well-nourished. She is cooperative.   Oriented to time, place, and person.   Cardiovascular:   Pulses:       Dorsalis pedis pulses are 1+ on the right side, and 1+ on the left side.        Posterior tibial pulses are 1+ on the right side, and 1+ on the left side.   Capillary fill time 3-5 seconds.  All toes warm to touch.      Negative lower extremity edema bilateral.    Negative elevational pallor and dependent rubor bilateral.     Musculoskeletal:   Normal angle, base, station of gait. Decreased stride length, early heel off, moderately propulsive toe off bilateral.    All ten toes without clubbing, cyanosis, or signs of ischemia.      No pain to palpation bilateral lower extremities.      Range of motion, stability, muscle strength, and muscle tone are age and health appropriate normal bilateral feet and legs.       Lymphadenopathy:   Negative lymphadenopathy bilateral popliteal fossa and tarsal tunnel.  Negative lymphangitic streaking bilateral foot/ankle bilateral.     Neurological: She is alert and oriented to person, place, and time. She has normal strength. She is not disoriented. She displays no atrophy and no tremor. A sensory deficit is present. She exhibits normal muscle tone.   Reflex Scores:       Patellar reflexes are 2+ on the right side and 2+ on the left side.       Achilles reflexes are 2+ on the right side and 2+ on the left side.  Decreased/absent vibratory sensation bilateral feet to 128Hz tuning fork.    Diminished/loss of protective sensation all toes bilateral to 10 gram monofilament.    Paresthesias, and burning bilateral feet with no clearly identified trigger or source.     Skin: Skin is warm, dry and intact. No abrasion, no bruising, no burn, no ecchymosis, no laceration, no lesion, no petechiae and no rash noted. She is not diaphoretic. No  cyanosis or erythema. No pallor. Nails show no clubbing.   Skin thin, atrophic, with decreased density and distribution of pedal hair bilateral, but without hyperpigmentation, adolph discoloration,  ulcers, masses, nodules or cords palpated bilateral feet and legs.      Toenails 1st, 2nd, 3rd, 4th, 5th  bilateral are hypertrophic thickened 2-3 mm, dystrophic, discolored tanish brown with tan, gray crumbly subungual debris.  Tender to distal nail plate pressure, without periungual skin abnormality of each.               Assessment:       Encounter Diagnoses   Name Primary?    Type II diabetes mellitus with neurological manifestations Yes    PAD (peripheral artery disease)     History of ulceration     Onychomycosis due to dermatophyte     Toe ulcer, left, limited to breakdown of skin          Plan:       Kateryna was seen today for other misc.    Diagnoses and all orders for this visit:    Type II diabetes mellitus with neurological manifestations  -     X-Ray Foot Complete Left; Future    PAD (peripheral artery disease)  -     X-Ray Foot Complete Left; Future    History of ulceration  -     X-Ray Foot Complete Left; Future    Onychomycosis due to dermatophyte  -     X-Ray Foot Complete Left; Future    Toe ulcer, left, limited to breakdown of skin  -     X-Ray Foot Complete Left; Future    Other orders  -     ciclopirox (PENLAC) 8 % Soln; Apply topically nightly.      I counseled the patient on her conditions, their implications and medical management.    Debride wound left 5th toe.    Dressed same with betadine swab, simple dressing of 4x4, kerlix - change same every other day.    - Shoe inspection. Diabetic Foot Education. Patient reminded of the importance of good nutrition and blood sugar control to help prevent podiatric complications of diabetes. Patient instructed on proper foot hygeine. We discussed wearing proper shoe gear, daily foot inspections, never walking without protective shoe gear, never putting  sharp instruments to feet, routine podiatric visits at least annually.      - With patient's permission, nails were aggressively reduced and debrided x 10 to their soft tissue attachment mechanically , removing all offending nail and debris. Patient relates relief following the procedure. She will continue to monitor the areas daily, inspect her feet, wear protective shoe gear when ambulatory, moisturizer to maintain skin integrity and follow in this office  P.r.n.    Rx penlac, xrays, diabetic shoes    Discussed conservative treatment with shoes of adequate dimensions, material, and style to alleviate symptoms and delay or prevent surgical intervention.         No follow-ups on file.

## 2019-10-07 ENCOUNTER — TELEPHONE (OUTPATIENT)
Dept: ENDOCRINOLOGY | Facility: CLINIC | Age: 69
End: 2019-10-07

## 2019-10-07 NOTE — TELEPHONE ENCOUNTER
"Spoke to patient, she does not think that she is taking enough medication. Blood sugars have been in the 90"s in am and at bedtime running in the 300"s. She takes 18 units of insulin in the am and 2 1000 mg metformin daily.. Has her CGM removed on the 10th and has a follow up appt on the 23rd. Will drop off logs and food logs on the 10th. Please advise any orders.   "

## 2019-10-07 NOTE — TELEPHONE ENCOUNTER
----- Message from Navi Cedeno sent at 10/7/2019 10:35 AM CDT -----  Contact: same  Patient called in and the only thing she would say is that she needs to know if she has to take this medication (did not know name) when her blood sugar is so high at night?    Call back number is [phone]

## 2019-10-07 NOTE — TELEPHONE ENCOUNTER
Spoke to patient states that she is still in the 90's in the morning and high 300's in the pm. Will come in on the 10 th to have a sensor placed. Will bring logs. Worried about her readings.

## 2019-10-10 ENCOUNTER — HOSPITAL ENCOUNTER (OUTPATIENT)
Dept: RADIOLOGY | Facility: CLINIC | Age: 69
Discharge: HOME | End: 2019-10-10
Attending: PHYSICIAN ASSISTANT
Payer: MEDICARE

## 2019-10-10 ENCOUNTER — CLINICAL SUPPORT (OUTPATIENT)
Dept: ENDOCRINOLOGY | Facility: CLINIC | Age: 69
End: 2019-10-10
Payer: MEDICARE

## 2019-10-10 DIAGNOSIS — E11.621 TYPE 2 DIABETES MELLITUS WITH FOOT ULCER, WITH LONG-TERM CURRENT USE OF INSULIN: ICD-10-CM

## 2019-10-10 DIAGNOSIS — Z79.4 TYPE 2 DIABETES MELLITUS WITH FOOT ULCER, WITH LONG-TERM CURRENT USE OF INSULIN: ICD-10-CM

## 2019-10-10 DIAGNOSIS — L97.509 TYPE 2 DIABETES MELLITUS WITH FOOT ULCER, WITH LONG-TERM CURRENT USE OF INSULIN: ICD-10-CM

## 2019-10-10 DIAGNOSIS — Z78.0 POSTMENOPAUSAL: ICD-10-CM

## 2019-10-10 PROCEDURE — 77080 DEXA BONE DENSITY SPINE HIP: ICD-10-PCS | Mod: 26,,, | Performed by: RADIOLOGY

## 2019-10-10 PROCEDURE — 77080 DXA BONE DENSITY AXIAL: CPT | Mod: TC,PO

## 2019-10-10 PROCEDURE — 77080 DXA BONE DENSITY AXIAL: CPT | Mod: 26,,, | Performed by: RADIOLOGY

## 2019-10-15 ENCOUNTER — OUTPATIENT CASE MANAGEMENT (OUTPATIENT)
Dept: ADMINISTRATIVE | Facility: OTHER | Age: 69
End: 2019-10-15

## 2019-10-15 ENCOUNTER — TELEPHONE (OUTPATIENT)
Dept: ENDOCRINOLOGY | Facility: CLINIC | Age: 69
End: 2019-10-15

## 2019-10-15 NOTE — PROGRESS NOTES
10/15/19-09/25/19-SUMMARY-  Patient is checking her CBG more than one time a day. Patient received the free style luke and it is not working. Patient with a follow up appointment with endocrinology on 10/23/19. Reviewed with patient a well balanced food plate and diabetic food items. Reviewed eating/nutrition habits. Reviewed labs with patient. A1C was 5.1 on 10/04/19. Reviewed with patient signs and symptoms of hypoglycemia and hyperglycemia. Patient cooks breakfast and lunch.,Simple meals like eggs, soup and grilled cheese sandwiches. Patient's daughter in law cooks supper. Patient is keeping a food log and CBG log.Patient with no falls. Patient states that she had tremors for years, starts on her right side and foot starts shaking then she will fall if she does not sit down. Encouraged patient to talk to her doctors about this. Reviewed with patient long term complications of diabetes. Patient has a smoke alarm in her trailer with her dog. Patient has reviewed educational material. Continue to educate about  Falls/safety and diabetes.  Encourage patient to follow medication and treatment regiment. Encourage patient to maintain follow up with doctors.       INTERVENTIONS-  Message to ARA Garcia- Vitamin D3 deficiency and patient is asking for a RX so her insurance will pay for it.     PLAN-  Follow up in two weeks  Review signs and symptoms of hypoglycemia and hyperglycemia  Upcoming appts.-10/23 /19 endocrinology and optometry                               10/24/19-[facility] and [facility]inology                                11/01/19-PCP  Patient/caregiver will verbalize importance of having a safe home environment by keeping room free of clutter, making sure no electrical cords placed in walk ways, making sure rooms are well lit, placing non-skid bath mats and removing throw rugs.                Todays OPCM Self-Management Care Plan was developed with the patients/caregivers input and was based on  identified barriers from todays assessment.  Goals were written today with the patient/caregiver and the patient has agreed to work towards these goals to improve his/her overall well-being. Patient verbalized understanding of the care plan, goals, and all of today's instructions. Encouraged patient/caregiver to communicate with his/her physician and health care team about health conditions and the treatment plan.  Provided my contact information today and encouraged patient/caregiver to call me with any questions as needed.

## 2019-10-15 NOTE — TELEPHONE ENCOUNTER
----- Message from Navi Cedeno sent at 10/15/2019  9:27 AM CDT -----  Contact: same  Patient called in and stated she needs to speak to nurse about the issues she is having with the meter in her arm?  Patient would like a call back at [phone]

## 2019-10-15 NOTE — TELEPHONE ENCOUNTER
Patient advised that her CGM placed only lasted 5 days. Do you want to have patient come in for removal and see you?

## 2019-10-16 ENCOUNTER — TELEPHONE (OUTPATIENT)
Dept: FAMILY MEDICINE | Facility: CLINIC | Age: 69
End: 2019-10-16

## 2019-10-16 NOTE — TELEPHONE ENCOUNTER
----- Message from Vida Berry RN sent at 10/16/2019 10:32 AM CDT -----  Contact: Vida Berry RN [facility] Care[facility]  EXT. [phone]  I would appreciate it if you would follow up with her, in case she has any other questions.  Thank you,   Vida   ----- Message -----  From: Abran Abraham RN  Sent: 10/16/2019   9:44 AM CDT  To: Vida Berry RN    No I did not, I can if you need me to.  Just thought that you wanted to handle that.  Just let me know what you like for me to do.  Abran   ----- Message -----  From: Vida Berry RN  Sent: 10/16/2019   9:20 AM CDT  To: Abran Abraham RN     Did you call the patient and update them with this information?   Thank you for your assistance,   Vida Berry RN [facility]   ----- Message -----  From: Abran Abraham RN  Sent: 10/15/2019   4:46 PM CDT  To: MARY Solano,  Please see the reply from CHRISTIAN Garcia NP her PCP.  Please let me know if you have questions.  The Rx dose for Vit D is 50,000IU.    Abran Abraham RN  ----- Message -----  From: Lillian Garcia NP  Sent: 10/15/2019   4:35 PM CDT  To: Abran Abraham RN    Her vitamin D is not low enough for the prescription dose of 50,000IU. This is OTC. If she has humana, she can order through the OTC program.     ----- Message -----  From: Abran Abraham RN  Sent: 10/15/2019   3:32 PM CDT  To: Lillian Garcia NP        ----- Message -----  From: Vida Berry RN  Sent: 10/15/2019   3:23 PM CDT  To: Radha Snyder Staff    Note from appt. On 10/4/19-Vitamin D deficiency-  Level 22 (10/2/19)  - start vitamin D3 1000IU daily .  Patient is asking if a RX could be called in for Vitamin D3 so her insurance would pay for it.   Please call and advise patient.     Thank you for your assistance,   Vida Berry RN [facility] OPCM

## 2019-10-23 ENCOUNTER — OFFICE VISIT (OUTPATIENT)
Dept: ENDOCRINOLOGY | Facility: CLINIC | Age: 69
End: 2019-10-23
Payer: MEDICARE

## 2019-10-23 ENCOUNTER — OFFICE VISIT (OUTPATIENT)
Dept: OPTOMETRY | Facility: CLINIC | Age: 69
End: 2019-10-23
Payer: MEDICARE

## 2019-10-23 VITALS
BODY MASS INDEX: 28.88 KG/M2 | SYSTOLIC BLOOD PRESSURE: 112 MMHG | HEART RATE: 93 BPM | TEMPERATURE: 98 F | WEIGHT: 184 LBS | HEIGHT: 67 IN | DIASTOLIC BLOOD PRESSURE: 84 MMHG

## 2019-10-23 DIAGNOSIS — F17.200 TOBACCO DEPENDENCE: ICD-10-CM

## 2019-10-23 DIAGNOSIS — Z96.1 PSEUDOPHAKIA: ICD-10-CM

## 2019-10-23 DIAGNOSIS — H52.7 REFRACTIVE ERROR: ICD-10-CM

## 2019-10-23 DIAGNOSIS — Z79.4 TYPE 2 DIABETES MELLITUS WITH FOOT ULCER, WITH LONG-TERM CURRENT USE OF INSULIN: Primary | ICD-10-CM

## 2019-10-23 DIAGNOSIS — E11.621 TYPE 2 DIABETES MELLITUS WITH FOOT ULCER, WITH LONG-TERM CURRENT USE OF INSULIN: Primary | ICD-10-CM

## 2019-10-23 DIAGNOSIS — E55.9 HYPOVITAMINOSIS D: ICD-10-CM

## 2019-10-23 DIAGNOSIS — E78.5 HYPERLIPIDEMIA, UNSPECIFIED HYPERLIPIDEMIA TYPE: ICD-10-CM

## 2019-10-23 DIAGNOSIS — M81.0 OSTEOPOROSIS, UNSPECIFIED OSTEOPOROSIS TYPE, UNSPECIFIED PATHOLOGICAL FRACTURE PRESENCE: ICD-10-CM

## 2019-10-23 DIAGNOSIS — E11.9 DIABETES MELLITUS WITHOUT OPHTHALMIC MANIFESTATIONS: Primary | ICD-10-CM

## 2019-10-23 DIAGNOSIS — I10 HYPERTENSION, UNSPECIFIED TYPE: ICD-10-CM

## 2019-10-23 DIAGNOSIS — L97.509 TYPE 2 DIABETES MELLITUS WITH FOOT ULCER, WITH LONG-TERM CURRENT USE OF INSULIN: Primary | ICD-10-CM

## 2019-10-23 PROCEDURE — 99213 OFFICE O/P EST LOW 20 MIN: CPT | Mod: S$PBB,,, | Performed by: PHYSICIAN ASSISTANT

## 2019-10-23 PROCEDURE — 99999 PR PBB SHADOW E&M-EST. PATIENT-LVL II: CPT | Mod: PBBFAC,,, | Performed by: OPTOMETRIST

## 2019-10-23 PROCEDURE — 92004 COMPRE OPH EXAM NEW PT 1/>: CPT | Mod: S$PBB,,, | Performed by: OPTOMETRIST

## 2019-10-23 PROCEDURE — 99214 OFFICE O/P EST MOD 30 MIN: CPT | Mod: PBBFAC,27,PO | Performed by: PHYSICIAN ASSISTANT

## 2019-10-23 PROCEDURE — 92004 PR EYE EXAM, NEW PATIENT,COMPREHESV: ICD-10-PCS | Mod: S$PBB,,, | Performed by: OPTOMETRIST

## 2019-10-23 PROCEDURE — 99212 OFFICE O/P EST SF 10 MIN: CPT | Mod: PBBFAC,PO | Performed by: OPTOMETRIST

## 2019-10-23 PROCEDURE — 99999 PR PBB SHADOW E&M-EST. PATIENT-LVL II: ICD-10-PCS | Mod: PBBFAC,,, | Performed by: OPTOMETRIST

## 2019-10-23 PROCEDURE — 99999 PR PBB SHADOW E&M-EST. PATIENT-LVL IV: CPT | Mod: PBBFAC,,, | Performed by: PHYSICIAN ASSISTANT

## 2019-10-23 PROCEDURE — 99999 PR PBB SHADOW E&M-EST. PATIENT-LVL IV: ICD-10-PCS | Mod: PBBFAC,,, | Performed by: PHYSICIAN ASSISTANT

## 2019-10-23 PROCEDURE — 99213 PR OFFICE/OUTPT VISIT, EST, LEVL III, 20-29 MIN: ICD-10-PCS | Mod: S$PBB,,, | Performed by: PHYSICIAN ASSISTANT

## 2019-10-23 RX ORDER — ALENDRONATE SODIUM 70 MG/1
TABLET ORAL
Qty: 12 TABLET | Refills: 3 | Status: SHIPPED | OUTPATIENT
Start: 2019-10-23 | End: 2020-10-22

## 2019-10-23 NOTE — PATIENT INSTRUCTIONS
Decrease Tresiba to 16 units in the morning. Increase Metformin to 1000 mg with breakfast and dinner and 500 mg with lunch. Start alendronate 70 mg once weekly. Stop taking if you experience jaw pain.

## 2019-10-23 NOTE — PROGRESS NOTES
HPI     Annual Exam      Additional comments: DLE x 4 yrs (outside [facility])    ocular health exam                 Diabetic Eye Exam      Additional comments: BSL fluctuates, uncontrolled              Blurred Vision      Additional comments: at both near & distance              Spots and/or Floaters      Additional comments: OD only --- no light flashes              Comments     Hemoglobin A1C       Date                     Value               Ref Range             Status                10/04/2019               5.1                 4.0 - 5.6 %           Final                  07/16/2018               5.9 (H)             4.0 - 5.6 %           Final                        Last edited by Tory Silverio on 10/23/2019  1:23 PM. (History)            Assessment /Plan     For exam results, see Encounter Report.    Diabetes mellitus without ophthalmic manifestations    Pseudophakia    Refractive error      DM type 2 w/o ocular retinopathy OU. Discussed possible ocular affects of uncontrolled blood sugar with patient. Recommended continued strong blood sugar control and continued care with PCP. Monitor yearly.     S/p cataract extraction with good results. S/p yag but still some mild pco OD. OS clear. Discussed spec options, pt happy with current specs, denies refraction. Return as desired for updated spec Rx.      RTC in 1 year for comprehensive eye exam, or sooner prn.

## 2019-10-23 NOTE — LETTER
October 23, 2019      PHIL Purcell PA-C  [address] [facility] [address]           [facility] - Optometry  [address] [place] [address] [address]IVE [address]  [facility] [address]  Phone: [phone]          Patient: Kateryna Connolly   MR Number: [id number]   YOB: 1950   Date of Visit: 10/23/2019       Dear PHIL Purcell:    Thank you for referring Kateryna Connolly to me for evaluation. Attached you will find relevant portions of my assessment and plan of care.    If you have questions, please do not hesitate to call me. I look forward to following Kateryna Connolly along with you.    Sincerely,    Uday Mejia, OD    Enclosure  CC:  No Recipients    If you would like to receive this communication electronically, please contact [email or web address] or [phone] to request more information on [employer] access.    For providers and/or their staff who would like to refer a patient to [facility], please contact us through our one-stop-shop provider referral line, Ted Dias, at [phone].    If you feel you have received this communication in error or would no longer like to receive these types of communications, please e-mail [email or web address]

## 2019-10-23 NOTE — PROGRESS NOTES
"CC: This 69 y.o. female presents for management of Diabetes    and chronic conditions pending review including HTN, HLP    HPI: was diagnosed with T2DM ~40 years ago. Here for a CGM review today.  Hospitalized for hypoglycemia on 9/5/19.   Family hx of DM: no  Fhx of thyroid disease:no  Denies missing doses of DM medication.     CGM was unable to dl to the chart. She is having hypoglycemia in the morning between 6-12 am. She is having hyperglycemia before dinner.    Exercise: none    CURRENT DM MEDS: metformin 2 g daily, Tresiba 18 units  Timing prandial insulin 5-15 minutes before meals:    Standards of Care:  Eye exam: She has an appointment on 10/23.  Podiatry exam: 10/19--Dr. Alexander  DE: years ago    DEXA scan: 10/19. Showed osteoporosis in the spine. Prescribed fosamax in the past but stopped taking.   PMHx, PSHx: reviewed in epic.  Social Hx: no ETOH use. Smokes     ROS:   Gen: Appetite good, + weight loss, denies fatigue and weakness.  Skin: Skin is intact and heals well, no rashes, no hair changes  Eyes: Denies visual disturbances  Resp: + KEITA, + chronic cough  Cardiac: No palpitations, chest pain, no edema   GI: + nausea, no vomiting, diarrhea, constipation, or abdominal pain.  /GYN: No nocturia, burning or pain.   MS/Neuro: + jt pain,  Left toe ulcer,  Denies numbness/ tingling in BLE;  speech clear  Psych: Denies drug/ETOH abuse, no hx of depression.  Other systems: negative.    /84 (BP Location: Right arm, Patient Position: Sitting, BP Method: Medium (Manual))   Pulse 93   Temp 97.7 °F (36.5 °C) (Oral)   Ht 5' 7" (1.702 m)   Wt 83.5 kg (184 lb)   BMI 28.82 kg/m²      Previous exam 10/19  PE:  GENERAL: elderly female, well developed, well nourished.  PSYCH: AAOx3, appropriate mood and affect, pleasant expression, conversant, appears relaxed, well groomed.   EYES: Conjunctiva, corneas clear  NECK: Supple, trachea midline,no thyromegaly or nodules  CHEST: Resp even and unlabored, CTA " bilateral.  CARDIAC: RRR, S1, S2 heard, no murmurs  ABDOMEN: Soft, non-tender, non-distended   VASCULAR: DP pulses +2/4 bilaterally, no edema.  NEURO: she is in a wheelchair, CN ll-Xll grossly intact  SKIN: Skin warm and dry no acanthosis nigracans.  Foot Exam: appropriate footwear.     Personally reviewed labs below:    Lab Visit on 10/04/2019   Component Date Value Ref Range Status    Hepatitis C Ab 10/04/2019 Negative  Negative Final   Lab Visit on 10/04/2019   Component Date Value Ref Range Status    Prothrombin Time 10/04/2019 9.9  9.0 - 12.5 sec Final    INR 10/04/2019 0.9  0.8 - 1.2 Final    Comment: Coumadin Therapy:  2.0 - 3.0 for INR for all indicators except mechanical heart valves  and antiphospholipid syndromes which should use 2.5 - 3.5.      Hemoglobin A1C 10/04/2019 5.1  4.0 - 5.6 % Final    Comment: ADA Screening Guidelines:  5.7-6.4%  Consistent with prediabetes  >or=6.5%  Consistent with diabetes  High levels of fetal hemoglobin interfere with the HbA1C  assay. Heterozygous hemoglobin variants (HbS, HgC, etc)do  not significantly interfere with this assay.   However, presence of multiple variants may affect accuracy.      Estimated Avg Glucose 10/04/2019 100  68 - 131 mg/dL Final    Vitamin B-12 10/04/2019 223  210 - 950 pg/mL Final   Lab Visit on 10/02/2019   Component Date Value Ref Range Status    Sodium 10/02/2019 141  136 - 145 mmol/L Final    Potassium 10/02/2019 4.2  3.5 - 5.1 mmol/L Final    Chloride 10/02/2019 101  95 - 110 mmol/L Final    CO2 10/02/2019 29  23 - 29 mmol/L Final    Glucose 10/02/2019 95  70 - 110 mg/dL Final    BUN, Bld 10/02/2019 10  8 - 23 mg/dL Final    Creatinine 10/02/2019 0.8  0.5 - 1.4 mg/dL Final    Calcium 10/02/2019 9.7  8.7 - 10.5 mg/dL Final    Total Protein 10/02/2019 7.3  6.0 - 8.4 g/dL Final    Albumin 10/02/2019 3.9  3.5 - 5.2 g/dL Final    Total Bilirubin 10/02/2019 0.5  0.1 - 1.0 mg/dL Final    Comment: For infants and newborns,  interpretation of results should be based  on gestational age, weight and in agreement with clinical  observations.  Premature Infant recommended reference ranges:  Up to 24 hours.............<8.0 mg/dL  Up to 48 hours............<12.0 mg/dL  3-5 days..................<15.0 mg/dL  6-29 days.................<15.0 mg/dL      Alkaline Phosphatase 10/02/2019 87  55 - 135 U/L Final    AST 10/02/2019 23  10 - 40 U/L Final    ALT 10/02/2019 24  10 - 44 U/L Final    Anion Gap 10/02/2019 11  8 - 16 mmol/L Final    eGFR if African American 10/02/2019 >60.0  >60 mL/min/1.73 m^2 Final    eGFR if non African American 10/02/2019 >60.0  >60 mL/min/1.73 m^2 Final    Comment: Calculation used to obtain the estimated glomerular filtration  rate (eGFR) is the CKD-EPI equation.       WBC 10/02/2019 6.36  3.90 - 12.70 K/uL Final    RBC 10/02/2019 4.16  4.00 - 5.40 M/uL Final    Hemoglobin 10/02/2019 12.7  12.0 - 16.0 g/dL Final    Hematocrit 10/02/2019 40.0  37.0 - 48.5 % Final    Mean Corpuscular Volume 10/02/2019 96  82 - 98 fL Final    Mean Corpuscular Hemoglobin 10/02/2019 30.5  27.0 - 31.0 pg Final    Mean Corpuscular Hemoglobin Conc 10/02/2019 31.8* 32.0 - 36.0 g/dL Final    RDW 10/02/2019 14.5  11.5 - 14.5 % Final    Platelets 10/02/2019 262  150 - 350 K/uL Final    MPV 10/02/2019 10.2  9.2 - 12.9 fL Final    Immature Granulocytes 10/02/2019 0.3  0.0 - 0.5 % Final    Gran # (ANC) 10/02/2019 4.4  1.8 - 7.7 K/uL Final    Immature Grans (Abs) 10/02/2019 0.02  0.00 - 0.04 K/uL Final    Comment: Mild elevation in immature granulocytes is non specific and   can be seen in a variety of conditions including stress response,   acute inflammation, trauma and pregnancy. Correlation with other   laboratory and clinical findings is essential.      Lymph # 10/02/2019 1.3  1.0 - 4.8 K/uL Final    Mono # 10/02/2019 0.4  0.3 - 1.0 K/uL Final    Eos # 10/02/2019 0.2  0.0 - 0.5 K/uL Final    Baso # 10/02/2019 0.03  0.00 -  0.20 K/uL Final    nRBC 10/02/2019 0  0 /100 WBC Final    Gran% 10/02/2019 69.6  38.0 - 73.0 % Final    Lymph% 10/02/2019 20.3  18.0 - 48.0 % Final    Mono% 10/02/2019 6.6  4.0 - 15.0 % Final    Eosinophil% 10/02/2019 2.7  0.0 - 8.0 % Final    Basophil% 10/02/2019 0.5  0.0 - 1.9 % Final    Differential Method 10/02/2019 Automated   Final    PTH, Intact 10/02/2019 64.0  9.0 - 77.0 pg/mL Final    Calcium, Ion 10/02/2019 1.27  1.06 - 1.42 mmol/L Final    Cholesterol 10/02/2019 111* 120 - 199 mg/dL Final    Comment: The National Cholesterol Education Program (NCEP) has set the  following guidelines (reference ranges) for Cholesterol:  Optimal.....................<200 mg/dL  Borderline High.............200-239 mg/dL  High........................> or = 240 mg/dL      Triglycerides 10/02/2019 94  30 - 150 mg/dL Final    Comment: The National Cholesterol Education Program (NCEP) has set the  following guidelines (reference values) for triglycerides:  Normal......................<150 mg/dL  Borderline High.............150-199 mg/dL  High........................200-499 mg/dL      HDL 10/02/2019 38* 40 - 75 mg/dL Final    Comment: The National Cholesterol Education Program (NCEP) has set the  following guidelines (reference values) for HDL Cholesterol:  Low...............<40 mg/dL  Optimal...........>60 mg/dL      LDL Cholesterol 10/02/2019 54.2* 63.0 - 159.0 mg/dL Final    Comment: The National Cholesterol Education Program (NCEP) has set the  following guidelines (reference values) for LDL Cholesterol:  Optimal.......................<130 mg/dL  Borderline High...............130-159 mg/dL  High..........................160-189 mg/dL  Very High.....................>190 mg/dL      Hdl/Cholesterol Ratio 10/02/2019 34.2  20.0 - 50.0 % Final    Total Cholesterol/HDL Ratio 10/02/2019 2.9  2.0 - 5.0 Final    Non-HDL Cholesterol 10/02/2019 73  mg/dL Final    Comment: Risk category and Non-HDL cholesterol  goals:  Coronary heart disease (CHD)or equivalent (10-year risk of CHD >20%):  Non-HDL cholesterol goal     <130 mg/dL  Two or more CHD risk factors and 10-year risk of CHD <= 20%:  Non-HDL cholesterol goal     <160 mg/dL  0 to 1 CHD risk factor:  Non-HDL cholesterol goal     <190 mg/dL      Vit D, 25-Hydroxy 10/02/2019 22* 30 - 96 ng/mL Final    Comment: Vitamin D deficiency.........<10 ng/mL                              Vitamin D insufficiency......10-29 ng/mL       Vitamin D sufficiency........> or equal to 30 ng/mL  Vitamin D toxicity............>100 ng/mL      Magnesium 10/02/2019 1.7  1.6 - 2.6 mg/dL Final    Phosphorus 10/02/2019 4.0  2.7 - 4.5 mg/dL Final    Free T4 10/02/2019 0.92  0.71 - 1.51 ng/dL Final    T3, Total 10/02/2019 92  60 - 180 ng/dL Final    TSH 10/02/2019 1.119  0.400 - 4.000 uIU/mL Final         ASSESSMENT and PLAN:    1. Type 2 diabetes mellitus with foot ulcer, with long-term current use of insulin  Hemoglobin A1c    Renal function panel   2. Hypertension, unspecified type     3. Hyperlipidemia, unspecified hyperlipidemia type     4. Osteoporosis, unspecified osteoporosis type, unspecified pathological fracture presence  alendronate (FOSAMAX) 70 MG tablet   5. Hypovitaminosis D  Vitamin D   6. Tobacco dependence        T2DM with hyperglycemia-Pt is still having multiple episodes of hypoglycemia. Decrease Tresiba to 16 units in the morning. Increase Metformin to 1000 mg with breakfast and dinner and 500 mg with lunch  Discussed DM, progression of disease, long term complications, tx options.   Discussed A1c and BG goals.   Reviewed  hypoglycemia, s/s and appropriate tx.   Instructed to monitor BG and bring meter/ log to every clinic visit.   - takes ASA, ACEi, statin    HTN - controlled, continue meds as previously prescribed and monitor.     HLP - LDL , on statin therapy, LFTs WNL  Postmenopausal/osteoporosis-DEXA scan 10/21. Start alendronate 70 mg weekly. Discussed side  effects.  Hypovitaminosis d-low-continue vd intake  Tobacco dependence-encouraged to quit smoking  Hypoglycemia-may benefit from a sensor     Follow-up: 3 mths

## 2019-10-30 ENCOUNTER — DOCUMENTATION ONLY (OUTPATIENT)
Dept: FAMILY MEDICINE | Facility: CLINIC | Age: 69
End: 2019-10-30

## 2019-10-30 NOTE — PROGRESS NOTES
Pre-Visit Chart Review  For Appointment Scheduled on 11/1/19    Health Maintenance Due   Topic Date Due    Mammogram  08/18/1990    TETANUS VACCINE  06/06/2016    Urine Microalbumin  10/24/2017    Pneumococcal Vaccine (65+ Low/Medium Risk) (2 of 2 - PPSV23) 11/08/2017    Fecal Occult Blood Test (FOBT)/[employer]  07/06/2019

## 2019-10-31 ENCOUNTER — OUTPATIENT CASE MANAGEMENT (OUTPATIENT)
Dept: ADMINISTRATIVE | Facility: OTHER | Age: 69
End: 2019-10-31

## 2019-10-31 NOTE — PROGRESS NOTES
10/31/19SUMMARY-  Attempt to follow up with  patient for outpatient case management. No answer and unable to leave a message. RN OPCM 1'st follow up attempt.   11/07/19-  Called and spoke to patient. Patient is reviewing educational material given to her. Patient with no follow up appointments until the first of the year.           Reviewed with patient diet/ nutrition habits. Reviewed signs and symptoms of hypotension with patient, verbalized understanding. Patient is checking her CBG three times a day. It is running between 89 - 117 in the morning. Patient's A1C was 5.1 on 10/04/19. Patient is taking all of her medications as prescribed. Patient with no falls and is keeping a clutter free camper trailer. If she has not used it in six months, she throws it away. Continue to educate about  Falls/safety and diabetes.  Encourage patient to follow medication and treatment regiment. Encourage patient to maintain follow up with doctors.       INTERVENTIONS-  Follow up     PLAN-  Follow up in two weeks  Review signs and symptoms of  Hyperglycemia  Encourage exercise   Mailed A1C test, fall prevention, exercises ot prevent falls, and diabetes (general information)      [facility] Self-Management Care Plan was developed with the patients/caregivers input and was based on identified barriers from [facility] assessment.  Goals were written today with the patient/caregiver and the patient has agreed to work towards these goals to improve his/her overall well-being. Patient verbalized understanding of the care plan, goals, and all of today's instructions. Encouraged patient/caregiver to communicate with his/her physician and health care team about health conditions and the treatment plan.  Provided my contact information today and encouraged patient/caregiver to call me with any questions as needed.

## 2019-11-15 DIAGNOSIS — E11.9 TYPE 2 DIABETES MELLITUS WITHOUT COMPLICATION: ICD-10-CM

## 2019-11-27 ENCOUNTER — OUTPATIENT CASE MANAGEMENT (OUTPATIENT)
Dept: ADMINISTRATIVE | Facility: OTHER | Age: 69
End: 2019-11-27

## 2019-11-27 NOTE — PROGRESS NOTES
11/27/19-SUMMARY-  Called and spoke to patient. Patient fell yesterday. States that her right leg just gave out. Patient states that she had test with no results. Patient retired when she was 62 years old because of her head shaking all the time. She talks about having right leg sudden leg weakness and just falling at times. She does not want an appointment with the doctor. Reviewed eating/nutrtion, A1C, and taking her CBG three to four times a day. She does not want to wear another monitor again.She Knows the signs and symptoms of hypo/hyperglycemia. Patient is not exercising. Continue to educate about  Falls/safety and diabetes.  Encourage patient to follow medication and treatment regiment. Encourage patient to maintain follow up with doctors.     INTERVENTIONS-  Follow up     PLAN-  Follow up in two weeks  Encourage exercise  Review eating and nutrition habits   Review falls and safety           Today[facility] Self-Management Care Plan was developed with the patients/caregivers input and was based on identified barriers from todays assessment.  Goals were written today with the patient/caregiver and the patient has agreed to work towards these goals to improve his/her overall well-being. Patient verbalized understanding of the care plan, goals, and all of today's instructions. Encouraged patient/caregiver to communicate with his/her physician and health care team about health conditions and the treatment plan.  Provided my contact information today and encouraged patient/caregiver to call me with any questions as needed.

## 2019-12-06 ENCOUNTER — OUTPATIENT CASE MANAGEMENT (OUTPATIENT)
Dept: ADMINISTRATIVE | Facility: OTHER | Age: 69
End: 2019-12-06

## 2019-12-06 NOTE — PROGRESS NOTES
12/06/19-SUMMARY-  Attempt to follow up with patient for outpatient case management. No answer. Left message requesting call back. RN OPCM 1'st follow up attempt.   12/16/19-  Called and spoke to patient. Patient has not fallen this week. Reviewed eating and nutrition habits with patient. Encouraged exercises to improve balance and flexibility. Reviewed falls and safety with patient. Patient is using a cane to ambulate. Patient denies having vision problems, dizziness or trouble swallowing. She drinks one bottle of water a day. Will follow up with patient after appointment on 01/08/19.     INTERVENTIONS-  Message to EDWARD Garcia NP- appt on 01/08/20-  At the appt the patient wants to talk the  NP about her having right leg sudden leg weakness and just falling at times. She also states that her head shakes at times. Patient states that she saw a neurologist in the past but does not remember his name. Patient has not had a nerve/conduciton test. Last CT of head was on 06/18/19, no MRI of the brain on file.     PLAN-  Follow up   Encourage exercise   Review falls and safety         Today[facility] Self-Management Care Plan was developed with the patients/caregivers input and was based on identified barriers from todays assessment.  Goals were written today with the patient/caregiver and the patient has agreed to work towards these goals to improve his/her overall well-being. Patient verbalized understanding of the care plan, goals, and all of today's instructions. Encouraged patient/caregiver to communicate with his/her physician and health care team about health conditions and the treatment plan.  Provided my contact information today and encouraged patient/caregiver to call me with any questions as needed.

## 2019-12-11 ENCOUNTER — TELEPHONE (OUTPATIENT)
Dept: FAMILY MEDICINE | Facility: CLINIC | Age: 69
End: 2019-12-11

## 2019-12-11 NOTE — TELEPHONE ENCOUNTER
----- Message from Lillian Garcia NP sent at 12/10/2019  5:05 PM CST -----  Pt does not have f/u appt. Please call to reschedule

## 2019-12-13 DIAGNOSIS — E11.42 DIABETIC POLYNEUROPATHY ASSOCIATED WITH TYPE 2 DIABETES MELLITUS: Primary | ICD-10-CM

## 2019-12-13 RX ORDER — GABAPENTIN 600 MG/1
600 TABLET ORAL DAILY
Qty: 90 TABLET | Refills: 3 | Status: SHIPPED | OUTPATIENT
Start: 2019-12-13 | End: 2020-01-21 | Stop reason: DRUGHIGH

## 2019-12-26 DIAGNOSIS — Z86.718 HISTORY OF DEEP VENOUS THROMBOSIS (DVT) OF DISTAL VEIN OF RIGHT LOWER EXTREMITY: Primary | ICD-10-CM

## 2019-12-26 RX ORDER — WARFARIN 6 MG/1
6 TABLET ORAL DAILY
COMMUNITY
End: 2019-12-26 | Stop reason: SDUPTHER

## 2019-12-26 RX ORDER — PEN NEEDLE, DIABETIC 33 GX5/32"
NEEDLE, DISPOSABLE MISCELLANEOUS
COMMUNITY
End: 2020-04-06 | Stop reason: SDUPTHER

## 2019-12-27 RX ORDER — WARFARIN 6 MG/1
6 TABLET ORAL DAILY
Qty: 30 TABLET | Refills: 11 | Status: SHIPPED | OUTPATIENT
Start: 2019-12-27 | End: 2020-01-06 | Stop reason: SDUPTHER

## 2020-01-06 DIAGNOSIS — Z86.718 HISTORY OF DEEP VENOUS THROMBOSIS (DVT) OF DISTAL VEIN OF RIGHT LOWER EXTREMITY: ICD-10-CM

## 2020-01-06 DIAGNOSIS — I10 BENIGN ESSENTIAL HTN: Primary | ICD-10-CM

## 2020-01-06 DIAGNOSIS — E78.00 HYPERCHOLESTEROLEMIA: ICD-10-CM

## 2020-01-06 RX ORDER — WARFARIN 6 MG/1
6 TABLET ORAL DAILY
Qty: 30 TABLET | Refills: 11 | Status: SHIPPED | OUTPATIENT
Start: 2020-01-06 | End: 2020-01-22 | Stop reason: ALTCHOICE

## 2020-01-06 RX ORDER — LISINOPRIL 2.5 MG/1
2.5 TABLET ORAL DAILY
Qty: 30 TABLET | Refills: 11 | Status: SHIPPED | OUTPATIENT
Start: 2020-01-06 | End: 2020-08-07 | Stop reason: SDUPTHER

## 2020-01-06 RX ORDER — ATORVASTATIN CALCIUM 80 MG/1
80 TABLET, FILM COATED ORAL NIGHTLY
Qty: 30 TABLET | Refills: 11 | Status: SHIPPED | OUTPATIENT
Start: 2020-01-06 | End: 2020-02-03 | Stop reason: SDUPTHER

## 2020-01-06 NOTE — TELEPHONE ENCOUNTER
----- Message from Iglesia Jones sent at 1/6/2020  7:59 AM CST -----  Type:  RX Refill Request    Who Called:  Patient  Refill or New Rx: Refill   RX Name and Strength:    warfarin (COUMADIN) 1 MG tablet, 1XDay, 30  lisinopril (PRINIVIL,ZESTRIL) 2.5 MG tablet , 1XDay, 90  atorvastatin (LIPITOR) 80 MG tablet , 1XDay, 90    Preferred Pharmacy with phone number:    [facility] [place] - [address]  [place] [address]  Phone: [phone] Fax: [phone]      Local or Mail Order:  Local  Ordering Provider:  Radha Cooper Call Back Number: [phone]  Additional Information:

## 2020-01-07 NOTE — PROGRESS NOTES
Primary Care Provider Appointment    Subjective:      Patient ID: Kateryna Connolly is a 69 y.o. female with T2DM with neuropathy, COPD, HLD, CAD, HTN, PAD, h/o DVT, and NSTEMI (underwent PCI/ABBI of the RCA which was occluded (1/6/15)    Chief Complaint: Follow-up    Accompanied by daughter Anais. Reports pt has had multiple frequent falls, about 12 since last OV in 10/2019. Usually due to R leg tremors causing weakness. Pt uses cane to ambulate in around the house and walker when going out. Pt is in wheelchair today. Instruct pt to start using walker for more stability in the house. Saw neurology 03/2019 for tremor work-up. Records requested.     Reports fbg 94-90, ppg .   Taking alendronate once weekly without issues, denies any adverse effects.     The left femoral neck bone mineral density is equal to 0.585 g/cm squared with a T score of -2.4.    Decrease Tresiba to 16 units in the morning. Increase Metformin to 1000 mg with breakfast and dinner and 500 mg with lunch. Start alendronate 70 mg once weekly.    dexa 10/10/19 The L1 to L4 vertebral bone mineral density is equal to 0.775 g/cm squared with a T score of -2.5.    Followed by:  Endocrinology: ALHAJI Hopson. Last seen 10/2/19. Tresiba decreased to 16 units in AM. Increase Metformin to 1000 mg with breakfast and dinner and 500 mg with lunch. Start alendronate 70 mg once weekly.  Neurology: Dr. Althea Mann. Last seen 3/12/19. Will request records.  Podiatry: Dr. Iglesia Alexander. Last seen 7/2/18. Needs annual f/u.   Cardiology: Dr. Andrew Kwok. Last seen 1/20/18. Note reviewed. Pt was on eliquis at the time, lost f/u. Does not want to f/u with cardiology at the moment.       Past Surgical History:   Procedure Laterality Date    CARPAL TUNNEL RELEASE      bilateral    CATARACT EXTRACTION Bilateral     EYE SURGERY      bilat cataract removal with iol implants    GANGLION CYST EXCISION      heart stents      HYSTERECTOMY      KNEE SURGERY Left      SKIN BIOPSY      on side of nose       Past Medical History:   Diagnosis Date    Arthritis     Diabetes mellitus     Heart attack     Hyperlipidemia     Hypertension     Neuropathy     Occasional tremors        Social History     Socioeconomic History    Marital status:      Spouse name: Not on file    Number of children: 2    Years of education: Not on file    Highest education level: 11th grade   Occupational History    Not on file   Social Needs    Financial resource strain: Not hard at all    Food insecurity:     Worry: Never true     Inability: Never true    Transportation needs:     Medical: Yes     Non-medical: Yes   Tobacco Use    Smoking status: Current Every Day Smoker     Packs/day: 0.75     Years: 39.00     Pack years: 29.25    Smokeless tobacco: Never Used   Substance and Sexual Activity    Alcohol use: Yes     Frequency: Monthly or less     Comment: rare    Drug use: No    Sexual activity: Not Currently   Lifestyle    Physical activity:     Days per week: 0 days     Minutes per session: 0 min    Stress: To some extent   Relationships    Social connections:     Talks on phone: More than three times a week     Gets together: More than three times a week     Attends Christianity service: Never     Active member of club or organization: No     Attends meetings of clubs or organizations: Never     Relationship status:    Other Topics Concern    Not on file   Social History Narrative    Lives in camper alone with puppy dog.     Daughter-in-law and step daughter lives right next door.     Retired since age 62, previously worked in retail.     Confucianist: [employer]ist     Hobbies: playing games on phone.             Review of Systems   Constitutional: Negative for appetite change, chills, diaphoresis, fever and unexpected weight change.   HENT: Negative for congestion, hearing loss, sinus pressure and sore throat.    Eyes: Negative for pain, discharge, redness and visual  "disturbance.   Respiratory: Positive for cough and shortness of breath. Negative for chest tightness, wheezing and stridor.         Chronic cough with mucus production every morning.   SOB with moderate exertion   Cardiovascular: Positive for leg swelling. Negative for chest pain and palpitations.   Gastrointestinal: Negative for abdominal pain, constipation and diarrhea.        Occasional nausea    Endocrine: Negative for polydipsia, polyphagia and polyuria.   Genitourinary: Negative for difficulty urinating and hematuria.   Musculoskeletal: Positive for arthralgias, back pain and gait problem. Negative for joint swelling and myalgias.   Skin: Negative for pallor and rash.   Allergic/Immunologic: Negative for food allergies.   Neurological: Positive for tremors and weakness. Negative for dizziness and light-headedness.        R leg tremors    Hematological: Bruises/bleeds easily.   Psychiatric/Behavioral: Negative for confusion and suicidal ideas.       Objective:   /60 (BP Location: Right arm, Patient Position: Sitting, BP Method: Medium (Manual))   Pulse 86   Temp 98.6 °F (37 °C) (Oral)   Ht 5' 7" (1.702 m)   Wt 85.5 kg (188 lb 7.9 oz)   SpO2 97%   BMI 29.52 kg/m²     Physical Exam   Constitutional: She is oriented to person, place, and time. She appears well-developed and well-nourished. No distress.   HENT:   Right Ear: External ear normal.   Left Ear: External ear normal.   Eyes: Pupils are equal, round, and reactive to light. Conjunctivae are normal.   Neck: Normal range of motion. Neck supple. No JVD present.   Cardiovascular: Normal rate, regular rhythm and normal heart sounds.   No murmur heard.  Pulmonary/Chest: Effort normal. No stridor. No respiratory distress. She has no wheezes. She exhibits no tenderness.   Breath sounds diminished.    Abdominal: Soft. Bowel sounds are normal. There is no tenderness. There is no rebound.   Musculoskeletal: Normal range of motion. She exhibits edema. " "  trace edema BLE    Neurological: She is alert and oriented to person, place, and time.   Skin: Skin is warm and dry. Capillary refill takes less than 2 seconds. No rash noted. She is not diaphoretic. No erythema.   Psychiatric: She has a normal mood and affect. Her behavior is normal. Judgment and thought content normal.           Lab Results   Component Value Date    WBC 6.36 10/02/2019    HGB 12.7 10/02/2019    HCT 40.0 10/02/2019     10/02/2019    CHOL 111 (L) 10/02/2019    TRIG 94 10/02/2019    HDL 38 (L) 10/02/2019    ALT 24 10/02/2019    AST 23 10/02/2019     10/02/2019    K 4.2 10/02/2019     10/02/2019    CREATININE 0.8 10/02/2019    BUN 10 10/02/2019    CO2 29 10/02/2019    TSH 1.119 10/02/2019    INR 0.9 10/04/2019    HGBA1C 5.1 10/04/2019       Current Outpatient Medications on File Prior to Visit   Medication Sig Dispense Refill    alendronate (FOSAMAX) 70 MG tablet Take one tablet every 7 days. 12 tablet 3    amitriptyline (ELAVIL) 25 MG tablet       atorvastatin (LIPITOR) 80 MG tablet Take 1 tablet (80 mg total) by mouth every evening. 30 tablet 11    blood-glucose meter Misc Use the meter as directed      clopidogrel (PLAVIX) 75 mg tablet Take 1 tablet by mouth once daily.      gabapentin (NEURONTIN) 600 MG tablet Take 1 tablet (600 mg total) by mouth once daily. 90 tablet 3    lisinopril (PRINIVIL,ZESTRIL) 2.5 MG tablet Take 1 tablet (2.5 mg total) by mouth once daily. 30 tablet 11    metformin (GLUCOPHAGE) 1000 MG tablet Take 1,000 mg by mouth 2 (two) times daily with meals.      metoprolol succinate (TOPROL-XL) 25 MG 24 hr tablet Take 1 tablet (25 mg total) by mouth once daily. 30 tablet 11    pen needle, diabetic (ADVOCATE PEN NEEDLE) 33 gauge x 5/32" Ndle by Misc.(Non-Drug; Combo Route) route.      promethazine (PHENERGAN) 25 MG tablet       TRESIBA FLEXTOUCH U-100 100 unit/mL (3 mL) InPn Inject 18 Units into the skin once daily.      warfarin (COUMADIN) 6 MG " tablet Take 1 tablet (6 mg total) by mouth Daily. 30 tablet 11    betamethasone dipropionate (DIPROLENE) 0.05 % cream Apply topically 2 (two) times daily. 45 g 1    ciclopirox (LOPROX) 0.77 % Crea Apply topically 2 (two) times daily. 90 g 2    ciclopirox (PENLAC) 8 % Soln Apply topically nightly. 6.6 mL 11    collagenase ointment Apply topically once daily. 90 g 0    lidocaine HCL 2% (XYLOCAINE) 2 % jelly Apply topically as needed. Apply once topically every dressing change to right ankle prior to mechanical debridement of wound with coarse gausze. 30 mL 2     No current facility-administered medications on file prior to visit.          Assessment:   69 y.o. female with multiple co-morbid illnesses here to continue chronic care management.     Plan:     Problem List Items Addressed This Visit        Pulmonary    Chronic obstructive pulmonary disease     Current smoker, 3/4 ppd, 39 pack year.   - Ct chest 4/5/17 Prior granulomatous disease with multiple calcified and noncalcified nodules within the lung fields bilaterally  - pt not ready to quit, counseling provided.   - no PFTs noted, wants to defer PFTs for now.   - Breath sounds diminished, chronic cough with mucus production daily.   - SOB with moderate exertion. Pt does not move around much.   - stable, cont as now               Cardiac/Vascular    PAD (peripheral artery disease)     Hx stent LLE 04/2017  - on statin and plavix              Endocrine    Type 2 diabetes mellitus with skin complication, with long-term current use of insulin     A1c 5.1 10/2019  - hx of hospitalization due to hypoglycemia, also with frequent falls  - Reports fbg 94-90, ppg .   - Will wean pt on tresiba if able to tolerate. Tresiba decreased to 10 units daily, cont metformin as now               Other Visit Diagnoses     Screening for malignant neoplasm of colon    -  Primary    Relevant Orders    Fecal Immunochemical Test (iFOBT)          Health Maintenance       Date  Due Completion Date    Mammogram 08/18/1990 ---    Shingles Vaccine (1 of 2) 08/18/2000 ---    TETANUS VACCINE 06/06/2016 6/6/2006    Pneumococcal Vaccine (65+ Low/Medium Risk) (2 of 2 - PPSV23) 11/08/2017 11/8/2016    Fecal Occult Blood Test (FOBT)/[employer] 07/06/2019 7/6/2018    Hemoglobin A1c 04/04/2020 10/4/2019    Foot Exam 10/02/2020 10/2/2019    Lipid Panel 10/02/2020 10/2/2019    Eye Exam 10/23/2020 10/23/2019    DEXA SCAN 10/10/2022 10/10/2019          Follow up in about 2 weeks (around 1/22/2020) for dm f/u. Total face-to-face time was 60 min, 50% of this was spent on counseling and coordination of care.     Lillian Garcia, MONSEP-C  Family Medicine  [facility] [place]

## 2020-01-08 ENCOUNTER — OFFICE VISIT (OUTPATIENT)
Dept: FAMILY MEDICINE | Facility: CLINIC | Age: 70
End: 2020-01-08
Payer: MEDICARE

## 2020-01-08 ENCOUNTER — LAB VISIT (OUTPATIENT)
Dept: LAB | Facility: HOSPITAL | Age: 70
End: 2020-01-08
Attending: PHYSICIAN ASSISTANT
Payer: MEDICARE

## 2020-01-08 VITALS
HEIGHT: 67 IN | WEIGHT: 188.5 LBS | OXYGEN SATURATION: 97 % | HEART RATE: 86 BPM | BODY MASS INDEX: 29.58 KG/M2 | DIASTOLIC BLOOD PRESSURE: 60 MMHG | SYSTOLIC BLOOD PRESSURE: 120 MMHG | TEMPERATURE: 99 F

## 2020-01-08 DIAGNOSIS — E11.621 TYPE 2 DIABETES MELLITUS WITH FOOT ULCER, WITH LONG-TERM CURRENT USE OF INSULIN: ICD-10-CM

## 2020-01-08 DIAGNOSIS — Z79.4 TYPE 2 DIABETES MELLITUS WITH FOOT ULCER, WITH LONG-TERM CURRENT USE OF INSULIN: ICD-10-CM

## 2020-01-08 DIAGNOSIS — R25.1 TREMOR, UNSPECIFIED: ICD-10-CM

## 2020-01-08 DIAGNOSIS — L97.509 TYPE 2 DIABETES MELLITUS WITH FOOT ULCER, WITH LONG-TERM CURRENT USE OF INSULIN: ICD-10-CM

## 2020-01-08 DIAGNOSIS — J44.9 CHRONIC OBSTRUCTIVE PULMONARY DISEASE, UNSPECIFIED COPD TYPE: ICD-10-CM

## 2020-01-08 DIAGNOSIS — E55.9 HYPOVITAMINOSIS D: ICD-10-CM

## 2020-01-08 DIAGNOSIS — I73.9 PAD (PERIPHERAL ARTERY DISEASE): ICD-10-CM

## 2020-01-08 DIAGNOSIS — Z12.11 SCREENING FOR MALIGNANT NEOPLASM OF COLON: Primary | ICD-10-CM

## 2020-01-08 PROBLEM — L03.115 CELLULITIS OF RIGHT ANKLE: Status: RESOLVED | Noted: 2017-07-06 | Resolved: 2020-01-08

## 2020-01-08 LAB
25(OH)D3+25(OH)D2 SERPL-MCNC: 23 NG/ML (ref 30–96)
ALBUMIN SERPL BCP-MCNC: 3.7 G/DL (ref 3.5–5.2)
ANION GAP SERPL CALC-SCNC: 8 MMOL/L (ref 8–16)
BUN SERPL-MCNC: 16 MG/DL (ref 8–23)
CALCIUM SERPL-MCNC: 9.9 MG/DL (ref 8.7–10.5)
CHLORIDE SERPL-SCNC: 104 MMOL/L (ref 95–110)
CO2 SERPL-SCNC: 30 MMOL/L (ref 23–29)
CREAT SERPL-MCNC: 0.8 MG/DL (ref 0.5–1.4)
EST. GFR  (AFRICAN AMERICAN): >60 ML/MIN/1.73 M^2
EST. GFR  (NON AFRICAN AMERICAN): >60 ML/MIN/1.73 M^2
ESTIMATED AVG GLUCOSE: 120 MG/DL (ref 68–131)
GLUCOSE SERPL-MCNC: 76 MG/DL (ref 70–110)
HBA1C MFR BLD HPLC: 5.8 % (ref 4–5.6)
PHOSPHATE SERPL-MCNC: 2.8 MG/DL (ref 2.7–4.5)
POTASSIUM SERPL-SCNC: 4.9 MMOL/L (ref 3.5–5.1)
SODIUM SERPL-SCNC: 142 MMOL/L (ref 136–145)

## 2020-01-08 PROCEDURE — 82306 VITAMIN D 25 HYDROXY: CPT

## 2020-01-08 PROCEDURE — 83036 HEMOGLOBIN GLYCOSYLATED A1C: CPT

## 2020-01-08 PROCEDURE — 99215 PR OFFICE/OUTPT VISIT, EST, LEVL V, 40-54 MIN: ICD-10-PCS | Mod: S$PBB,,, | Performed by: NURSE PRACTITIONER

## 2020-01-08 PROCEDURE — 1125F PR PAIN SEVERITY QUANTIFIED, PAIN PRESENT: ICD-10-PCS | Mod: ,,, | Performed by: NURSE PRACTITIONER

## 2020-01-08 PROCEDURE — 80069 RENAL FUNCTION PANEL: CPT

## 2020-01-08 PROCEDURE — 1159F MED LIST DOCD IN RCRD: CPT | Mod: ,,, | Performed by: NURSE PRACTITIONER

## 2020-01-08 PROCEDURE — 99999 PR PBB SHADOW E&M-EST. PATIENT-LVL III: CPT | Mod: PBBFAC,,, | Performed by: NURSE PRACTITIONER

## 2020-01-08 PROCEDURE — 99999 PR PBB SHADOW E&M-EST. PATIENT-LVL III: ICD-10-PCS | Mod: PBBFAC,,, | Performed by: NURSE PRACTITIONER

## 2020-01-08 PROCEDURE — 99213 OFFICE O/P EST LOW 20 MIN: CPT | Mod: PBBFAC,PO | Performed by: NURSE PRACTITIONER

## 2020-01-08 PROCEDURE — 1125F AMNT PAIN NOTED PAIN PRSNT: CPT | Mod: ,,, | Performed by: NURSE PRACTITIONER

## 2020-01-08 PROCEDURE — 99215 OFFICE O/P EST HI 40 MIN: CPT | Mod: S$PBB,,, | Performed by: NURSE PRACTITIONER

## 2020-01-08 PROCEDURE — 36415 COLL VENOUS BLD VENIPUNCTURE: CPT | Mod: PO

## 2020-01-08 PROCEDURE — 1159F PR MEDICATION LIST DOCUMENTED IN MEDICAL RECORD: ICD-10-PCS | Mod: ,,, | Performed by: NURSE PRACTITIONER

## 2020-01-08 NOTE — ASSESSMENT & PLAN NOTE
Current smoker, 3/4 ppd, 39 pack year.   - Ct chest 4/5/17 Prior granulomatous disease with multiple calcified and noncalcified nodules within the lung fields bilaterally  - pt not ready to quit, counseling provided.   - no PFTs noted, wants to defer PFTs for now.   - Breath sounds diminished, chronic cough with mucus production daily.   - SOB with moderate exertion. Pt does not move around much.   - stable, cont as now

## 2020-01-08 NOTE — ASSESSMENT & PLAN NOTE
A1c 5.1 10/2019  - hx of hospitalization due to hypoglycemia, also with frequent falls  - Reports fbg 94-90, ppg .   - Will wean pt on tresiba if able to tolerate. Tresiba decreased to 10 units daily, cont metformin as now

## 2020-01-08 NOTE — ASSESSMENT & PLAN NOTE
Reports pt has had multiple frequent falls, about 12 since last OV in 10/2019. Usually due to R leg tremors causing weakness. Pt uses cane to ambulate in around the house and walker when going out. Pt is in wheelchair today. Instruct pt to start using walker for more stability in the house. Saw neurology 03/2019 for tremor work-up. Records requested.

## 2020-01-08 NOTE — PATIENT INSTRUCTIONS
- Decrease Tresiba to 10 units daily  - Check blood sugar twice daily, in the morning before breakfast and before bed  - Start b12 supplement.   - Bring in stool card next office visit  - labs today

## 2020-01-10 ENCOUNTER — OUTPATIENT CASE MANAGEMENT (OUTPATIENT)
Dept: ADMINISTRATIVE | Facility: OTHER | Age: 70
End: 2020-01-10

## 2020-01-10 NOTE — PROGRESS NOTES
01/10/20 -      SUMMARY-and spoke to patient. Patient received educational material mailed to her. She is asking for preventing falls by exercising be mailed to her again as she has misplaced this information. Patient had a follow up appointment on 01/08/20      with ARA Garcia in the [facility].Reviewed with patient instructions and changes to  medications given to patient at the visit, verbalized understanding. Reviewed with patient diet/ nutrition habits. She is trying to keep a clutter free home.Reviewed with patient safety and falls, verbalized understanding. Encourage Patient to exercise daily during TV commercials. Patient with no falls this week. Patient states that she wants to find out why she shakes and then falls if she does not sit down. [facility] getting records from her neurologist. Will follow up with patient after appointment on 01/22/20 with [facility].                                                                                                                                                                                                                                                           INTERVENTIONS-  Follow Up     PLAN-  Follow up after appt on 01/22/20 with [facility]   Encourage exercise   Review falls and safety         Todays OPCM Self-Management Care Plan was developed with the patients/caregivers input and was based on identified barriers from todays assessment.  Goals were written today with the patient/caregiver and the patient has agreed to work towards these goals to improve his/her overall well-being. Patient verbalized understanding of the care plan, goals, and all of today's instructions. Encouraged patient/caregiver to communicate with his/her physician and health care team about health conditions and the treatment plan.  Provided my contact information today and encouraged patient/caregiver to call me with any questions as  needed.

## 2020-01-15 ENCOUNTER — TELEPHONE (OUTPATIENT)
Dept: FAMILY MEDICINE | Facility: CLINIC | Age: 70
End: 2020-01-15

## 2020-01-15 NOTE — TELEPHONE ENCOUNTER
----- Message from Eleanor Devendra sent at 1/15/2020 11:34 AM CST -----  Contact: pt  Type: Needs Medical Advice    Who Called:      Best Call Back Number:   Additional Information: Requesting a call back from Nurse, regarding a refill for RX atorvastatin (LIPITOR) 80 MG tablet 90 tablet be called in today ,please call back with with advice

## 2020-01-21 DIAGNOSIS — Z86.718 HISTORY OF DEEP VENOUS THROMBOSIS (DVT) OF DISTAL VEIN OF RIGHT LOWER EXTREMITY: Primary | ICD-10-CM

## 2020-01-21 RX ORDER — GABAPENTIN 300 MG/1
900 CAPSULE ORAL NIGHTLY
Qty: 270 CAPSULE | Refills: 3 | Status: SHIPPED | OUTPATIENT
Start: 2020-01-21 | End: 2020-03-16

## 2020-01-21 RX ORDER — CLOPIDOGREL BISULFATE 75 MG/1
75 TABLET ORAL DAILY
Qty: 90 TABLET | Refills: 3 | Status: SHIPPED | OUTPATIENT
Start: 2020-01-21

## 2020-01-21 RX ORDER — GABAPENTIN 300 MG/1
900 CAPSULE ORAL NIGHTLY
COMMUNITY
End: 2020-01-21 | Stop reason: DRUGHIGH

## 2020-01-21 NOTE — TELEPHONE ENCOUNTER
Spoke with patient and advised that her warfarin was refilled on 1-6-2020 and the gabapentin was refilled on 12/13/19 for a year supply of both medications.  Advised will send in refill on the plavix today.  Patient verbalized understanding. Also corrected gabapentin dosage and directions.

## 2020-01-21 NOTE — TELEPHONE ENCOUNTER
----- Message from Tiffany Sancho sent at 1/21/2020 10:22 AM CST -----  Contact: pt  1. What is the name of the medication you are requesting? Gabapentin, Warfarin, Clopidogrel  2. What is the dose? 300mg, 6mg, 75mg  3. How do you take the medication? Orally, topically, etc? n/a  4. How often do you take this medication? n/a  5. Do you need a 30 day or 90 day supply? All 90day  6. How many refills are you requesting? n/a  7. What is your preferred pharmacy and location of the pharmacy? [facility]  8. Who can we contact with further questions? The pt at [phone]

## 2020-01-22 ENCOUNTER — OFFICE VISIT (OUTPATIENT)
Dept: FAMILY MEDICINE | Facility: CLINIC | Age: 70
End: 2020-01-22
Payer: MEDICARE

## 2020-01-22 VITALS
SYSTOLIC BLOOD PRESSURE: 120 MMHG | HEIGHT: 67 IN | WEIGHT: 182.56 LBS | BODY MASS INDEX: 28.65 KG/M2 | OXYGEN SATURATION: 99 % | TEMPERATURE: 98 F | HEART RATE: 78 BPM | DIASTOLIC BLOOD PRESSURE: 70 MMHG

## 2020-01-22 DIAGNOSIS — J42 CHRONIC BRONCHITIS, UNSPECIFIED CHRONIC BRONCHITIS TYPE: ICD-10-CM

## 2020-01-22 DIAGNOSIS — Z86.718 HISTORY OF DEEP VENOUS THROMBOSIS (DVT) OF DISTAL VEIN OF RIGHT LOWER EXTREMITY: ICD-10-CM

## 2020-01-22 DIAGNOSIS — Z23 ENCOUNTER FOR IMMUNIZATION: Primary | ICD-10-CM

## 2020-01-22 DIAGNOSIS — I73.9 PAD (PERIPHERAL ARTERY DISEASE): ICD-10-CM

## 2020-01-22 DIAGNOSIS — I25.10 CORONARY ARTERY DISEASE WITHOUT ANGINA PECTORIS, UNSPECIFIED VESSEL OR LESION TYPE, UNSPECIFIED WHETHER NATIVE OR TRANSPLANTED HEART: ICD-10-CM

## 2020-01-22 DIAGNOSIS — E11.42 DIABETIC POLYNEUROPATHY ASSOCIATED WITH TYPE 2 DIABETES MELLITUS: ICD-10-CM

## 2020-01-22 DIAGNOSIS — R25.1 TREMOR, UNSPECIFIED: ICD-10-CM

## 2020-01-22 PROCEDURE — G0009 ADMIN PNEUMOCOCCAL VACCINE: HCPCS | Mod: PBBFAC,PO

## 2020-01-22 PROCEDURE — 99213 OFFICE O/P EST LOW 20 MIN: CPT | Mod: PBBFAC,PO | Performed by: NURSE PRACTITIONER

## 2020-01-22 PROCEDURE — 1159F PR MEDICATION LIST DOCUMENTED IN MEDICAL RECORD: ICD-10-PCS | Mod: ,,, | Performed by: NURSE PRACTITIONER

## 2020-01-22 PROCEDURE — 99999 PR PBB SHADOW E&M-EST. PATIENT-LVL III: ICD-10-PCS | Mod: PBBFAC,,, | Performed by: NURSE PRACTITIONER

## 2020-01-22 PROCEDURE — 1125F AMNT PAIN NOTED PAIN PRSNT: CPT | Mod: ,,, | Performed by: NURSE PRACTITIONER

## 2020-01-22 PROCEDURE — 1125F PR PAIN SEVERITY QUANTIFIED, PAIN PRESENT: ICD-10-PCS | Mod: ,,, | Performed by: NURSE PRACTITIONER

## 2020-01-22 PROCEDURE — 99215 OFFICE O/P EST HI 40 MIN: CPT | Mod: S$PBB,,, | Performed by: NURSE PRACTITIONER

## 2020-01-22 PROCEDURE — 99999 PR PBB SHADOW E&M-EST. PATIENT-LVL III: CPT | Mod: PBBFAC,,, | Performed by: NURSE PRACTITIONER

## 2020-01-22 PROCEDURE — 1159F MED LIST DOCD IN RCRD: CPT | Mod: ,,, | Performed by: NURSE PRACTITIONER

## 2020-01-22 PROCEDURE — 99215 PR OFFICE/OUTPT VISIT, EST, LEVL V, 40-54 MIN: ICD-10-PCS | Mod: S$PBB,,, | Performed by: NURSE PRACTITIONER

## 2020-01-22 RX ORDER — PRIMIDONE 50 MG/1
50 TABLET ORAL NIGHTLY
Qty: 30 TABLET | Refills: 11 | Status: ON HOLD | OUTPATIENT
Start: 2020-01-22 | End: 2021-01-30 | Stop reason: HOSPADM

## 2020-01-22 NOTE — PROGRESS NOTES
Pneumovax 23 vaccine admin as noted.  Pt instructed to wait 15 min prior to leaving clinic area.  Pt voiced understanding.  Tolerated well NAD noted.

## 2020-01-22 NOTE — ASSESSMENT & PLAN NOTE
Worked up by neuro 03/2019  - recommenced primidone  - CT head 06/ 2019 shows cortical atrophy with periventricular deep white matter change consistent with chronic small vessel ischemic disease.  - Start primidone 50mg qhs

## 2020-01-22 NOTE — PROGRESS NOTES
Primary Care Provider Appointment    Subjective:      Patient ID: Kateryna Connolly is a 69 y.o. female with T2DM with neuropathy, COPD, HLD, CAD, HTN, PAD, h/o DVT, and NSTEMI (underwent PCI/ABBI of the RCA which was occluded (1/6/15)    Chief Complaint: Follow-up    Accompanied by step-daughter Anitha.   Here for routine follow-up. No fall since last OV.     BS doing well on decreased dose of Tresiba. Taking 10 units q am. fbg , ppg 137-343. bs > 200 is related to eating ice cream and fede cake. Pt states she is trying to eat better.     Neuro note from 3/12/19 reviewed. Primidone was prescribed for essential tremors but pt does not remember why she never started medication. Would like to treatment.     Lab results discussed with pt     Needs PPSV23, will get today. Declines mammogram.     Followed by:  Endocrinology: ALHAJI Hopson. Last seen 10/2/19. Tresiba decreased to 16 units in AM. Increase Metformin to 1000 mg with breakfast and dinner and 500 mg with lunch. Start alendronate 70 mg once weekly.  Neurology: Dr. Althea Mann. Last seen 3/12/19. Will request records.  Podiatry: Dr. Iglesia Alexander. Last seen 7/2/18. Needs annual f/u.   Cardiology: Dr. Andrew Kwok. Last seen 1/20/18. Note reviewed. Pt was on eliquis at the time, lost f/u. Does not want to f/u with cardiology at the moment.       Past Surgical History:   Procedure Laterality Date    CARPAL TUNNEL RELEASE      bilateral    CATARACT EXTRACTION Bilateral     EYE SURGERY      bilat cataract removal with iol implants    GANGLION CYST EXCISION      heart stents      HYSTERECTOMY      KNEE SURGERY Left     SKIN BIOPSY      on side of nose       Past Medical History:   Diagnosis Date    Arthritis     Diabetes mellitus     Heart attack     Hyperlipidemia     Hypertension     Neuropathy     Occasional tremors        Social History     Socioeconomic History    Marital status:      Spouse name: Not on file    Number of  children: 2    Years of education: Not on file    Highest education level: 11th grade   Occupational History    Not on file   Social Needs    Financial resource strain: Not hard at all    Food insecurity:     Worry: Never true     Inability: Never true    Transportation needs:     Medical: Yes     Non-medical: Yes   Tobacco Use    Smoking status: Current Every Day Smoker     Packs/day: 0.75     Years: 39.00     Pack years: 29.25    Smokeless tobacco: Never Used   Substance and Sexual Activity    Alcohol use: Yes     Frequency: Monthly or less     Comment: rare    Drug use: No    Sexual activity: Not Currently   Lifestyle    Physical activity:     Days per week: 0 days     Minutes per session: 0 min    Stress: To some extent   Relationships    Social connections:     Talks on phone: More than three times a week     Gets together: More than three times a week     Attends [employer] service: Never     Active member of club or organization: No     Attends meetings of clubs or organizations: Never     Relationship status:    Other Topics Concern    Not on file   Social History Narrative    Lives in camper alone with puppy dog.     Daughter-in-law and step daughter lives right next door.     Retired since age 62, previously worked in retail.     Congregation: [employer]     Hobbies: playing games on phone.             Review of Systems   Constitutional: Negative for appetite change, chills, diaphoresis, fever and unexpected weight change.   HENT: Negative for congestion, hearing loss, sinus pressure and sore throat.    Eyes: Negative for pain, discharge, redness and visual disturbance.   Respiratory: Positive for cough and shortness of breath. Negative for chest tightness, wheezing and stridor.         Chronic cough with mucus production every morning.   SOB with moderate exertion   Cardiovascular: Positive for leg swelling. Negative for chest pain and palpitations.   Gastrointestinal: Negative for  "abdominal pain, constipation and diarrhea.        Occasional nausea    Endocrine: Negative for polydipsia, polyphagia and polyuria.   Genitourinary: Negative for difficulty urinating and hematuria.   Musculoskeletal: Positive for arthralgias, back pain and gait problem. Negative for joint swelling and myalgias.   Skin: Negative for pallor and rash.   Allergic/Immunologic: Negative for food allergies.   Neurological: Positive for tremors and weakness. Negative for dizziness and light-headedness.        R leg tremors    Hematological: Bruises/bleeds easily.   Psychiatric/Behavioral: Negative for confusion and suicidal ideas.       Objective:   /70 (BP Location: Right arm, Patient Position: Sitting, BP Method: Medium (Manual))   Pulse 78   Temp 97.7 °F (36.5 °C) (Oral)   Ht 5' 7" (1.702 m)   Wt 82.8 kg (182 lb 8.7 oz)   SpO2 99%   BMI 28.59 kg/m²     Physical Exam   Constitutional: She is oriented to person, place, and time. She appears well-developed and well-nourished. No distress.   HENT:   Right Ear: External ear normal.   Left Ear: External ear normal.   Eyes: Pupils are equal, round, and reactive to light. Conjunctivae are normal.   Neck: Normal range of motion. Neck supple. No JVD present.   Cardiovascular: Normal rate, regular rhythm and normal heart sounds.   No murmur heard.  Pulmonary/Chest: Effort normal. No stridor. No respiratory distress. She has no wheezes. She exhibits no tenderness.   Breath sounds diminished.    Abdominal: Soft. Bowel sounds are normal. There is no tenderness. There is no rebound.   Musculoskeletal: Normal range of motion. She exhibits edema.   trace edema BLE    Neurological: She is alert and oriented to person, place, and time.   Skin: Skin is warm and dry. Capillary refill takes less than 2 seconds. No rash noted. She is not diaphoretic. No erythema.   Psychiatric: She has a normal mood and affect. Her behavior is normal. Judgment and thought content normal. " "          Lab Results   Component Value Date    WBC 6.36 10/02/2019    HGB 12.7 10/02/2019    HCT 40.0 10/02/2019     10/02/2019    CHOL 111 (L) 10/02/2019    TRIG 94 10/02/2019    HDL 38 (L) 10/02/2019    ALT 24 10/02/2019    AST 23 10/02/2019     01/08/2020    K 4.9 01/08/2020     01/08/2020    CREATININE 0.8 01/08/2020    BUN 16 01/08/2020    CO2 30 (H) 01/08/2020    TSH 1.119 10/02/2019    INR 0.9 10/04/2019    HGBA1C 5.8 (H) 01/08/2020       Current Outpatient Medications on File Prior to Visit   Medication Sig Dispense Refill    alendronate (FOSAMAX) 70 MG tablet Take one tablet every 7 days. 12 tablet 3    amitriptyline (ELAVIL) 25 MG tablet       atorvastatin (LIPITOR) 80 MG tablet Take 1 tablet (80 mg total) by mouth every evening. 30 tablet 11    blood-glucose meter Misc Use the meter as directed      clopidogrel (PLAVIX) 75 mg tablet Take 1 tablet (75 mg total) by mouth once daily. 90 tablet 3    gabapentin (NEURONTIN) 300 MG capsule Take 3 capsules (900 mg total) by mouth every evening. Take 3 capsules at bedtime 270 capsule 3    lisinopril (PRINIVIL,ZESTRIL) 2.5 MG tablet Take 1 tablet (2.5 mg total) by mouth once daily. 30 tablet 11    metformin (GLUCOPHAGE) 1000 MG tablet Take 1,000 mg by mouth 2 (two) times daily with meals.      metoprolol succinate (TOPROL-XL) 25 MG 24 hr tablet Take 1 tablet (25 mg total) by mouth once daily. 30 tablet 11    pen needle, diabetic (ADVOCATE PEN NEEDLE) 33 gauge x 5/32" Ndle by Misc.(Non-Drug; Combo Route) route.      promethazine (PHENERGAN) 25 MG tablet       TRESIBA FLEXTOUCH U-100 100 unit/mL (3 mL) InPn Inject 18 Units into the skin once daily.      warfarin (COUMADIN) 6 MG tablet Take 1 tablet (6 mg total) by mouth Daily. 30 tablet 11    betamethasone dipropionate (DIPROLENE) 0.05 % cream Apply topically 2 (two) times daily. 45 g 1    ciclopirox (LOPROX) 0.77 % Crea Apply topically 2 (two) times daily. (Patient not taking: " Reported on 1/22/2020) 90 g 2    ciclopirox (PENLAC) 8 % Soln Apply topically nightly. 6.6 mL 11    collagenase ointment Apply topically once daily. 90 g 0    [DISCONTINUED] lidocaine HCL 2% (XYLOCAINE) 2 % jelly Apply topically as needed. Apply once topically every dressing change to right ankle prior to mechanical debridement of wound with coarse gausze. 30 mL 2     No current facility-administered medications on file prior to visit.              Assessment:   69 y.o. female with multiple co-morbid illnesses here to continue chronic care management.     Plan:     Problem List Items Addressed This Visit        Neuro    Tremor, unspecified     Worked up by neuro 03/2019  - recommenced primidone  - CT head 06/ 2019 shows cortical atrophy with periventricular deep white matter change consistent with chronic small vessel ischemic disease.  - Start primidone 50mg qhs         Relevant Medications    primidone (MYSOLINE) 50 MG Tab    Other Relevant Orders    WHEELCHAIR FOR HOME USE    Diabetic polyneuropathy associated with type 2 diabetes mellitus    Relevant Orders    WHEELCHAIR FOR HOME USE       Pulmonary    Chronic obstructive pulmonary disease     Current smoker, 3/4 ppd, 39 pack year.   - Ct chest 4/5/17 Prior granulomatous disease with multiple calcified and noncalcified nodules within the lung fields bilaterally  - pt not ready to quit, counseling provided.   - no PFTs noted, declines further testing   - Breath sounds diminished, chronic cough with mucus production daily.   - SOB with moderate exertion. Pt does not move around much.   - stable, cont as now               Cardiac/Vascular    Coronary artery disease     Previously followed by cardiology  - on plavix and statin   - current smoker  - non-sedentary lifestyle  - pt not very motivated to change behaviors          PAD (peripheral artery disease)    Relevant Orders    WHEELCHAIR FOR HOME USE       Hematology    History of deep venous thrombosis (DVT) of  distal vein of right lower extremity     Previously on Eliquis but now coumadin due to cost.   - difficult to manage coumadin due to transportation   - Eliquis order, pharmacy working on financial assistance            Relevant Medications    apixaban (ELIQUIS) 5 mg Tab      Other Visit Diagnoses     Encounter for immunization    -  Primary    Relevant Orders    (In Office Administered) Pneumococcal Polysaccharide Vaccine (23 Valent) (SQ/IM) (Completed)          Health Maintenance       Date Due Completion Date    Mammogram 08/18/1990 ---    Shingles Vaccine (1 of 2) 08/18/2000 ---    TETANUS VACCINE 06/06/2016 6/6/2006    Pneumococcal Vaccine (65+ Low/Medium Risk) (2 of 2 - PPSV23) 11/08/2017 - today  11/8/2016    Fecal Occult Blood Test (FOBT)/[employer] 07/06/2019 7/6/2018    Hemoglobin A1c 04/04/2020 10/4/2019    Foot Exam 10/02/2020 10/2/2019    Lipid Panel 10/02/2020 10/2/2019    Eye Exam 10/23/2020 10/23/2019    DEXA SCAN 10/10/2022 10/10/2019        Advance Care Planning     Power of   I initiated the process of advance care planning today and explained the importance of this process to the patient.  I introduced the concept of advance directives to the patient, as well. Then the patient received detailed information about the importance of designating a Health Care Power of  (HCPOA). She was also instructed to communicate with this person about their wishes for future healthcare, should she become sick and lose decision-making capacity. The patient has previously appointed a HCPOA. After our discussion, the patient has decided to complete a HCPOA and has appointed her step daughter and NAME:Anitha Duffy  I spent a total time of 30 minutes discussing this issue with the patient.         Living Will  During this visit, I engaged the patient and family  in the advance care planning process.  The patient and I reviewed the role for advance directives and their purpose in directing future healthcare  if the patient's unable to speak for him/herself.  At this point in time, the patient does have full decision-making capacity.  We discussed different extreme health states that she could experience, and reviewed what kind of medical care she would want in those situations.  The patient and family communicated that if she were comatose and had little chance of a meaningful recovery, she would not want machines/life-sustaining treatments used. In addition to the above preference, other important end-of-life issues for the patient include DNR/DNI, pt also reports she has donated her body to [facility] [employer]. The patient has completed a living will to reflect these preferences.  I spent a total of 30 minutes engaging the patient in this advance care planning discussion.      Follow up in about 4 weeks (around 2/19/2020). Total face-to-face time was 60 min, 50% of this was spent on counseling and coordination of care.     Lillian Garcia, MONSEP-C  Family Medicine  [facility] [place]

## 2020-01-22 NOTE — ASSESSMENT & PLAN NOTE
Previously followed by cardiology  - on plavix and statin   - current smoker  - non-sedentary lifestyle  - pt not very motivated to change behaviors

## 2020-01-22 NOTE — ASSESSMENT & PLAN NOTE
Previously on Eliquis but now coumadin due to cost.   - difficult to manage coumadin due to transportation   - Eliquis order, pharmacy working on financial assistance

## 2020-01-22 NOTE — ASSESSMENT & PLAN NOTE
Current smoker, 3/4 ppd, 39 pack year.   - Ct chest 4/5/17 Prior granulomatous disease with multiple calcified and noncalcified nodules within the lung fields bilaterally  - pt not ready to quit, counseling provided.   - no PFTs noted, declines further testing   - Breath sounds diminished, chronic cough with mucus production daily.   - SOB with moderate exertion. Pt does not move around much.   - stable, cont as now

## 2020-01-24 ENCOUNTER — OUTPATIENT CASE MANAGEMENT (OUTPATIENT)
Dept: ADMINISTRATIVE | Facility: OTHER | Age: 70
End: 2020-01-24

## 2020-01-24 NOTE — PROGRESS NOTES
01/24/20-SUMMARY-  Called and spoke to patient. Patient had an appointment with [facility] on 01/22/20. She is taking the primidone nightly. [facility] pharmacy assistance is assisting her with obtaining Eliquis. Patient with no falls. All questions answered. Will close case and dis-enroll patient from [facility].     INTERVENTIONS-  Case Closure      PLAN-  Case Closure     Todays [facility] Self-Management Care Plan was developed with the patients/caregivers input and was based on identified barriers from todays assessment.  Goals were written today with the patient/caregiver and the patient has agreed to work towards these goals to improve his/her overall well-being. Patient verbalized understanding of the care plan, goals, and all of today's instructions. Encouraged patient/caregiver to communicate with his/her physician and health care team about health conditions and the treatment plan.  Provided my contact information today and encouraged patient/caregiver to call me with any questions as needed

## 2020-01-27 ENCOUNTER — TELEPHONE (OUTPATIENT)
Dept: PRIMARY CARE CLINIC | Facility: CLINIC | Age: 70
End: 2020-01-27

## 2020-01-27 DIAGNOSIS — R25.1 TREMOR, UNSPECIFIED: ICD-10-CM

## 2020-01-27 DIAGNOSIS — J42 CHRONIC BRONCHITIS, UNSPECIFIED CHRONIC BRONCHITIS TYPE: Primary | ICD-10-CM

## 2020-01-27 DIAGNOSIS — I73.9 PAD (PERIPHERAL ARTERY DISEASE): ICD-10-CM

## 2020-01-27 NOTE — TELEPHONE ENCOUNTER
Spoke with pt. Pt can not push herself in transport chair, requesting regular wheelchair. Order placed

## 2020-01-27 NOTE — TELEPHONE ENCOUNTER
Spoke with patient the wheelchair does have handles.  She would like a heavy duty wheelchair with the big wheels so she can push herself around.

## 2020-01-27 NOTE — TELEPHONE ENCOUNTER
----- Message from Sophie Bronson sent at 1/27/2020  3:39 PM CST -----  Contact: Self  Patient rec'd her wheelchair today and it only has four small wheels and doen't even have handles for someone to push her.  With the small wheels she can't push herself.  Call back to advise at [phone] (home).  Thanks

## 2020-02-03 DIAGNOSIS — E78.00 HYPERCHOLESTEROLEMIA: ICD-10-CM

## 2020-02-03 RX ORDER — ATORVASTATIN CALCIUM 80 MG/1
80 TABLET, FILM COATED ORAL NIGHTLY
Qty: 30 TABLET | Refills: 11 | Status: SHIPPED | OUTPATIENT
Start: 2020-02-03 | End: 2021-02-02

## 2020-02-05 ENCOUNTER — PATIENT OUTREACH (OUTPATIENT)
Dept: ADMINISTRATIVE | Facility: HOSPITAL | Age: 70
End: 2020-02-05

## 2020-02-05 NOTE — PROGRESS NOTES
Health Maintenance Due   Topic Date Due    Mammogram  08/18/1990    Shingles Vaccine (1 of 2) 08/18/2000    TETANUS VACCINE  06/06/2016

## 2020-02-05 NOTE — LETTER
February 5, 2020    Kateryna Connolly  [address] [facility] [place] [address]     [facility]  [address]  [facility] [address]  Phone: [phone] Kateryna Connolly  [address] [facility] [place] [address]    Dear, Kateryna Connolly,    [facility] is committed to your overall health.  To help you get the most out of each of your visits, we will review your information to make sure you are up to date on all of your recommended tests and/or procedures.  Lillian Garcia NP  has found that your chart shows you may be due for the following:    Health Maintenance Due   Topic    Mammogram     TETANUS VACCINE        If you have had any of the above done at another facility, please bring the records with you or Fax them to [phone] so that your record at [facility] will be complete. If you have not had any of these tests or procedures done recently and would like to complete this testing ,  please call [phone] or send a message through your [facility] portal to your provider's office.     If you have an upcoming scheduled appointment for the above test and/or procedures, please disregard this letter.    If you are currently taking medication, please bring it with you to your appointment for review.    Thank you for letting us care for you,    Sammy Donis, Care Coordinator  [facility]  Phone: [phone]  Fax: [phone]

## 2020-02-06 ENCOUNTER — TELEPHONE (OUTPATIENT)
Dept: PRIMARY CARE CLINIC | Facility: CLINIC | Age: 70
End: 2020-02-06

## 2020-02-06 NOTE — TELEPHONE ENCOUNTER
----- Message from Princess JOSLYN Alanis sent at 2/6/2020  4:48 PM CST -----  Contact: Pt  Type:  Patient Returning Call    Who Called:  Patient  Who Left Message for Patient:  Nurse Osullivan  Does the patient know what this is regarding?:  Not sure  Best Call Back Number:     Additional Information:

## 2020-02-06 NOTE — TELEPHONE ENCOUNTER
----- Message from Oralia Larkin sent at 2/6/2020  4:32 PM CST -----  Contact: Patient  Patient called in regards to a new wheelchair that was supposed to replace the incorrect one she received?    Please call at: [phone]  
[facility] contacted, problem corrected.  Pt made aware at 1700hrs 2/6/2020  
74 y/o M with PMHx of BPH, HTN, Normal Pressure Hydrocephalus, Lumbar Stenosis s/p surgery and PSHx of Cataract Surgery,  c/o palpitations when he was having breakfast. Pt related previous episode of Afib 7 years ago that was Tx. However, pt is not currently on any anticoagulants or antiarrhythmics. He is weak in his legs and sharp decline in function since 3-4 months. He cannot walk more than a block. He lost muscle strength and also not ambulating well due to balance problems. He feels whole body is going to fall. Pt is scheduled for a  Shunt Placement on 8/19. Pt denies CP, Urinary incontinence, memory problems, SOB, fever, HA, dizziness or any other acute complaints.  GOC Full code

## 2020-02-07 NOTE — TELEPHONE ENCOUNTER
Patient states that [facility] has never contacted her about the delivery of her wheelchair.  Spoke with [facility] and they thought the order was a duplicate advised that the patient did not want the transfer chair she needed an actual standard wheelchair. They will have standard wheelchair delivered to patient.

## 2020-02-10 RX ORDER — METFORMIN HYDROCHLORIDE 1000 MG/1
TABLET ORAL
Qty: 450 TABLET | Refills: 1 | Status: SHIPPED | OUTPATIENT
Start: 2020-02-10 | End: 2020-02-19

## 2020-02-10 NOTE — TELEPHONE ENCOUNTER
----- Message from Chelsea Ramirez sent at 2/10/2020 12:16 PM CST -----  Contact: pt [phone]  Patient called and asked if you will send in a new prescription Metformin to be called into [facility] in [place]

## 2020-02-18 NOTE — PROGRESS NOTES
Primary Care Provider Appointment    Subjective:      Patient ID: Kateryna Connolly is a 69 y.o. female with neuropathy, COPD, HLD, CAD, HTN, PAD, h/o DVT, and NSTEMI (underwent PCI/ABBI of the RCA which was occluded (1/6/15)    Chief Complaint: Follow-up     Accompanied by step-daughter Anitha.   Here for routine follow-up. No fall since last OV.     Pt taking eliquis without complications, warfarin has been stopped.   Primidone is helping with tremors. Has not had any falls since starting.     Followed by:  Endocrinology: ALHAJI Hopson. Last seen 10/2/19. Tresiba decreased to 16 units in AM. Increase Metformin to 1000 mg with breakfast and dinner and 500 mg with lunch. Start alendronate 70 mg once weekly.  Neurology: Dr. Althea Mann. Last seen 3/12/19. Will request records.  Podiatry: Dr. Iglesia Alexander. Last seen 7/2/18. Needs annual f/u.   Cardiology: Dr. Andrew Kwok. Last seen 1/20/18. Note reviewed. Pt was on eliquis at the time, lost f/u. Does not want to f/u with cardiology at the moment.       Past Surgical History:   Procedure Laterality Date    CARPAL TUNNEL RELEASE      bilateral    CATARACT EXTRACTION Bilateral     EYE SURGERY      bilat cataract removal with iol implants    GANGLION CYST EXCISION      heart stents      HYSTERECTOMY      KNEE SURGERY Left     SKIN BIOPSY      on side of nose       Past Medical History:   Diagnosis Date    Arthritis     Diabetes mellitus     Heart attack     Hyperlipidemia     Hypertension     Neuropathy     Occasional tremors        Social History     Socioeconomic History    Marital status:      Spouse name: Not on file    Number of children: 2    Years of education: Not on file    Highest education level: 11th grade   Occupational History    Not on file   Social Needs    Financial resource strain: Not hard at all    Food insecurity:     Worry: Never true     Inability: Never true    Transportation needs:     Medical: Yes     Non-medical:  Yes   Tobacco Use    Smoking status: Current Every Day Smoker     Packs/day: 0.75     Years: 39.00     Pack years: 29.25    Smokeless tobacco: Never Used   Substance and Sexual Activity    Alcohol use: Yes     Frequency: Monthly or less     Comment: rare    Drug use: No    Sexual activity: Not Currently   Lifestyle    Physical activity:     Days per week: 0 days     Minutes per session: 0 min    Stress: To some extent   Relationships    Social connections:     Talks on phone: More than three times a week     Gets together: More than three times a week     Attends Restoration service: Never     Active member of club or organization: No     Attends meetings of clubs or organizations: Never     Relationship status:    Other Topics Concern    Not on file   Social History Narrative    Lives in [place] alone with puppy dog.     Daughter-in-law and step daughter lives right next door.     Retired since age 62, previously worked in retail.     Hinduism: Jain     Hobbies: playing games on phone.             Review of Systems   Constitutional: Negative for appetite change, chills, diaphoresis, fever and unexpected weight change.   HENT: Negative for congestion, hearing loss, sinus pressure and sore throat.    Eyes: Negative for pain, discharge, redness and visual disturbance.   Respiratory: Positive for cough and shortness of breath. Negative for chest tightness, wheezing and stridor.         Chronic cough with mucus production every morning.   SOB with moderate exertion   Cardiovascular: Positive for leg swelling. Negative for chest pain and palpitations.   Gastrointestinal: Negative for abdominal pain, constipation and diarrhea.        Occasional nausea    Endocrine: Negative for polydipsia, polyphagia and polyuria.   Genitourinary: Negative for difficulty urinating and hematuria.   Musculoskeletal: Positive for arthralgias, back pain and gait problem. Negative for joint swelling and myalgias.   Skin:  "Negative for pallor and rash.   Allergic/Immunologic: Negative for food allergies.   Neurological: Positive for tremors and weakness. Negative for dizziness and light-headedness.        R leg tremors    Hematological: Bruises/bleeds easily.   Psychiatric/Behavioral: Negative for confusion and suicidal ideas.       Objective:   /60 (BP Location: Right arm, Patient Position: Sitting, BP Method: Medium (Manual))   Pulse 91   Temp 99 °F (37.2 °C) (Oral)   Ht 5' 7" (1.702 m)   Wt 84.4 kg (186 lb 1.1 oz)   SpO2 (!) 94%   BMI 29.14 kg/m²     Physical Exam   Constitutional: She is oriented to person, place, and time. She appears well-developed and well-nourished. No distress.   HENT:   Right Ear: External ear normal.   Left Ear: External ear normal.   Eyes: Pupils are equal, round, and reactive to light. Conjunctivae are normal.   Neck: Normal range of motion. Neck supple. No JVD present.   Cardiovascular: Normal rate, regular rhythm and normal heart sounds.   No murmur heard.  Pulmonary/Chest: Effort normal. No stridor. No respiratory distress. She has no wheezes. She exhibits no tenderness.   Breath sounds diminished.    Abdominal: Soft. Bowel sounds are normal. There is no tenderness. There is no rebound.   Musculoskeletal: Normal range of motion. She exhibits edema.   trace edema BLE    Neurological: She is alert and oriented to person, place, and time.   Skin: Skin is warm and dry. Capillary refill takes less than 2 seconds. No rash noted. She is not diaphoretic. No erythema.   Psychiatric: She has a normal mood and affect. Her behavior is normal. Judgment and thought content normal.           Lab Results   Component Value Date    WBC 6.36 10/02/2019    HGB 12.7 10/02/2019    HCT 40.0 10/02/2019     10/02/2019    CHOL 111 (L) 10/02/2019    TRIG 94 10/02/2019    HDL 38 (L) 10/02/2019    ALT 24 10/02/2019    AST 23 10/02/2019     01/08/2020    K 4.9 01/08/2020     01/08/2020    CREATININE " "0.8 01/08/2020    BUN 16 01/08/2020    CO2 30 (H) 01/08/2020    TSH 1.119 10/02/2019    INR 0.9 10/04/2019    HGBA1C 5.8 (H) 01/08/2020       Medication List with Changes/Refills   New Medications    DIPHTH,PERTUS,ACELL,,TETANUS (BOOSTRIX TDAP) 2.5-8-5 LF-MCG-LF/0.5ML SYRG INJECTION    Inject into the muscle.    ERGOCALCIFEROL (ERGOCALCIFEROL) 50,000 UNIT CAP    Take 1 capsule (50,000 Units total) by mouth every 7 days.   Current Medications    ALENDRONATE (FOSAMAX) 70 MG TABLET    Take one tablet every 7 days.    AMITRIPTYLINE (ELAVIL) 25 MG TABLET        APIXABAN (ELIQUIS) 5 MG TAB    Take 1 tablet (5 mg total) by mouth 2 (two) times daily.    APIXABAN (ELIQUIS) 5 MG TAB    bid    ATORVASTATIN (LIPITOR) 80 MG TABLET    Take 1 tablet (80 mg total) by mouth every evening.    BETAMETHASONE DIPROPIONATE (DIPROLENE) 0.05 % CREAM    Apply topically 2 (two) times daily.    BLOOD-GLUCOSE METER MISC    Use the meter as directed    CICLOPIROX (LOPROX) 0.77 % CREA    Apply topically 2 (two) times daily.    CICLOPIROX (PENLAC) 8 % SOLN    Apply topically nightly.    CLOPIDOGREL (PLAVIX) 75 MG TABLET    Take 1 tablet (75 mg total) by mouth once daily.    COLLAGENASE OINTMENT    Apply topically once daily.    GABAPENTIN (NEURONTIN) 300 MG CAPSULE    Take 3 capsules (900 mg total) by mouth every evening. Take 3 capsules at bedtime    LISINOPRIL (PRINIVIL,ZESTRIL) 2.5 MG TABLET    Take 1 tablet (2.5 mg total) by mouth once daily.    METOPROLOL SUCCINATE (TOPROL-XL) 25 MG 24 HR TABLET    Take 1 tablet (25 mg total) by mouth once daily.    PEN NEEDLE, DIABETIC (ADVOCATE PEN NEEDLE) 33 GAUGE X 5/32" NDLE    by Misc.(Non-Drug; Combo Route) route.    PRIMIDONE (MYSOLINE) 50 MG TAB    Take 1 tablet (50 mg total) by mouth every evening.    PROMETHAZINE (PHENERGAN) 25 MG TABLET        TRESIBA FLEXTOUCH U-100 100 UNIT/ML (3 ML) INPN    Inject 18 Units into the skin once daily.   Changed and/or Refilled Medications    Modified Medication " Previous Medication    METFORMIN (GLUCOPHAGE) 1000 MG TABLET metFORMIN (GLUCOPHAGE) 1000 MG tablet       Take 1 tablet (1,000 mg total) by mouth 2 (two) times daily with meals.    Take 2,500 mg daily with food.         Assessment:   69 y.o. female with multiple co-morbid illnesses here to continue chronic care management.     Plan:     Problem List Items Addressed This Visit        Neuro    Tremor, unspecified     Worked up by neuro 03/2019  - CT head 06/ 2019 shows cortical atrophy with periventricular deep white matter change consistent with chronic small vessel ischemic disease.  - Started primidone 50mg qhs, reports significantly helping. No falls since pt started medication, tolerating well  - cont as now          Diabetic polyneuropathy associated with type 2 diabetes mellitus - Primary    Relevant Medications    metFORMIN (GLUCOPHAGE) 1000 MG tablet       Cardiac/Vascular    Hypercholesterolemia     Lipids well controlled on statin  Cont as now            Benign essential HTN     bp well controlled on current regimen  Encourage low salt diet              Endocrine    Type 2 diabetes mellitus with skin complication, with long-term current use of insulin     A1c 5.8 01/2020  - hx of hospitalization due to hypoglycemia, also with frequent falls  - BS log reviewed, , 120-268  - On metformin 1000mg BID and tresiba 10 units every morning  - will hold tresiba for 2 weeks , if bs controlled, will d/c              Relevant Medications    metFORMIN (GLUCOPHAGE) 1000 MG tablet    Vitamin D deficiency     Level 23 (02/2020)  Start Vitamin D 05769 IU once weekly          Relevant Medications    ergocalciferol (ERGOCALCIFEROL) 50,000 unit Cap          Health Maintenance       Date Due Completion Date    Shingles Vaccine (1 of 2) 08/18/2000 ---    TETANUS VACCINE 06/06/2016 6/6/2006    Mammogram 02/07/2021 (Originally 8/18/1990) ---    Hemoglobin A1c 07/08/2020 1/8/2020    Foot Exam 10/02/2020 10/2/2019    Lipid Panel  10/02/2020 10/2/2019    Eye Exam 10/23/2020 10/23/2019    Fecal Occult Blood Test (FOBT)/FitKit 01/14/2021 1/14/2020    High Dose Statin 02/03/2021 2/3/2020    DEXA SCAN 10/10/2022 10/10/2019          Follow up in about 2 weeks (around 3/4/2020). Total face-to-face time was 40 min, greater than 50% of this was spent on counseling and coordination of care.       MONSE AllisonP-C  Family Medicine  [facility] [place]

## 2020-02-19 ENCOUNTER — OFFICE VISIT (OUTPATIENT)
Dept: PRIMARY CARE CLINIC | Facility: CLINIC | Age: 70
End: 2020-02-19
Payer: MEDICARE

## 2020-02-19 VITALS
OXYGEN SATURATION: 94 % | HEIGHT: 67 IN | BODY MASS INDEX: 29.2 KG/M2 | HEART RATE: 91 BPM | TEMPERATURE: 99 F | WEIGHT: 186.06 LBS | SYSTOLIC BLOOD PRESSURE: 120 MMHG | DIASTOLIC BLOOD PRESSURE: 60 MMHG

## 2020-02-19 DIAGNOSIS — E55.9 VITAMIN D DEFICIENCY: ICD-10-CM

## 2020-02-19 DIAGNOSIS — I10 BENIGN ESSENTIAL HTN: ICD-10-CM

## 2020-02-19 DIAGNOSIS — E78.00 HYPERCHOLESTEROLEMIA: ICD-10-CM

## 2020-02-19 DIAGNOSIS — Z79.4 TYPE 2 DIABETES MELLITUS WITH FOOT ULCER, WITH LONG-TERM CURRENT USE OF INSULIN: ICD-10-CM

## 2020-02-19 DIAGNOSIS — E11.621 TYPE 2 DIABETES MELLITUS WITH FOOT ULCER, WITH LONG-TERM CURRENT USE OF INSULIN: ICD-10-CM

## 2020-02-19 DIAGNOSIS — L97.509 TYPE 2 DIABETES MELLITUS WITH FOOT ULCER, WITH LONG-TERM CURRENT USE OF INSULIN: ICD-10-CM

## 2020-02-19 DIAGNOSIS — E11.42 DIABETIC POLYNEUROPATHY ASSOCIATED WITH TYPE 2 DIABETES MELLITUS: Primary | ICD-10-CM

## 2020-02-19 DIAGNOSIS — R25.1 TREMOR, UNSPECIFIED: ICD-10-CM

## 2020-02-19 PROBLEM — S91.009A ANKLE WOUND: Status: RESOLVED | Noted: 2017-07-10 | Resolved: 2020-02-19

## 2020-02-19 PROBLEM — L08.9 INFECTED WOUND: Status: RESOLVED | Noted: 2017-07-06 | Resolved: 2020-02-19

## 2020-02-19 PROBLEM — S91.001A WOUND OF RIGHT ANKLE: Status: RESOLVED | Noted: 2017-09-27 | Resolved: 2020-02-19

## 2020-02-19 PROBLEM — T14.8XXA INFECTED WOUND: Status: RESOLVED | Noted: 2017-07-06 | Resolved: 2020-02-19

## 2020-02-19 PROCEDURE — 99215 PR OFFICE/OUTPT VISIT, EST, LEVL V, 40-54 MIN: ICD-10-PCS | Mod: S$GLB,,, | Performed by: NURSE PRACTITIONER

## 2020-02-19 PROCEDURE — 99215 OFFICE O/P EST HI 40 MIN: CPT | Mod: S$GLB,,, | Performed by: NURSE PRACTITIONER

## 2020-02-19 RX ORDER — ERGOCALCIFEROL 1.25 MG/1
50000 CAPSULE ORAL
Qty: 12 CAPSULE | Refills: 0 | Status: SHIPPED | OUTPATIENT
Start: 2020-02-19 | End: 2020-05-12 | Stop reason: SDUPTHER

## 2020-02-19 RX ORDER — METFORMIN HYDROCHLORIDE 1000 MG/1
1000 TABLET ORAL 2 TIMES DAILY WITH MEALS
Qty: 450 TABLET | Refills: 1 | Status: SHIPPED | OUTPATIENT
Start: 2020-02-19 | End: 2020-05-12 | Stop reason: SDUPTHER

## 2020-02-19 NOTE — ASSESSMENT & PLAN NOTE
A1c 5.8 01/2020  - hx of hospitalization due to hypoglycemia, also with frequent falls  - BS log reviewed, , 120-268  - On metformin 1000mg BID and tresiba 10 units every morning  - will hold tresiba for 2 weeks , if bs controlled, will d/c

## 2020-02-19 NOTE — ASSESSMENT & PLAN NOTE
Worked up by neuro 03/2019  - CT head 06/ 2019 shows cortical atrophy with periventricular deep white matter change consistent with chronic small vessel ischemic disease.  - Started primidone 50mg qhs, reports significantly helping. No falls since pt started medication, tolerating well  - cont as now

## 2020-02-19 NOTE — PATIENT INSTRUCTIONS
- Take metformin 1000mg twice daily  - Stop insulin for 2 weeks and continue to keep blood sugar log, check twice daily  - call if blood sugar >200  - Start Vitamin D 88867P once weekly   - [facility]

## 2020-02-20 DIAGNOSIS — Z86.718 HISTORY OF DEEP VENOUS THROMBOSIS (DVT) OF DISTAL VEIN OF RIGHT LOWER EXTREMITY: ICD-10-CM

## 2020-02-20 NOTE — TELEPHONE ENCOUNTER
----- Message from Zion Candelaria sent at 2/20/2020  9:49 AM CST -----  Contact: Ptnt  [phone]  Type:  RX Refill Request    Who Called:  Ptnt  [phone]    Refill or New Rx:  Refill    RX Name and Strength:  Disp Refills Start End   apixaban (ELIQUIS) 5 mg Tab 60 tablet 3 1/22/2020    Sig - Route: Take 1 tablet (5 mg total) by mouth 2 (two) times daily. - Oral       How is the patient currently taking it? (ex. 1XDay):  See above.    Is this a 30 day or 90 day RX:  30 day    Preferred Pharmacy with phone number:      [id number] - [place], [place] - [address]  [address]  [place] [address]  Phone: [phone] Fax: [phone]    Local or Mail Order:  Local    Ordering Provider:  Lillian Cooper Call Back Number:  Ptnt  [phone]    Additional Information:     Advised she only has one pill left. Please send to pharmacy as soon as possible will be out today. Please call to advise when sent to pharmacy.

## 2020-02-28 ENCOUNTER — TELEPHONE (OUTPATIENT)
Dept: PRIMARY CARE CLINIC | Facility: CLINIC | Age: 70
End: 2020-02-28

## 2020-02-28 NOTE — TELEPHONE ENCOUNTER
Pt called this morning stating that she had forgotten to call earlier with her glucose over 200mg/dl  2/20 - 258  2/23 112 280  2/22 99 387  2/23 106 284  2/24 107 124  2/25 108 149  2/26 122 144  2/27 101 90  2/28 110    Pt did admit to a change in her diet with Will Millan (king joslyn).  Asked pt to keep a diary of her diet along with the glucose logs and to bring it with her to the next appt.  Reminded to call if glucose gets above 200.

## 2020-03-04 ENCOUNTER — OFFICE VISIT (OUTPATIENT)
Dept: PRIMARY CARE CLINIC | Facility: CLINIC | Age: 70
End: 2020-03-04
Payer: MEDICARE

## 2020-03-04 VITALS
BODY MASS INDEX: 29.79 KG/M2 | OXYGEN SATURATION: 97 % | SYSTOLIC BLOOD PRESSURE: 120 MMHG | TEMPERATURE: 99 F | HEART RATE: 80 BPM | DIASTOLIC BLOOD PRESSURE: 70 MMHG | WEIGHT: 189.81 LBS | HEIGHT: 67 IN

## 2020-03-04 DIAGNOSIS — Z79.4 TYPE 2 DIABETES MELLITUS WITH FOOT ULCER, WITH LONG-TERM CURRENT USE OF INSULIN: ICD-10-CM

## 2020-03-04 DIAGNOSIS — E11.42 DIABETIC POLYNEUROPATHY ASSOCIATED WITH TYPE 2 DIABETES MELLITUS: ICD-10-CM

## 2020-03-04 DIAGNOSIS — E11.621 TYPE 2 DIABETES MELLITUS WITH FOOT ULCER, WITH LONG-TERM CURRENT USE OF INSULIN: ICD-10-CM

## 2020-03-04 DIAGNOSIS — L97.509 TYPE 2 DIABETES MELLITUS WITH FOOT ULCER, WITH LONG-TERM CURRENT USE OF INSULIN: ICD-10-CM

## 2020-03-04 PROCEDURE — 99213 OFFICE O/P EST LOW 20 MIN: CPT | Mod: S$GLB,,, | Performed by: NURSE PRACTITIONER

## 2020-03-04 PROCEDURE — 99213 PR OFFICE/OUTPT VISIT, EST, LEVL III, 20-29 MIN: ICD-10-PCS | Mod: S$GLB,,, | Performed by: NURSE PRACTITIONER

## 2020-03-04 NOTE — PROGRESS NOTES
Primary Care Provider Appointment    Subjective:      Patient ID: Kateryna Connolly is a 69 y.o. female with neuropathy, COPD, HLD, CAD, HTN, PAD, h/o DVT, and NSTEMI (underwent PCI/ABBI of the RCA which was occluded (1/6/15)    Chief Complaint: Follow-up     Accompanied by step-daughter Anitha.     Here for DM follow-up. Stopped insulin for 3 days but restarted due to ppg >200. bs on tresiba 10 units fbg , ppg . Pt instructed to decrease to 5 units daily.     Encouraged pt to get Shingles vaccine, cost to pt per pharmacy would be $67 for first dose. Pt can not afford at the moment.     Followed by:  Endocrinology: ALHAJI Hopson. Last seen 10/2/19. Tresiba decreased to 16 units in AM. Increase Metformin to 1000 mg with breakfast and dinner and 500 mg with lunch. Start alendronate 70 mg once weekly.  Neurology: Dr. Althea Mann. Last seen 3/12/19. Will request records.  Podiatry: Dr. Iglesia Alexander. Last seen 7/2/18. Needs annual f/u.   Cardiology: Dr. Andrew Kwok. Last seen 1/20/18. Note reviewed. Pt was on eliquis at the time, lost f/u. Does not want to f/u with cardiology at the moment.       Past Surgical History:   Procedure Laterality Date    CARPAL TUNNEL RELEASE      bilateral    CATARACT EXTRACTION Bilateral     EYE SURGERY      bilat cataract removal with iol implants    GANGLION CYST EXCISION      heart stents      HYSTERECTOMY      KNEE SURGERY Left     SKIN BIOPSY      on side of nose       Past Medical History:   Diagnosis Date    Arthritis     Diabetes mellitus     Heart attack     Hyperlipidemia     Hypertension     Neuropathy     Occasional tremors        Social History     Socioeconomic History    Marital status:      Spouse name: Not on file    Number of children: 2    Years of education: Not on file    Highest education level: 11th grade   Occupational History    Not on file   Social Needs    Financial resource strain: Not hard at all    Food insecurity:      Worry: Never true     Inability: Never true    Transportation needs:     Medical: Yes     Non-medical: Yes   Tobacco Use    Smoking status: Current Every Day Smoker     Packs/day: 0.75     Years: 39.00     Pack years: 29.25    Smokeless tobacco: Never Used   Substance and Sexual Activity    Alcohol use: Yes     Frequency: Monthly or less     Comment: rare    Drug use: No    Sexual activity: Not Currently   Lifestyle    Physical activity:     Days per week: 0 days     Minutes per session: 0 min    Stress: To some extent   Relationships    Social connections:     Talks on phone: More than three times a week     Gets together: More than three times a week     Attends Moravian service: Never     Active member of club or organization: No     Attends meetings of clubs or organizations: Never     Relationship status:    Other Topics Concern    Not on file   Social History Narrative    [place] with puppy dog.     Daughter-in-law and step daughter lives right next door.     Retired since age 62, previously worked in retail.     Confucianism: Taoism     Hobbies: playing games on phone.             Review of Systems   Constitutional: Negative for appetite change, chills, diaphoresis, fever and unexpected weight change.   HENT: Negative for congestion, hearing loss, sinus pressure and sore throat.    Eyes: Negative for pain, discharge, redness and visual disturbance.   Respiratory: Positive for cough and shortness of breath. Negative for chest tightness, wheezing and stridor.         Chronic cough with mucus production every morning.   SOB with moderate exertion   Cardiovascular: Positive for leg swelling. Negative for chest pain and palpitations.   Gastrointestinal: Negative for abdominal pain, constipation and diarrhea.        Occasional nausea    Endocrine: Negative for polydipsia, polyphagia and polyuria.   Genitourinary: Negative for difficulty urinating and hematuria.   Musculoskeletal:  "Positive for arthralgias, back pain and gait problem. Negative for joint swelling and myalgias.   Skin: Negative for pallor and rash.   Allergic/Immunologic: Negative for food allergies.   Neurological: Positive for tremors and weakness. Negative for dizziness and light-headedness.        R leg tremors    Hematological: Bruises/bleeds easily.   Psychiatric/Behavioral: Negative for confusion and suicidal ideas.       Objective:   /70 (BP Location: Right arm, Patient Position: Sitting, BP Method: Medium (Manual))   Pulse 80   Temp 99 °F (37.2 °C) (Oral)   Ht 5' 7" (1.702 m)   Wt 86.1 kg (189 lb 13.1 oz)   SpO2 97%   BMI 29.73 kg/m²     Physical Exam   Constitutional: She is oriented to person, place, and time. She appears well-developed and well-nourished. No distress.   HENT:   Right Ear: External ear normal.   Left Ear: External ear normal.   Eyes: Pupils are equal, round, and reactive to light. Conjunctivae are normal.   Neck: Normal range of motion. Neck supple. No JVD present.   Cardiovascular: Normal rate, regular rhythm and normal heart sounds.   No murmur heard.  Pulmonary/Chest: Effort normal. No stridor. No respiratory distress. She has no wheezes. She exhibits no tenderness.   Breath sounds diminished.    Abdominal: Soft. Bowel sounds are normal. There is no tenderness. There is no rebound.   Musculoskeletal: Normal range of motion. She exhibits edema.   trace edema BLE    Neurological: She is alert and oriented to person, place, and time.   Skin: Skin is warm and dry. Capillary refill takes less than 2 seconds. No rash noted. She is not diaphoretic. No erythema.   Psychiatric: She has a normal mood and affect. Her behavior is normal. Judgment and thought content normal.           Lab Results   Component Value Date    WBC 6.36 10/02/2019    HGB 12.7 10/02/2019    HCT 40.0 10/02/2019     10/02/2019    CHOL 111 (L) 10/02/2019    TRIG 94 10/02/2019    HDL 38 (L) 10/02/2019    ALT 24 " "10/02/2019    AST 23 10/02/2019     01/08/2020    K 4.9 01/08/2020     01/08/2020    CREATININE 0.8 01/08/2020    BUN 16 01/08/2020    CO2 30 (H) 01/08/2020    TSH 1.119 10/02/2019    INR 0.9 10/04/2019    HGBA1C 5.8 (H) 01/08/2020       Medication List with Changes/Refills   New Medications    VARICELLA-ZOSTER GE-AS01B, PF, (SHINGRIX, PF,) 50 MCG/0.5 ML INJECTION    Inject into the muscle.   Current Medications    ALENDRONATE (FOSAMAX) 70 MG TABLET    Take one tablet every 7 days.    AMITRIPTYLINE (ELAVIL) 25 MG TABLET        APIXABAN (ELIQUIS) 5 MG TAB    Take 1 tablet (5 mg total) by mouth 2 (two) times daily.    ATORVASTATIN (LIPITOR) 80 MG TABLET    Take 1 tablet (80 mg total) by mouth every evening.    BETAMETHASONE DIPROPIONATE (DIPROLENE) 0.05 % CREAM    Apply topically 2 (two) times daily.    BLOOD-GLUCOSE METER MISC    Use the meter as directed    CICLOPIROX (LOPROX) 0.77 % CREA    Apply topically 2 (two) times daily.    CICLOPIROX (PENLAC) 8 % SOLN    Apply topically nightly.    CLOPIDOGREL (PLAVIX) 75 MG TABLET    Take 1 tablet (75 mg total) by mouth once daily.    COLLAGENASE OINTMENT    Apply topically once daily.    DIPHTH,PERTUS,ACELL,,TETANUS (BOOSTRIX TDAP) 2.5-8-5 LF-MCG-LF/0.5ML SYRG INJECTION    Inject into the muscle.    ERGOCALCIFEROL (ERGOCALCIFEROL) 50,000 UNIT CAP    Take 1 capsule (50,000 Units total) by mouth every 7 days.    GABAPENTIN (NEURONTIN) 300 MG CAPSULE    Take 3 capsules (900 mg total) by mouth every evening. Take 3 capsules at bedtime    LISINOPRIL (PRINIVIL,ZESTRIL) 2.5 MG TABLET    Take 1 tablet (2.5 mg total) by mouth once daily.    METFORMIN (GLUCOPHAGE) 1000 MG TABLET    Take 1 tablet (1,000 mg total) by mouth 2 (two) times daily with meals.    METOPROLOL SUCCINATE (TOPROL-XL) 25 MG 24 HR TABLET    Take 1 tablet (25 mg total) by mouth once daily.    PEN NEEDLE, DIABETIC (ADVOCATE PEN NEEDLE) 33 GAUGE X 5/32" NDLE    by Misc.(Non-Drug; Combo Route) route.    " PRIMIDONE (MYSOLINE) 50 MG TAB    Take 1 tablet (50 mg total) by mouth every evening.    PROMETHAZINE (PHENERGAN) 25 MG TABLET        TRESIBA FLEXTOUCH U-100 100 UNIT/ML (3 ML) INPN    Inject 5 Units into the skin once daily.         Assessment:   69 y.o. female with multiple co-morbid illnesses here to continue chronic care management.     Plan:     Problem List Items Addressed This Visit        Neuro    Diabetic polyneuropathy associated with type 2 diabetes mellitus     Followed by podiatry   - DM well controlled, A1c 5.8 (1/2020)  - pain well controlled on gabapentin   - encourage tobacco cessation, encourage daily feet checks.             Endocrine    Type 2 diabetes mellitus with skin complication, with long-term current use of insulin     A1c 5.8 01/2020  - hx of hospitalization due to hypoglycemia, also with frequent falls  - Trial off insulin for 3 days but restarted due to ppg >200. bs on tresiba 10 units fbg , ppg .   - Pt instructed to decrease to 5 units daily.                  Health Maintenance       Date Due Completion Date    Shingles Vaccine (1 of 2) 08/18/2000 ---    TETANUS VACCINE 06/06/2016 6/6/2006    Mammogram 02/07/2021 (Originally 8/18/1990) ---    Hemoglobin A1c 07/08/2020 1/8/2020    Foot Exam 10/02/2020 10/2/2019    Lipid Panel 10/02/2020 10/2/2019    Eye Exam 10/23/2020 10/23/2019    Fecal Occult Blood Test (FOBT)/FitKit 01/14/2021 1/14/2020    High Dose Statin 02/03/2021 2/3/2020    DEXA SCAN 10/10/2022 10/10/2019          No follow-ups on file. Total face-to-face time was 20 min, greater than 50% of this was spent on counseling and coordination of care.       JOELLEN Allison  Family Medicine  [facility] [place]

## 2020-03-04 NOTE — ASSESSMENT & PLAN NOTE
A1c 5.8 01/2020  - hx of hospitalization due to hypoglycemia, also with frequent falls  - Trial off insulin for 3 days but restarted due to ppg >200. bs on tresiba 10 units fbg , ppg .   - Pt instructed to decrease to 5 units daily.

## 2020-03-04 NOTE — ASSESSMENT & PLAN NOTE
Followed by podiatry   - DM well controlled, A1c 5.8 (1/2020)  - pain well controlled on gabapentin   - encourage tobacco cessation, encourage daily feet checks.

## 2020-03-16 ENCOUNTER — TELEPHONE (OUTPATIENT)
Dept: PRIMARY CARE CLINIC | Facility: CLINIC | Age: 70
End: 2020-03-16

## 2020-03-16 DIAGNOSIS — L97.509 TYPE 2 DIABETES MELLITUS WITH FOOT ULCER, WITH LONG-TERM CURRENT USE OF INSULIN: Primary | ICD-10-CM

## 2020-03-16 DIAGNOSIS — Z79.4 TYPE 2 DIABETES MELLITUS WITH FOOT ULCER, WITH LONG-TERM CURRENT USE OF INSULIN: Primary | ICD-10-CM

## 2020-03-16 DIAGNOSIS — E11.621 TYPE 2 DIABETES MELLITUS WITH FOOT ULCER, WITH LONG-TERM CURRENT USE OF INSULIN: Primary | ICD-10-CM

## 2020-03-16 DIAGNOSIS — E11.42 DIABETIC POLYNEUROPATHY ASSOCIATED WITH TYPE 2 DIABETES MELLITUS: Primary | ICD-10-CM

## 2020-03-16 RX ORDER — GABAPENTIN 600 MG/1
TABLET ORAL
Qty: 225 TABLET | Refills: 3 | Status: ON HOLD | OUTPATIENT
Start: 2020-03-16 | End: 2021-01-30 | Stop reason: HOSPADM

## 2020-03-16 RX ORDER — INSULIN DEGLUDEC 100 U/ML
5 INJECTION, SOLUTION SUBCUTANEOUS DAILY
Qty: 7 SYRINGE | Refills: 3 | Status: SHIPPED | OUTPATIENT
Start: 2020-03-16 | End: 2021-03-17

## 2020-03-16 NOTE — TELEPHONE ENCOUNTER
Pt called and discussed her Neurontin rx, pt voiced understanding, instructed to call office if she has nay questions.  Neurontin 600mg in AM and 900mg at night.

## 2020-03-16 NOTE — TELEPHONE ENCOUNTER
----- Message from Iglesia Jones sent at 3/16/2020  8:25 AM CDT -----  Type:  RX Refill Request    Who Called:  Patient  Refill or New Rx:  Refill  RX Name and Strength:    gabapentin (NEURONTIN) 300 MG capsule ,Take 3 capsules (900 mg total) by mouth every evening. Take 3 capsules at bedtime , 90  gabapentin (NEURONTIN) 600 MG capsule -Not on file-Patient states that she takes 1 at night, 90    Preferred Pharmacy with phone number:    [facility] - [place], [place] - [address]  [address]  [place] [address]  Phone: [phone] Fax: [phone]      Local or Mail Order: Local  Ordering Provider:  Radha Cooper Call Back Number:  [phone]  Additional Information:  Please call to advise-patient has questions about the medications.    
LOV 3/4/2020  LAB 1/8/2020  
Spoke with pt who stated that she is taking her Gabapentin 900mg at night and 600mg once a day.  Pt needs her RX for the Gabapentin 600mg re ordered.  Please advise   
Admitted

## 2020-04-01 ENCOUNTER — TELEPHONE (OUTPATIENT)
Dept: PRIMARY CARE CLINIC | Facility: CLINIC | Age: 70
End: 2020-04-01

## 2020-04-06 DIAGNOSIS — Z79.4 TYPE 2 DIABETES MELLITUS WITH FOOT ULCER, WITH LONG-TERM CURRENT USE OF INSULIN: Primary | ICD-10-CM

## 2020-04-06 DIAGNOSIS — E11.621 TYPE 2 DIABETES MELLITUS WITH FOOT ULCER, WITH LONG-TERM CURRENT USE OF INSULIN: Primary | ICD-10-CM

## 2020-04-06 DIAGNOSIS — L97.509 TYPE 2 DIABETES MELLITUS WITH FOOT ULCER, WITH LONG-TERM CURRENT USE OF INSULIN: Primary | ICD-10-CM

## 2020-04-06 RX ORDER — PEN NEEDLE, DIABETIC 33 GX5/32"
1 NEEDLE, DISPOSABLE MISCELLANEOUS DAILY
Qty: 90 EACH | Refills: 3 | Status: SHIPPED | OUTPATIENT
Start: 2020-04-06 | End: 2021-04-06

## 2020-04-22 DIAGNOSIS — E55.9 VITAMIN D DEFICIENCY: ICD-10-CM

## 2020-04-22 RX ORDER — ERGOCALCIFEROL 1.25 MG/1
50000 CAPSULE ORAL
Qty: 12 CAPSULE | Refills: 0 | Status: CANCELLED | OUTPATIENT
Start: 2020-04-22

## 2020-04-23 NOTE — TELEPHONE ENCOUNTER
Spoke with pt and relayed information from Lillian Garcia NP, Pt to change to Vit D 1000 units per day.  Pt voiced understanding

## 2020-05-12 DIAGNOSIS — E55.9 VITAMIN D DEFICIENCY: ICD-10-CM

## 2020-05-12 DIAGNOSIS — E11.42 DIABETIC POLYNEUROPATHY ASSOCIATED WITH TYPE 2 DIABETES MELLITUS: ICD-10-CM

## 2020-05-12 RX ORDER — ERGOCALCIFEROL 1.25 MG/1
50000 CAPSULE ORAL
Qty: 12 CAPSULE | Refills: 0 | Status: SHIPPED | OUTPATIENT
Start: 2020-05-12

## 2020-05-12 RX ORDER — METFORMIN HYDROCHLORIDE 1000 MG/1
1000 TABLET ORAL 2 TIMES DAILY WITH MEALS
Qty: 450 TABLET | Refills: 1 | Status: SHIPPED | OUTPATIENT
Start: 2020-05-12

## 2020-05-12 RX ORDER — METOPROLOL SUCCINATE 25 MG/1
25 TABLET, EXTENDED RELEASE ORAL DAILY
Qty: 90 TABLET | Refills: 3 | Status: SHIPPED | OUTPATIENT
Start: 2020-05-12 | End: 2020-08-07 | Stop reason: SDUPTHER

## 2020-05-12 NOTE — TELEPHONE ENCOUNTER
----- Message from Carey Knag sent at 5/12/2020 12:52 PM CDT -----  Contact: self  Type:  RX Refill Request    Who Called:  self  Refill or New Rx:  refill  RX Name and Strength:    metFORMIN (GLUCOPHAGE) 1000 MG tablet  2 1/2Xday  Vitamin D2 1Xweek  How is the patient currently taking it? (ex. 1XDay):    Is this a 30 day or 90 day RX:  30  Preferred Pharmacy with phone number:    [facility], [place] - [address]  [address]  [place] [address]  Phone: [phone] Fax: [phone]  Local or Mail Order:  local  Ordering Provider:  Dr Radha Cooper Call Back Number:  [phone] (home)   Additional Information:  Patient states the Vitamin D2 was last filled by Dr Rodriguez. Please call patient if any questions. Thanks!

## 2020-06-05 ENCOUNTER — OFFICE VISIT (OUTPATIENT)
Dept: PRIMARY CARE CLINIC | Facility: CLINIC | Age: 70
End: 2020-06-05
Payer: MEDICARE

## 2020-06-05 DIAGNOSIS — L97.509 TYPE 2 DIABETES MELLITUS WITH FOOT ULCER, WITH LONG-TERM CURRENT USE OF INSULIN: Primary | ICD-10-CM

## 2020-06-05 DIAGNOSIS — E11.621 TYPE 2 DIABETES MELLITUS WITH FOOT ULCER, WITH LONG-TERM CURRENT USE OF INSULIN: Primary | ICD-10-CM

## 2020-06-05 DIAGNOSIS — Z79.4 TYPE 2 DIABETES MELLITUS WITH FOOT ULCER, WITH LONG-TERM CURRENT USE OF INSULIN: Primary | ICD-10-CM

## 2020-06-05 PROCEDURE — 99442 PR PHYSICIAN TELEPHONE EVALUATION 11-20 MIN: ICD-10-PCS | Mod: 95,,, | Performed by: NURSE PRACTITIONER

## 2020-06-05 PROCEDURE — 99442 PR PHYSICIAN TELEPHONE EVALUATION 11-20 MIN: CPT | Mod: 95,,, | Performed by: NURSE PRACTITIONER

## 2020-06-05 NOTE — PROGRESS NOTES
Established Patient - Audio Only Telehealth Visit     The patient location is: pt's home   The chief complaint leading to consultation is: routine follow-up.   Visit type: Virtual visit with audio only (telephone)   Total time spent with patient: 15 minutes        The reason for the audio only service rather than synchronous audio and video virtual visit was related to technical difficulties or patient preference/necessity.     Each patient to whom I provide medical services by telemedicine is:  (1) informed of the relationship between the physician and patient and the respective role of any other health care provider with respect to management of the patient; and (2) notified that they may decline to receive medical services by telemedicine and may withdraw from such care at any time. Patient verbally consented to receive this service via voice-only telephone call.        HPI: Pt reports she is doing really well. Has not left her home, still afraid due to her high risk for bad outcomes if she were to have covid-19. Daughter lives next door and is able to deliver her food and medications for her.     Denies recent fall.     Diabetes has been well controlled. fbg this am 99. fbg for the last two weeks ranged . On tresiba 5 units daily. Has not had any episodes of hypoglycemia.     Pt is still smoking but KEITA stable. No worsening sob. Denies swelling to LE.     Denies needs for any medication refill.     Assessment and plan:      Continue current medication regimen. rtc in 6 weeks        This service was not originating from a related E/M service provided within the previous 7 days nor will  to an E/M service or procedure within the next 24 hours or my soonest available appointment.  Prevailing standard of care was able to be met in this audio-only visit.

## 2020-07-14 ENCOUNTER — TELEPHONE (OUTPATIENT)
Dept: FAMILY MEDICINE | Facility: CLINIC | Age: 70
End: 2020-07-14

## 2020-07-14 NOTE — TELEPHONE ENCOUNTER
Called and spoke with patient to see if she would like to go ahead and schedule her yearly mammogram. She declined and explained that she checks herself frequently.

## 2020-07-24 DIAGNOSIS — E11.9 TYPE 2 DIABETES MELLITUS WITHOUT COMPLICATION: ICD-10-CM

## 2020-07-26 DIAGNOSIS — E55.9 VITAMIN D DEFICIENCY: ICD-10-CM

## 2020-07-27 RX ORDER — ERGOCALCIFEROL 1.25 MG/1
CAPSULE ORAL
Qty: 12 CAPSULE | Refills: 0 | OUTPATIENT
Start: 2020-07-27

## 2020-07-27 RX ORDER — ERGOCALCIFEROL 1.25 MG/1
50000 CAPSULE ORAL
Qty: 12 CAPSULE | Refills: 0 | OUTPATIENT
Start: 2020-07-27

## 2020-07-28 ENCOUNTER — TELEPHONE (OUTPATIENT)
Dept: PRIMARY CARE CLINIC | Facility: CLINIC | Age: 70
End: 2020-07-28

## 2020-08-05 ENCOUNTER — TELEPHONE (OUTPATIENT)
Dept: PRIMARY CARE CLINIC | Facility: CLINIC | Age: 70
End: 2020-08-05

## 2020-08-05 ENCOUNTER — DOCUMENTATION ONLY (OUTPATIENT)
Dept: PRIMARY CARE CLINIC | Facility: CLINIC | Age: 70
End: 2020-08-05

## 2020-08-05 NOTE — TELEPHONE ENCOUNTER
Received call from Yanely with [facility] informing that the pt can only come to their facility short term due to the pt being a [place] resident.  For long term placement she would need to go to a [place] facility.

## 2020-08-05 NOTE — TELEPHONE ENCOUNTER
Spoke with pt, relayed information that records have been faxed to [facility], once she is clinically approved they will contact Lillian Cornejo NP for the next step, financial.

## 2020-08-05 NOTE — TELEPHONE ENCOUNTER
Spoke to pt via phone. Pt states she had a fall a few days ago, pt was using cane to ambulate in the house and felt weak so she slowly went down to the floor. Denies any injuries. Pt was unable to get herself up and phone was not nearby to call daughter. She lives in a mobile home behind daughter's home. Requesting information for NH placement due to feeling unsafe living at home alone. Pt interested in [facility]. She called and left her information but have not received a call back. Call made to [facility], unable to speak to admissions. VM left to return call.

## 2020-08-07 DIAGNOSIS — I10 BENIGN ESSENTIAL HTN: ICD-10-CM

## 2020-08-07 RX ORDER — LISINOPRIL 2.5 MG/1
2.5 TABLET ORAL DAILY
Qty: 90 TABLET | Refills: 3 | Status: ON HOLD | OUTPATIENT
Start: 2020-08-07 | End: 2021-01-30 | Stop reason: HOSPADM

## 2020-08-07 RX ORDER — METOPROLOL SUCCINATE 25 MG/1
25 TABLET, EXTENDED RELEASE ORAL DAILY
Qty: 90 TABLET | Refills: 3 | Status: SHIPPED | OUTPATIENT
Start: 2020-08-07 | End: 2021-08-07

## 2020-08-07 NOTE — TELEPHONE ENCOUNTER
Patient is requesting a 90 day supply of metoprolol 25 mg and Lisinopril 2.5 mg be sent to the [facility] in [place]

## 2020-08-11 ENCOUNTER — HOSPITAL ENCOUNTER (OUTPATIENT)
Facility: HOSPITAL | Age: 70
Discharge: NURSING, REHAB OR LONG-TERM CARE | End: 2020-08-14
Attending: EMERGENCY MEDICINE | Admitting: FAMILY MEDICINE
Payer: MEDICARE

## 2020-08-11 ENCOUNTER — TELEPHONE (OUTPATIENT)
Dept: PRIMARY CARE CLINIC | Facility: CLINIC | Age: 70
End: 2020-08-11

## 2020-08-11 DIAGNOSIS — R29.6 FALLS FREQUENTLY: ICD-10-CM

## 2020-08-11 DIAGNOSIS — R53.81 DEBILITY: ICD-10-CM

## 2020-08-11 DIAGNOSIS — R53.81 DEBILITATED PATIENT: Primary | ICD-10-CM

## 2020-08-11 DIAGNOSIS — N39.0 URINARY TRACT INFECTION WITHOUT HEMATURIA, SITE UNSPECIFIED: ICD-10-CM

## 2020-08-11 DIAGNOSIS — R10.2 PELVIC PAIN: ICD-10-CM

## 2020-08-11 PROBLEM — R79.89 ELEVATED SERUM CREATININE: Status: ACTIVE | Noted: 2020-08-11

## 2020-08-11 PROBLEM — R33.9 URINARY RETENTION: Status: ACTIVE | Noted: 2020-08-11

## 2020-08-11 PROBLEM — L68.0 HIRSUTISM: Status: ACTIVE | Noted: 2020-08-11

## 2020-08-11 LAB
ALBUMIN SERPL BCP-MCNC: 4 G/DL (ref 3.5–5.2)
ALP SERPL-CCNC: 109 U/L (ref 55–135)
ALT SERPL W/O P-5'-P-CCNC: 15 U/L (ref 10–44)
ANION GAP SERPL CALC-SCNC: 13 MMOL/L (ref 8–16)
AST SERPL-CCNC: 27 U/L (ref 10–40)
BACTERIA #/AREA URNS HPF: ABNORMAL /HPF
BASOPHILS # BLD AUTO: 0.02 K/UL (ref 0–0.2)
BASOPHILS NFR BLD: 0.2 % (ref 0–1.9)
BILIRUB SERPL-MCNC: 0.8 MG/DL (ref 0.1–1)
BILIRUB UR QL STRIP: NEGATIVE
BUN SERPL-MCNC: 19 MG/DL (ref 8–23)
CALCIUM SERPL-MCNC: 8.9 MG/DL (ref 8.7–10.5)
CHLORIDE SERPL-SCNC: 101 MMOL/L (ref 95–110)
CLARITY UR: CLEAR
CO2 SERPL-SCNC: 22 MMOL/L (ref 23–29)
COLOR UR: YELLOW
CREAT SERPL-MCNC: 1.1 MG/DL (ref 0.5–1.4)
DIFFERENTIAL METHOD: ABNORMAL
EOSINOPHIL # BLD AUTO: 0.1 K/UL (ref 0–0.5)
EOSINOPHIL NFR BLD: 0.9 % (ref 0–8)
ERYTHROCYTE [DISTWIDTH] IN BLOOD BY AUTOMATED COUNT: 14 % (ref 11.5–14.5)
EST. GFR  (AFRICAN AMERICAN): 59.2 ML/MIN/1.73 M^2
EST. GFR  (NON AFRICAN AMERICAN): 51.3 ML/MIN/1.73 M^2
GLUCOSE SERPL-MCNC: 123 MG/DL (ref 70–110)
GLUCOSE UR QL STRIP: NEGATIVE
HCT VFR BLD AUTO: 35.5 % (ref 37–48.5)
HGB BLD-MCNC: 11.6 G/DL (ref 12–16)
HGB UR QL STRIP: ABNORMAL
HYALINE CASTS #/AREA URNS LPF: 2 /LPF
IMM GRANULOCYTES # BLD AUTO: 0.03 K/UL (ref 0–0.04)
IMM GRANULOCYTES NFR BLD AUTO: 0.3 % (ref 0–0.5)
INR PPP: 1.1 (ref 0.8–1.2)
KETONES UR QL STRIP: NEGATIVE
LACTATE SERPL-SCNC: 1.8 MMOL/L (ref 0.5–2.2)
LEUKOCYTE ESTERASE UR QL STRIP: NEGATIVE
LYMPHOCYTES # BLD AUTO: 1 K/UL (ref 1–4.8)
LYMPHOCYTES NFR BLD: 11.6 % (ref 18–48)
MCH RBC QN AUTO: 31.3 PG (ref 27–31)
MCHC RBC AUTO-ENTMCNC: 32.7 G/DL (ref 32–36)
MCV RBC AUTO: 96 FL (ref 82–98)
MICROSCOPIC COMMENT: ABNORMAL
MONOCYTES # BLD AUTO: 0.7 K/UL (ref 0.3–1)
MONOCYTES NFR BLD: 8.1 % (ref 4–15)
NEUTROPHILS # BLD AUTO: 7.1 K/UL (ref 1.8–7.7)
NEUTROPHILS NFR BLD: 78.9 % (ref 38–73)
NITRITE UR QL STRIP: NEGATIVE
NRBC BLD-RTO: 0 /100 WBC
PH UR STRIP: 6 [PH] (ref 5–8)
PLATELET # BLD AUTO: 290 K/UL (ref 150–350)
PMV BLD AUTO: 10.3 FL (ref 9.2–12.9)
POCT GLUCOSE: 82 MG/DL (ref 70–110)
POTASSIUM SERPL-SCNC: 4.3 MMOL/L (ref 3.5–5.1)
PROT SERPL-MCNC: 7.3 G/DL (ref 6–8.4)
PROT UR QL STRIP: ABNORMAL
PROTHROMBIN TIME: 11 SEC (ref 9–12.5)
RBC # BLD AUTO: 3.71 M/UL (ref 4–5.4)
RBC #/AREA URNS HPF: 5 /HPF (ref 0–4)
SARS-COV-2 RDRP RESP QL NAA+PROBE: NEGATIVE
SODIUM SERPL-SCNC: 136 MMOL/L (ref 136–145)
SP GR UR STRIP: 1.01 (ref 1–1.03)
URN SPEC COLLECT METH UR: ABNORMAL
UROBILINOGEN UR STRIP-ACNC: NEGATIVE EU/DL
WBC # BLD AUTO: 8.94 K/UL (ref 3.9–12.7)
WBC #/AREA URNS HPF: 8 /HPF (ref 0–5)

## 2020-08-11 PROCEDURE — 63600175 PHARM REV CODE 636 W HCPCS: Performed by: FAMILY MEDICINE

## 2020-08-11 PROCEDURE — 51702 INSERT TEMP BLADDER CATH: CPT

## 2020-08-11 PROCEDURE — 90471 IMMUNIZATION ADMIN: CPT | Performed by: EMERGENCY MEDICINE

## 2020-08-11 PROCEDURE — G0378 HOSPITAL OBSERVATION PER HR: HCPCS

## 2020-08-11 PROCEDURE — 81000 URINALYSIS NONAUTO W/SCOPE: CPT

## 2020-08-11 PROCEDURE — 94760 N-INVAS EAR/PLS OXIMETRY 1: CPT

## 2020-08-11 PROCEDURE — 99219 PR INITIAL OBSERVATION CARE,LEVL II: ICD-10-PCS | Mod: ,,, | Performed by: FAMILY MEDICINE

## 2020-08-11 PROCEDURE — 73660 X-RAY EXAM OF TOE(S): CPT | Mod: 26,LT,, | Performed by: RADIOLOGY

## 2020-08-11 PROCEDURE — 72170 XR PELVIS ROUTINE AP: ICD-10-PCS | Mod: 26,,, | Performed by: RADIOLOGY

## 2020-08-11 PROCEDURE — 87040 BLOOD CULTURE FOR BACTERIA: CPT

## 2020-08-11 PROCEDURE — 99219 PR INITIAL OBSERVATION CARE,LEVL II: CPT | Mod: ,,, | Performed by: FAMILY MEDICINE

## 2020-08-11 PROCEDURE — 96361 HYDRATE IV INFUSION ADD-ON: CPT | Mod: 59

## 2020-08-11 PROCEDURE — 80053 COMPREHEN METABOLIC PANEL: CPT

## 2020-08-11 PROCEDURE — 90714 TD VACC NO PRESV 7 YRS+ IM: CPT | Performed by: EMERGENCY MEDICINE

## 2020-08-11 PROCEDURE — 25000003 PHARM REV CODE 250: Performed by: EMERGENCY MEDICINE

## 2020-08-11 PROCEDURE — 63600175 PHARM REV CODE 636 W HCPCS: Performed by: EMERGENCY MEDICINE

## 2020-08-11 PROCEDURE — 25000003 PHARM REV CODE 250: Performed by: FAMILY MEDICINE

## 2020-08-11 PROCEDURE — 72170 X-RAY EXAM OF PELVIS: CPT | Mod: TC,FY

## 2020-08-11 PROCEDURE — 96376 TX/PRO/DX INJ SAME DRUG ADON: CPT

## 2020-08-11 PROCEDURE — 72170 X-RAY EXAM OF PELVIS: CPT | Mod: 26,,, | Performed by: RADIOLOGY

## 2020-08-11 PROCEDURE — 73660 XR TOE 2 OR MORE VIEWS LEFT: ICD-10-PCS | Mod: 26,LT,, | Performed by: RADIOLOGY

## 2020-08-11 PROCEDURE — 85610 PROTHROMBIN TIME: CPT

## 2020-08-11 PROCEDURE — 85025 COMPLETE CBC W/AUTO DIFF WBC: CPT

## 2020-08-11 PROCEDURE — 96374 THER/PROPH/DIAG INJ IV PUSH: CPT

## 2020-08-11 PROCEDURE — 36415 COLL VENOUS BLD VENIPUNCTURE: CPT

## 2020-08-11 PROCEDURE — 96361 HYDRATE IV INFUSION ADD-ON: CPT

## 2020-08-11 PROCEDURE — U0002 COVID-19 LAB TEST NON-CDC: HCPCS

## 2020-08-11 PROCEDURE — 83605 ASSAY OF LACTIC ACID: CPT

## 2020-08-11 PROCEDURE — 99285 EMERGENCY DEPT VISIT HI MDM: CPT | Mod: 25

## 2020-08-11 PROCEDURE — 73660 X-RAY EXAM OF TOE(S): CPT | Mod: TC,FY,LT

## 2020-08-11 RX ORDER — MORPHINE SULFATE 10 MG/ML
1 INJECTION INTRAMUSCULAR; INTRAVENOUS; SUBCUTANEOUS EVERY 6 HOURS PRN
Status: DISCONTINUED | OUTPATIENT
Start: 2020-08-11 | End: 2020-08-12

## 2020-08-11 RX ORDER — METOPROLOL SUCCINATE 25 MG/1
25 TABLET, EXTENDED RELEASE ORAL DAILY
Status: DISCONTINUED | OUTPATIENT
Start: 2020-08-12 | End: 2020-08-14 | Stop reason: HOSPADM

## 2020-08-11 RX ORDER — SODIUM CHLORIDE 9 MG/ML
INJECTION, SOLUTION INTRAVENOUS CONTINUOUS
Status: DISCONTINUED | OUTPATIENT
Start: 2020-08-11 | End: 2020-08-14 | Stop reason: HOSPADM

## 2020-08-11 RX ORDER — ONDANSETRON 2 MG/ML
4 INJECTION INTRAMUSCULAR; INTRAVENOUS EVERY 8 HOURS PRN
Status: DISCONTINUED | OUTPATIENT
Start: 2020-08-11 | End: 2020-08-14 | Stop reason: HOSPADM

## 2020-08-11 RX ORDER — CIPROFLOXACIN 2 MG/ML
400 INJECTION, SOLUTION INTRAVENOUS
Status: DISCONTINUED | OUTPATIENT
Start: 2020-08-12 | End: 2020-08-14 | Stop reason: HOSPADM

## 2020-08-11 RX ORDER — PRIMIDONE 50 MG/1
50 TABLET ORAL NIGHTLY
Status: DISCONTINUED | OUTPATIENT
Start: 2020-08-11 | End: 2020-08-14 | Stop reason: HOSPADM

## 2020-08-11 RX ORDER — GABAPENTIN 300 MG/1
300 CAPSULE ORAL 3 TIMES DAILY PRN
Status: DISCONTINUED | OUTPATIENT
Start: 2020-08-11 | End: 2020-08-14 | Stop reason: HOSPADM

## 2020-08-11 RX ORDER — SODIUM CHLORIDE 0.9 % (FLUSH) 0.9 %
10 SYRINGE (ML) INJECTION
Status: DISCONTINUED | OUTPATIENT
Start: 2020-08-11 | End: 2020-08-14 | Stop reason: HOSPADM

## 2020-08-11 RX ORDER — ACETAMINOPHEN 325 MG/1
TABLET ORAL
Status: COMPLETED
Start: 2020-08-11 | End: 2020-08-11

## 2020-08-11 RX ORDER — ACETAMINOPHEN 325 MG/1
650 TABLET ORAL EVERY 6 HOURS PRN
Status: DISCONTINUED | OUTPATIENT
Start: 2020-08-11 | End: 2020-08-14 | Stop reason: HOSPADM

## 2020-08-11 RX ORDER — LISINOPRIL 2.5 MG/1
2.5 TABLET ORAL DAILY
Status: DISCONTINUED | OUTPATIENT
Start: 2020-08-12 | End: 2020-08-14 | Stop reason: HOSPADM

## 2020-08-11 RX ORDER — CLOPIDOGREL BISULFATE 75 MG/1
75 TABLET ORAL DAILY
Status: DISCONTINUED | OUTPATIENT
Start: 2020-08-12 | End: 2020-08-14 | Stop reason: HOSPADM

## 2020-08-11 RX ORDER — CIPROFLOXACIN 2 MG/ML
400 INJECTION, SOLUTION INTRAVENOUS ONCE
Status: COMPLETED | OUTPATIENT
Start: 2020-08-11 | End: 2020-08-11

## 2020-08-11 RX ORDER — ATORVASTATIN CALCIUM 40 MG/1
80 TABLET, FILM COATED ORAL NIGHTLY
Status: DISCONTINUED | OUTPATIENT
Start: 2020-08-11 | End: 2020-08-14 | Stop reason: HOSPADM

## 2020-08-11 RX ADMIN — SODIUM CHLORIDE 1000 ML: 0.9 INJECTION, SOLUTION INTRAVENOUS at 06:08

## 2020-08-11 RX ADMIN — TETANUS AND DIPHTHERIA TOXOIDS ADSORBED 0.5 ML: 2; 2 INJECTION INTRAMUSCULAR at 12:08

## 2020-08-11 RX ADMIN — ATORVASTATIN CALCIUM 80 MG: 40 TABLET, FILM COATED ORAL at 09:08

## 2020-08-11 RX ADMIN — MORPHINE SULFATE 1 MG: 10 INJECTION INTRAVENOUS at 11:08

## 2020-08-11 RX ADMIN — PRIMIDONE 50 MG: 50 TABLET ORAL at 09:08

## 2020-08-11 RX ADMIN — MORPHINE SULFATE 1 MG: 10 INJECTION INTRAVENOUS at 06:08

## 2020-08-11 RX ADMIN — SODIUM CHLORIDE 500 ML: 0.9 INJECTION, SOLUTION INTRAVENOUS at 12:08

## 2020-08-11 RX ADMIN — APIXABAN 5 MG: 2.5 TABLET, FILM COATED ORAL at 09:08

## 2020-08-11 RX ADMIN — ACETAMINOPHEN 650 MG: 325 TABLET ORAL at 05:08

## 2020-08-11 RX ADMIN — CIPROFLOXACIN 400 MG: 2 INJECTION, SOLUTION INTRAVENOUS at 05:08

## 2020-08-11 NOTE — ASSESSMENT & PLAN NOTE
Patient with worsening tremor resulting in multiple falls  Reportedly has had workup outpatient though no MRI  Patient has been taking primidone which does not help with tremors  Reports associated dizziness and lightheadedness with tremors and falls  MRI brain without contrast ordered  Follow-up TSH and RPR

## 2020-08-11 NOTE — ASSESSMENT & PLAN NOTE
This is a chronic problem though etiology is unknown  Male pattern hair growth is significant, possibly insulin resistance versus PCOS versus other

## 2020-08-11 NOTE — ASSESSMENT & PLAN NOTE
Levemir 5 units nightly while in-patient  Accu-Armond hirschcRobel HS  Hold metformin  Follow-up A1c

## 2020-08-11 NOTE — ASSESSMENT & PLAN NOTE
Patient has difficulty standing up, falls of increased to multiple times a day over the last week and seemed to be worsening  Workup for possible neurologic deficit  Case management consulted for possible placement

## 2020-08-11 NOTE — SUBJECTIVE & OBJECTIVE
Past Medical History:   Diagnosis Date    Arthritis     Diabetes mellitus     Heart attack     Hyperlipidemia     Hypertension     Neuropathy     Occasional tremors        Past Surgical History:   Procedure Laterality Date    CARPAL TUNNEL RELEASE      bilateral    CATARACT EXTRACTION Bilateral     EYE SURGERY      bilat cataract removal with iol implants    GANGLION CYST EXCISION      heart stents      HYSTERECTOMY      KNEE SURGERY Left     SKIN BIOPSY      on side of nose       Review of patient's allergies indicates:   Allergen Reactions    Hydrocodone Hives    Pcn [penicillins] Hives    Pcn [penicillins] Rash       No current facility-administered medications on file prior to encounter.      Current Outpatient Medications on File Prior to Encounter   Medication Sig    alendronate (FOSAMAX) 70 MG tablet Take one tablet every 7 days.    amitriptyline (ELAVIL) 25 MG tablet     atorvastatin (LIPITOR) 80 MG tablet Take 1 tablet (80 mg total) by mouth every evening.    betamethasone dipropionate (DIPROLENE) 0.05 % cream Apply topically 2 (two) times daily.    blood-glucose meter Misc Use the meter as directed    ciclopirox (LOPROX) 0.77 % Crea Apply topically 2 (two) times daily. (Patient not taking: Reported on 1/22/2020)    ciclopirox (PENLAC) 8 % Soln Apply topically nightly.    clopidogrel (PLAVIX) 75 mg tablet Take 1 tablet (75 mg total) by mouth once daily.    collagenase ointment Apply topically once daily.    diphth,pertus,acell,,tetanus (BOOSTRIX TDAP) 2.5-8-5 Lf-mcg-Lf/0.5mL Syrg injection Inject into the muscle.    ELIQUIS 5 mg Tab Take 1 tablet by mouth twice daily    ergocalciferol (ERGOCALCIFEROL) 50,000 unit Cap Take 1 capsule (50,000 Units total) by mouth every 7 days.    gabapentin (NEURONTIN) 600 MG tablet Take 1 tablet by mouth in the morning and take 1.5 tablet by mouth at night    lisinopriL (PRINIVIL,ZESTRIL) 2.5 MG tablet Take 1 tablet (2.5 mg total) by mouth  "once daily.    metFORMIN (GLUCOPHAGE) 1000 MG tablet Take 1 tablet (1,000 mg total) by mouth 2 (two) times daily with meals.    metoprolol succinate (TOPROL-XL) 25 MG 24 hr tablet Take 1 tablet (25 mg total) by mouth once daily.    pen needle, diabetic (ADVOCATE PEN NEEDLE) 33 gauge x 5/32" Ndle 1 Dose by Misc.(Non-Drug; Combo Route) route once daily.    primidone (MYSOLINE) 50 MG Tab Take 1 tablet (50 mg total) by mouth every evening.    promethazine (PHENERGAN) 25 MG tablet     TRESIBA FLEXTOUCH U-100 100 unit/mL (3 mL) InPn Inject 5 Units into the skin once daily.    varicella-zoster gE-AS01B, PF, (SHINGRIX, PF,) 50 mcg/0.5 mL injection Inject into the muscle.     Family History     None        Tobacco Use    Smoking status: Current Every Day Smoker     Packs/day: 0.75     Years: 39.00     Pack years: 29.25    Smokeless tobacco: Never Used   Substance and Sexual Activity    Alcohol use: Yes     Frequency: Monthly or less     Comment: rare    Drug use: No    Sexual activity: Not Currently     Review of Systems   Constitutional: Positive for fatigue. Negative for chills and fever.   HENT: Negative.    Eyes: Positive for visual disturbance.   Respiratory: Negative for shortness of breath.    Cardiovascular: Negative for chest pain and leg swelling.   Gastrointestinal: Negative for abdominal pain, nausea and vomiting.   Genitourinary: Positive for decreased urine volume and dysuria.   Musculoskeletal: Positive for arthralgias and back pain.        Hip pain   Skin: Negative.    Neurological: Positive for dizziness, tremors, weakness, light-headedness and headaches. Negative for speech difficulty.   Psychiatric/Behavioral: The patient is not nervous/anxious.      Objective:     Vital Signs (Most Recent):  Temp: 98.3 °F (36.8 °C) (08/11/20 1200)  Pulse: 88 (08/11/20 1713)  Resp: (!) 7 (08/11/20 1713)  BP: (!) 140/58 (08/11/20 1713)  SpO2: 97 % (08/11/20 1713) Vital Signs (24h Range):  Temp:  [98.3 °F (36.8 " °C)] 98.3 °F (36.8 °C)  Pulse:  [] 88  Resp:  [7-17] 7  SpO2:  [67 %-100 %] 97 %  BP: (102-149)/(58-91) 140/58     Weight: 90.7 kg (200 lb)  Body mass index is 31.32 kg/m².    Physical Exam  Vitals signs reviewed.   Constitutional:       Appearance: She is obese.      Comments: Appears disheveled, uncomfortable but not toxic   HENT:      Head: Normocephalic and atraumatic.      Right Ear: External ear normal.      Left Ear: External ear normal.      Nose: Nose normal.      Mouth/Throat:      Mouth: Mucous membranes are moist.   Eyes:      Conjunctiva/sclera: Conjunctivae normal.   Cardiovascular:      Rate and Rhythm: Normal rate and regular rhythm.      Pulses: Normal pulses.      Heart sounds: No murmur.   Pulmonary:      Effort: Pulmonary effort is normal. No respiratory distress.      Breath sounds: No wheezing or rales.   Abdominal:      General: Bowel sounds are normal. There is distension.      Tenderness: There is no abdominal tenderness. There is no guarding.   Musculoskeletal:      Right lower leg: No edema.      Left lower leg: No edema.   Skin:     Comments: Finger nails are unkempt and dirty.  There was a significant amount of hirsutism on the face along with hyperpigmentation of the extremities and face.   Neurological:      Mental Status: She is alert and oriented to person, place, and time. Mental status is at baseline.      Comments: Patient is very weak.  Has difficulty sitting up in bed for exam.   Psychiatric:         Mood and Affect: Mood normal.         Behavior: Behavior normal.             Significant Labs:   Recent Lab Results       08/11/20  1235   08/11/20  1210        Albumin   4.0     Alkaline Phosphatase   109     ALT   15     Anion Gap   13     Appearance, UA   Clear     AST   27     Bacteria, UA   Few     Baso #   0.02     Basophil%   0.2     Bilirubin (UA)   Negative     BILIRUBIN TOTAL   0.8  Comment:  For infants and newborns, interpretation of results should be based  on  gestational age, weight and in agreement with clinical  observations.  Premature Infant recommended reference ranges:  Up to 24 hours.............<8.0 mg/dL  Up to 48 hours............<12.0 mg/dL  3-5 days..................<15.0 mg/dL  6-29 days.................<15.0 mg/dL       BUN, Bld   19     Calcium   8.9     Chloride   101     CO2   22     Color, UA   Yellow     Creatinine   1.1     Differential Method   Automated     eGFR if African American   59.2     eGFR if non    51.3  Comment:  Calculation used to obtain the estimated glomerular filtration  rate (eGFR) is the CKD-EPI equation.        Eos #   0.1     Eosinophil%   0.9     Glucose   123     Glucose, UA   Negative     Gran # (ANC)   7.1     Gran%   78.9     Hematocrit   35.5     Hemoglobin   11.6     Hyaline Casts, UA   2     Immature Grans (Abs)   0.03  Comment:  Mild elevation in immature granulocytes is non specific and   can be seen in a variety of conditions including stress response,   acute inflammation, trauma and pregnancy. Correlation with other   laboratory and clinical findings is essential.       Immature Granulocytes   0.3     INR 1.1  Comment:  Coumadin Therapy:  2.0 - 3.0 for INR for all indicators except mechanical heart valves  and antiphospholipid syndromes which should use 2.5 - 3.5.         Ketones, UA   Negative     Lactate, Roly   1.8  Comment:  Falsely low lactic acid results can be found in samples   containing >=13.0 mg/dL total bilirubin and/or >=3.5 mg/dL   direct bilirubin.       Leukocytes, UA   Negative     Lymph #   1.0     Lymph%   11.6     MCH   31.3     MCHC   32.7     MCV   96     Microscopic Comment   SEE COMMENT  Comment:  Other formed elements not mentioned in the report are not   present in the microscopic examination.        Mono #   0.7     Mono%   8.1     MPV   10.3     NITRITE UA   Negative     nRBC   0     Occult Blood UA   1+     pH, UA   6.0     Platelets   290     Potassium   4.3     PROTEIN  TOTAL   7.3     Protein, UA   Trace  Comment:  Recommend a 24 hour urine protein or a urine   protein/creatinine ratio if globulin induced proteinuria is  clinically suspected.       Protime 11.0       RBC   3.71     RBC, UA   5     RDW   14.0     SARS-CoV-2 RNA, Amplification, Qual   Negative  Comment:  This test utilizes isothermal nucleic acid amplification   technology to detect the SARS-CoV-2 RdRp nucleic acid segment.   The analytical sensitivity (limit of detection) is 125 genome   equivalents/mL.   A POSITIVE result implies infection with the SARS-CoV-2 virus;  the patient is presumed to be contagious.    A NEGATIVE result means that SARS-CoV-2 nucleic acids are not  present above the limit of detection. A NEGATIVE result should be   treated as presumptive. It does not rule out the possibility of   COVID-19 and should not be the sole basis for treatment decisions.   If COVID-19 is strongly suspected based on clinical and exposure   history, re-testing using an alternate molecular assay should be   considered.   This test is only for use under the Food and Drug   Administration s Emergency Use Authorization (EUA).   Commercial kits are provided by [employer].   Performance characteristics of the EUA have been independently  verified by [facility] Department of  Pathology and Laboratory Medicine.   _________________________________________________________________  The ID NOW COVID-19 Letter of Authorization, along with the   authorized Fact Sheet for Healthcare Providers, the authorized Fact  Sheet for Patients, and authorized labeling are available on the FDA   website:  www.fda.gov/MedicalDevices/Safety/EmergencySituations/wsh119007.htm       Sodium   136     Specific Gravity, UA   1.015     Specimen UA   Urine, Clean Catch     UROBILINOGEN UA   Negative     WBC, UA   8     WBC   8.94         All pertinent labs within the past 24 hours have been reviewed.    Significant Imaging: I have  reviewed all pertinent imaging results/findings within the past 24 hours.

## 2020-08-11 NOTE — H&P
[facility] Medicine  History & Physical    Patient Name: Kateryna Connolly  MRN: [id number]  Admission Date: 8/11/2020  Attending Physician: Eleanor Wagner MD   Primary Care Provider: Lillian Garcia NP         Patient information was obtained from patient and ER records.     Subjective:     Principal Problem:Debility    Chief Complaint:   Chief Complaint   Patient presents with    generalized weakness        HPI: Patient is a 69 year old female with past medical history of type 2 diabetes on insulin, MI, hypertension, hyperlipidemia, neuropathy, occasional tremors who presents to the ER with worsening falls due to tremors over the last 1 week.  Patient has taken primidone in the past for these tremors but reports that over the last week they have become significantly worse so that when she stands up she becomes dizzy and feels off balance, her legs started shaking and she falls to the floor.  Falls multiple times every day.  She has reportedly had a workup couple of years ago by Neurology for tremors did not include an MRI but reports that this is much worse.  She has a daughter in law who helps take care of her but is unable to do anything for herself.  She denies any recent fever, cough, nausea or vomiting.  Does report urinary retention over the last 6 weeks as well as buttock pain and hip/pelvic pain.  In the ER, lab work was unremarkable except for a mildly elevated serum creatinine in with a GFR of 50.  Lactic acid normal.  WBC normal.  X-ray of the pelvis with bony demineralization.  Corbin inserted for urinary retention with good output.  Hospital team called for admission for worsening tremors, multiple falls, debility, unsafe discharge home.    Past Medical History:   Diagnosis Date    Arthritis     Diabetes mellitus     Heart attack     Hyperlipidemia     Hypertension     Neuropathy     Occasional tremors        Past Surgical History:   Procedure  Laterality Date    CARPAL TUNNEL RELEASE      bilateral    CATARACT EXTRACTION Bilateral     EYE SURGERY      bilat cataract removal with iol implants    GANGLION CYST EXCISION      heart stents      HYSTERECTOMY      KNEE SURGERY Left     SKIN BIOPSY      on side of nose       Review of patient's allergies indicates:   Allergen Reactions    Hydrocodone Hives    Pcn [penicillins] Hives    Pcn [penicillins] Rash       No current facility-administered medications on file prior to encounter.      Current Outpatient Medications on File Prior to Encounter   Medication Sig    alendronate (FOSAMAX) 70 MG tablet Take one tablet every 7 days.    amitriptyline (ELAVIL) 25 MG tablet     atorvastatin (LIPITOR) 80 MG tablet Take 1 tablet (80 mg total) by mouth every evening.    betamethasone dipropionate (DIPROLENE) 0.05 % cream Apply topically 2 (two) times daily.    blood-glucose meter Misc Use the meter as directed    ciclopirox (LOPROX) 0.77 % Crea Apply topically 2 (two) times daily. (Patient not taking: Reported on 1/22/2020)    ciclopirox (PENLAC) 8 % Soln Apply topically nightly.    clopidogrel (PLAVIX) 75 mg tablet Take 1 tablet (75 mg total) by mouth once daily.    collagenase ointment Apply topically once daily.    diphth,pertus,acell,,tetanus (BOOSTRIX TDAP) 2.5-8-5 Lf-mcg-Lf/0.5mL Syrg injection Inject into the muscle.    ELIQUIS 5 mg Tab Take 1 tablet by mouth twice daily    ergocalciferol (ERGOCALCIFEROL) 50,000 unit Cap Take 1 capsule (50,000 Units total) by mouth every 7 days.    gabapentin (NEURONTIN) 600 MG tablet Take 1 tablet by mouth in the morning and take 1.5 tablet by mouth at night    lisinopriL (PRINIVIL,ZESTRIL) 2.5 MG tablet Take 1 tablet (2.5 mg total) by mouth once daily.    metFORMIN (GLUCOPHAGE) 1000 MG tablet Take 1 tablet (1,000 mg total) by mouth 2 (two) times daily with meals.    metoprolol succinate (TOPROL-XL) 25 MG 24 hr tablet Take 1 tablet (25 mg total) by  "mouth once daily.    pen needle, diabetic (ADVOCATE PEN NEEDLE) 33 gauge x 5/32" Ndle 1 Dose by Misc.(Non-Drug; Combo Route) route once daily.    primidone (MYSOLINE) 50 MG Tab Take 1 tablet (50 mg total) by mouth every evening.    promethazine (PHENERGAN) 25 MG tablet     TRESIBA FLEXTOUCH U-100 100 unit/mL (3 mL) InPn Inject 5 Units into the skin once daily.    varicella-zoster gE-AS01B, PF, (SHINGRIX, PF,) 50 mcg/0.5 mL injection Inject into the muscle.     Family History     None        Tobacco Use    Smoking status: Current Every Day Smoker     Packs/day: 0.75     Years: 39.00     Pack years: 29.25    Smokeless tobacco: Never Used   Substance and Sexual Activity    Alcohol use: Yes     Frequency: Monthly or less     Comment: rare    Drug use: No    Sexual activity: Not Currently     Review of Systems   Constitutional: Positive for fatigue. Negative for chills and fever.   HENT: Negative.    Eyes: Positive for visual disturbance.   Respiratory: Negative for shortness of breath.    Cardiovascular: Negative for chest pain and leg swelling.   Gastrointestinal: Negative for abdominal pain, nausea and vomiting.   Genitourinary: Positive for decreased urine volume and dysuria.   Musculoskeletal: Positive for arthralgias and back pain.        Hip pain   Skin: Negative.    Neurological: Positive for dizziness, tremors, weakness, light-headedness and headaches. Negative for speech difficulty.   Psychiatric/Behavioral: The patient is not nervous/anxious.      Objective:     Vital Signs (Most Recent):  Temp: 98.3 °F (36.8 °C) (08/11/20 1200)  Pulse: 88 (08/11/20 1713)  Resp: (!) 7 (08/11/20 1713)  BP: (!) 140/58 (08/11/20 1713)  SpO2: 97 % (08/11/20 1713) Vital Signs (24h Range):  Temp:  [98.3 °F (36.8 °C)] 98.3 °F (36.8 °C)  Pulse:  [] 88  Resp:  [7-17] 7  SpO2:  [67 %-100 %] 97 %  BP: (102-149)/(58-91) 140/58     Weight: 90.7 kg (200 lb)  Body mass index is 31.32 kg/m².    Physical Exam  Vitals signs " reviewed.   Constitutional:       Appearance: She is obese.      Comments: Appears disheveled, uncomfortable but not toxic   HENT:      Head: Normocephalic and atraumatic.      Right Ear: External ear normal.      Left Ear: External ear normal.      Nose: Nose normal.      Mouth/Throat:      Mouth: Mucous membranes are moist.   Eyes:      Conjunctiva/sclera: Conjunctivae normal.   Cardiovascular:      Rate and Rhythm: Normal rate and regular rhythm.      Pulses: Normal pulses.      Heart sounds: No murmur.   Pulmonary:      Effort: Pulmonary effort is normal. No respiratory distress.      Breath sounds: No wheezing or rales.   Abdominal:      General: Bowel sounds are normal. There is distension.      Tenderness: There is no abdominal tenderness. There is no guarding.   Musculoskeletal:      Right lower leg: No edema.      Left lower leg: No edema.   Skin:     Comments: Finger nails are unkempt and dirty.  There was a significant amount of hirsutism on the face along with hyperpigmentation of the extremities and face.   Neurological:      Mental Status: She is alert and oriented to person, place, and time. Mental status is at baseline.      Comments: Patient is very weak.  Has difficulty sitting up in bed for exam.   Psychiatric:         Mood and Affect: Mood normal.         Behavior: Behavior normal.             Significant Labs:   Recent Lab Results       08/11/20  1235   08/11/20  1210        Albumin   4.0     Alkaline Phosphatase   109     ALT   15     Anion Gap   13     Appearance, UA   Clear     AST   27     Bacteria, UA   Few     Baso #   0.02     Basophil%   0.2     Bilirubin (UA)   Negative     BILIRUBIN TOTAL   0.8  Comment:  For infants and newborns, interpretation of results should be based  on gestational age, weight and in agreement with clinical  observations.  Premature Infant recommended reference ranges:  Up to 24 hours.............<8.0 mg/dL  Up to 48 hours............<12.0 mg/dL  3-5  days..................<15.0 mg/dL  6-29 days.................<15.0 mg/dL       BUN, Bld   19     Calcium   8.9     Chloride   101     CO2   22     Color, UA   Yellow     Creatinine   1.1     Differential Method   Automated     eGFR if African American   59.2     eGFR if non    51.3  Comment:  Calculation used to obtain the estimated glomerular filtration  rate (eGFR) is the CKD-EPI equation.        Eos #   0.1     Eosinophil%   0.9     Glucose   123     Glucose, UA   Negative     Gran # (ANC)   7.1     Gran%   78.9     Hematocrit   35.5     Hemoglobin   11.6     Hyaline Casts, UA   2     Immature Grans (Abs)   0.03  Comment:  Mild elevation in immature granulocytes is non specific and   can be seen in a variety of conditions including stress response,   acute inflammation, trauma and pregnancy. Correlation with other   laboratory and clinical findings is essential.       Immature Granulocytes   0.3     INR 1.1  Comment:  Coumadin Therapy:  2.0 - 3.0 for INR for all indicators except mechanical heart valves  and antiphospholipid syndromes which should use 2.5 - 3.5.         Ketones, UA   Negative     Lactate, Roly   1.8  Comment:  Falsely low lactic acid results can be found in samples   containing >=13.0 mg/dL total bilirubin and/or >=3.5 mg/dL   direct bilirubin.       Leukocytes, UA   Negative     Lymph #   1.0     Lymph%   11.6     MCH   31.3     MCHC   32.7     MCV   96     Microscopic Comment   SEE COMMENT  Comment:  Other formed elements not mentioned in the report are not   present in the microscopic examination.        Mono #   0.7     Mono%   8.1     MPV   10.3     NITRITE UA   Negative     nRBC   0     Occult Blood UA   1+     pH, UA   6.0     Platelets   290     Potassium   4.3     PROTEIN TOTAL   7.3     Protein, UA   Trace  Comment:  Recommend a 24 hour urine protein or a urine   protein/creatinine ratio if globulin induced proteinuria is  clinically suspected.       Protime 11.0        RBC   3.71     RBC, UA   5     RDW   14.0     SARS-CoV-2 RNA, Amplification, Qual   Negative  Comment:  This test utilizes isothermal nucleic acid amplification   technology to detect the SARS-CoV-2 RdRp nucleic acid segment.   The analytical sensitivity (limit of detection) is 125 genome   equivalents/mL.   A POSITIVE result implies infection with the SARS-CoV-2 virus;  the patient is presumed to be contagious.    A NEGATIVE result means that SARS-CoV-2 nucleic acids are not  present above the limit of detection. A NEGATIVE result should be   treated as presumptive. It does not rule out the possibility of   COVID-19 and should not be the sole basis for treatment decisions.   If COVID-19 is strongly suspected based on clinical and exposure   history, re-testing using an alternate molecular assay should be   considered.   This test is only for use under the Food and Drug   Administration s Emergency Use Authorization (EUA).   Commercial kits are provided by [employer].   Performance characteristics of the EUA have been independently  verified by [facility] Department of  Pathology and Laboratory Medicine.   _________________________________________________________________  The ID NOW COVID-19 Letter of Authorization, along with the   authorized Fact Sheet for Healthcare Providers, the authorized Fact  Sheet for Patients, and authorized labeling are available on the FDA   website:  www.fda.gov/MedicalDevices/Safety/EmergencySituations/nfd085795.htm       Sodium   136     Specific Gravity, UA   1.015     Specimen UA   Urine, Clean Catch     UROBILINOGEN UA   Negative     WBC, UA   8     WBC   8.94         All pertinent labs within the past 24 hours have been reviewed.    Significant Imaging: I have reviewed all pertinent imaging results/findings within the past 24 hours.    Assessment/Plan:     * Debility  Patient has difficulty standing up, falls of increased to multiple times a day over the last  week and seemed to be worsening  Workup for possible neurologic deficit  Case management consulted for possible placement      Hirsutism  This is a chronic problem though etiology is unknown  Male pattern hair growth is significant, possibly insulin resistance versus PCOS versus other        Elevated serum creatinine  Baseline serum creatinine appears to be around 0.7  Slightly elevated at 1.1 on admission  Will try gentle IV hydration  Monitor daily BMP while inpatient      Multiple falls  See below      Urinary retention  Worsening in the last 1 month  Patient has difficulty initiating a stream which causes pain sometimes  Corbin inserted  Monitor I&Os  Will treat for urinary tract infection based on WBC count and symptoms, follow-up urine culture        Pelvic pain  X-ray of pelvis with bony demineralization otherwise normal      Tremor, unspecified  Patient with worsening tremor resulting in multiple falls  Reportedly has had workup outpatient though no MRI  Patient has been taking primidone which does not help with tremors  Reports associated dizziness and lightheadedness with tremors and falls  MRI brain without contrast ordered  Follow-up TSH and RPR      Type 2 diabetes mellitus with skin complication, with long-term current use of insulin  Continue Levemir 5 units nightly      Diabetic polyneuropathy associated with type 2 diabetes mellitus  Levemir 5 units nightly while in-patient  Accu-Cheks a.c. HS  Hold metformin  Follow-up A1c      Coronary artery disease  Continue home Plavix        VTE Risk Mitigation (From admission, onward)    None             Eleanor Wagner MD  Department of Hospital Medicine   [facility]

## 2020-08-11 NOTE — ASSESSMENT & PLAN NOTE
Worsening in the last 1 month  Patient has difficulty initiating a stream which causes pain sometimes  Corbin inserted  Monitor I&Os  Will treat for urinary tract infection based on WBC count and symptoms, follow-up urine culture

## 2020-08-11 NOTE — HPI
Patient is a 69 year old female with past medical history of type 2 diabetes on insulin, MI, hypertension, hyperlipidemia, neuropathy, occasional tremors who presents to the ER with worsening falls due to tremors over the last 1 week.  Patient has taken primidone in the past for these tremors but reports that over the last week they have become significantly worse so that when she stands up she becomes dizzy and feels off balance, her legs started shaking and she falls to the floor.  Falls multiple times every day.  She has reportedly had a workup couple of years ago by Neurology for tremors did not include an MRI but reports that this is much worse.  She has a daughter in law who helps take care of her but is unable to do anything for herself.  She denies any recent fever, cough, nausea or vomiting.  Does report urinary retention over the last 6 weeks as well as buttock pain and hip/pelvic pain.  In the ER, lab work was unremarkable except for a mildly elevated serum creatinine in with a GFR of 50.  Lactic acid normal.  WBC normal.  X-ray of the pelvis with bony demineralization.  Corbin inserted for urinary retention with good output.  Hospital team called for admission for worsening tremors, multiple falls, debility, unsafe discharge home.

## 2020-08-11 NOTE — ED TRIAGE NOTES
Pt to ER via EMS from home,     C/o Multiple falls and progressive weakness over last 6 weeks, urinary retention and bladder pressure, R knee pain, low back pain.    PCP Dr. Asif in [place] per pt report.

## 2020-08-11 NOTE — NURSING
Report received from MARY Morrell.  Patient arrived to floor from ER via stretcher.  Patient transferred to bed with assist x3.  Patient is AAOx4, respirations even and unlabored, luo catheter patent and draining yellow urine.  Patient has current complaint of significant left leg pain, Dr. Wagner made aware and orders placed (see MAR).  Bed alarm placed and call light within reach, NAD noted.

## 2020-08-11 NOTE — ED PROVIDER NOTES
"Encounter Date: 8/11/2020       History     Chief Complaint   Patient presents with    generalized weakness     69-year-old female brought by EMS from home with complaints of worsening weakness.  Patient is a poor historian, but she states her weakness began about 3 months ago.  She states her symptoms have been getting progressively worse, and lately she has been falling quite a bit.  She also complains of dysuria, and states that for the past several weeks he has been having worsening difficulty passing urine.  She denies any numbness or tingling in the extremities.  No perineal numbness or tingling.  No difficulty controlling her bowels.  Patient states that over the last few months, she has been in contact with her doctor over the telephone, but she has not been able to see her in person.  Patient states that no labs have been ordered or performed prior to coming here.  Patient denies any unilateral weakness.  No speech disturbance.  No confusion or mental status changes.  I asked the patient about why she believes she is falling, and she stated that she has been diagnosed with "tremors", and she states this is the reason she has been falling.  She admits that she has not seen a neurologist.  She is complaining of bilateral buttock pain but otherwise has no complaints of pain.  No neck pain or back pain.  Patient states that she and her doctor have been working over the telephone to get her admitted to a nursing home.  I asked the patient about who helps her at home with activities of daily living, cooking meals, etc., and she states that her daughter's girlfriend has been helping her.        Review of patient's allergies indicates:   Allergen Reactions    Hydrocodone Hives    Pcn [penicillins] Hives    Pcn [penicillins] Rash     Past Medical History:   Diagnosis Date    Arthritis     Diabetes mellitus     Heart attack     Hyperlipidemia     Hypertension     Neuropathy     Occasional tremors      Past " Surgical History:   Procedure Laterality Date    CARPAL TUNNEL RELEASE      bilateral    CATARACT EXTRACTION Bilateral     EYE SURGERY      bilat cataract removal with iol implants    GANGLION CYST EXCISION      heart stents      HYSTERECTOMY      KNEE SURGERY Left     SKIN BIOPSY      on side of nose     History reviewed. No pertinent family history.  Social History     Tobacco Use    Smoking status: Current Every Day Smoker     Packs/day: 0.75     Years: 39.00     Pack years: 29.25    Smokeless tobacco: Never Used   Substance Use Topics    Alcohol use: Yes     Frequency: Monthly or less     Comment: rare    Drug use: No     Review of Systems   Constitutional: Positive for fatigue. Negative for appetite change, chills and fever.   HENT: Negative for congestion, rhinorrhea, sore throat and trouble swallowing.    Eyes: Negative for photophobia and visual disturbance.   Respiratory: Negative for cough, chest tightness, shortness of breath and wheezing.    Cardiovascular: Negative for chest pain, palpitations and leg swelling.   Gastrointestinal: Negative for abdominal pain, blood in stool, diarrhea, nausea and vomiting.   Endocrine: Negative for polydipsia and polyuria.   Genitourinary: Positive for difficulty urinating and dysuria. Negative for decreased urine volume, enuresis, flank pain, frequency, hematuria and urgency.   Musculoskeletal: Positive for arthralgias and gait problem. Negative for back pain, myalgias, neck pain and neck stiffness.   Skin: Negative for pallor, rash and wound.   Neurological: Positive for tremors and weakness. Negative for dizziness, seizures, syncope, facial asymmetry, speech difficulty, light-headedness, numbness and headaches.   Psychiatric/Behavioral: Negative for agitation, confusion, decreased concentration, dysphoric mood and suicidal ideas.       Physical Exam     Initial Vitals [08/11/20 1200]   BP Pulse Resp Temp SpO2   102/61 106 15 -- (!) 94 %      MAP       --          Physical Exam    Nursing note and vitals reviewed.  Constitutional: She appears well-developed and well-nourished. No distress.   HENT:   Head: Normocephalic and atraumatic.   Nose: Nose normal.   Mouth/Throat: Oropharynx is clear and moist. No oropharyngeal exudate.   Eyes: EOM are normal. Pupils are equal, round, and reactive to light. No scleral icterus.   Neck: Normal range of motion. Neck supple. No tracheal deviation present. No JVD present.   Cardiovascular: Normal rate, regular rhythm, normal heart sounds and intact distal pulses.   No murmur heard.  Pulmonary/Chest: Breath sounds normal. No respiratory distress. She has no wheezes. She has no rhonchi. She has no rales.   Abdominal: Soft. Bowel sounds are normal. She exhibits no distension. There is no abdominal tenderness.   Musculoskeletal: Normal range of motion. No tenderness or edema.   Neurological: She is alert. She has normal strength and normal reflexes. No cranial nerve deficit or sensory deficit.   Patient appears to be fatigued.  GCS is 14.  Oriented x3.  Moves all extremities equally well.  Motor strength in upper extremities is 5/5, motor strength both lower extremities for over 5.  No sensory deficits.  No facial droop, no slurred speech, negative Romberg.   Skin: Skin is warm and dry. Capillary refill takes less than 2 seconds.   Slight abrasion left 5th toe.   Psychiatric: She has a normal mood and affect. Her behavior is normal.         ED Course   Procedures  Labs Reviewed   CULTURE, BLOOD   CULTURE, BLOOD   CBC W/ AUTO DIFFERENTIAL   COMPREHENSIVE METABOLIC PANEL   LACTIC ACID, PLASMA   URINALYSIS   SARS-COV-2 RNA AMPLIFICATION, QUAL          Imaging Results    None          Medical Decision Making:   Differential Diagnosis:   Urinary tract infection, sepsis, pelvic fracture, neuromuscular disease, deconditioning, etc.  ED Management:  Patient's labs showed a normal white blood cell count, with a lactic of 1.8.   Mild anemia.   Urinalysis shows signs of possible urinary tract infection.  The patient has been falling quite a bit recently, and on physical exam, she appears to be weak and debilitated.  I am concerned that if she were to be discharged home, she may fall and injure herself seriously.  I spoke to Dr. Wagner about this patient and she will admit.  Patient agrees with plan of care.                                 Clinical Impression:       ICD-10-CM ICD-9-CM   1. Debilitated patient  R53.81 799.3   2. Pelvic pain  R10.2 [id number]   3. Falls frequently  R29.6 V15.88   4. Urinary tract infection without hematuria, site unspecified  N39.0 599.0                                Richard Quinteros MD  08/11/20 1639       Richard Quinteros MD  08/11/20 1640

## 2020-08-11 NOTE — ASSESSMENT & PLAN NOTE
Baseline serum creatinine appears to be around 0.7  Slightly elevated at 1.1 on admission  Will try gentle IV hydration  Monitor daily BMP while inpatient

## 2020-08-12 ENCOUNTER — TELEPHONE (OUTPATIENT)
Dept: PRIMARY CARE CLINIC | Facility: CLINIC | Age: 70
End: 2020-08-12

## 2020-08-12 LAB
ANION GAP SERPL CALC-SCNC: 7 MMOL/L (ref 8–16)
BASOPHILS # BLD AUTO: 0.04 K/UL (ref 0–0.2)
BASOPHILS NFR BLD: 0.5 % (ref 0–1.9)
BUN SERPL-MCNC: 15 MG/DL (ref 8–23)
CALCIUM SERPL-MCNC: 8.2 MG/DL (ref 8.7–10.5)
CHLORIDE SERPL-SCNC: 102 MMOL/L (ref 95–110)
CO2 SERPL-SCNC: 26 MMOL/L (ref 23–29)
CREAT SERPL-MCNC: 1 MG/DL (ref 0.5–1.4)
DIFFERENTIAL METHOD: ABNORMAL
EOSINOPHIL # BLD AUTO: 0.3 K/UL (ref 0–0.5)
EOSINOPHIL NFR BLD: 3.8 % (ref 0–8)
ERYTHROCYTE [DISTWIDTH] IN BLOOD BY AUTOMATED COUNT: 14.1 % (ref 11.5–14.5)
EST. GFR  (AFRICAN AMERICAN): >60 ML/MIN/1.73 M^2
EST. GFR  (NON AFRICAN AMERICAN): 57.6 ML/MIN/1.73 M^2
ESTIMATED AVG GLUCOSE: 114 MG/DL (ref 68–131)
GLUCOSE SERPL-MCNC: 88 MG/DL (ref 70–110)
HBA1C MFR BLD HPLC: 5.6 % (ref 4.5–6.2)
HCT VFR BLD AUTO: 31.3 % (ref 37–48.5)
HGB BLD-MCNC: 9.9 G/DL (ref 12–16)
IMM GRANULOCYTES # BLD AUTO: 0.03 K/UL (ref 0–0.04)
IMM GRANULOCYTES NFR BLD AUTO: 0.4 % (ref 0–0.5)
LYMPHOCYTES # BLD AUTO: 1.2 K/UL (ref 1–4.8)
LYMPHOCYTES NFR BLD: 15.3 % (ref 18–48)
MCH RBC QN AUTO: 31.3 PG (ref 27–31)
MCHC RBC AUTO-ENTMCNC: 31.6 G/DL (ref 32–36)
MCV RBC AUTO: 99 FL (ref 82–98)
MONOCYTES # BLD AUTO: 0.5 K/UL (ref 0.3–1)
MONOCYTES NFR BLD: 6.7 % (ref 4–15)
NEUTROPHILS # BLD AUTO: 5.6 K/UL (ref 1.8–7.7)
NEUTROPHILS NFR BLD: 73.3 % (ref 38–73)
NRBC BLD-RTO: 0 /100 WBC
PLATELET # BLD AUTO: 243 K/UL (ref 150–350)
PMV BLD AUTO: 10.1 FL (ref 9.2–12.9)
POCT GLUCOSE: 135 MG/DL (ref 70–110)
POCT GLUCOSE: 82 MG/DL (ref 70–110)
POTASSIUM SERPL-SCNC: 3.7 MMOL/L (ref 3.5–5.1)
RBC # BLD AUTO: 3.16 M/UL (ref 4–5.4)
SODIUM SERPL-SCNC: 135 MMOL/L (ref 136–145)
TSH SERPL DL<=0.005 MIU/L-ACNC: 1.68 UIU/ML (ref 0.34–5.6)
WBC # BLD AUTO: 7.66 K/UL (ref 3.9–12.7)

## 2020-08-12 PROCEDURE — 85025 COMPLETE CBC W/AUTO DIFF WBC: CPT

## 2020-08-12 PROCEDURE — 97530 THERAPEUTIC ACTIVITIES: CPT

## 2020-08-12 PROCEDURE — 99224 PR SUBSEQUENT OBSERVATION CARE,LEVEL I: CPT | Mod: ,,, | Performed by: FAMILY MEDICINE

## 2020-08-12 PROCEDURE — 36415 COLL VENOUS BLD VENIPUNCTURE: CPT

## 2020-08-12 PROCEDURE — 27000221 HC OXYGEN, UP TO 24 HOURS

## 2020-08-12 PROCEDURE — G0378 HOSPITAL OBSERVATION PER HR: HCPCS

## 2020-08-12 PROCEDURE — 86592 SYPHILIS TEST NON-TREP QUAL: CPT

## 2020-08-12 PROCEDURE — 80048 BASIC METABOLIC PNL TOTAL CA: CPT

## 2020-08-12 PROCEDURE — 63600175 PHARM REV CODE 636 W HCPCS: Performed by: FAMILY MEDICINE

## 2020-08-12 PROCEDURE — 86580 TB INTRADERMAL TEST: CPT | Performed by: FAMILY MEDICINE

## 2020-08-12 PROCEDURE — 97166 OT EVAL MOD COMPLEX 45 MIN: CPT

## 2020-08-12 PROCEDURE — 25000003 PHARM REV CODE 250: Performed by: FAMILY MEDICINE

## 2020-08-12 PROCEDURE — 94760 N-INVAS EAR/PLS OXIMETRY 1: CPT

## 2020-08-12 PROCEDURE — 30200315 PPD INTRADERMAL TEST REV CODE 302: Performed by: FAMILY MEDICINE

## 2020-08-12 PROCEDURE — 97161 PT EVAL LOW COMPLEX 20 MIN: CPT

## 2020-08-12 PROCEDURE — 84443 ASSAY THYROID STIM HORMONE: CPT

## 2020-08-12 PROCEDURE — 96361 HYDRATE IV INFUSION ADD-ON: CPT

## 2020-08-12 PROCEDURE — 99224 PR SUBSEQUENT OBSERVATION CARE,LEVEL I: ICD-10-PCS | Mod: ,,, | Performed by: FAMILY MEDICINE

## 2020-08-12 PROCEDURE — 96376 TX/PRO/DX INJ SAME DRUG ADON: CPT

## 2020-08-12 PROCEDURE — 83036 HEMOGLOBIN GLYCOSYLATED A1C: CPT

## 2020-08-12 RX ORDER — MORPHINE SULFATE 10 MG/ML
1 INJECTION INTRAMUSCULAR; INTRAVENOUS; SUBCUTANEOUS EVERY 4 HOURS PRN
Status: DISCONTINUED | OUTPATIENT
Start: 2020-08-12 | End: 2020-08-13 | Stop reason: CLARIF

## 2020-08-12 RX ADMIN — MORPHINE SULFATE 1 MG: 10 INJECTION INTRAVENOUS at 05:08

## 2020-08-12 RX ADMIN — ATORVASTATIN CALCIUM 80 MG: 40 TABLET, FILM COATED ORAL at 08:08

## 2020-08-12 RX ADMIN — CIPROFLOXACIN 400 MG: 2 INJECTION, SOLUTION INTRAVENOUS at 05:08

## 2020-08-12 RX ADMIN — MORPHINE SULFATE 1 MG: 10 INJECTION, SOLUTION INTRAMUSCULAR; INTRAVENOUS at 10:08

## 2020-08-12 RX ADMIN — GABAPENTIN 300 MG: 300 CAPSULE ORAL at 08:08

## 2020-08-12 RX ADMIN — MORPHINE SULFATE 1 MG: 10 INJECTION, SOLUTION INTRAMUSCULAR; INTRAVENOUS at 12:08

## 2020-08-12 RX ADMIN — ACETAMINOPHEN 650 MG: 325 TABLET ORAL at 02:08

## 2020-08-12 RX ADMIN — SODIUM CHLORIDE: 0.9 INJECTION, SOLUTION INTRAVENOUS at 05:08

## 2020-08-12 RX ADMIN — CLOPIDOGREL 75 MG: 75 TABLET, FILM COATED ORAL at 08:08

## 2020-08-12 RX ADMIN — SODIUM CHLORIDE: 0.9 INJECTION, SOLUTION INTRAVENOUS at 10:08

## 2020-08-12 RX ADMIN — LISINOPRIL 2.5 MG: 2.5 TABLET ORAL at 08:08

## 2020-08-12 RX ADMIN — MORPHINE SULFATE 1 MG: 10 INJECTION, SOLUTION INTRAMUSCULAR; INTRAVENOUS at 05:08

## 2020-08-12 RX ADMIN — TUBERCULIN PURIFIED PROTEIN DERIVATIVE 5 UNITS: 5 INJECTION, SOLUTION INTRADERMAL at 02:08

## 2020-08-12 RX ADMIN — APIXABAN 5 MG: 2.5 TABLET, FILM COATED ORAL at 08:08

## 2020-08-12 RX ADMIN — METOPROLOL SUCCINATE 25 MG: 25 TABLET, EXTENDED RELEASE ORAL at 08:08

## 2020-08-12 RX ADMIN — PRIMIDONE 50 MG: 50 TABLET ORAL at 08:08

## 2020-08-12 NOTE — PLAN OF CARE
08/12/20 1015   HAMILTON Message   Medicare Outpatient and Observation Notification regarding financial responsibility Given to patient/caregiver;Explained to patient/caregiver;Signed/date by patient/caregiver   Date HAMILTON was signed 08/12/20   Time HAMILTON was signed 1010

## 2020-08-12 NOTE — PT/OT/SLP EVAL
Occupational Therapy   Evaluation    Name: Kateryna Connolly  MRN: [id number]  Admitting Diagnosis:  Debility      Recommendations:     Discharge Recommendations:  Skilled nursing placement  Discharge Equipment Recommendations:   Will continue to assess  Barriers to discharge:   None    Assessment:    Patient is a 69 year old female with past medical history of type 2 diabetes on insulin, MI, hypertension, hyperlipidemia, neuropathy, occasional tremors who presents to the ER with worsening falls due to tremors over the last 1 week.  Patient has taken primidone in the past for these tremors but reports that over the last week they have become significantly worse so that when she stands up she becomes dizzy and feels off balance, her legs started shaking and she falls to the floor.  Falls multiple times every day.  She has reportedly had a workup couple of years ago by Neurology for tremors did not include an MRI but reports that this is much worse.  She has a daughter in law who helps take care of her but is unable to do anything for herself.  She denies any recent fever, cough, nausea or vomiting.  Does report urinary retention over the last 6 weeks as well as buttock pain and hip/pelvic pain.  In the ER, lab work was unremarkable except for a mildly elevated serum creatinine in with a GFR of 50.  Lactic acid normal.  WBC normal.  X-ray of the pelvis with bony demineralization.  Corbin inserted for urinary retention with good output.  Hospital team called for admission for worsening tremors, multiple falls, debility, unsafe discharge home.       Plan:     Patient to be seen   to address the above listed problems via    · Plan of Care Expires:  When patient is discharged from hospital.  · Plan of Care Reviewed with:  Patient    Subjective     Chief Complaint: Left foot pain      Occupational Profile:  Living Environment: Patient lives in a camper with ramp to enter.   Previous level of function: Patient was modified  independent with all ADLs prior to being hospitalized.  Equipment Used at Home:   Patient owns a straight cane and rolling walker.  Assistance upon Discharge: Patient lives in a camper behind her step daughter's house.    Pain/Comfort:  ·  Patient reported left foot pain being a 6/10.    Patients cultural, spiritual, Gnosticism conflicts given the current situation:      Objective:     Communicated with: OT spoke with nurse prior to entering.     General Precautions: Standard,        Occupational Performance:    Bed Mobility:    · Patient required maxA when going from supine to sitting.    Functional Mobility/Transfers:  · Patient required Jaun when performing three functional transfers with use of rolling walker.    Activities of Daily Living:  · Patient requires modA with UE dressing and grooming.  Patient requires maxA with LE dressing, bathing, and toileting.     Cognitive/Visual Perceptual:  Patient is alert and oriented x4.    Treatment & Education:  -Patient required maxA when going from supine to sitting.  -Patient required maxA in order to don gripper socks.  -Patient required Jaun when performing three functional sit to stand transfers with rolling walker.  -Patient exhibited a BUE MMT grade of 3+/5 at all joints in all planes of movement.    Education:      GOALS:   1. Patient will require Jaun with all aspects of bed mobility prior to discharge.  2. Patient will require CGA with UE dressing prior to discharge.  3. Patient will require modA with LE dressing prior to discharge.  4. Patient will increase BUE MMT grade to 4/5 at all joints in all planes of movement prior to discharge.     History:     Past Medical History:   Diagnosis Date    Arthritis     Diabetes mellitus     Heart attack     Hyperlipidemia     Hypertension     Neuropathy     Occasional tremors        Past Surgical History:   Procedure Laterality Date    CARPAL TUNNEL RELEASE      bilateral    CATARACT EXTRACTION Bilateral      EYE SURGERY      bilat cataract removal with iol implants    GANGLION CYST EXCISION      heart stents      HYSTERECTOMY      KNEE SURGERY Left     SKIN BIOPSY      on side of nose       Time Tracking:     OT Date of Treatment:  8/12/2020  OT Start Time:  202  OT Stop Time:  221  OT Total Time (min):  19 minutes         Ricco Pagan, OT  8/12/2020

## 2020-08-12 NOTE — PROGRESS NOTES
[facility] Hospital Medicine  Progress Note    Patient Name: Kateryna Connolly  MRN: [id number]  Patient Class: OP- Observation   Admission Date: 8/11/2020  Length of Stay: 0 days  Attending Physician: Eleanor Wagner MD  Primary Care Provider: Lillian Garcia NP        Subjective:     Principal Problem:Debility        HPI:  Patient is a 69 year old female with past medical history of type 2 diabetes on insulin, MI, hypertension, hyperlipidemia, neuropathy, occasional tremors who presents to the ER with worsening falls due to tremors over the last 1 week.  Patient has taken primidone in the past for these tremors but reports that over the last week they have become significantly worse so that when she stands up she becomes dizzy and feels off balance, her legs started shaking and she falls to the floor.  Falls multiple times every day.  She has reportedly had a workup couple of years ago by Neurology for tremors did not include an MRI but reports that this is much worse.  She has a daughter in law who helps take care of her but is unable to do anything for herself.  She denies any recent fever, cough, nausea or vomiting.  Does report urinary retention over the last 6 weeks as well as buttock pain and hip/pelvic pain.  In the ER, lab work was unremarkable except for a mildly elevated serum creatinine in with a GFR of 50.  Lactic acid normal.  WBC normal.  X-ray of the pelvis with bony demineralization.  Corbin inserted for urinary retention with good output.  Hospital team called for admission for worsening tremors, multiple falls, debility, unsafe discharge home.    Overview/Hospital Course:  08/12/2020  Patient with increasing need for IV pain medicine due to low back pain and pelvic pain.  Patient is unable to stand.  PT/OT ordered.  Continuing to try to seek placement for this patient who is unsafe for discharge home at this time.    Interval History:  No acute events    Review  of Systems   Constitutional: Positive for fatigue.   HENT: Negative.    Eyes: Negative.    Respiratory: Negative for shortness of breath.    Cardiovascular: Negative.    Gastrointestinal: Negative.    Endocrine: Negative.    Genitourinary: Negative.    Musculoskeletal: Positive for arthralgias and back pain.   Skin: Positive for color change.   Neurological: Positive for dizziness, tremors and weakness.   Psychiatric/Behavioral: Negative.      Objective:     Vital Signs (Most Recent):  Temp: 96.6 °F (35.9 °C) (08/12/20 1017)  Pulse: 86 (08/12/20 1020)  Resp: 18 (08/12/20 1701)  BP: (!) 162/75 (08/12/20 1020)  SpO2: 100 % (08/12/20 1017) Vital Signs (24h Range):  Temp:  [96.2 °F (35.7 °C)-98.1 °F (36.7 °C)] 96.6 °F (35.9 °C)  Pulse:  [75-86] 86  Resp:  [16-20] 18  SpO2:  [96 %-100 %] 100 %  BP: (104-162)/(58-75) 162/75     Weight: 83.6 kg (184 lb 4.9 oz)  Body mass index is 28.87 kg/m².    Intake/Output Summary (Last 24 hours) at 8/12/2020 1826  Last data filed at 8/12/2020 1800  Gross per 24 hour   Intake 1800 ml   Output 2300 ml   Net -500 ml      Physical Exam  Vitals signs reviewed.   Constitutional:       Comments: Disheveled, dried feces on feet as well as dry blood, no acute distress   HENT:      Head: Normocephalic and atraumatic.      Right Ear: External ear normal.      Left Ear: External ear normal.      Nose: Nose normal.      Mouth/Throat:      Mouth: Mucous membranes are moist.   Eyes:      Conjunctiva/sclera: Conjunctivae normal.   Cardiovascular:      Rate and Rhythm: Normal rate.      Pulses: Normal pulses.      Heart sounds: No murmur.   Pulmonary:      Effort: Pulmonary effort is normal. No respiratory distress.      Breath sounds: No wheezing or rales.   Abdominal:      General: Abdomen is flat. Bowel sounds are normal. There is no distension.   Skin:     General: Skin is dry.      Comments: Skin is covered in dirt, blood, feces in places.  Hyperpigmentation on the lower extremities and on the  face.  Multiple superficial abrasions.   Neurological:      Mental Status: She is alert and oriented to person, place, and time.   Psychiatric:      Comments: Depressed mood and affect         Significant Labs:   Recent Lab Results       08/12/20  1657   08/12/20  0654   08/12/20  0618   08/11/20  2321        Anion Gap     7       Baso #     0.04       Basophil%     0.5       BUN, Bld     15       Calcium     8.2       Chloride     102       CO2     26       Creatinine     1.0       Differential Method     Automated       eGFR if      >60.0       eGFR if non      57.6  Comment:  Calculation used to obtain the estimated glomerular filtration  rate (eGFR) is the CKD-EPI equation.          Eos #     0.3       Eosinophil%     3.8       Estimated Avg Glucose     114       Glucose     88       Gran # (ANC)     5.6       Gran%     73.3       Hematocrit     31.3       Hemoglobin     9.9       Hemoglobin A1C External     5.6  Comment:  According to ADA guidelines, hemoglobin A1C <7.0% represents  optimal control in non-pregnant diabetic patients.  Different  metrics may apply to specific populations.   Standards of Medical Care in Diabetes - 2016.  For the purpose of screening for the presence of diabetes:  <5.7%     Consistent with the absence of diabetes  5.7-6.4%  Consistent with increasing risk for diabetes   (prediabetes)  >or=6.5%  Consistent with diabetes  Currently no consensus exists for use of hemoglobin A1C  for diagnosis of diabetes for children.         Immature Grans (Abs)     0.03  Comment:  Mild elevation in immature granulocytes is non specific and   can be seen in a variety of conditions including stress response,   acute inflammation, trauma and pregnancy. Correlation with other   laboratory and clinical findings is essential.         Immature Granulocytes     0.4       Lymph #     1.2       Lymph%     15.3       MCH     31.3       MCHC     31.6       MCV     99       Mono #      0.5       Mono%     6.7       MPV     10.1       nRBC     0       Platelets     243       POCT Glucose 135 82   82     Potassium     3.7       RBC     3.16       RDW     14.1       Sodium     135       TSH     1.678       WBC     7.66           All pertinent labs within the past 24 hours have been reviewed.    Significant Imaging: I have reviewed all pertinent imaging results/findings within the past 24 hours.      Assessment/Plan:      * Debility  Patient has difficulty standing up, falls of increased to multiple times a day over the last week and seemed to be worsening  Workup for possible neurologic deficit  Case management consulted for possible placement    08/12/2020  Case management still continuing to seek placement  Patient is not safe for discharge home as she was covered in feces and is unable to stand and take care of herself.      Hirsutism  This is a chronic problem though etiology is unknown  Male pattern hair growth is significant, possibly insulin resistance versus PCOS versus other        Elevated serum creatinine  Baseline serum creatinine appears to be around 0.7  Slightly elevated at 1.1 on admission  Will try gentle IV hydration  Monitor daily BMP while inpatient      Multiple falls  See below      Urinary retention  Worsening in the last 1 month  Patient has difficulty initiating a stream which causes pain sometimes  Corbin inserted  Monitor I&Os  Will treat for urinary tract infection based on WBC count and symptoms, follow-up urine culture        Pelvic pain  X-ray of pelvis with bony demineralization otherwise normal    08/12/2020  Patient with low back pain, chronic, will increase IV morphine to q.4 hours p.r.n.      Tremor, unspecified  Patient with worsening tremor resulting in multiple falls  Reportedly has had workup outpatient though no MRI  Patient has been taking primidone which does not help with tremors  Reports associated dizziness and lightheadedness with tremors and  falls  MRI brain without contrast ordered  Follow-up TSH and RPR      Type 2 diabetes mellitus with skin complication, with long-term current use of insulin  Continue Levemir 5 units nightly      Diabetic polyneuropathy associated with type 2 diabetes mellitus  Levemir 5 units nightly while in-patient  Accu-Cheks a.c. HS  Hold metformin  Follow-up A1c      Coronary artery disease  Continue home Plavix        VTE Risk Mitigation (From admission, onward)         Ordered     apixaban tablet 5 mg  2 times daily      08/11/20 1748     IP VTE HIGH RISK PATIENT  Once      08/11/20 1748     Place sequential compression device  Until discontinued      08/11/20 1748                      Eleanor Wagner MD  Department of Hospital Medicine   [facility]

## 2020-08-12 NOTE — PLAN OF CARE
Problem: Infection  Goal: Infection Symptom Resolution  Outcome: Ongoing, Progressing     Problem: Fall Injury Risk  Goal: Absence of Fall and Fall-Related Injury  Outcome: Ongoing, Progressing     Problem: Adult Inpatient Plan of Care  Goal: Plan of Care Review  Outcome: Ongoing, Progressing

## 2020-08-12 NOTE — SUBJECTIVE & OBJECTIVE
Interval History:  No acute events    Review of Systems   Constitutional: Positive for fatigue.   HENT: Negative.    Eyes: Negative.    Respiratory: Negative for shortness of breath.    Cardiovascular: Negative.    Gastrointestinal: Negative.    Endocrine: Negative.    Genitourinary: Negative.    Musculoskeletal: Positive for arthralgias and back pain.   Skin: Positive for color change.   Neurological: Positive for dizziness, tremors and weakness.   Psychiatric/Behavioral: Negative.      Objective:     Vital Signs (Most Recent):  Temp: 96.6 °F (35.9 °C) (08/12/20 1017)  Pulse: 86 (08/12/20 1020)  Resp: 18 (08/12/20 1701)  BP: (!) 162/75 (08/12/20 1020)  SpO2: 100 % (08/12/20 1017) Vital Signs (24h Range):  Temp:  [96.2 °F (35.7 °C)-98.1 °F (36.7 °C)] 96.6 °F (35.9 °C)  Pulse:  [75-86] 86  Resp:  [16-20] 18  SpO2:  [96 %-100 %] 100 %  BP: (104-162)/(58-75) 162/75     Weight: 83.6 kg (184 lb 4.9 oz)  Body mass index is 28.87 kg/m².    Intake/Output Summary (Last 24 hours) at 8/12/2020 1826  Last data filed at 8/12/2020 1800  Gross per 24 hour   Intake 1800 ml   Output 2300 ml   Net -500 ml      Physical Exam  Vitals signs reviewed.   Constitutional:       Comments: Disheveled, dried feces on feet as well as dry blood, no acute distress   HENT:      Head: Normocephalic and atraumatic.      Right Ear: External ear normal.      Left Ear: External ear normal.      Nose: Nose normal.      Mouth/Throat:      Mouth: Mucous membranes are moist.   Eyes:      Conjunctiva/sclera: Conjunctivae normal.   Cardiovascular:      Rate and Rhythm: Normal rate.      Pulses: Normal pulses.      Heart sounds: No murmur.   Pulmonary:      Effort: Pulmonary effort is normal. No respiratory distress.      Breath sounds: No wheezing or rales.   Abdominal:      General: Abdomen is flat. Bowel sounds are normal. There is no distension.   Skin:     General: Skin is dry.      Comments: Skin is covered in dirt, blood, feces in places.   Hyperpigmentation on the lower extremities and on the face.  Multiple superficial abrasions.   Neurological:      Mental Status: She is alert and oriented to person, place, and time.   Psychiatric:      Comments: Depressed mood and affect         Significant Labs:   Recent Lab Results       08/12/20  1657   08/12/20  0654   08/12/20  0618   08/11/20  2321        Anion Gap     7       Baso #     0.04       Basophil%     0.5       BUN, Bld     15       Calcium     8.2       Chloride     102       CO2     26       Creatinine     1.0       Differential Method     Automated       eGFR if      >60.0       eGFR if non      57.6  Comment:  Calculation used to obtain the estimated glomerular filtration  rate (eGFR) is the CKD-EPI equation.          Eos #     0.3       Eosinophil%     3.8       Estimated Avg Glucose     114       Glucose     88       Gran # (ANC)     5.6       Gran%     73.3       Hematocrit     31.3       Hemoglobin     9.9       Hemoglobin A1C External     5.6  Comment:  According to ADA guidelines, hemoglobin A1C <7.0% represents  optimal control in non-pregnant diabetic patients.  Different  metrics may apply to specific populations.   Standards of Medical Care in Diabetes - 2016.  For the purpose of screening for the presence of diabetes:  <5.7%     Consistent with the absence of diabetes  5.7-6.4%  Consistent with increasing risk for diabetes   (prediabetes)  >or=6.5%  Consistent with diabetes  Currently no consensus exists for use of hemoglobin A1C  for diagnosis of diabetes for children.         Immature Grans (Abs)     0.03  Comment:  Mild elevation in immature granulocytes is non specific and   can be seen in a variety of conditions including stress response,   acute inflammation, trauma and pregnancy. Correlation with other   laboratory and clinical findings is essential.         Immature Granulocytes     0.4       Lymph #     1.2       Lymph%     15.3       MCH      31.3       MCHC     31.6       MCV     99       Mono #     0.5       Mono%     6.7       MPV     10.1       nRBC     0       Platelets     243       POCT Glucose 135 82   82     Potassium     3.7       RBC     3.16       RDW     14.1       Sodium     135       TSH     1.678       WBC     7.66           All pertinent labs within the past 24 hours have been reviewed.    Significant Imaging: I have reviewed all pertinent imaging results/findings within the past 24 hours.

## 2020-08-12 NOTE — ASSESSMENT & PLAN NOTE
Patient has difficulty standing up, falls of increased to multiple times a day over the last week and seemed to be worsening  Workup for possible neurologic deficit  Case management consulted for possible placement    08/12/2020  Case management still continuing to seek placement  Patient is not safe for discharge home as she was covered in feces and is unable to stand and take care of herself.

## 2020-08-12 NOTE — HOSPITAL COURSE
08/12/2020  Patient with increasing need for IV pain medicine due to low back pain and pelvic pain.  Patient is unable to stand.  PT/OT ordered.  Continuing to try to seek placement for this patient who is unsafe for discharge home at this time.    08/13/2020  No change to current plan.  PT/OT are working with patient.  She would be a good candidate for rehab.  Continue to try to seek placement for this patient who is not safe for discharge home.

## 2020-08-12 NOTE — ASSESSMENT & PLAN NOTE
X-ray of pelvis with bony demineralization otherwise normal    08/12/2020  Patient with low back pain, chronic, will increase IV morphine to q.4 hours p.r.n.

## 2020-08-12 NOTE — PT/OT/SLP EVAL
Physical Therapy Evaluation    Patient Name:  Kateryna Connolly   MRN:  [id number]    Recommendations:     Discharge Recommendations:  nursing facility, skilled   Discharge Equipment Recommendations: (TBD)   Barriers to discharge: None    Assessment:     Kateryna Connolly is a 69 y.o. female admitted with a medical diagnosis of Debility.  She presents with the following impairments/functional limitations:  weakness, impaired endurance, impaired self care skills, impaired functional mobilty, gait instability, impaired balance, pain, impaired muscle length.    Rehab Prognosis: Good; patient would benefit from acute skilled PT services to address these deficits and reach maximum level of function.    Recent Surgery: * No surgery found *      Plan:     During this hospitalization, patient to be seen (QD M-F) to address the identified rehab impairments via gait training, therapeutic activities, therapeutic exercises and progress toward the following goals:    · Plan of Care Expires:  (upon discharge)    Subjective     Chief Complaint: pain in her buttocks and weakness  Patient/Family Comments/goals: rehab placement, step-daughter states patient has a history of falls which have become more frequent over the past several months due to a decrease in mobility/activity secondary to COVID 19    Pain/Comfort:  · Pain Rating 1: (patient reporting pain in her buttocks at start of gluteal fold, numerical pain rating not provided)    Patients cultural, spiritual, Oriental orthodox conflicts given the current situation: no    Living Environment:  Patient lives in a trailer behind her step-daughters home.  Prior to admission, patients level of function was mod I, ambulatory primarily using cane.  Equipment used at home: cane, quad, walker, rolling, wheelchair.    Upon discharge, patient will have assistance from family.    Objective:     Communicated with RN and OT prior to session.  Patient found sitting up on edge of bed with OT with valerio  catheter, peripheral IV  upon PT entry to room.    General Precautions: Standard, fall   Orthopedic Precautions:N/A   Braces: N/A     Exams:  · Cognitive Exam:  Patient is oriented to Person, Place, Time and Situation  · RLE ROM: WFL except decreased AROM in ankle, all planes, secondary to previous injury requiring surgery  · RLE Strength: 3/5 to 3+/5  · LLE ROM: WFL  · LLE Strength: 3/5 to 3+/5    Functional Mobility:  · Bed Mobility:  Sit to Supine: maximal assistance   · Transfers:  Sit to Stand:  minimum assistance with rolling walker and verbal cueing for hand placement and technique  · Gait: patient able to take 2 small side steps up toward HOB with min assist using RW    Therapeutic Activities and Exercises:  Patient performed sit to stand from EOB with  minimum assistance with rolling walker and verbal cueing for hand placement and technique standing briefly and requiring cueing for erect posture. She was able to take 2 small side steps up toward HOB with min assist using RW, noted difficulty lifting and placing LLE and requiring PT assist for weight shifting onto right. Once supine she required PT assist to stabilize LE's to perform bridging to reposition in bed.     - Pt educated on:              -PT roles, expectations, and POC               -Safety with mobility              -Benefits of OOB activities to increase strength and functional mobility               -Performing ther ex for increasing LE ROM and strength              -Discharge recommendations     AM-PAC 6 CLICK MOBILITY  Total Score:      Patient left supine with call button in reach, bed alarm on and RN present.    GOALS:   1. Patient will require min assist with all aspects of bed mobility  2. Patient will perform transfers to chair at bedside with CGA  3. Patient to tolerate up in chair >1 hour to improve endurance/activity tolerance  4. Patient will ambulate 40' with min assist using RW  5. Patient will tolerate active strengthening  exercises to BLE's to improve function    History:     Past Medical History:   Diagnosis Date    Arthritis     Diabetes mellitus     Heart attack     Hyperlipidemia     Hypertension     Neuropathy     Occasional tremors        Past Surgical History:   Procedure Laterality Date    CARPAL TUNNEL RELEASE      bilateral    CATARACT EXTRACTION Bilateral     EYE SURGERY      bilat cataract removal with iol implants    GANGLION CYST EXCISION      heart stents      HYSTERECTOMY      KNEE SURGERY Left     SKIN BIOPSY      on side of nose       Time Tracking:     PT Received On: 08/12/20  PT Start Time: 1423     PT Stop Time: 1450  PT Total Time (min): 27 min     Billable Minutes: Evaluation 12 min and Therapeutic Activity 15 min      Carlos Robertson, PT  08/12/2020

## 2020-08-12 NOTE — NURSING
TB SKIN TEST PLACED LFA/AREA CIRCLED/PT CRISTHIAN WELL./TO BE READ 8- AND 8- @3459.  PRIMARY RN NOTIFIED.

## 2020-08-12 NOTE — PLAN OF CARE
08/12/20 1326   Discharge Assessment   Assessment Type Discharge Planning Assessment   Confirmed/corrected address and phone number on facesheet? Yes   Assessment information obtained from? Patient   Expected Length of Stay (days) 3   Communicated expected length of stay with patient/caregiver yes   Prior to hospitilization cognitive status: Alert/Oriented   Prior to hospitalization functional status: Assistive Equipment   Current cognitive status: Alert/Oriented   Current Functional Status: Assistive Equipment   Facility Arrived From: Home   Lives With alone   Able to Return to Prior Arrangements no   Is patient able to care for self after discharge? No   Who are your caregiver(s) and their phone number(s)? self   Patient's perception of discharge disposition nursing home   Readmission Within the Last 30 Days no previous admission in last 30 days   Patient currently being followed by outpatient case management? No   Patient currently receives any other outside agency services? No   Equipment Currently Used at Home cane, quad;walker, rolling;wheelchair   Do you have any problems affording any of your prescribed medications? No   Is the patient taking medications as prescribed? yes   Does the patient have transportation home? Yes   Transportation Anticipated family or friend will provide   Dialysis Name and Scheduled days n/a   Does the patient receive services at the [facility]? No   Discharge Plan A Skilled Nursing Facility   Discharge Plan B New Nursing Home placement - senior care care facility   DME Needed Upon Discharge  none   Patient/Family in Agreement with Plan yes     Pt admitted secondary to frequent falls. Pt states she had been trying to get herself into [facility], but had not been successful. Contact made with [facility] reveals they will not take pt because she in not inpatient and would not meet skill criteria. Facility requires the long term care Medicaid matthew be completed prior to admitting,  which has not been completed.     Referral has been sent to [facility] and [facility] for additional review. Pt states she cannot return home because she cannot care for herself and has no one to assist her at home. Ongoing attempts will be made to locate willing nursing home that will take the pt directly into long term care.

## 2020-08-13 LAB
ANION GAP SERPL CALC-SCNC: 11 MMOL/L (ref 8–16)
BASOPHILS # BLD AUTO: 0.02 K/UL (ref 0–0.2)
BASOPHILS NFR BLD: 0.3 % (ref 0–1.9)
BUN SERPL-MCNC: 11 MG/DL (ref 8–23)
CALCIUM SERPL-MCNC: 8.8 MG/DL (ref 8.7–10.5)
CHLORIDE SERPL-SCNC: 103 MMOL/L (ref 95–110)
CO2 SERPL-SCNC: 25 MMOL/L (ref 23–29)
CREAT SERPL-MCNC: 1 MG/DL (ref 0.5–1.4)
DIFFERENTIAL METHOD: ABNORMAL
EOSINOPHIL # BLD AUTO: 0.3 K/UL (ref 0–0.5)
EOSINOPHIL NFR BLD: 5.6 % (ref 0–8)
ERYTHROCYTE [DISTWIDTH] IN BLOOD BY AUTOMATED COUNT: 13.8 % (ref 11.5–14.5)
EST. GFR  (AFRICAN AMERICAN): >60 ML/MIN/1.73 M^2
EST. GFR  (NON AFRICAN AMERICAN): 57.6 ML/MIN/1.73 M^2
GLUCOSE SERPL-MCNC: 113 MG/DL (ref 70–110)
HCT VFR BLD AUTO: 34.6 % (ref 37–48.5)
HGB BLD-MCNC: 11.2 G/DL (ref 12–16)
IMM GRANULOCYTES # BLD AUTO: 0.03 K/UL (ref 0–0.04)
IMM GRANULOCYTES NFR BLD AUTO: 0.5 % (ref 0–0.5)
LYMPHOCYTES # BLD AUTO: 1.1 K/UL (ref 1–4.8)
LYMPHOCYTES NFR BLD: 17.6 % (ref 18–48)
MCH RBC QN AUTO: 31.5 PG (ref 27–31)
MCHC RBC AUTO-ENTMCNC: 32.4 G/DL (ref 32–36)
MCV RBC AUTO: 98 FL (ref 82–98)
MONOCYTES # BLD AUTO: 0.5 K/UL (ref 0.3–1)
MONOCYTES NFR BLD: 8.1 % (ref 4–15)
NEUTROPHILS # BLD AUTO: 4.1 K/UL (ref 1.8–7.7)
NEUTROPHILS NFR BLD: 67.9 % (ref 38–73)
NRBC BLD-RTO: 0 /100 WBC
PLATELET # BLD AUTO: 269 K/UL (ref 150–350)
PMV BLD AUTO: 10 FL (ref 9.2–12.9)
POCT GLUCOSE: 119 MG/DL (ref 70–110)
POCT GLUCOSE: 129 MG/DL (ref 70–110)
POCT GLUCOSE: 153 MG/DL (ref 70–110)
POCT GLUCOSE: 93 MG/DL (ref 70–110)
POTASSIUM SERPL-SCNC: 4.1 MMOL/L (ref 3.5–5.1)
RBC # BLD AUTO: 3.55 M/UL (ref 4–5.4)
RPR SER QL: NORMAL
SODIUM SERPL-SCNC: 139 MMOL/L (ref 136–145)
WBC # BLD AUTO: 6.07 K/UL (ref 3.9–12.7)

## 2020-08-13 PROCEDURE — G0378 HOSPITAL OBSERVATION PER HR: HCPCS

## 2020-08-13 PROCEDURE — 99224 PR SUBSEQUENT OBSERVATION CARE,LEVEL I: ICD-10-PCS | Mod: ,,, | Performed by: FAMILY MEDICINE

## 2020-08-13 PROCEDURE — 25000003 PHARM REV CODE 250: Performed by: FAMILY MEDICINE

## 2020-08-13 PROCEDURE — 97530 THERAPEUTIC ACTIVITIES: CPT

## 2020-08-13 PROCEDURE — 96376 TX/PRO/DX INJ SAME DRUG ADON: CPT

## 2020-08-13 PROCEDURE — 85025 COMPLETE CBC W/AUTO DIFF WBC: CPT

## 2020-08-13 PROCEDURE — 63600175 PHARM REV CODE 636 W HCPCS: Performed by: FAMILY MEDICINE

## 2020-08-13 PROCEDURE — 80048 BASIC METABOLIC PNL TOTAL CA: CPT

## 2020-08-13 PROCEDURE — C9399 UNCLASSIFIED DRUGS OR BIOLOG: HCPCS | Performed by: FAMILY MEDICINE

## 2020-08-13 PROCEDURE — 96375 TX/PRO/DX INJ NEW DRUG ADDON: CPT

## 2020-08-13 PROCEDURE — 99224 PR SUBSEQUENT OBSERVATION CARE,LEVEL I: CPT | Mod: ,,, | Performed by: FAMILY MEDICINE

## 2020-08-13 PROCEDURE — 94761 N-INVAS EAR/PLS OXIMETRY MLT: CPT

## 2020-08-13 PROCEDURE — 96372 THER/PROPH/DIAG INJ SC/IM: CPT

## 2020-08-13 PROCEDURE — 96361 HYDRATE IV INFUSION ADD-ON: CPT

## 2020-08-13 PROCEDURE — 36415 COLL VENOUS BLD VENIPUNCTURE: CPT

## 2020-08-13 RX ORDER — MORPHINE SULFATE 4 MG/ML
1 INJECTION, SOLUTION INTRAMUSCULAR; INTRAVENOUS EVERY 4 HOURS PRN
Status: DISCONTINUED | OUTPATIENT
Start: 2020-08-13 | End: 2020-08-14 | Stop reason: HOSPADM

## 2020-08-13 RX ADMIN — MORPHINE SULFATE 1 MG: 10 INJECTION, SOLUTION INTRAMUSCULAR; INTRAVENOUS at 09:08

## 2020-08-13 RX ADMIN — APIXABAN 5 MG: 2.5 TABLET, FILM COATED ORAL at 08:08

## 2020-08-13 RX ADMIN — ATORVASTATIN CALCIUM 80 MG: 40 TABLET, FILM COATED ORAL at 08:08

## 2020-08-13 RX ADMIN — CLOPIDOGREL 75 MG: 75 TABLET, FILM COATED ORAL at 09:08

## 2020-08-13 RX ADMIN — GABAPENTIN 300 MG: 300 CAPSULE ORAL at 11:08

## 2020-08-13 RX ADMIN — CIPROFLOXACIN 400 MG: 2 INJECTION, SOLUTION INTRAVENOUS at 05:08

## 2020-08-13 RX ADMIN — MORPHINE SULFATE 1 MG: 10 INJECTION, SOLUTION INTRAMUSCULAR; INTRAVENOUS at 05:08

## 2020-08-13 RX ADMIN — MORPHINE SULFATE 1 MG: 10 INJECTION, SOLUTION INTRAMUSCULAR; INTRAVENOUS at 01:08

## 2020-08-13 RX ADMIN — MORPHINE SULFATE 1 MG: 4 INJECTION INTRAVENOUS at 10:08

## 2020-08-13 RX ADMIN — LISINOPRIL 2.5 MG: 2.5 TABLET ORAL at 08:08

## 2020-08-13 RX ADMIN — ACETAMINOPHEN 650 MG: 325 TABLET ORAL at 10:08

## 2020-08-13 RX ADMIN — INSULIN DETEMIR 5 UNITS: 100 INJECTION, SOLUTION SUBCUTANEOUS at 09:08

## 2020-08-13 RX ADMIN — METOPROLOL SUCCINATE 25 MG: 25 TABLET, EXTENDED RELEASE ORAL at 08:08

## 2020-08-13 RX ADMIN — GABAPENTIN 300 MG: 300 CAPSULE ORAL at 08:08

## 2020-08-13 RX ADMIN — SODIUM CHLORIDE: 0.9 INJECTION, SOLUTION INTRAVENOUS at 05:08

## 2020-08-13 RX ADMIN — ONDANSETRON HYDROCHLORIDE 4 MG: 2 SOLUTION INTRAMUSCULAR; INTRAVENOUS at 09:08

## 2020-08-13 RX ADMIN — PRIMIDONE 50 MG: 50 TABLET ORAL at 08:08

## 2020-08-13 RX ADMIN — MORPHINE SULFATE 1 MG: 4 INJECTION INTRAVENOUS at 05:08

## 2020-08-13 NOTE — PT/OT/SLP PROGRESS
Physical Therapy Treatment    Patient Name:  Kateryna Connolly   MRN:  [id number]    Recommendations:     Discharge Recommendations:  nursing facility, skilled   Discharge Equipment Recommendations: (TBD)   Barriers to discharge: None    Assessment:     Kateryna Connolly is a 69 y.o. female admitted with a medical diagnosis of Debility.  She presents with the following impairments/functional limitations:  weakness, impaired endurance, impaired self care skills, impaired functional mobilty, gait instability, impaired balance, pain, impaired muscle length. Patient tolerated without complication, progress limited by debility and pain.     Rehab Prognosis: Good; patient would benefit from acute skilled PT services to address these deficits and reach maximum level of function.    Recent Surgery: * No surgery found *      Plan:     During this hospitalization, patient to be seen (QD M-F) to address the identified rehab impairments via gait training, therapeutic activities, therapeutic exercises and progress toward the following goals:    · Plan of Care Expires:  (upon discharge)    Subjective     Chief Complaint: weakness, discomfort secondary to catheter   Patient/Family Comments/goals: patient reporting she knows she needs to get up but she is too weak, required coaxing for participation initially however was able to complete without complication  Pain/Comfort: patient reporting pain in her buttocks and left lower leg, no numerical pain rating provided   ·        Objective:     Communicated with RN, Stuart, prior to session. RN states she is planning on giving the patient a shower this afternoon and recently gave her pain medication. Patient found supine with luo catheter, peripheral IV upon PT entry to room.     General Precautions: Standard, fall   Orthopedic Precautions:N/A   Braces:   N/A    Functional Mobility:  · Bed Mobility:  Rolling Left:  maximal assistance  · Scooting: moderate assistance to scoot to EOB in  sitting  · Bridging: minimum assistance to stabilize LE's  · Supine to Sit: maximal assistance  · Sit to Supine: maximal assistance  · Transfers:  Sit to Stand:  minimum assistance with rolling walker and verbal cueing for hand placement and technique    Therapeutic Activities and Exercises:  Patient participated in bed mobility activities with max assist to side of bed. She performed functional transfers x 4 from side of bed with min assist using RW and requiring VC for hand placement and technique. She was only able to stand briefly secondary to reported fatigue and pain, unable to take any steps today. PT attempted to transfer patient into chair in anticipation of shower however she requested to return supine. Once supine she was able to scoot up to HOB via bridging and use of side rails with min assist. PT brought her fresh ice and water upon request.    - Pt educated on:              -PT roles, expectations, and POC               -Safety with mobility              -Benefits of OOB activities to increase strength and functional mobility               -Performing ther ex for increasing LE ROM and strength              -Discharge recommendations     Patient left HOB elevated with call button in reach, bed alarm on and RN notified..      Time Tracking:     PT Received On: 08/13/20  PT Start Time: 1412     PT Stop Time: 1435  PT Total Time (min): 23 min     Billable Minutes: Therapeutic Activity 23 min    Treatment Type: Treatment  PT/PTA: PT           Carlos Robertson, PT  08/13/2020

## 2020-08-13 NOTE — PLAN OF CARE
Problem: Fall Injury Risk  Goal: Absence of Fall and Fall-Related Injury  Outcome: Ongoing, Progressing     Problem: Adult Inpatient Plan of Care  Goal: Plan of Care Review  Outcome: Ongoing, Progressing  Goal: Patient-Specific Goal (Individualization)  Outcome: Ongoing, Progressing  Goal: Absence of Hospital-Acquired Illness or Injury  Outcome: Ongoing, Progressing  Goal: Optimal Comfort and Wellbeing  Outcome: Ongoing, Progressing     Problem: Diabetes Comorbidity  Goal: Blood Glucose Level Within Desired Range  Outcome: Ongoing, Progressing     Problem: Skin Injury Risk Increased  Goal: Skin Health and Integrity  Outcome: Ongoing, Progressing

## 2020-08-13 NOTE — PT/OT/SLP PROGRESS
"Patient:  Kateryna Mckeon [facility] #:  [id number]   Date of Note: 08/13/2020   Referring Physician:  Self, Aaareferral  Discharge recommendation: Skilled nursing placement    Start Time: 900  End Time: 915  Total Time: 15 min        Subjective:   "I feel nauseated."     Pain: 7 out of 10 in left foot    Objective:   Patient seen by OT this session. Patient reported feeling nauseated when mobilizing.  Patient was educated on the importance of mobilizing.      Treatment:   -Patient required modA with all aspects of bed mobility.  -Patient required maxA with LE dressing.  -Patient performed BUE AROM for elbow flexion 3x10 reps.     Assessment:  Patient tolerated skilled therapy session fair on this date with OT.  Patient was educated on the importance of mobilizing frequently.  Patient appears to be motivated to improve functional deficits.          Plan:  Continue with current POC Monday-Friday.      Ricco Pagan OTR/L       "

## 2020-08-13 NOTE — PLAN OF CARE
Problem: Infection  Goal: Infection Symptom Resolution  Outcome: Ongoing, Progressing     Problem: Fall Injury Risk  Goal: Absence of Fall and Fall-Related Injury  Outcome: Ongoing, Progressing  Intervention: Identify and Manage Contributors to Fall Injury Risk  Flowsheets (Taken 8/13/2020 0129)  Self-Care Promotion:   independence encouraged   BADL personal objects within reach     Problem: Adult Inpatient Plan of Care  Goal: Absence of Hospital-Acquired Illness or Injury  Outcome: Ongoing, Progressing  Intervention: Identify and Manage Fall Risk  Flowsheets (Taken 8/13/2020 0129)  Safety Promotion/Fall Prevention:   assistive device/personal item within reach   bed alarm set   Fall Risk signage in place   side rails raised x 2   instructed to call staff for mobility  Goal: Optimal Comfort and Wellbeing  Outcome: Ongoing, Progressing

## 2020-08-13 NOTE — PROGRESS NOTES
[facility] Hospital Medicine  Progress Note    Patient Name: Kateryna Connolly  MRN: [id number]  Patient Class: OP- Observation   Admission Date: 8/11/2020  Length of Stay: 0 days  Attending Physician: Eleanor Wagner MD  Primary Care Provider: Lillian Garcia NP        Subjective:     Principal Problem:Debility        HPI:  Patient is a 69 year old female with past medical history of type 2 diabetes on insulin, MI, hypertension, hyperlipidemia, neuropathy, occasional tremors who presents to the ER with worsening falls due to tremors over the last 1 week.  Patient has taken primidone in the past for these tremors but reports that over the last week they have become significantly worse so that when she stands up she becomes dizzy and feels off balance, her legs started shaking and she falls to the floor.  Falls multiple times every day.  She has reportedly had a workup couple of years ago by Neurology for tremors did not include an MRI but reports that this is much worse.  She has a daughter in law who helps take care of her but is unable to do anything for herself.  She denies any recent fever, cough, nausea or vomiting.  Does report urinary retention over the last 6 weeks as well as buttock pain and hip/pelvic pain.  In the ER, lab work was unremarkable except for a mildly elevated serum creatinine in with a GFR of 50.  Lactic acid normal.  WBC normal.  X-ray of the pelvis with bony demineralization.  Corbin inserted for urinary retention with good output.  Hospital team called for admission for worsening tremors, multiple falls, debility, unsafe discharge home.    Overview/Hospital Course:  08/12/2020  Patient with increasing need for IV pain medicine due to low back pain and pelvic pain.  Patient is unable to stand.  PT/OT ordered.  Continuing to try to seek placement for this patient who is unsafe for discharge home at this time.    08/13/2020  No change to current plan.  PT/OT  are working with patient.  She would be a good candidate for rehab.  Continue to try to seek placement for this patient who is not safe for discharge home.    Interval History:  No acute events    Review of Systems   Constitutional: Negative for fever.   HENT: Negative.    Respiratory: Negative for shortness of breath.    Cardiovascular: Negative for chest pain.   Gastrointestinal: Negative.    Genitourinary: Positive for difficulty urinating.   Musculoskeletal: Negative.    Skin: Negative.    Neurological: Negative.    Hematological: Negative.    Psychiatric/Behavioral: The patient is nervous/anxious.      Objective:     Vital Signs (Most Recent):  Temp: 98.1 °F (36.7 °C) (08/13/20 1156)  Pulse: 85 (08/13/20 1156)  Resp: 18 (08/13/20 1754)  BP: (!) 153/68 (08/13/20 1156)  SpO2: 97 % (08/13/20 1156) Vital Signs (24h Range):  Temp:  [96.5 °F (35.8 °C)-98.3 °F (36.8 °C)] 98.1 °F (36.7 °C)  Pulse:  [78-89] 85  Resp:  [16-18] 18  SpO2:  [94 %-97 %] 97 %  BP: (148-158)/(63-74) 153/68     Weight: 83.6 kg (184 lb 4.9 oz)  Body mass index is 28.87 kg/m².    Intake/Output Summary (Last 24 hours) at 8/13/2020 1818  Last data filed at 8/13/2020 1800  Gross per 24 hour   Intake 2070 ml   Output 4000 ml   Net -1930 ml      Physical Exam  Vitals signs reviewed.   Constitutional:       Appearance: She is obese.      Comments: No acute distress, disheveled, dirty   HENT:      Head: Normocephalic and atraumatic.      Right Ear: External ear normal.      Left Ear: External ear normal.      Nose: Nose normal.      Mouth/Throat:      Mouth: Mucous membranes are moist.   Eyes:      Conjunctiva/sclera: Conjunctivae normal.   Cardiovascular:      Rate and Rhythm: Normal rate and regular rhythm.      Pulses: Normal pulses.      Heart sounds: No murmur.   Pulmonary:      Effort: Pulmonary effort is normal. No respiratory distress.      Breath sounds: No wheezing or rales.   Abdominal:      General: Abdomen is flat. Bowel sounds are normal.  There is distension.      Tenderness: There is abdominal tenderness (Mild, generalized).   Musculoskeletal:      Right lower leg: No edema.      Left lower leg: No edema.   Skin:     General: Skin is warm and dry.   Neurological:      Mental Status: She is alert. Mental status is at baseline.   Psychiatric:         Mood and Affect: Mood normal.         Behavior: Behavior normal.         Significant Labs:   Recent Lab Results       08/13/20  1158   08/13/20  0834   08/13/20  0815   08/12/20  2201        Anion Gap     11       Baso #     0.02       Basophil%     0.3       BUN, Bld     11       Calcium     8.8       Chloride     103       CO2     25       Creatinine     1.0       Differential Method     Automated       eGFR if      >60.0       eGFR if non      57.6  Comment:  Calculation used to obtain the estimated glomerular filtration  rate (eGFR) is the CKD-EPI equation.          Eos #     0.3       Eosinophil%     5.6       Glucose     113       Gran # (ANC)     4.1       Gran%     67.9       Hematocrit     34.6       Hemoglobin     11.2       Immature Grans (Abs)     0.03  Comment:  Mild elevation in immature granulocytes is non specific and   can be seen in a variety of conditions including stress response,   acute inflammation, trauma and pregnancy. Correlation with other   laboratory and clinical findings is essential.         Immature Granulocytes     0.5       Lymph #     1.1       Lymph%     17.6       MCH     31.5       MCHC     32.4       MCV     98       Mono #     0.5       Mono%     8.1       MPV     10.0       nRBC     0       Platelets     269       POCT Glucose 119 153   93     Potassium     4.1       RBC     3.55       RDW     13.8       Sodium     139       WBC     6.07           All pertinent labs within the past 24 hours have been reviewed.    Significant Imaging: I have reviewed all pertinent imaging results/findings within the past 24  hours.      Assessment/Plan:      * Debility  Patient has difficulty standing up, falls of increased to multiple times a day over the last week and seemed to be worsening  Workup for possible neurologic deficit  Case management consulted for possible placement    08/12/2020  Case management still continuing to seek placement  Patient is not safe for discharge home as she was covered in feces and is unable to stand and take care of herself.    08/13/2020  Continue to seek placement for this patient  MRI without any acute abnormalities or masses  She is medically stable for transfer      Hirsutism  This is a chronic problem though etiology is unknown  Male pattern hair growth is significant, possibly insulin resistance versus PCOS versus other        Elevated serum creatinine  Baseline serum creatinine appears to be around 0.7  Slightly elevated at 1.1 on admission  Will try gentle IV hydration  Monitor daily BMP while inpatient      Multiple falls  See below      Urinary retention  Worsening in the last 1 month  Patient has difficulty initiating a stream which causes pain sometimes  Corbin inserted  Monitor I&Os  Will treat for urinary tract infection based on WBC count and symptoms, follow-up urine culture        Pelvic pain  X-ray of pelvis with bony demineralization otherwise normal    08/12/2020  Patient with low back pain, chronic, will increase IV morphine to q.4 hours p.r.n.      Tremor, unspecified  Patient with worsening tremor resulting in multiple falls  Reportedly has had workup outpatient though no MRI  Patient has been taking primidone which does not help with tremors  Reports associated dizziness and lightheadedness with tremors and falls  MRI brain without contrast ordered  Follow-up TSH and RPR      Type 2 diabetes mellitus with skin complication, with long-term current use of insulin  Continue Levemir 5 units nightly      Diabetic polyneuropathy associated with type 2 diabetes mellitus  Levemir 5  units nightly while in-patient  Accu-[place] [facility]  Hold metformin  Follow-up A1c      Coronary artery disease  Continue home Plavix        VTE Risk Mitigation (From admission, onward)         Ordered     apixaban tablet 5 mg  2 times daily      08/11/20 1748     IP VTE HIGH RISK PATIENT  Once      08/11/20 1748     Place sequential compression device  Until discontinued      08/11/20 1748                Discharge Planning   STEPHANIE:      Code Status: Full Code   Is the patient medically ready for discharge?:     Reason for patient still in hospital (select all that apply): Other (specify) Not safe for discharge home  Discharge Plan A: Skilled Nursing Facility                  Eleanor Wagner MD  Department of Hospital Medicine   [facility]

## 2020-08-13 NOTE — SUBJECTIVE & OBJECTIVE
Interval History:  No acute events    Review of Systems   Constitutional: Negative for fever.   HENT: Negative.    Respiratory: Negative for shortness of breath.    Cardiovascular: Negative for chest pain.   Gastrointestinal: Negative.    Genitourinary: Positive for difficulty urinating.   Musculoskeletal: Negative.    Skin: Negative.    Neurological: Negative.    Hematological: Negative.    Psychiatric/Behavioral: The patient is nervous/anxious.      Objective:     Vital Signs (Most Recent):  Temp: 98.1 °F (36.7 °C) (08/13/20 1156)  Pulse: 85 (08/13/20 1156)  Resp: 18 (08/13/20 1754)  BP: (!) 153/68 (08/13/20 1156)  SpO2: 97 % (08/13/20 1156) Vital Signs (24h Range):  Temp:  [96.5 °F (35.8 °C)-98.3 °F (36.8 °C)] 98.1 °F (36.7 °C)  Pulse:  [78-89] 85  Resp:  [16-18] 18  SpO2:  [94 %-97 %] 97 %  BP: (148-158)/(63-74) 153/68     Weight: 83.6 kg (184 lb 4.9 oz)  Body mass index is 28.87 kg/m².    Intake/Output Summary (Last 24 hours) at 8/13/2020 1818  Last data filed at 8/13/2020 1800  Gross per 24 hour   Intake 2070 ml   Output 4000 ml   Net -1930 ml      Physical Exam  Vitals signs reviewed.   Constitutional:       Appearance: She is obese.      Comments: No acute distress, disheveled, dirty   HENT:      Head: Normocephalic and atraumatic.      Right Ear: External ear normal.      Left Ear: External ear normal.      Nose: Nose normal.      Mouth/Throat:      Mouth: Mucous membranes are moist.   Eyes:      Conjunctiva/sclera: Conjunctivae normal.   Cardiovascular:      Rate and Rhythm: Normal rate and regular rhythm.      Pulses: Normal pulses.      Heart sounds: No murmur.   Pulmonary:      Effort: Pulmonary effort is normal. No respiratory distress.      Breath sounds: No wheezing or rales.   Abdominal:      General: Abdomen is flat. Bowel sounds are normal. There is distension.      Tenderness: There is abdominal tenderness (Mild, generalized).   Musculoskeletal:      Right lower leg: No edema.      Left lower  leg: No edema.   Skin:     General: Skin is warm and dry.   Neurological:      Mental Status: She is alert. Mental status is at baseline.   Psychiatric:         Mood and Affect: Mood normal.         Behavior: Behavior normal.         Significant Labs:   Recent Lab Results       08/13/20  1158   08/13/20  0834   08/13/20  0815   08/12/20  2201        Anion Gap     11       Baso #     0.02       Basophil%     0.3       BUN, Bld     11       Calcium     8.8       Chloride     103       CO2     25       Creatinine     1.0       Differential Method     Automated       eGFR if      >60.0       eGFR if non      57.6  Comment:  Calculation used to obtain the estimated glomerular filtration  rate (eGFR) is the CKD-EPI equation.          Eos #     0.3       Eosinophil%     5.6       Glucose     113       Gran # (ANC)     4.1       Gran%     67.9       Hematocrit     34.6       Hemoglobin     11.2       Immature Grans (Abs)     0.03  Comment:  Mild elevation in immature granulocytes is non specific and   can be seen in a variety of conditions including stress response,   acute inflammation, trauma and pregnancy. Correlation with other   laboratory and clinical findings is essential.         Immature Granulocytes     0.5       Lymph #     1.1       Lymph%     17.6       MCH     31.5       MCHC     32.4       MCV     98       Mono #     0.5       Mono%     8.1       MPV     10.0       nRBC     0       Platelets     269       POCT Glucose 119 153   93     Potassium     4.1       RBC     3.55       RDW     13.8       Sodium     139       WBC     6.07           All pertinent labs within the past 24 hours have been reviewed.    Significant Imaging: I have reviewed all pertinent imaging results/findings within the past 24 hours.

## 2020-08-14 VITALS
WEIGHT: 184.31 LBS | HEART RATE: 84 BPM | TEMPERATURE: 97 F | BODY MASS INDEX: 28.93 KG/M2 | RESPIRATION RATE: 16 BRPM | SYSTOLIC BLOOD PRESSURE: 126 MMHG | DIASTOLIC BLOOD PRESSURE: 70 MMHG | HEIGHT: 67 IN | OXYGEN SATURATION: 95 %

## 2020-08-14 LAB
ANION GAP SERPL CALC-SCNC: 9 MMOL/L (ref 8–16)
BASOPHILS # BLD AUTO: 0.02 K/UL (ref 0–0.2)
BASOPHILS NFR BLD: 0.4 % (ref 0–1.9)
BUN SERPL-MCNC: 11 MG/DL (ref 8–23)
CALCIUM SERPL-MCNC: 8.5 MG/DL (ref 8.7–10.5)
CHLORIDE SERPL-SCNC: 103 MMOL/L (ref 95–110)
CO2 SERPL-SCNC: 29 MMOL/L (ref 23–29)
CREAT SERPL-MCNC: 1.1 MG/DL (ref 0.5–1.4)
DIFFERENTIAL METHOD: ABNORMAL
EOSINOPHIL # BLD AUTO: 0.3 K/UL (ref 0–0.5)
EOSINOPHIL NFR BLD: 6.1 % (ref 0–8)
ERYTHROCYTE [DISTWIDTH] IN BLOOD BY AUTOMATED COUNT: 13.8 % (ref 11.5–14.5)
EST. GFR  (AFRICAN AMERICAN): 59.2 ML/MIN/1.73 M^2
EST. GFR  (NON AFRICAN AMERICAN): 51.3 ML/MIN/1.73 M^2
GLUCOSE SERPL-MCNC: 123 MG/DL (ref 70–110)
HCT VFR BLD AUTO: 33.7 % (ref 37–48.5)
HGB BLD-MCNC: 10.9 G/DL (ref 12–16)
IMM GRANULOCYTES # BLD AUTO: 0.02 K/UL (ref 0–0.04)
IMM GRANULOCYTES NFR BLD AUTO: 0.4 % (ref 0–0.5)
LYMPHOCYTES # BLD AUTO: 1 K/UL (ref 1–4.8)
LYMPHOCYTES NFR BLD: 17.6 % (ref 18–48)
MCH RBC QN AUTO: 31.4 PG (ref 27–31)
MCHC RBC AUTO-ENTMCNC: 32.3 G/DL (ref 32–36)
MCV RBC AUTO: 97 FL (ref 82–98)
MONOCYTES # BLD AUTO: 0.5 K/UL (ref 0.3–1)
MONOCYTES NFR BLD: 9.4 % (ref 4–15)
NEUTROPHILS # BLD AUTO: 3.7 K/UL (ref 1.8–7.7)
NEUTROPHILS NFR BLD: 66.1 % (ref 38–73)
NRBC BLD-RTO: 0 /100 WBC
PLATELET # BLD AUTO: 240 K/UL (ref 150–350)
PMV BLD AUTO: 9.9 FL (ref 9.2–12.9)
POCT GLUCOSE: 115 MG/DL (ref 70–110)
POCT GLUCOSE: 139 MG/DL (ref 70–110)
POTASSIUM SERPL-SCNC: 3.8 MMOL/L (ref 3.5–5.1)
RBC # BLD AUTO: 3.47 M/UL (ref 4–5.4)
SODIUM SERPL-SCNC: 141 MMOL/L (ref 136–145)
TB INDURATION 48 - 72 HR READ: 0 MM
WBC # BLD AUTO: 5.56 K/UL (ref 3.9–12.7)

## 2020-08-14 PROCEDURE — 99217 PR OBSERVATION CARE DISCHARGE: ICD-10-PCS | Mod: ,,, | Performed by: FAMILY MEDICINE

## 2020-08-14 PROCEDURE — 85025 COMPLETE CBC W/AUTO DIFF WBC: CPT

## 2020-08-14 PROCEDURE — 96376 TX/PRO/DX INJ SAME DRUG ADON: CPT

## 2020-08-14 PROCEDURE — 36415 COLL VENOUS BLD VENIPUNCTURE: CPT

## 2020-08-14 PROCEDURE — 25000003 PHARM REV CODE 250: Performed by: FAMILY MEDICINE

## 2020-08-14 PROCEDURE — 96372 THER/PROPH/DIAG INJ SC/IM: CPT

## 2020-08-14 PROCEDURE — 99217 PR OBSERVATION CARE DISCHARGE: CPT | Mod: ,,, | Performed by: FAMILY MEDICINE

## 2020-08-14 PROCEDURE — 94761 N-INVAS EAR/PLS OXIMETRY MLT: CPT

## 2020-08-14 PROCEDURE — G0378 HOSPITAL OBSERVATION PER HR: HCPCS

## 2020-08-14 PROCEDURE — 80048 BASIC METABOLIC PNL TOTAL CA: CPT

## 2020-08-14 PROCEDURE — 97530 THERAPEUTIC ACTIVITIES: CPT

## 2020-08-14 PROCEDURE — 63600175 PHARM REV CODE 636 W HCPCS: Performed by: FAMILY MEDICINE

## 2020-08-14 RX ORDER — BUTALBITAL, ACETAMINOPHEN AND CAFFEINE 50; 325; 40 MG/1; MG/1; MG/1
1 TABLET ORAL EVERY 6 HOURS PRN
Qty: 30 TABLET | Refills: 0 | Status: SHIPPED | OUTPATIENT
Start: 2020-08-14 | End: 2020-09-13

## 2020-08-14 RX ADMIN — LISINOPRIL 2.5 MG: 2.5 TABLET ORAL at 09:08

## 2020-08-14 RX ADMIN — APIXABAN 5 MG: 2.5 TABLET, FILM COATED ORAL at 09:08

## 2020-08-14 RX ADMIN — MORPHINE SULFATE 1 MG: 4 INJECTION INTRAVENOUS at 12:08

## 2020-08-14 RX ADMIN — MORPHINE SULFATE 1 MG: 4 INJECTION INTRAVENOUS at 07:08

## 2020-08-14 RX ADMIN — ACETAMINOPHEN 650 MG: 325 TABLET ORAL at 12:08

## 2020-08-14 RX ADMIN — INSULIN DETEMIR 5 UNITS: 100 INJECTION, SOLUTION SUBCUTANEOUS at 09:08

## 2020-08-14 RX ADMIN — METOPROLOL SUCCINATE 25 MG: 25 TABLET, EXTENDED RELEASE ORAL at 09:08

## 2020-08-14 RX ADMIN — CIPROFLOXACIN 400 MG: 2 INJECTION, SOLUTION INTRAVENOUS at 05:08

## 2020-08-14 RX ADMIN — CLOPIDOGREL 75 MG: 75 TABLET, FILM COATED ORAL at 09:08

## 2020-08-14 NOTE — PLAN OF CARE
08/14/20 1034   Discharge Reassessment   Assessment Type Discharge Planning Reassessment   Anticipated Discharge Disposition SNF   Provided patient/caregiver education on the expected discharge date and the discharge plan Yes   Do you have any problems affording any of your prescribed medications? No   Discharge Plan A Skilled Nursing Facility   DME Needed Upon Discharge  none   Post-Acute Status   Post-Acute Authorization Placement   Post-Acute Placement Status Referrals Sent     Pt has been accepted to [facility] for SNF admission. COVID test from   8/11/20 has been faxed to the facility per their request. Needed documents are TB skin test, signs & symptoms form, orders, discharge summary and hard prescriptions. Will continue to assist ongoing for placement.

## 2020-08-14 NOTE — PLAN OF CARE
[facility]    FACILITY TRANSFER ORDERS      Patient Name: Kateryna Connolly  YOB: 1950    PCP: Lillian Garcia NP   PCP Address: [address] [place] [address] [address] / [place] [id number]  PCP Phone Number: [phone]  PCP Fax: [phone]    Encounter Date: 08/14/2020    Admit to: [facility]    Vital Signs:  Routine    Diagnoses:   Active Hospital Problems    Diagnosis  POA    *Debility [R53.81]  Yes    Pelvic pain [R10.2]  Yes    Urinary retention [R33.9]  Yes    Multiple falls [R29.6]  Not Applicable    Elevated serum creatinine [R79.89]  Yes    Hirsutism [L68.0]  Yes    Tremor, unspecified [R25.1]  Yes    Diabetic polyneuropathy associated with type 2 diabetes mellitus [E11.42]  Yes    Type 2 diabetes mellitus with skin complication, with long-term current use of insulin [E11.628, Z79.4]  Not Applicable    Coronary artery disease [I25.10]  Yes      Resolved Hospital Problems   No resolved problems to display.       Allergies:  Review of patient's allergies indicates:   Allergen Reactions    Hydrocodone Hives    Pcn [penicillins] Hives    Pcn [penicillins] Rash       Diet: regular diet    Activities: Activity as tolerated    Nursing: Routine     Labs: CBC and BMP every 3 months Glucose monitoring AM fasting or prn for symptoms of hypoglycemia    CONSULTS:    Physical Therapy to evaluate and treat.  and Occupational Therapy to evaluate and treat.    MISCELLANEOUS CARE:  Routine Skin for Bedridden Patients: Apply moisture barrier cream to all skin folds and wet areas in perineal area daily and after baths and all bowel movements.    WOUND CARE ORDERS  None    Medications: Review discharge medications with patient and family and provide education.      Current Discharge Medication List      START taking these medications    Details   butalbital-acetaminophen-caffeine -40 mg (FIORICET, ESGIC) -40 mg per tablet Take 1 tablet by mouth every 6 (six) hours as  needed for Pain.  Qty: 30 tablet, Refills: 0         CONTINUE these medications which have NOT CHANGED    Details   alendronate (FOSAMAX) 70 MG tablet Take one tablet every 7 days.  Qty: 12 tablet, Refills: 3    Associated Diagnoses: Osteoporosis, unspecified osteoporosis type, unspecified pathological fracture presence      amitriptyline (ELAVIL) 25 MG tablet       atorvastatin (LIPITOR) 80 MG tablet Take 1 tablet (80 mg total) by mouth every evening.  Qty: 30 tablet, Refills: 11    Associated Diagnoses: Hypercholesterolemia      betamethasone dipropionate (DIPROLENE) 0.05 % cream Apply topically 2 (two) times daily.  Qty: 45 g, Refills: 1      blood-glucose meter Misc Use the meter as directed      ciclopirox (LOPROX) 0.77 % Crea Apply topically 2 (two) times daily.  Qty: 90 g, Refills: 2      ciclopirox (PENLAC) 8 % Soln Apply topically nightly.  Qty: 6.6 mL, Refills: 11      clopidogrel (PLAVIX) 75 mg tablet Take 1 tablet (75 mg total) by mouth once daily.  Qty: 90 tablet, Refills: 3      collagenase ointment Apply topically once daily.  Qty: 90 g, Refills: 0      ELIQUIS 5 mg Tab Take 1 tablet by mouth twice daily  Qty: 60 tablet, Refills: 0    Associated Diagnoses: History of deep venous thrombosis (DVT) of distal vein of right lower extremity      ergocalciferol (ERGOCALCIFEROL) 50,000 unit Cap Take 1 capsule (50,000 Units total) by mouth every 7 days.  Qty: 12 capsule, Refills: 0    Comments: Pt here in clinic now, please fill.  Associated Diagnoses: Vitamin D deficiency      gabapentin (NEURONTIN) 600 MG tablet Take 1 tablet by mouth in the morning and take 1.5 tablet by mouth at night  Qty: 225 tablet, Refills: 3    Associated Diagnoses: Diabetic polyneuropathy associated with type 2 diabetes mellitus      lisinopriL (PRINIVIL,ZESTRIL) 2.5 MG tablet Take 1 tablet (2.5 mg total) by mouth once daily.  Qty: 90 tablet, Refills: 3    Comments: .  Associated Diagnoses: Benign essential HTN      metFORMIN  "(GLUCOPHAGE) 1000 MG tablet Take 1 tablet (1,000 mg total) by mouth 2 (two) times daily with meals.  Qty: 450 tablet, Refills: 1    Associated Diagnoses: Diabetic polyneuropathy associated with type 2 diabetes mellitus      metoprolol succinate (TOPROL-XL) 25 MG 24 hr tablet Take 1 tablet (25 mg total) by mouth once daily.  Qty: 90 tablet, Refills: 3    Comments: .  Associated Diagnoses: Benign essential HTN      pen needle, diabetic (ADVOCATE PEN NEEDLE) 33 gauge x 5/32" Ndle 1 Dose by Misc.(Non-Drug; Combo Route) route once daily.  Qty: 90 each, Refills: 3    Associated Diagnoses: Type 2 diabetes mellitus with foot ulcer, with long-term current use of insulin      primidone (MYSOLINE) 50 MG Tab Take 1 tablet (50 mg total) by mouth every evening.  Qty: 30 tablet, Refills: 11    Associated Diagnoses: Tremor, unspecified      promethazine (PHENERGAN) 25 MG tablet       TRESIBA FLEXTOUCH U-100 100 unit/mL (3 mL) InPn Inject 5 Units into the skin once daily.  Qty: 7 Syringe, Refills: 3    Associated Diagnoses: Type 2 diabetes mellitus with foot ulcer, with long-term current use of insulin         STOP taking these medications       diphth,pertus,acell,,tetanus (BOOSTRIX TDAP) 2.5-8-5 Lf-mcg-Lf/0.5mL Syrg injection Comments:   Reason for Stopping:         varicella-zoster gE-AS01B, PF, (SHINGRIX, PF,) 50 mcg/0.5 mL injection Comments:   Reason for Stopping:                    _________________________________  Eleanor Wagner MD  08/14/2020          "

## 2020-08-14 NOTE — PT/OT/SLP PROGRESS
"Patient:  Kateryna Mckeon [facility] #:  [id number]   Date of Note: 08/14/2020   Referring Physician:  Self, Aaareferral  Discharge recommendation: Skilled nursing placement    Start Time: 705  End Time: 722  Total Time: 17 min        Subjective:   "I am tired."    Pain: 6 out of 10 in left foot    Objective:   Patient seen by OT this session.   Patient was educated on the importance of mobilizing.      Treatment:   -Patient required modA with all aspects of bed mobility.  -Patient required maxA with LE dressing.  -Patient performed BUE AROM for elbow flexion 3x10 reps.  -Patient required maxA when performing functional sit to stand transfer with rolling walker.     Assessment:  Patient tolerated skilled therapy session fair on this date with OT.  Patient was educated on the importance of mobilizing frequently.  Patient appears to be motivated to improve functional deficits.          Plan:  Continue with current POC Monday-Friday.      Ricco Pagan OTR/L       "

## 2020-08-14 NOTE — PLAN OF CARE
08/14/20 1547   Final Note   Assessment Type Final Discharge Note   Anticipated Discharge Disposition Long Term   What phone number can be called within the next 1-3 days to see how you are doing after discharge? [phone]   Hospital Follow Up  Appt(s) scheduled? No   Discharge plans and expectations educations in teach back method with documentation complete? Yes   Post-Acute Status   Post-Acute Authorization Placement   Post-Acute Placement Status Set-up Complete   Patient informed that someone from [facility] will come pick her up. Demonstrated understanding by verbal feedback. Denies any other needs at this time.

## 2020-08-14 NOTE — DISCHARGE SUMMARY
[facility] Medicine  Discharge Summary      Patient Name: Kateryna Connolly  MRN: [id number]  Admission Date: 8/11/2020  Hospital Length of Stay: 3 days  Discharge Date and Time:  08/14/2020 12:32 PM  Attending Physician: Eleanor Wagner MD   Discharging Provider: Eleanor Wagner MD  Primary Care Provider: Lillian Garcia NP        HPI:   Patient is a 69 year old female with past medical history of type 2 diabetes on insulin, MI, hypertension, hyperlipidemia, neuropathy, occasional tremors who presents to the ER with worsening falls due to tremors over the last 1 week.  Patient has taken primidone in the past for these tremors but reports that over the last week they have become significantly worse so that when she stands up she becomes dizzy and feels off balance, her legs started shaking and she falls to the floor.  Falls multiple times every day.  She has reportedly had a workup couple of years ago by Neurology for tremors did not include an MRI but reports that this is much worse.  She has a daughter in law who helps take care of her but is unable to do anything for herself.  She denies any recent fever, cough, nausea or vomiting.  Does report urinary retention over the last 6 weeks as well as buttock pain and hip/pelvic pain.  In the ER, lab work was unremarkable except for a mildly elevated serum creatinine in with a GFR of 50.  Lactic acid normal.  WBC normal.  X-ray of the pelvis with bony demineralization.  Corbin inserted for urinary retention with good output.  Hospital team called for admission for worsening tremors, multiple falls, debility, unsafe discharge home.    * No surgery found *      Hospital Course:   08/12/2020  Patient with increasing need for IV pain medicine due to low back pain and pelvic pain.  Patient is unable to stand.  PT/OT ordered.  Continuing to try to seek placement for this patient who is unsafe for discharge home at this  time.     08/13/2020  No change to current plan.  PT/OT are working with patient.  She would be a good candidate for rehab.  Continue to try to seek placement for this patient who is not safe for discharge home.    8/14/2020  Patient is medically stable for discharge to [facility]. Will transfer today.         Consults:     Final Active Diagnoses:    Diagnosis Date Noted POA    PRINCIPAL PROBLEM:  Debility [R53.81] 08/11/2020 Yes    Pelvic pain [R10.2] 08/11/2020 Yes    Urinary retention [R33.9] 08/11/2020 Yes    Multiple falls [R29.6] 08/11/2020 Not Applicable    Elevated serum creatinine [R79.89] 08/11/2020 Yes    Hirsutism [L68.0] 08/11/2020 Yes    Tremor, unspecified [R25.1] 01/08/2020 Yes    Diabetic polyneuropathy associated with type 2 diabetes mellitus [E11.42] 07/06/2017 Yes    Type 2 diabetes mellitus with skin complication, with long-term current use of insulin [E11.628, Z79.4] 07/06/2017 Not Applicable    Coronary artery disease [I25.10] 01/26/2015 Yes      Problems Resolved During this Admission:      Discharged Condition: stable    Disposition: Rehab Facility    Follow Up:    Patient Instructions:      Diet Adult Regular     Notify your health care provider if you experience any of the following:  temperature >100.4     Activity as tolerated     Medications:  Reconciled Home Medications:      Medication List      START taking these medications    butalbital-acetaminophen-caffeine -40 mg -40 mg per tablet  Commonly known as: FIORICET, ESGIC  Take 1 tablet by mouth every 6 (six) hours as needed for Pain.        CONTINUE taking these medications    alendronate 70 MG tablet  Commonly known as: FOSAMAX  Take one tablet every 7 days.     amitriptyline 25 MG tablet  Commonly known as: ELAVIL     atorvastatin 80 MG tablet  Commonly known as: LIPITOR  Take 1 tablet (80 mg total) by mouth every evening.     betamethasone dipropionate 0.05 % cream  Commonly known as:  "DIPROLENE  Apply topically 2 (two) times daily.     blood-glucose meter Misc  Use the meter as directed     ciclopirox 0.77 % Crea  Commonly known as: LOPROX  Apply topically 2 (two) times daily.     ciclopirox 8 % Soln  Commonly known as: PENLAC  Apply topically nightly.     clopidogreL 75 mg tablet  Commonly known as: PLAVIX  Take 1 tablet (75 mg total) by mouth once daily.     collagenase ointment  Commonly known as: SANTYL  Apply topically once daily.     ELIQUIS 5 mg Tab  Generic drug: apixaban  Take 1 tablet by mouth twice daily     ergocalciferol 50,000 unit Cap  Commonly known as: ERGOCALCIFEROL  Take 1 capsule (50,000 Units total) by mouth every 7 days.     gabapentin 600 MG tablet  Commonly known as: NEURONTIN  Take 1 tablet by mouth in the morning and take 1.5 tablet by mouth at night     lisinopriL 2.5 MG tablet  Commonly known as: PRINIVIL,ZESTRIL  Take 1 tablet (2.5 mg total) by mouth once daily.     metFORMIN 1000 MG tablet  Commonly known as: GLUCOPHAGE  Take 1 tablet (1,000 mg total) by mouth 2 (two) times daily with meals.     metoprolol succinate 25 MG 24 hr tablet  Commonly known as: TOPROL-XL  Take 1 tablet (25 mg total) by mouth once daily.     pen needle, diabetic 33 gauge x 5/32" Ndle  Commonly known as: ADVOCATE PEN NEEDLE  1 Dose by Misc.(Non-Drug; Combo Route) route once daily.     primidone 50 MG Tab  Commonly known as: MYSOLINE  Take 1 tablet (50 mg total) by mouth every evening.     promethazine 25 MG tablet  Commonly known as: PHENERGAN     TRESIBA FLEXTOUCH U-100 100 unit/mL (3 mL) Inpn  Generic drug: insulin degludec  Inject 5 Units into the skin once daily.        STOP taking these medications    diphth,pertus(acell),tetanus 2.5-8-5 Lf-mcg-Lf/0.5mL Syrg injection  Commonly known as: BOOSTRIX TDAP     varicella-zoster gE-AS01B (PF) 50 mcg/0.5 mL injection  Commonly known as: SHINGRIX (PF)            Significant Diagnostic Studies: Labs:   BMP:   Recent Labs   Lab 08/13/20  0815 " 08/14/20  0631   * 123*    141   K 4.1 3.8    103   CO2 25 29   BUN 11 11   CREATININE 1.0 1.1   CALCIUM 8.8 8.5*   , CMP   Recent Labs   Lab 08/13/20  0815 08/14/20  0631    141   K 4.1 3.8    103   CO2 25 29   * 123*   BUN 11 11   CREATININE 1.0 1.1   CALCIUM 8.8 8.5*   ANIONGAP 11 9   ESTGFRAFRICA >60.0 59.2*   EGFRNONAA 57.6* 51.3*   , CBC   Recent Labs   Lab 08/13/20  0815 08/14/20  0631   WBC 6.07 5.56   HGB 11.2* 10.9*   HCT 34.6* 33.7*    240    and All labs within the past 24 hours have been reviewed    Pending Diagnostic Studies:     None        Indwelling Lines/Drains at time of discharge:   Lines/Drains/Airways     Drain                 Urethral Catheter 08/11/20 1205 Straight-tip 16 Fr. 3 days          Epidural Line                 Perineural Analgesia/Anesthesia Assessment (using dermatomes) Epidural 09/27/17 1440 1051 days                Time spent on the discharge of patient: 40 minutes  Patient was seen and examined on the date of discharge and determined to be suitable for discharge.         Eleanor Wagner MD  Department of Hospital Medicine  [facility]

## 2020-08-14 NOTE — NURSING
Patient left facility via W/C van to [facility].  Discharge packet and patient's belongings sent with patient, verbalized understanding.  NAD noted.

## 2020-08-14 NOTE — PLAN OF CARE
Problem: Adult Inpatient Plan of Care  Goal: Plan of Care Review  Outcome: Ongoing, Progressing  Goal: Optimal Comfort and Wellbeing  Outcome: Ongoing, Progressing     Problem: Diabetes Comorbidity  Goal: Blood Glucose Level Within Desired Range  Outcome: Ongoing, Progressing

## 2020-08-14 NOTE — PLAN OF CARE
08/14/20 1615   Final Note   Assessment Type Final Discharge Note   Anticipated Discharge Disposition SNF   What phone number can be called within the next 1-3 days to see how you are doing after discharge? [phone]   Hospital Follow Up  Appt(s) scheduled? No  (Pt will be followed by facility medical director for primary care)   Discharge plans and expectations educations in teach back method with documentation complete? Yes   Post-Acute Status   Post-Acute Authorization Placement   Post-Acute Placement Status Set-up Complete   Patient choice form signed by patient/caregiver List from System Post-Acute Care   Discharge Delays None known at this time     Pt was accepted to [facility] for SNF placement this day and then long term care. Facility in agreement to allow pt sign consents when she arrives. All required documentation was sent to the facility and acceptance is final. Transportation being sent by the facility for . This plan is complete.

## 2020-08-16 LAB
BACTERIA BLD CULT: NORMAL
BACTERIA BLD CULT: NORMAL

## 2020-08-28 ENCOUNTER — PATIENT OUTREACH (OUTPATIENT)
Dept: ADMINISTRATIVE | Facility: HOSPITAL | Age: 70
End: 2020-08-28

## 2020-10-16 DIAGNOSIS — E11.9 TYPE 2 DIABETES MELLITUS WITHOUT COMPLICATION: ICD-10-CM

## 2021-01-20 DIAGNOSIS — L97.509 ULCER OF FOOT, UNSPECIFIED LATERALITY, UNSPECIFIED ULCER STAGE: Primary | ICD-10-CM

## 2021-01-22 ENCOUNTER — HOSPITAL ENCOUNTER (INPATIENT)
Facility: HOSPITAL | Age: 71
LOS: 8 days | Discharge: NURSING, REHAB OR LONG-TERM CARE | DRG: 253 | End: 2021-01-30
Attending: EMERGENCY MEDICINE | Admitting: INTERNAL MEDICINE
Payer: MEDICARE

## 2021-01-22 ENCOUNTER — OFFICE VISIT (OUTPATIENT)
Dept: WOUND CARE | Facility: HOSPITAL | Age: 71
DRG: 253 | End: 2021-01-22
Attending: PODIATRIST
Payer: MEDICARE

## 2021-01-22 VITALS
SYSTOLIC BLOOD PRESSURE: 127 MMHG | DIASTOLIC BLOOD PRESSURE: 69 MMHG | RESPIRATION RATE: 18 BRPM | HEART RATE: 106 BPM | TEMPERATURE: 98 F

## 2021-01-22 DIAGNOSIS — M86.8X7 OTHER OSTEOMYELITIS OF LEFT FOOT: ICD-10-CM

## 2021-01-22 DIAGNOSIS — E11.42 DIABETIC POLYNEUROPATHY ASSOCIATED WITH TYPE 2 DIABETES MELLITUS: ICD-10-CM

## 2021-01-22 DIAGNOSIS — I96 GANGRENE: ICD-10-CM

## 2021-01-22 DIAGNOSIS — M79.672 FOOT PAIN, LEFT: ICD-10-CM

## 2021-01-22 DIAGNOSIS — M86.172 OTHER ACUTE OSTEOMYELITIS OF LEFT FOOT: ICD-10-CM

## 2021-01-22 DIAGNOSIS — L03.116 CELLULITIS OF LEFT LOWER EXTREMITY: ICD-10-CM

## 2021-01-22 DIAGNOSIS — L97.524 TOE ULCER, LEFT, WITH NECROSIS OF BONE: ICD-10-CM

## 2021-01-22 DIAGNOSIS — R07.9 CHEST PAIN: ICD-10-CM

## 2021-01-22 DIAGNOSIS — I25.10 CAD (CORONARY ARTERY DISEASE): ICD-10-CM

## 2021-01-22 DIAGNOSIS — L97.509 ULCER OF FOOT, UNSPECIFIED LATERALITY, UNSPECIFIED ULCER STAGE: ICD-10-CM

## 2021-01-22 DIAGNOSIS — L97.524 ULCER OF LEFT FOOT WITH NECROSIS OF BONE: Primary | ICD-10-CM

## 2021-01-22 DIAGNOSIS — M86.00 ACUTE HEMATOGENOUS OSTEOMYELITIS, UNSPECIFIED SITE: ICD-10-CM

## 2021-01-22 DIAGNOSIS — L97.524 ULCER OF TOE OF LEFT FOOT, WITH NECROSIS OF BONE: ICD-10-CM

## 2021-01-22 DIAGNOSIS — L97.521 TOE ULCER, LEFT, LIMITED TO BREAKDOWN OF SKIN: ICD-10-CM

## 2021-01-22 DIAGNOSIS — I73.9 PERIPHERAL VASCULAR DISEASE: Primary | ICD-10-CM

## 2021-01-22 DIAGNOSIS — L03.119 CELLULITIS OF FOOT: ICD-10-CM

## 2021-01-22 PROBLEM — J43.9 PULMONARY EMPHYSEMA: Status: ACTIVE | Noted: 2017-07-06

## 2021-01-22 LAB
ALBUMIN SERPL BCP-MCNC: 3.7 G/DL (ref 3.5–5.2)
ALP SERPL-CCNC: 84 U/L (ref 55–135)
ALT SERPL W/O P-5'-P-CCNC: 22 U/L (ref 10–44)
ANION GAP SERPL CALC-SCNC: 14 MMOL/L (ref 8–16)
APTT PPP: 28.4 SEC (ref 23.6–33.3)
AST SERPL-CCNC: 28 U/L (ref 10–40)
BASOPHILS # BLD AUTO: 0.03 K/UL (ref 0–0.2)
BASOPHILS NFR BLD: 0.3 % (ref 0–1.9)
BILIRUB SERPL-MCNC: 0.6 MG/DL (ref 0.1–1)
BNP SERPL-MCNC: 31 PG/ML (ref 0–99)
BUN SERPL-MCNC: 31 MG/DL (ref 8–23)
CALCIUM SERPL-MCNC: 9.2 MG/DL (ref 8.7–10.5)
CHLORIDE SERPL-SCNC: 99 MMOL/L (ref 95–110)
CO2 SERPL-SCNC: 26 MMOL/L (ref 23–29)
CREAT SERPL-MCNC: 1.2 MG/DL (ref 0.5–1.4)
DIFFERENTIAL METHOD: ABNORMAL
EOSINOPHIL # BLD AUTO: 0.2 K/UL (ref 0–0.5)
EOSINOPHIL NFR BLD: 2.4 % (ref 0–8)
ERYTHROCYTE [DISTWIDTH] IN BLOOD BY AUTOMATED COUNT: 13.4 % (ref 11.5–14.5)
EST. GFR  (AFRICAN AMERICAN): 52.9 ML/MIN/1.73 M^2
EST. GFR  (NON AFRICAN AMERICAN): 45.9 ML/MIN/1.73 M^2
GLUCOSE SERPL-MCNC: 97 MG/DL (ref 70–110)
HCT VFR BLD AUTO: 34.5 % (ref 37–48.5)
HGB BLD-MCNC: 11 G/DL (ref 12–16)
IMM GRANULOCYTES # BLD AUTO: 0.04 K/UL (ref 0–0.04)
IMM GRANULOCYTES NFR BLD AUTO: 0.4 % (ref 0–0.5)
INR PPP: 1.1
LACTATE SERPL-SCNC: 5.4 MMOL/L (ref 0.5–1.9)
LYMPHOCYTES # BLD AUTO: 1.6 K/UL (ref 1–4.8)
LYMPHOCYTES NFR BLD: 16.4 % (ref 18–48)
MAGNESIUM SERPL-MCNC: 1.7 MG/DL (ref 1.6–2.6)
MCH RBC QN AUTO: 30.1 PG (ref 27–31)
MCHC RBC AUTO-ENTMCNC: 31.9 G/DL (ref 32–36)
MCV RBC AUTO: 95 FL (ref 82–98)
MONOCYTES # BLD AUTO: 0.6 K/UL (ref 0.3–1)
MONOCYTES NFR BLD: 6.6 % (ref 4–15)
NEUTROPHILS # BLD AUTO: 7.1 K/UL (ref 1.8–7.7)
NEUTROPHILS NFR BLD: 73.9 % (ref 38–73)
NRBC BLD-RTO: 0 /100 WBC
PLATELET # BLD AUTO: 329 K/UL (ref 150–350)
PMV BLD AUTO: 10.1 FL (ref 9.2–12.9)
POTASSIUM SERPL-SCNC: 4.4 MMOL/L (ref 3.5–5.1)
PROT SERPL-MCNC: 7.4 G/DL (ref 6–8.4)
PROTHROMBIN TIME: 13.2 SEC (ref 10.6–14.8)
RBC # BLD AUTO: 3.65 M/UL (ref 4–5.4)
SARS-COV-2 RDRP RESP QL NAA+PROBE: NEGATIVE
SODIUM SERPL-SCNC: 139 MMOL/L (ref 136–145)
TROPONIN I SERPL DL<=0.01 NG/ML-MCNC: <0.03 NG/ML
TROPONIN I SERPL DL<=0.01 NG/ML-MCNC: <0.03 NG/ML
WBC # BLD AUTO: 9.58 K/UL (ref 3.9–12.7)

## 2021-01-22 PROCEDURE — 12000002 HC ACUTE/MED SURGE SEMI-PRIVATE ROOM

## 2021-01-22 PROCEDURE — 87040 BLOOD CULTURE FOR BACTERIA: CPT | Mod: 59

## 2021-01-22 PROCEDURE — 99213 OFFICE O/P EST LOW 20 MIN: CPT | Mod: 25 | Performed by: PODIATRIST

## 2021-01-22 PROCEDURE — 93010 EKG 12-LEAD: ICD-10-PCS | Mod: ,,, | Performed by: INTERNAL MEDICINE

## 2021-01-22 PROCEDURE — 25000003 PHARM REV CODE 250: Performed by: EMERGENCY MEDICINE

## 2021-01-22 PROCEDURE — 85610 PROTHROMBIN TIME: CPT

## 2021-01-22 PROCEDURE — 93010 ELECTROCARDIOGRAM REPORT: CPT | Mod: ,,, | Performed by: INTERNAL MEDICINE

## 2021-01-22 PROCEDURE — 83880 ASSAY OF NATRIURETIC PEPTIDE: CPT

## 2021-01-22 PROCEDURE — 99203 PR OFFICE/OUTPT VISIT, NEW, LEVL III, 30-44 MIN: ICD-10-PCS | Mod: ,,, | Performed by: PODIATRIST

## 2021-01-22 PROCEDURE — 80053 COMPREHEN METABOLIC PANEL: CPT

## 2021-01-22 PROCEDURE — 85025 COMPLETE CBC W/AUTO DIFF WBC: CPT

## 2021-01-22 PROCEDURE — 36415 COLL VENOUS BLD VENIPUNCTURE: CPT

## 2021-01-22 PROCEDURE — 93005 ELECTROCARDIOGRAM TRACING: CPT | Performed by: INTERNAL MEDICINE

## 2021-01-22 PROCEDURE — U0002 COVID-19 LAB TEST NON-CDC: HCPCS

## 2021-01-22 PROCEDURE — 84484 ASSAY OF TROPONIN QUANT: CPT

## 2021-01-22 PROCEDURE — 99285 EMERGENCY DEPT VISIT HI MDM: CPT | Mod: 25

## 2021-01-22 PROCEDURE — 63600175 PHARM REV CODE 636 W HCPCS: Performed by: EMERGENCY MEDICINE

## 2021-01-22 PROCEDURE — 84484 ASSAY OF TROPONIN QUANT: CPT | Mod: 91

## 2021-01-22 PROCEDURE — 85730 THROMBOPLASTIN TIME PARTIAL: CPT

## 2021-01-22 PROCEDURE — 99203 OFFICE O/P NEW LOW 30 MIN: CPT | Mod: ,,, | Performed by: PODIATRIST

## 2021-01-22 PROCEDURE — 96365 THER/PROPH/DIAG IV INF INIT: CPT

## 2021-01-22 PROCEDURE — 63600175 PHARM REV CODE 636 W HCPCS: Performed by: INTERNAL MEDICINE

## 2021-01-22 PROCEDURE — 83735 ASSAY OF MAGNESIUM: CPT

## 2021-01-22 PROCEDURE — 83605 ASSAY OF LACTIC ACID: CPT

## 2021-01-22 PROCEDURE — 96376 TX/PRO/DX INJ SAME DRUG ADON: CPT

## 2021-01-22 RX ORDER — DEXTROMETHORPHAN HYDROBROMIDE, GUAIFENESIN 5; 100 MG/5ML; MG/5ML
650 LIQUID ORAL EVERY 8 HOURS
COMMUNITY

## 2021-01-22 RX ORDER — MELATONIN 10 MG
1 CAPSULE ORAL DAILY
COMMUNITY

## 2021-01-22 RX ORDER — CLINDAMYCIN HYDROCHLORIDE 300 MG/1
300 CAPSULE ORAL 4 TIMES DAILY
Status: ON HOLD | COMMUNITY
Start: 2021-01-18 | End: 2021-01-30 | Stop reason: HOSPADM

## 2021-01-22 RX ORDER — ONDANSETRON 4 MG/1
8 TABLET, ORALLY DISINTEGRATING ORAL EVERY 8 HOURS PRN
Status: DISCONTINUED | OUTPATIENT
Start: 2021-01-22 | End: 2021-01-30

## 2021-01-22 RX ORDER — HYDROCODONE BITARTRATE AND ACETAMINOPHEN 5; 325 MG/1; MG/1
1 TABLET ORAL EVERY 12 HOURS PRN
Status: ON HOLD | COMMUNITY
Start: 2020-12-28 | End: 2021-01-30 | Stop reason: HOSPADM

## 2021-01-22 RX ORDER — VANCOMYCIN HCL IN 5 % DEXTROSE 1G/250ML
1000 PLASTIC BAG, INJECTION (ML) INTRAVENOUS ONCE
Status: COMPLETED | OUTPATIENT
Start: 2021-01-22 | End: 2021-01-22

## 2021-01-22 RX ORDER — GLUCAGON 1 MG
1 KIT INJECTION
Status: DISCONTINUED | OUTPATIENT
Start: 2021-01-22 | End: 2021-01-30 | Stop reason: HOSPADM

## 2021-01-22 RX ORDER — IBUPROFEN 200 MG
24 TABLET ORAL
Status: DISCONTINUED | OUTPATIENT
Start: 2021-01-22 | End: 2021-01-30 | Stop reason: HOSPADM

## 2021-01-22 RX ORDER — SODIUM CHLORIDE 9 MG/ML
INJECTION, SOLUTION INTRAVENOUS
Status: COMPLETED | OUTPATIENT
Start: 2021-01-22 | End: 2021-01-22

## 2021-01-22 RX ORDER — TALC
6 POWDER (GRAM) TOPICAL NIGHTLY PRN
Status: DISCONTINUED | OUTPATIENT
Start: 2021-01-22 | End: 2021-01-30 | Stop reason: HOSPADM

## 2021-01-22 RX ORDER — ACETAMINOPHEN 10 MG/ML
1000 INJECTION, SOLUTION INTRAVENOUS EVERY 8 HOURS
Status: COMPLETED | OUTPATIENT
Start: 2021-01-22 | End: 2021-01-23

## 2021-01-22 RX ORDER — IBUPROFEN 200 MG
16 TABLET ORAL
Status: DISCONTINUED | OUTPATIENT
Start: 2021-01-22 | End: 2021-01-30 | Stop reason: HOSPADM

## 2021-01-22 RX ORDER — POLYETHYLENE GLYCOL 3350 17 G/17G
17 POWDER, FOR SOLUTION ORAL DAILY
COMMUNITY

## 2021-01-22 RX ORDER — NUT.TX.GLUCOSE INTOLERANCE,SOY
30 BAR ORAL DAILY
COMMUNITY

## 2021-01-22 RX ORDER — BUTALBITAL, ACETAMINOPHEN, CAFFEINE AND CODEINE PHOSPHATE 50; 325; 40; 30 MG/1; MG/1; MG/1; MG/1
1 CAPSULE ORAL EVERY 6 HOURS PRN
Status: ON HOLD | COMMUNITY
End: 2021-01-30 | Stop reason: HOSPADM

## 2021-01-22 RX ORDER — L.ACIDOPH/L.BULG/B.BIF/S.THERM 1B-250 MG
1 TABLET ORAL DAILY
COMMUNITY

## 2021-01-22 RX ORDER — FUROSEMIDE 20 MG/1
20 TABLET ORAL DAILY
COMMUNITY
Start: 2021-01-19

## 2021-01-22 RX ORDER — ACETAMINOPHEN 325 MG/1
650 TABLET ORAL EVERY 4 HOURS PRN
Status: DISCONTINUED | OUTPATIENT
Start: 2021-01-22 | End: 2021-01-30 | Stop reason: HOSPADM

## 2021-01-22 RX ORDER — MINOCYCLINE HYDROCHLORIDE 100 MG/1
CAPSULE ORAL
Status: ON HOLD | COMMUNITY
Start: 2020-11-24 | End: 2021-01-30 | Stop reason: HOSPADM

## 2021-01-22 RX ORDER — SODIUM CHLORIDE 0.9 % (FLUSH) 0.9 %
10 SYRINGE (ML) INJECTION
Status: DISCONTINUED | OUTPATIENT
Start: 2021-01-22 | End: 2021-01-28 | Stop reason: SDUPTHER

## 2021-01-22 RX ADMIN — ACETAMINOPHEN 1000 MG: 10 INJECTION, SOLUTION INTRAVENOUS at 11:01

## 2021-01-22 RX ADMIN — VANCOMYCIN HYDROCHLORIDE 500 MG: 500 INJECTION, POWDER, LYOPHILIZED, FOR SOLUTION INTRAVENOUS at 05:01

## 2021-01-22 RX ADMIN — SODIUM CHLORIDE 130 ML/HR: 0.9 INJECTION, SOLUTION INTRAVENOUS at 05:01

## 2021-01-22 RX ADMIN — VANCOMYCIN HYDROCHLORIDE 1000 MG: 1 INJECTION, POWDER, LYOPHILIZED, FOR SOLUTION INTRAVENOUS at 07:01

## 2021-01-23 LAB
ANION GAP SERPL CALC-SCNC: 12 MMOL/L (ref 8–16)
BASOPHILS # BLD AUTO: 0.02 K/UL (ref 0–0.2)
BASOPHILS NFR BLD: 0.3 % (ref 0–1.9)
BUN SERPL-MCNC: 28 MG/DL (ref 8–23)
CALCIUM SERPL-MCNC: 8.7 MG/DL (ref 8.7–10.5)
CHLORIDE SERPL-SCNC: 99 MMOL/L (ref 95–110)
CO2 SERPL-SCNC: 27 MMOL/L (ref 23–29)
CREAT SERPL-MCNC: 1 MG/DL (ref 0.5–1.4)
DIFFERENTIAL METHOD: ABNORMAL
EOSINOPHIL # BLD AUTO: 0.2 K/UL (ref 0–0.5)
EOSINOPHIL NFR BLD: 3.3 % (ref 0–8)
ERYTHROCYTE [DISTWIDTH] IN BLOOD BY AUTOMATED COUNT: 13.6 % (ref 11.5–14.5)
EST. GFR  (AFRICAN AMERICAN): >60 ML/MIN/1.73 M^2
EST. GFR  (NON AFRICAN AMERICAN): 57.2 ML/MIN/1.73 M^2
GLUCOSE SERPL-MCNC: 101 MG/DL (ref 70–110)
GLUCOSE SERPL-MCNC: 102 MG/DL (ref 70–110)
GLUCOSE SERPL-MCNC: 183 MG/DL (ref 70–110)
GLUCOSE SERPL-MCNC: 234 MG/DL (ref 70–110)
GLUCOSE SERPL-MCNC: 247 MG/DL (ref 70–110)
HCT VFR BLD AUTO: 30.7 % (ref 37–48.5)
HGB BLD-MCNC: 9.9 G/DL (ref 12–16)
IMM GRANULOCYTES # BLD AUTO: 0.02 K/UL (ref 0–0.04)
IMM GRANULOCYTES NFR BLD AUTO: 0.3 % (ref 0–0.5)
LYMPHOCYTES # BLD AUTO: 1 K/UL (ref 1–4.8)
LYMPHOCYTES NFR BLD: 15.7 % (ref 18–48)
MCH RBC QN AUTO: 30.1 PG (ref 27–31)
MCHC RBC AUTO-ENTMCNC: 32.2 G/DL (ref 32–36)
MCV RBC AUTO: 93 FL (ref 82–98)
MONOCYTES # BLD AUTO: 0.5 K/UL (ref 0.3–1)
MONOCYTES NFR BLD: 7.8 % (ref 4–15)
NEUTROPHILS # BLD AUTO: 4.8 K/UL (ref 1.8–7.7)
NEUTROPHILS NFR BLD: 72.6 % (ref 38–73)
NRBC BLD-RTO: 0 /100 WBC
PLATELET # BLD AUTO: 265 K/UL (ref 150–350)
PMV BLD AUTO: 10.1 FL (ref 9.2–12.9)
POTASSIUM SERPL-SCNC: 3.6 MMOL/L (ref 3.5–5.1)
RBC # BLD AUTO: 3.29 M/UL (ref 4–5.4)
SODIUM SERPL-SCNC: 138 MMOL/L (ref 136–145)
TROPONIN I SERPL DL<=0.01 NG/ML-MCNC: <0.03 NG/ML
WBC # BLD AUTO: 6.58 K/UL (ref 3.9–12.7)

## 2021-01-23 PROCEDURE — 25000003 PHARM REV CODE 250: Performed by: INTERNAL MEDICINE

## 2021-01-23 PROCEDURE — 84484 ASSAY OF TROPONIN QUANT: CPT

## 2021-01-23 PROCEDURE — 25000003 PHARM REV CODE 250: Performed by: PODIATRIST

## 2021-01-23 PROCEDURE — 80048 BASIC METABOLIC PNL TOTAL CA: CPT

## 2021-01-23 PROCEDURE — 12000002 HC ACUTE/MED SURGE SEMI-PRIVATE ROOM

## 2021-01-23 PROCEDURE — 85025 COMPLETE CBC W/AUTO DIFF WBC: CPT

## 2021-01-23 PROCEDURE — 36415 COLL VENOUS BLD VENIPUNCTURE: CPT

## 2021-01-23 PROCEDURE — 63600175 PHARM REV CODE 636 W HCPCS: Performed by: INTERNAL MEDICINE

## 2021-01-23 RX ORDER — FUROSEMIDE 20 MG/1
20 TABLET ORAL DAILY
Status: DISCONTINUED | OUTPATIENT
Start: 2021-01-23 | End: 2021-01-30 | Stop reason: HOSPADM

## 2021-01-23 RX ORDER — ZINC SULFATE 50(220)MG
110 CAPSULE ORAL DAILY
COMMUNITY

## 2021-01-23 RX ORDER — METOPROLOL SUCCINATE 25 MG/1
25 TABLET, EXTENDED RELEASE ORAL DAILY
Status: DISCONTINUED | OUTPATIENT
Start: 2021-01-23 | End: 2021-01-30 | Stop reason: HOSPADM

## 2021-01-23 RX ORDER — HYDROCODONE BITARTRATE AND ACETAMINOPHEN 5; 325 MG/1; MG/1
1 TABLET ORAL EVERY 12 HOURS
Status: DISCONTINUED | OUTPATIENT
Start: 2021-01-23 | End: 2021-01-23

## 2021-01-23 RX ORDER — ASCORBIC ACID 500 MG
500 TABLET ORAL DAILY
COMMUNITY

## 2021-01-23 RX ORDER — ATORVASTATIN CALCIUM 40 MG/1
80 TABLET, FILM COATED ORAL NIGHTLY
Status: DISCONTINUED | OUTPATIENT
Start: 2021-01-23 | End: 2021-01-30 | Stop reason: HOSPADM

## 2021-01-23 RX ORDER — CLOPIDOGREL BISULFATE 75 MG/1
75 TABLET ORAL DAILY
Status: DISCONTINUED | OUTPATIENT
Start: 2021-01-23 | End: 2021-01-27

## 2021-01-23 RX ORDER — HYDROCODONE BITARTRATE AND ACETAMINOPHEN 5; 325 MG/1; MG/1
1 TABLET ORAL EVERY 6 HOURS PRN
Status: DISCONTINUED | OUTPATIENT
Start: 2021-01-23 | End: 2021-01-30

## 2021-01-23 RX ORDER — HYDROCODONE BITARTRATE AND ACETAMINOPHEN 5; 325 MG/1; MG/1
1 TABLET ORAL EVERY 12 HOURS PRN
Status: DISCONTINUED | OUTPATIENT
Start: 2021-01-23 | End: 2021-01-23

## 2021-01-23 RX ORDER — LISINOPRIL 2.5 MG/1
2.5 TABLET ORAL DAILY
Status: DISCONTINUED | OUTPATIENT
Start: 2021-01-23 | End: 2021-01-29

## 2021-01-23 RX ADMIN — ATORVASTATIN CALCIUM 80 MG: 40 TABLET, FILM COATED ORAL at 08:01

## 2021-01-23 RX ADMIN — APIXABAN 5 MG: 5 TABLET, FILM COATED ORAL at 08:01

## 2021-01-23 RX ADMIN — VANCOMYCIN HYDROCHLORIDE 1500 MG: 1.5 INJECTION, POWDER, LYOPHILIZED, FOR SOLUTION INTRAVENOUS at 05:01

## 2021-01-23 RX ADMIN — ACETAMINOPHEN 650 MG: 325 TABLET, FILM COATED ORAL at 08:01

## 2021-01-23 RX ADMIN — HUMAN INSULIN 2 UNITS: 100 INJECTION, SOLUTION SUBCUTANEOUS at 12:01

## 2021-01-23 RX ADMIN — LISINOPRIL 2.5 MG: 2.5 TABLET ORAL at 03:01

## 2021-01-23 RX ADMIN — ACETAMINOPHEN 1000 MG: 10 INJECTION, SOLUTION INTRAVENOUS at 06:01

## 2021-01-23 RX ADMIN — HYDROCODONE BITARTRATE AND ACETAMINOPHEN 1 TABLET: 5; 325 TABLET ORAL at 04:01

## 2021-01-23 RX ADMIN — HYDROCODONE BITARTRATE AND ACETAMINOPHEN 1 TABLET: 5; 325 TABLET ORAL at 09:01

## 2021-01-23 RX ADMIN — CLOPIDOGREL BISULFATE 75 MG: 75 TABLET, FILM COATED ORAL at 03:01

## 2021-01-23 RX ADMIN — APIXABAN 5 MG: 5 TABLET, FILM COATED ORAL at 03:01

## 2021-01-23 RX ADMIN — FUROSEMIDE 20 MG: 20 TABLET ORAL at 03:01

## 2021-01-23 RX ADMIN — METOPROLOL SUCCINATE 25 MG: 25 TABLET, FILM COATED, EXTENDED RELEASE ORAL at 03:01

## 2021-01-23 RX ADMIN — COLLAGENASE SANTYL: 250 OINTMENT TOPICAL at 09:01

## 2021-01-23 RX ADMIN — ACETAMINOPHEN 1000 MG: 10 INJECTION, SOLUTION INTRAVENOUS at 01:01

## 2021-01-24 PROBLEM — M86.9 OSTEOMYELITIS: Status: ACTIVE | Noted: 2021-01-24

## 2021-01-24 LAB
ANION GAP SERPL CALC-SCNC: 12 MMOL/L (ref 8–16)
BASOPHILS # BLD AUTO: 0.02 K/UL (ref 0–0.2)
BASOPHILS NFR BLD: 0.3 % (ref 0–1.9)
BUN SERPL-MCNC: 22 MG/DL (ref 8–23)
CALCIUM SERPL-MCNC: 8.9 MG/DL (ref 8.7–10.5)
CHLORIDE SERPL-SCNC: 100 MMOL/L (ref 95–110)
CO2 SERPL-SCNC: 27 MMOL/L (ref 23–29)
CREAT SERPL-MCNC: 1.1 MG/DL (ref 0.5–1.4)
DIFFERENTIAL METHOD: ABNORMAL
EOSINOPHIL # BLD AUTO: 0.2 K/UL (ref 0–0.5)
EOSINOPHIL NFR BLD: 2.3 % (ref 0–8)
ERYTHROCYTE [DISTWIDTH] IN BLOOD BY AUTOMATED COUNT: 13.5 % (ref 11.5–14.5)
EST. GFR  (AFRICAN AMERICAN): 58.8 ML/MIN/1.73 M^2
EST. GFR  (NON AFRICAN AMERICAN): 51 ML/MIN/1.73 M^2
GLUCOSE SERPL-MCNC: 148 MG/DL (ref 70–110)
GLUCOSE SERPL-MCNC: 165 MG/DL (ref 70–110)
GLUCOSE SERPL-MCNC: 172 MG/DL (ref 70–110)
GLUCOSE SERPL-MCNC: 269 MG/DL (ref 70–110)
GLUCOSE SERPL-MCNC: 270 MG/DL (ref 70–110)
HCT VFR BLD AUTO: 32.5 % (ref 37–48.5)
HGB BLD-MCNC: 10.5 G/DL (ref 12–16)
IMM GRANULOCYTES # BLD AUTO: 0.02 K/UL (ref 0–0.04)
IMM GRANULOCYTES NFR BLD AUTO: 0.3 % (ref 0–0.5)
LYMPHOCYTES # BLD AUTO: 1.2 K/UL (ref 1–4.8)
LYMPHOCYTES NFR BLD: 15.6 % (ref 18–48)
MCH RBC QN AUTO: 30.1 PG (ref 27–31)
MCHC RBC AUTO-ENTMCNC: 32.3 G/DL (ref 32–36)
MCV RBC AUTO: 93 FL (ref 82–98)
MONOCYTES # BLD AUTO: 0.6 K/UL (ref 0.3–1)
MONOCYTES NFR BLD: 8.3 % (ref 4–15)
NEUTROPHILS # BLD AUTO: 5.5 K/UL (ref 1.8–7.7)
NEUTROPHILS NFR BLD: 73.2 % (ref 38–73)
NRBC BLD-RTO: 0 /100 WBC
PLATELET # BLD AUTO: 307 K/UL (ref 150–350)
PMV BLD AUTO: 9.9 FL (ref 9.2–12.9)
POTASSIUM SERPL-SCNC: 3.5 MMOL/L (ref 3.5–5.1)
RBC # BLD AUTO: 3.49 M/UL (ref 4–5.4)
SODIUM SERPL-SCNC: 139 MMOL/L (ref 136–145)
WBC # BLD AUTO: 7.49 K/UL (ref 3.9–12.7)

## 2021-01-24 PROCEDURE — 36415 COLL VENOUS BLD VENIPUNCTURE: CPT

## 2021-01-24 PROCEDURE — 63600175 PHARM REV CODE 636 W HCPCS: Performed by: INTERNAL MEDICINE

## 2021-01-24 PROCEDURE — 25000003 PHARM REV CODE 250: Performed by: PODIATRIST

## 2021-01-24 PROCEDURE — 12000002 HC ACUTE/MED SURGE SEMI-PRIVATE ROOM

## 2021-01-24 PROCEDURE — 25000003 PHARM REV CODE 250: Performed by: INTERNAL MEDICINE

## 2021-01-24 PROCEDURE — 99222 PR INITIAL HOSPITAL CARE,LEVL II: ICD-10-PCS | Mod: ,,, | Performed by: PODIATRIST

## 2021-01-24 PROCEDURE — 99222 1ST HOSP IP/OBS MODERATE 55: CPT | Mod: ,,, | Performed by: PODIATRIST

## 2021-01-24 PROCEDURE — 97110 THERAPEUTIC EXERCISES: CPT

## 2021-01-24 PROCEDURE — 97530 THERAPEUTIC ACTIVITIES: CPT

## 2021-01-24 PROCEDURE — 85025 COMPLETE CBC W/AUTO DIFF WBC: CPT

## 2021-01-24 PROCEDURE — 97162 PT EVAL MOD COMPLEX 30 MIN: CPT

## 2021-01-24 PROCEDURE — 80048 BASIC METABOLIC PNL TOTAL CA: CPT

## 2021-01-24 RX ADMIN — HUMAN INSULIN 3 UNITS: 100 INJECTION, SOLUTION SUBCUTANEOUS at 09:01

## 2021-01-24 RX ADMIN — APIXABAN 5 MG: 5 TABLET, FILM COATED ORAL at 07:01

## 2021-01-24 RX ADMIN — COLLAGENASE SANTYL: 250 OINTMENT TOPICAL at 12:01

## 2021-01-24 RX ADMIN — ATORVASTATIN CALCIUM 80 MG: 40 TABLET, FILM COATED ORAL at 07:01

## 2021-01-24 RX ADMIN — HYDROCODONE BITARTRATE AND ACETAMINOPHEN 1 TABLET: 5; 325 TABLET ORAL at 03:01

## 2021-01-24 RX ADMIN — VANCOMYCIN HYDROCHLORIDE 1500 MG: 1.5 INJECTION, POWDER, LYOPHILIZED, FOR SOLUTION INTRAVENOUS at 05:01

## 2021-01-24 RX ADMIN — APIXABAN 5 MG: 5 TABLET, FILM COATED ORAL at 09:01

## 2021-01-24 RX ADMIN — FUROSEMIDE 20 MG: 20 TABLET ORAL at 09:01

## 2021-01-24 RX ADMIN — METOPROLOL SUCCINATE 25 MG: 25 TABLET, FILM COATED, EXTENDED RELEASE ORAL at 09:01

## 2021-01-24 RX ADMIN — HYDROCODONE BITARTRATE AND ACETAMINOPHEN 1 TABLET: 5; 325 TABLET ORAL at 05:01

## 2021-01-24 RX ADMIN — LISINOPRIL 2.5 MG: 2.5 TABLET ORAL at 09:01

## 2021-01-24 RX ADMIN — CLOPIDOGREL BISULFATE 75 MG: 75 TABLET, FILM COATED ORAL at 09:01

## 2021-01-24 RX ADMIN — MELATONIN 6 MG: at 07:01

## 2021-01-25 LAB
ANION GAP SERPL CALC-SCNC: 9 MMOL/L (ref 8–16)
BASOPHILS # BLD AUTO: 0.02 K/UL (ref 0–0.2)
BASOPHILS NFR BLD: 0.2 % (ref 0–1.9)
BUN SERPL-MCNC: 20 MG/DL (ref 8–23)
CALCIUM SERPL-MCNC: 8.7 MG/DL (ref 8.7–10.5)
CHLORIDE SERPL-SCNC: 97 MMOL/L (ref 95–110)
CO2 SERPL-SCNC: 29 MMOL/L (ref 23–29)
CREAT SERPL-MCNC: 1.1 MG/DL (ref 0.5–1.4)
DIFFERENTIAL METHOD: ABNORMAL
EOSINOPHIL # BLD AUTO: 0.2 K/UL (ref 0–0.5)
EOSINOPHIL NFR BLD: 2.3 % (ref 0–8)
ERYTHROCYTE [DISTWIDTH] IN BLOOD BY AUTOMATED COUNT: 13.5 % (ref 11.5–14.5)
EST. GFR  (AFRICAN AMERICAN): 58.8 ML/MIN/1.73 M^2
EST. GFR  (NON AFRICAN AMERICAN): 51 ML/MIN/1.73 M^2
GLUCOSE SERPL-MCNC: 141 MG/DL (ref 70–110)
GLUCOSE SERPL-MCNC: 147 MG/DL (ref 70–110)
GLUCOSE SERPL-MCNC: 162 MG/DL (ref 70–110)
GLUCOSE SERPL-MCNC: 173 MG/DL (ref 70–110)
GLUCOSE SERPL-MCNC: 299 MG/DL (ref 70–110)
HCT VFR BLD AUTO: 32.7 % (ref 37–48.5)
HGB BLD-MCNC: 10.4 G/DL (ref 12–16)
IMM GRANULOCYTES # BLD AUTO: 0.03 K/UL (ref 0–0.04)
IMM GRANULOCYTES NFR BLD AUTO: 0.4 % (ref 0–0.5)
LYMPHOCYTES # BLD AUTO: 1.3 K/UL (ref 1–4.8)
LYMPHOCYTES NFR BLD: 15.4 % (ref 18–48)
MAGNESIUM SERPL-MCNC: 2 MG/DL (ref 1.6–2.6)
MCH RBC QN AUTO: 30.1 PG (ref 27–31)
MCHC RBC AUTO-ENTMCNC: 31.8 G/DL (ref 32–36)
MCV RBC AUTO: 95 FL (ref 82–98)
MONOCYTES # BLD AUTO: 0.7 K/UL (ref 0.3–1)
MONOCYTES NFR BLD: 8.2 % (ref 4–15)
NEUTROPHILS # BLD AUTO: 6.2 K/UL (ref 1.8–7.7)
NEUTROPHILS NFR BLD: 73.5 % (ref 38–73)
NRBC BLD-RTO: 0 /100 WBC
PLATELET # BLD AUTO: 288 K/UL (ref 150–350)
PMV BLD AUTO: 9.9 FL (ref 9.2–12.9)
POTASSIUM SERPL-SCNC: 3.5 MMOL/L (ref 3.5–5.1)
RBC # BLD AUTO: 3.46 M/UL (ref 4–5.4)
SODIUM SERPL-SCNC: 135 MMOL/L (ref 136–145)
VANCOMYCIN TROUGH SERPL-MCNC: 18.3 UG/ML (ref 10–22)
WBC # BLD AUTO: 8.4 K/UL (ref 3.9–12.7)

## 2021-01-25 PROCEDURE — 85025 COMPLETE CBC W/AUTO DIFF WBC: CPT

## 2021-01-25 PROCEDURE — 80202 ASSAY OF VANCOMYCIN: CPT

## 2021-01-25 PROCEDURE — 12000002 HC ACUTE/MED SURGE SEMI-PRIVATE ROOM

## 2021-01-25 PROCEDURE — 63600175 PHARM REV CODE 636 W HCPCS: Performed by: INTERNAL MEDICINE

## 2021-01-25 PROCEDURE — 99222 PR INITIAL HOSPITAL CARE,LEVL II: ICD-10-PCS | Mod: ,,, | Performed by: INTERNAL MEDICINE

## 2021-01-25 PROCEDURE — 80048 BASIC METABOLIC PNL TOTAL CA: CPT

## 2021-01-25 PROCEDURE — 25000003 PHARM REV CODE 250: Performed by: INTERNAL MEDICINE

## 2021-01-25 PROCEDURE — 99222 1ST HOSP IP/OBS MODERATE 55: CPT | Mod: ,,, | Performed by: INTERNAL MEDICINE

## 2021-01-25 PROCEDURE — 83735 ASSAY OF MAGNESIUM: CPT

## 2021-01-25 PROCEDURE — 36415 COLL VENOUS BLD VENIPUNCTURE: CPT

## 2021-01-25 PROCEDURE — 97530 THERAPEUTIC ACTIVITIES: CPT

## 2021-01-25 RX ADMIN — HYDROCODONE BITARTRATE AND ACETAMINOPHEN 1 TABLET: 5; 325 TABLET ORAL at 03:01

## 2021-01-25 RX ADMIN — LISINOPRIL 2.5 MG: 2.5 TABLET ORAL at 08:01

## 2021-01-25 RX ADMIN — FUROSEMIDE 20 MG: 20 TABLET ORAL at 08:01

## 2021-01-25 RX ADMIN — HYDROCODONE BITARTRATE AND ACETAMINOPHEN 1 TABLET: 5; 325 TABLET ORAL at 04:01

## 2021-01-25 RX ADMIN — METOPROLOL SUCCINATE 25 MG: 25 TABLET, FILM COATED, EXTENDED RELEASE ORAL at 08:01

## 2021-01-25 RX ADMIN — COLLAGENASE SANTYL: 250 OINTMENT TOPICAL at 08:01

## 2021-01-25 RX ADMIN — CLOPIDOGREL BISULFATE 75 MG: 75 TABLET, FILM COATED ORAL at 10:01

## 2021-01-25 RX ADMIN — HYDROCODONE BITARTRATE AND ACETAMINOPHEN 1 TABLET: 5; 325 TABLET ORAL at 10:01

## 2021-01-25 RX ADMIN — ATORVASTATIN CALCIUM 80 MG: 40 TABLET, FILM COATED ORAL at 09:01

## 2021-01-25 RX ADMIN — VANCOMYCIN HYDROCHLORIDE 1500 MG: 1.5 INJECTION, POWDER, LYOPHILIZED, FOR SOLUTION INTRAVENOUS at 05:01

## 2021-01-26 LAB
ANION GAP SERPL CALC-SCNC: 11 MMOL/L (ref 8–16)
BASOPHILS # BLD AUTO: 0.02 K/UL (ref 0–0.2)
BASOPHILS NFR BLD: 0.2 % (ref 0–1.9)
BUN SERPL-MCNC: 23 MG/DL (ref 8–23)
CALCIUM SERPL-MCNC: 8.8 MG/DL (ref 8.7–10.5)
CHLORIDE SERPL-SCNC: 99 MMOL/L (ref 95–110)
CO2 SERPL-SCNC: 27 MMOL/L (ref 23–29)
CREAT SERPL-MCNC: 1.1 MG/DL (ref 0.5–1.4)
DIFFERENTIAL METHOD: ABNORMAL
EOSINOPHIL # BLD AUTO: 0.2 K/UL (ref 0–0.5)
EOSINOPHIL NFR BLD: 1.8 % (ref 0–8)
ERYTHROCYTE [DISTWIDTH] IN BLOOD BY AUTOMATED COUNT: 13.5 % (ref 11.5–14.5)
EST. GFR  (AFRICAN AMERICAN): 58.8 ML/MIN/1.73 M^2
EST. GFR  (NON AFRICAN AMERICAN): 51 ML/MIN/1.73 M^2
GLUCOSE SERPL-MCNC: 150 MG/DL (ref 70–110)
GLUCOSE SERPL-MCNC: 153 MG/DL (ref 70–110)
GLUCOSE SERPL-MCNC: 162 MG/DL (ref 70–110)
GLUCOSE SERPL-MCNC: 167 MG/DL (ref 70–110)
GLUCOSE SERPL-MCNC: 233 MG/DL (ref 70–110)
HCT VFR BLD AUTO: 32.8 % (ref 37–48.5)
HGB BLD-MCNC: 10.4 G/DL (ref 12–16)
IMM GRANULOCYTES # BLD AUTO: 0.04 K/UL (ref 0–0.04)
IMM GRANULOCYTES NFR BLD AUTO: 0.4 % (ref 0–0.5)
LYMPHOCYTES # BLD AUTO: 1.2 K/UL (ref 1–4.8)
LYMPHOCYTES NFR BLD: 13 % (ref 18–48)
MAGNESIUM SERPL-MCNC: 2.1 MG/DL (ref 1.6–2.6)
MCH RBC QN AUTO: 29.9 PG (ref 27–31)
MCHC RBC AUTO-ENTMCNC: 31.7 G/DL (ref 32–36)
MCV RBC AUTO: 94 FL (ref 82–98)
MONOCYTES # BLD AUTO: 0.8 K/UL (ref 0.3–1)
MONOCYTES NFR BLD: 8.2 % (ref 4–15)
NEUTROPHILS # BLD AUTO: 7.1 K/UL (ref 1.8–7.7)
NEUTROPHILS NFR BLD: 76.4 % (ref 38–73)
NRBC BLD-RTO: 0 /100 WBC
PLATELET # BLD AUTO: 298 K/UL (ref 150–350)
PMV BLD AUTO: 10.2 FL (ref 9.2–12.9)
POTASSIUM SERPL-SCNC: 3.6 MMOL/L (ref 3.5–5.1)
RBC # BLD AUTO: 3.48 M/UL (ref 4–5.4)
SODIUM SERPL-SCNC: 137 MMOL/L (ref 136–145)
WBC # BLD AUTO: 9.32 K/UL (ref 3.9–12.7)

## 2021-01-26 PROCEDURE — 83735 ASSAY OF MAGNESIUM: CPT

## 2021-01-26 PROCEDURE — 80048 BASIC METABOLIC PNL TOTAL CA: CPT

## 2021-01-26 PROCEDURE — 63600175 PHARM REV CODE 636 W HCPCS: Performed by: INTERNAL MEDICINE

## 2021-01-26 PROCEDURE — 12000002 HC ACUTE/MED SURGE SEMI-PRIVATE ROOM

## 2021-01-26 PROCEDURE — 99231 SBSQ HOSP IP/OBS SF/LOW 25: CPT | Mod: ,,, | Performed by: INTERNAL MEDICINE

## 2021-01-26 PROCEDURE — 25000003 PHARM REV CODE 250: Performed by: INTERNAL MEDICINE

## 2021-01-26 PROCEDURE — 97110 THERAPEUTIC EXERCISES: CPT | Mod: CQ

## 2021-01-26 PROCEDURE — 85025 COMPLETE CBC W/AUTO DIFF WBC: CPT

## 2021-01-26 PROCEDURE — 25000003 PHARM REV CODE 250: Performed by: FAMILY MEDICINE

## 2021-01-26 PROCEDURE — 99231 PR SUBSEQUENT HOSPITAL CARE,LEVL I: ICD-10-PCS | Mod: ,,, | Performed by: INTERNAL MEDICINE

## 2021-01-26 PROCEDURE — 36415 COLL VENOUS BLD VENIPUNCTURE: CPT

## 2021-01-26 RX ORDER — POTASSIUM CHLORIDE 20 MEQ/1
40 TABLET, EXTENDED RELEASE ORAL ONCE
Status: COMPLETED | OUTPATIENT
Start: 2021-01-26 | End: 2021-01-26

## 2021-01-26 RX ADMIN — COLLAGENASE SANTYL: 250 OINTMENT TOPICAL at 09:01

## 2021-01-26 RX ADMIN — VANCOMYCIN HYDROCHLORIDE 1250 MG: 1.25 INJECTION, POWDER, LYOPHILIZED, FOR SOLUTION INTRAVENOUS at 02:01

## 2021-01-26 RX ADMIN — HUMAN INSULIN 1 UNITS: 100 INJECTION, SOLUTION SUBCUTANEOUS at 12:01

## 2021-01-26 RX ADMIN — HUMAN INSULIN 1 UNITS: 100 INJECTION, SOLUTION SUBCUTANEOUS at 05:01

## 2021-01-26 RX ADMIN — CLOPIDOGREL BISULFATE 75 MG: 75 TABLET, FILM COATED ORAL at 09:01

## 2021-01-26 RX ADMIN — HUMAN INSULIN 2 UNITS: 100 INJECTION, SOLUTION SUBCUTANEOUS at 09:01

## 2021-01-26 RX ADMIN — HYDROCODONE BITARTRATE AND ACETAMINOPHEN 1 TABLET: 5; 325 TABLET ORAL at 12:01

## 2021-01-26 RX ADMIN — ATORVASTATIN CALCIUM 80 MG: 40 TABLET, FILM COATED ORAL at 09:01

## 2021-01-26 RX ADMIN — ACETAMINOPHEN 650 MG: 325 TABLET, FILM COATED ORAL at 03:01

## 2021-01-26 RX ADMIN — LISINOPRIL 2.5 MG: 2.5 TABLET ORAL at 09:01

## 2021-01-26 RX ADMIN — POTASSIUM CHLORIDE 40 MEQ: 20 TABLET, EXTENDED RELEASE ORAL at 12:01

## 2021-01-26 RX ADMIN — FUROSEMIDE 20 MG: 20 TABLET ORAL at 09:01

## 2021-01-26 RX ADMIN — METOPROLOL SUCCINATE 25 MG: 25 TABLET, FILM COATED, EXTENDED RELEASE ORAL at 09:01

## 2021-01-27 PROBLEM — M79.672 FOOT PAIN, LEFT: Status: ACTIVE | Noted: 2021-01-27

## 2021-01-27 LAB
ANION GAP SERPL CALC-SCNC: 13 MMOL/L (ref 8–16)
BACTERIA BLD CULT: NORMAL
BACTERIA BLD CULT: NORMAL
BASOPHILS # BLD AUTO: 0.02 K/UL (ref 0–0.2)
BASOPHILS NFR BLD: 0.3 % (ref 0–1.9)
BUN SERPL-MCNC: 26 MG/DL (ref 8–23)
CALCIUM SERPL-MCNC: 9.2 MG/DL (ref 8.7–10.5)
CHLORIDE SERPL-SCNC: 100 MMOL/L (ref 95–110)
CO2 SERPL-SCNC: 27 MMOL/L (ref 23–29)
CREAT SERPL-MCNC: 1 MG/DL (ref 0.5–1.4)
DIFFERENTIAL METHOD: ABNORMAL
EOSINOPHIL # BLD AUTO: 0.1 K/UL (ref 0–0.5)
EOSINOPHIL NFR BLD: 1.5 % (ref 0–8)
ERYTHROCYTE [DISTWIDTH] IN BLOOD BY AUTOMATED COUNT: 13.7 % (ref 11.5–14.5)
EST. GFR  (AFRICAN AMERICAN): >60 ML/MIN/1.73 M^2
EST. GFR  (NON AFRICAN AMERICAN): 57.2 ML/MIN/1.73 M^2
GLUCOSE SERPL-MCNC: 151 MG/DL (ref 70–110)
GLUCOSE SERPL-MCNC: 152 MG/DL (ref 70–110)
GLUCOSE SERPL-MCNC: 152 MG/DL (ref 70–110)
GLUCOSE SERPL-MCNC: 158 MG/DL (ref 70–110)
HCT VFR BLD AUTO: 36.4 % (ref 37–48.5)
HGB BLD-MCNC: 11.2 G/DL (ref 12–16)
IMM GRANULOCYTES # BLD AUTO: 0.03 K/UL (ref 0–0.04)
IMM GRANULOCYTES NFR BLD AUTO: 0.4 % (ref 0–0.5)
LYMPHOCYTES # BLD AUTO: 1.1 K/UL (ref 1–4.8)
LYMPHOCYTES NFR BLD: 14.6 % (ref 18–48)
MAGNESIUM SERPL-MCNC: 2.3 MG/DL (ref 1.6–2.6)
MCH RBC QN AUTO: 29.6 PG (ref 27–31)
MCHC RBC AUTO-ENTMCNC: 30.8 G/DL (ref 32–36)
MCV RBC AUTO: 96 FL (ref 82–98)
MONOCYTES # BLD AUTO: 0.7 K/UL (ref 0.3–1)
MONOCYTES NFR BLD: 9.6 % (ref 4–15)
NEUTROPHILS # BLD AUTO: 5.6 K/UL (ref 1.8–7.7)
NEUTROPHILS NFR BLD: 73.6 % (ref 38–73)
NRBC BLD-RTO: 0 /100 WBC
PLATELET # BLD AUTO: 307 K/UL (ref 150–350)
PMV BLD AUTO: 9.9 FL (ref 9.2–12.9)
POTASSIUM SERPL-SCNC: 3.9 MMOL/L (ref 3.5–5.1)
RBC # BLD AUTO: 3.79 M/UL (ref 4–5.4)
SODIUM SERPL-SCNC: 140 MMOL/L (ref 136–145)
WBC # BLD AUTO: 7.58 K/UL (ref 3.9–12.7)

## 2021-01-27 PROCEDURE — C1884 EMBOLIZATION PROTECT SYST: HCPCS | Performed by: SURGERY

## 2021-01-27 PROCEDURE — 83735 ASSAY OF MAGNESIUM: CPT

## 2021-01-27 PROCEDURE — 37226 HC FEM/POPL REVASC W/STENT: CPT | Mod: LT | Performed by: SURGERY

## 2021-01-27 PROCEDURE — 27201423 OPTIME MED/SURG SUP & DEVICES STERILE SUPPLY: Performed by: SURGERY

## 2021-01-27 PROCEDURE — 99900035 HC TECH TIME PER 15 MIN (STAT)

## 2021-01-27 PROCEDURE — 25000003 PHARM REV CODE 250: Performed by: INTERNAL MEDICINE

## 2021-01-27 PROCEDURE — 25500020 PHARM REV CODE 255: Performed by: SURGERY

## 2021-01-27 PROCEDURE — 99153 MOD SED SAME PHYS/QHP EA: CPT | Performed by: SURGERY

## 2021-01-27 PROCEDURE — 85025 COMPLETE CBC W/AUTO DIFF WBC: CPT

## 2021-01-27 PROCEDURE — 63600175 PHARM REV CODE 636 W HCPCS: Performed by: INTERNAL MEDICINE

## 2021-01-27 PROCEDURE — 25000003 PHARM REV CODE 250: Performed by: SURGERY

## 2021-01-27 PROCEDURE — 99152 MOD SED SAME PHYS/QHP 5/>YRS: CPT | Performed by: SURGERY

## 2021-01-27 PROCEDURE — 94761 N-INVAS EAR/PLS OXIMETRY MLT: CPT

## 2021-01-27 PROCEDURE — 36415 COLL VENOUS BLD VENIPUNCTURE: CPT

## 2021-01-27 PROCEDURE — C1769 GUIDE WIRE: HCPCS | Performed by: SURGERY

## 2021-01-27 PROCEDURE — 75710 ARTERY X-RAYS ARM/LEG: CPT | Mod: XU,LT | Performed by: SURGERY

## 2021-01-27 PROCEDURE — 25000003 PHARM REV CODE 250: Performed by: FAMILY MEDICINE

## 2021-01-27 PROCEDURE — 63600175 PHARM REV CODE 636 W HCPCS: Performed by: SURGERY

## 2021-01-27 PROCEDURE — 80048 BASIC METABOLIC PNL TOTAL CA: CPT

## 2021-01-27 PROCEDURE — C1876 STENT, NON-COA/NON-COV W/DEL: HCPCS | Performed by: SURGERY

## 2021-01-27 PROCEDURE — C1894 INTRO/SHEATH, NON-LASER: HCPCS | Performed by: SURGERY

## 2021-01-27 PROCEDURE — 27000221 HC OXYGEN, UP TO 24 HOURS

## 2021-01-27 PROCEDURE — C1725 CATH, TRANSLUMIN NON-LASER: HCPCS | Performed by: SURGERY

## 2021-01-27 PROCEDURE — C1887 CATHETER, GUIDING: HCPCS | Performed by: SURGERY

## 2021-01-27 PROCEDURE — 12000002 HC ACUTE/MED SURGE SEMI-PRIVATE ROOM

## 2021-01-27 DEVICE — SELF-EXPANDING STENT SYSTEM
Type: IMPLANTABLE DEVICE | Site: LEG | Status: FUNCTIONAL
Brand: INNOVA™ VASCULAR

## 2021-01-27 DEVICE — PERCLOSE PROGLIDE™ SUTURE-MEDIATED CLOSURE SYSTEM
Type: IMPLANTABLE DEVICE | Site: GROIN | Status: FUNCTIONAL
Brand: PERCLOSE PROGLIDE™

## 2021-01-27 RX ORDER — METHYLPREDNISOLONE SOD SUCC 125 MG
VIAL (EA) INJECTION
Status: DISCONTINUED | OUTPATIENT
Start: 2021-01-27 | End: 2021-01-27 | Stop reason: HOSPADM

## 2021-01-27 RX ORDER — MIDAZOLAM HYDROCHLORIDE 1 MG/ML
INJECTION INTRAMUSCULAR; INTRAVENOUS
Status: DISCONTINUED | OUTPATIENT
Start: 2021-01-27 | End: 2021-01-27 | Stop reason: HOSPADM

## 2021-01-27 RX ORDER — BALSAM PERU/CASTOR OIL
OINTMENT (GRAM) TOPICAL 2 TIMES DAILY
Status: DISCONTINUED | OUTPATIENT
Start: 2021-01-27 | End: 2021-01-30 | Stop reason: HOSPADM

## 2021-01-27 RX ORDER — NAPROXEN SODIUM 220 MG/1
81 TABLET, FILM COATED ORAL DAILY
Status: DISCONTINUED | OUTPATIENT
Start: 2021-01-27 | End: 2021-01-30 | Stop reason: HOSPADM

## 2021-01-27 RX ORDER — PROTAMINE SULFATE 10 MG/ML
INJECTION, SOLUTION INTRAVENOUS
Status: DISCONTINUED | OUTPATIENT
Start: 2021-01-27 | End: 2021-01-27 | Stop reason: HOSPADM

## 2021-01-27 RX ORDER — IODIXANOL 320 MG/ML
INJECTION, SOLUTION INTRAVASCULAR
Status: DISCONTINUED | OUTPATIENT
Start: 2021-01-27 | End: 2021-01-27 | Stop reason: HOSPADM

## 2021-01-27 RX ORDER — CLOPIDOGREL BISULFATE 75 MG/1
75 TABLET ORAL DAILY
Status: DISCONTINUED | OUTPATIENT
Start: 2021-01-27 | End: 2021-01-30 | Stop reason: HOSPADM

## 2021-01-27 RX ORDER — LIDOCAINE HYDROCHLORIDE 10 MG/ML
INJECTION, SOLUTION EPIDURAL; INFILTRATION; INTRACAUDAL; PERINEURAL
Status: DISCONTINUED | OUTPATIENT
Start: 2021-01-27 | End: 2021-01-27 | Stop reason: HOSPADM

## 2021-01-27 RX ORDER — HEPARIN SODIUM 10000 [USP'U]/ML
INJECTION, SOLUTION INTRAVENOUS; SUBCUTANEOUS
Status: DISCONTINUED | OUTPATIENT
Start: 2021-01-27 | End: 2021-01-27 | Stop reason: HOSPADM

## 2021-01-27 RX ORDER — FENTANYL CITRATE 50 UG/ML
INJECTION, SOLUTION INTRAMUSCULAR; INTRAVENOUS
Status: DISCONTINUED | OUTPATIENT
Start: 2021-01-27 | End: 2021-01-27 | Stop reason: HOSPADM

## 2021-01-27 RX ADMIN — HYDROCODONE BITARTRATE AND ACETAMINOPHEN 1 TABLET: 5; 325 TABLET ORAL at 10:01

## 2021-01-27 RX ADMIN — ONDANSETRON 8 MG: 4 TABLET, ORALLY DISINTEGRATING ORAL at 12:01

## 2021-01-27 RX ADMIN — COLLAGENASE SANTYL: 250 OINTMENT TOPICAL at 09:01

## 2021-01-27 RX ADMIN — CASTOR OIL AND BALSAM, PERU: 788; 87 OINTMENT TOPICAL at 08:01

## 2021-01-27 RX ADMIN — VANCOMYCIN HYDROCHLORIDE 1250 MG: 1.25 INJECTION, POWDER, LYOPHILIZED, FOR SOLUTION INTRAVENOUS at 02:01

## 2021-01-27 RX ADMIN — HYDROCODONE BITARTRATE AND ACETAMINOPHEN 1 TABLET: 5; 325 TABLET ORAL at 02:01

## 2021-01-27 RX ADMIN — FUROSEMIDE 20 MG: 20 TABLET ORAL at 09:01

## 2021-01-27 RX ADMIN — METOPROLOL SUCCINATE 25 MG: 25 TABLET, FILM COATED, EXTENDED RELEASE ORAL at 09:01

## 2021-01-27 RX ADMIN — LISINOPRIL 2.5 MG: 2.5 TABLET ORAL at 09:01

## 2021-01-28 ENCOUNTER — ANESTHESIA EVENT (OUTPATIENT)
Dept: SURGERY | Facility: HOSPITAL | Age: 71
DRG: 253 | End: 2021-01-28
Payer: MEDICARE

## 2021-01-28 DIAGNOSIS — L97.524 ULCER OF TOE OF LEFT FOOT, WITH NECROSIS OF BONE: ICD-10-CM

## 2021-01-28 DIAGNOSIS — L97.521 TOE ULCER, LEFT, LIMITED TO BREAKDOWN OF SKIN: Primary | ICD-10-CM

## 2021-01-28 DIAGNOSIS — I96 GANGRENE: ICD-10-CM

## 2021-01-28 LAB
BASOPHILS # BLD AUTO: 0.03 K/UL (ref 0–0.2)
BASOPHILS NFR BLD: 0.4 % (ref 0–1.9)
DIFFERENTIAL METHOD: ABNORMAL
EOSINOPHIL # BLD AUTO: 0.1 K/UL (ref 0–0.5)
EOSINOPHIL NFR BLD: 0.9 % (ref 0–8)
ERYTHROCYTE [DISTWIDTH] IN BLOOD BY AUTOMATED COUNT: 13.7 % (ref 11.5–14.5)
GLUCOSE SERPL-MCNC: 128 MG/DL (ref 70–110)
GLUCOSE SERPL-MCNC: 152 MG/DL (ref 70–110)
GLUCOSE SERPL-MCNC: 168 MG/DL (ref 70–110)
GLUCOSE SERPL-MCNC: 204 MG/DL (ref 70–110)
HCT VFR BLD AUTO: 33 % (ref 37–48.5)
HGB BLD-MCNC: 10.2 G/DL (ref 12–16)
IMM GRANULOCYTES # BLD AUTO: 0.03 K/UL (ref 0–0.04)
IMM GRANULOCYTES NFR BLD AUTO: 0.4 % (ref 0–0.5)
LYMPHOCYTES # BLD AUTO: 1 K/UL (ref 1–4.8)
LYMPHOCYTES NFR BLD: 13 % (ref 18–48)
MAGNESIUM SERPL-MCNC: 2.2 MG/DL (ref 1.6–2.6)
MCH RBC QN AUTO: 29.6 PG (ref 27–31)
MCHC RBC AUTO-ENTMCNC: 30.9 G/DL (ref 32–36)
MCV RBC AUTO: 96 FL (ref 82–98)
MONOCYTES # BLD AUTO: 0.7 K/UL (ref 0.3–1)
MONOCYTES NFR BLD: 9.7 % (ref 4–15)
NEUTROPHILS # BLD AUTO: 5.8 K/UL (ref 1.8–7.7)
NEUTROPHILS NFR BLD: 75.6 % (ref 38–73)
NRBC BLD-RTO: 0 /100 WBC
PLATELET # BLD AUTO: 293 K/UL (ref 150–350)
PMV BLD AUTO: 10.2 FL (ref 9.2–12.9)
RBC # BLD AUTO: 3.45 M/UL (ref 4–5.4)
TROPONIN I SERPL DL<=0.01 NG/ML-MCNC: 0.03 NG/ML
TROPONIN I SERPL DL<=0.01 NG/ML-MCNC: <0.03 NG/ML
TROPONIN I SERPL DL<=0.01 NG/ML-MCNC: <0.03 NG/ML
WBC # BLD AUTO: 7.63 K/UL (ref 3.9–12.7)

## 2021-01-28 PROCEDURE — 85025 COMPLETE CBC W/AUTO DIFF WBC: CPT

## 2021-01-28 PROCEDURE — 36415 COLL VENOUS BLD VENIPUNCTURE: CPT

## 2021-01-28 PROCEDURE — 27000221 HC OXYGEN, UP TO 24 HOURS

## 2021-01-28 PROCEDURE — 63600175 PHARM REV CODE 636 W HCPCS: Performed by: INTERNAL MEDICINE

## 2021-01-28 PROCEDURE — 25000003 PHARM REV CODE 250: Performed by: FAMILY MEDICINE

## 2021-01-28 PROCEDURE — 25000003 PHARM REV CODE 250: Performed by: SURGERY

## 2021-01-28 PROCEDURE — 25000003 PHARM REV CODE 250: Performed by: INTERNAL MEDICINE

## 2021-01-28 PROCEDURE — 84484 ASSAY OF TROPONIN QUANT: CPT | Mod: 91

## 2021-01-28 PROCEDURE — 99900035 HC TECH TIME PER 15 MIN (STAT)

## 2021-01-28 PROCEDURE — 93010 ELECTROCARDIOGRAM REPORT: CPT | Mod: ,,, | Performed by: INTERNAL MEDICINE

## 2021-01-28 PROCEDURE — 12000002 HC ACUTE/MED SURGE SEMI-PRIVATE ROOM

## 2021-01-28 PROCEDURE — 94761 N-INVAS EAR/PLS OXIMETRY MLT: CPT

## 2021-01-28 PROCEDURE — 93010 EKG 12-LEAD: ICD-10-PCS | Mod: ,,, | Performed by: INTERNAL MEDICINE

## 2021-01-28 PROCEDURE — 63600175 PHARM REV CODE 636 W HCPCS: Performed by: FAMILY MEDICINE

## 2021-01-28 PROCEDURE — C9113 INJ PANTOPRAZOLE SODIUM, VIA: HCPCS | Performed by: FAMILY MEDICINE

## 2021-01-28 PROCEDURE — 97165 OT EVAL LOW COMPLEX 30 MIN: CPT

## 2021-01-28 PROCEDURE — 83735 ASSAY OF MAGNESIUM: CPT

## 2021-01-28 PROCEDURE — 97535 SELF CARE MNGMENT TRAINING: CPT

## 2021-01-28 PROCEDURE — 99900031 HC PATIENT EDUCATION (STAT)

## 2021-01-28 PROCEDURE — 99232 PR SUBSEQUENT HOSPITAL CARE,LEVL II: ICD-10-PCS | Mod: ,,, | Performed by: PODIATRIST

## 2021-01-28 PROCEDURE — 93005 ELECTROCARDIOGRAM TRACING: CPT | Performed by: INTERNAL MEDICINE

## 2021-01-28 PROCEDURE — 99232 SBSQ HOSP IP/OBS MODERATE 35: CPT | Mod: ,,, | Performed by: PODIATRIST

## 2021-01-28 PROCEDURE — 97530 THERAPEUTIC ACTIVITIES: CPT | Mod: CQ

## 2021-01-28 RX ORDER — SODIUM CHLORIDE 0.9 % (FLUSH) 0.9 %
10 SYRINGE (ML) INJECTION
Status: CANCELLED | OUTPATIENT
Start: 2021-01-28

## 2021-01-28 RX ORDER — SODIUM CHLORIDE 0.9 % (FLUSH) 0.9 %
10 SYRINGE (ML) INJECTION
Status: DISCONTINUED | OUTPATIENT
Start: 2021-01-28 | End: 2021-01-30 | Stop reason: HOSPADM

## 2021-01-28 RX ORDER — PANTOPRAZOLE SODIUM 40 MG/10ML
40 INJECTION, POWDER, LYOPHILIZED, FOR SOLUTION INTRAVENOUS DAILY
Status: DISCONTINUED | OUTPATIENT
Start: 2021-01-28 | End: 2021-01-30 | Stop reason: HOSPADM

## 2021-01-28 RX ADMIN — LISINOPRIL 2.5 MG: 2.5 TABLET ORAL at 09:01

## 2021-01-28 RX ADMIN — CASTOR OIL AND BALSAM, PERU: 788; 87 OINTMENT TOPICAL at 09:01

## 2021-01-28 RX ADMIN — METOPROLOL SUCCINATE 25 MG: 25 TABLET, FILM COATED, EXTENDED RELEASE ORAL at 09:01

## 2021-01-28 RX ADMIN — DICYCLOMINE HYDROCHLORIDE 50 ML: 10 SOLUTION ORAL at 12:01

## 2021-01-28 RX ADMIN — VANCOMYCIN HYDROCHLORIDE 1250 MG: 1.25 INJECTION, POWDER, LYOPHILIZED, FOR SOLUTION INTRAVENOUS at 02:01

## 2021-01-28 RX ADMIN — ATORVASTATIN CALCIUM 80 MG: 40 TABLET, FILM COATED ORAL at 08:01

## 2021-01-28 RX ADMIN — CLOPIDOGREL BISULFATE 75 MG: 75 TABLET, FILM COATED ORAL at 09:01

## 2021-01-28 RX ADMIN — COLLAGENASE SANTYL: 250 OINTMENT TOPICAL at 09:01

## 2021-01-28 RX ADMIN — ASPIRIN 81 MG: 81 TABLET, CHEWABLE ORAL at 09:01

## 2021-01-28 RX ADMIN — FUROSEMIDE 20 MG: 20 TABLET ORAL at 09:01

## 2021-01-28 RX ADMIN — PANTOPRAZOLE SODIUM 40 MG: 40 INJECTION, POWDER, FOR SOLUTION INTRAVENOUS at 12:01

## 2021-01-28 RX ADMIN — CASTOR OIL AND BALSAM, PERU: 788; 87 OINTMENT TOPICAL at 08:01

## 2021-01-29 ENCOUNTER — ANESTHESIA (OUTPATIENT)
Dept: SURGERY | Facility: HOSPITAL | Age: 71
DRG: 253 | End: 2021-01-29
Payer: MEDICARE

## 2021-01-29 PROBLEM — M86.9 OSTEOMYELITIS OF THIRD TOE OF LEFT FOOT: Status: ACTIVE | Noted: 2021-01-29

## 2021-01-29 LAB
ANION GAP SERPL CALC-SCNC: 9 MMOL/L (ref 8–16)
BASOPHILS # BLD AUTO: 0.02 K/UL (ref 0–0.2)
BASOPHILS NFR BLD: 0.2 % (ref 0–1.9)
BUN SERPL-MCNC: 33 MG/DL (ref 8–23)
CALCIUM SERPL-MCNC: 8.6 MG/DL (ref 8.7–10.5)
CHLORIDE SERPL-SCNC: 103 MMOL/L (ref 95–110)
CO2 SERPL-SCNC: 25 MMOL/L (ref 23–29)
CREAT SERPL-MCNC: 1.3 MG/DL (ref 0.5–1.4)
DIFFERENTIAL METHOD: ABNORMAL
EOSINOPHIL # BLD AUTO: 0.2 K/UL (ref 0–0.5)
EOSINOPHIL NFR BLD: 2 % (ref 0–8)
ERYTHROCYTE [DISTWIDTH] IN BLOOD BY AUTOMATED COUNT: 13.5 % (ref 11.5–14.5)
EST. GFR  (AFRICAN AMERICAN): 48 ML/MIN/1.73 M^2
EST. GFR  (NON AFRICAN AMERICAN): 41.7 ML/MIN/1.73 M^2
GLUCOSE SERPL-MCNC: 139 MG/DL (ref 70–110)
GLUCOSE SERPL-MCNC: 143 MG/DL (ref 70–110)
GLUCOSE SERPL-MCNC: 147 MG/DL (ref 70–110)
GLUCOSE SERPL-MCNC: 160 MG/DL (ref 70–110)
GLUCOSE SERPL-MCNC: 205 MG/DL (ref 70–110)
GLUCOSE SERPL-MCNC: 219 MG/DL (ref 70–110)
HCT VFR BLD AUTO: 31.6 % (ref 37–48.5)
HGB BLD-MCNC: 10 G/DL (ref 12–16)
IMM GRANULOCYTES # BLD AUTO: 0.02 K/UL (ref 0–0.04)
IMM GRANULOCYTES NFR BLD AUTO: 0.2 % (ref 0–0.5)
LYMPHOCYTES # BLD AUTO: 0.8 K/UL (ref 1–4.8)
LYMPHOCYTES NFR BLD: 9.9 % (ref 18–48)
MAGNESIUM SERPL-MCNC: 2.3 MG/DL (ref 1.6–2.6)
MCH RBC QN AUTO: 30 PG (ref 27–31)
MCHC RBC AUTO-ENTMCNC: 31.6 G/DL (ref 32–36)
MCV RBC AUTO: 95 FL (ref 82–98)
MONOCYTES # BLD AUTO: 0.9 K/UL (ref 0.3–1)
MONOCYTES NFR BLD: 10.5 % (ref 4–15)
NEUTROPHILS # BLD AUTO: 6.2 K/UL (ref 1.8–7.7)
NEUTROPHILS NFR BLD: 77.2 % (ref 38–73)
NRBC BLD-RTO: 0 /100 WBC
PLATELET # BLD AUTO: 268 K/UL (ref 150–350)
PMV BLD AUTO: 10.2 FL (ref 9.2–12.9)
POTASSIUM SERPL-SCNC: 4 MMOL/L (ref 3.5–5.1)
RBC # BLD AUTO: 3.33 M/UL (ref 4–5.4)
SODIUM SERPL-SCNC: 137 MMOL/L (ref 136–145)
VANCOMYCIN TROUGH SERPL-MCNC: 24.4 UG/ML (ref 10–22)
WBC # BLD AUTO: 8.06 K/UL (ref 3.9–12.7)

## 2021-01-29 PROCEDURE — 63600175 PHARM REV CODE 636 W HCPCS: Performed by: INTERNAL MEDICINE

## 2021-01-29 PROCEDURE — 71000039 HC RECOVERY, EACH ADD'L HOUR: Performed by: PODIATRIST

## 2021-01-29 PROCEDURE — 80048 BASIC METABOLIC PNL TOTAL CA: CPT

## 2021-01-29 PROCEDURE — 28820 AMPUTATION OF TOE: CPT | Mod: T2,,, | Performed by: PODIATRIST

## 2021-01-29 PROCEDURE — 25000003 PHARM REV CODE 250: Performed by: INTERNAL MEDICINE

## 2021-01-29 PROCEDURE — 36000707: Performed by: PODIATRIST

## 2021-01-29 PROCEDURE — 27000673 HC TUBING BLOOD Y: Performed by: ANESTHESIOLOGY

## 2021-01-29 PROCEDURE — 37000009 HC ANESTHESIA EA ADD 15 MINS: Performed by: PODIATRIST

## 2021-01-29 PROCEDURE — 37000008 HC ANESTHESIA 1ST 15 MINUTES: Performed by: PODIATRIST

## 2021-01-29 PROCEDURE — 88305 TISSUE EXAM BY PATHOLOGIST: CPT | Mod: TC

## 2021-01-29 PROCEDURE — 80202 ASSAY OF VANCOMYCIN: CPT

## 2021-01-29 PROCEDURE — 27000221 HC OXYGEN, UP TO 24 HOURS

## 2021-01-29 PROCEDURE — 85025 COMPLETE CBC W/AUTO DIFF WBC: CPT

## 2021-01-29 PROCEDURE — 71000033 HC RECOVERY, INTIAL HOUR: Performed by: PODIATRIST

## 2021-01-29 PROCEDURE — 25000003 PHARM REV CODE 250: Performed by: PODIATRIST

## 2021-01-29 PROCEDURE — 63600175 PHARM REV CODE 636 W HCPCS: Performed by: FAMILY MEDICINE

## 2021-01-29 PROCEDURE — 63600175 PHARM REV CODE 636 W HCPCS: Performed by: NURSE ANESTHETIST, CERTIFIED REGISTERED

## 2021-01-29 PROCEDURE — 36000706: Performed by: PODIATRIST

## 2021-01-29 PROCEDURE — 63600175 PHARM REV CODE 636 W HCPCS: Performed by: ANESTHESIOLOGY

## 2021-01-29 PROCEDURE — 25000003 PHARM REV CODE 250: Performed by: FAMILY MEDICINE

## 2021-01-29 PROCEDURE — C9113 INJ PANTOPRAZOLE SODIUM, VIA: HCPCS | Performed by: FAMILY MEDICINE

## 2021-01-29 PROCEDURE — 94761 N-INVAS EAR/PLS OXIMETRY MLT: CPT

## 2021-01-29 PROCEDURE — 83735 ASSAY OF MAGNESIUM: CPT

## 2021-01-29 PROCEDURE — 99900035 HC TECH TIME PER 15 MIN (STAT)

## 2021-01-29 PROCEDURE — 27000080 OPTIME MED/SURG SUP & DEVICES GENERAL CLASSIFICATION: Performed by: PODIATRIST

## 2021-01-29 PROCEDURE — 27000284 HC CANNULA NASAL: Performed by: ANESTHESIOLOGY

## 2021-01-29 PROCEDURE — 28820 PR AMPUTATION TOE,MT-P JT: ICD-10-PCS | Mod: T2,,, | Performed by: PODIATRIST

## 2021-01-29 PROCEDURE — 36415 COLL VENOUS BLD VENIPUNCTURE: CPT

## 2021-01-29 PROCEDURE — 82962 GLUCOSE BLOOD TEST: CPT | Performed by: PODIATRIST

## 2021-01-29 PROCEDURE — 12000002 HC ACUTE/MED SURGE SEMI-PRIVATE ROOM

## 2021-01-29 RX ORDER — ONDANSETRON 2 MG/ML
4 INJECTION INTRAMUSCULAR; INTRAVENOUS DAILY PRN
Status: DISCONTINUED | OUTPATIENT
Start: 2021-01-29 | End: 2021-01-30 | Stop reason: HOSPADM

## 2021-01-29 RX ORDER — FENTANYL CITRATE 50 UG/ML
25 INJECTION, SOLUTION INTRAMUSCULAR; INTRAVENOUS
Status: DISCONTINUED | OUTPATIENT
Start: 2021-01-29 | End: 2021-01-30 | Stop reason: HOSPADM

## 2021-01-29 RX ORDER — SODIUM CHLORIDE 9 MG/ML
INJECTION, SOLUTION INTRAVENOUS CONTINUOUS
Status: DISCONTINUED | OUTPATIENT
Start: 2021-01-29 | End: 2021-01-30 | Stop reason: HOSPADM

## 2021-01-29 RX ORDER — OXYCODONE HYDROCHLORIDE 5 MG/1
5 TABLET ORAL
Status: DISCONTINUED | OUTPATIENT
Start: 2021-01-29 | End: 2021-01-30 | Stop reason: HOSPADM

## 2021-01-29 RX ORDER — FENTANYL CITRATE 50 UG/ML
INJECTION, SOLUTION INTRAMUSCULAR; INTRAVENOUS
Status: DISCONTINUED | OUTPATIENT
Start: 2021-01-29 | End: 2021-01-29

## 2021-01-29 RX ORDER — DIPHENHYDRAMINE HYDROCHLORIDE 50 MG/ML
12.5 INJECTION INTRAMUSCULAR; INTRAVENOUS
Status: DISCONTINUED | OUTPATIENT
Start: 2021-01-29 | End: 2021-01-30 | Stop reason: HOSPADM

## 2021-01-29 RX ORDER — PROPOFOL 10 MG/ML
VIAL (ML) INTRAVENOUS
Status: DISCONTINUED | OUTPATIENT
Start: 2021-01-29 | End: 2021-01-29

## 2021-01-29 RX ORDER — SODIUM CHLORIDE, SODIUM LACTATE, POTASSIUM CHLORIDE, CALCIUM CHLORIDE 600; 310; 30; 20 MG/100ML; MG/100ML; MG/100ML; MG/100ML
INJECTION, SOLUTION INTRAVENOUS CONTINUOUS PRN
Status: DISCONTINUED | OUTPATIENT
Start: 2021-01-29 | End: 2021-01-29

## 2021-01-29 RX ORDER — BUPIVACAINE HYDROCHLORIDE 5 MG/ML
INJECTION, SOLUTION EPIDURAL; INTRACAUDAL
Status: DISCONTINUED | OUTPATIENT
Start: 2021-01-29 | End: 2021-01-29 | Stop reason: HOSPADM

## 2021-01-29 RX ORDER — SODIUM CHLORIDE 0.9 % (FLUSH) 0.9 %
10 SYRINGE (ML) INJECTION
Status: DISCONTINUED | OUTPATIENT
Start: 2021-01-29 | End: 2021-01-30 | Stop reason: HOSPADM

## 2021-01-29 RX ORDER — METOPROLOL TARTRATE 25 MG/1
12.5 TABLET ORAL ONCE
Status: COMPLETED | OUTPATIENT
Start: 2021-01-29 | End: 2021-01-29

## 2021-01-29 RX ORDER — MEPERIDINE HYDROCHLORIDE 50 MG/ML
12.5 INJECTION INTRAMUSCULAR; INTRAVENOUS; SUBCUTANEOUS EVERY 10 MIN PRN
Status: ACTIVE | OUTPATIENT
Start: 2021-01-29 | End: 2021-01-29

## 2021-01-29 RX ADMIN — PROPOFOL 20 MG: 10 INJECTION, EMULSION INTRAVENOUS at 12:01

## 2021-01-29 RX ADMIN — SODIUM CHLORIDE: 0.9 INJECTION, SOLUTION INTRAVENOUS at 02:01

## 2021-01-29 RX ADMIN — FENTANYL CITRATE 25 MCG: 50 INJECTION INTRAMUSCULAR; INTRAVENOUS at 01:01

## 2021-01-29 RX ADMIN — HYDROCODONE BITARTRATE AND ACETAMINOPHEN 1 TABLET: 5; 325 TABLET ORAL at 09:01

## 2021-01-29 RX ADMIN — MELATONIN 6 MG: at 09:01

## 2021-01-29 RX ADMIN — ONDANSETRON 4 MG: 2 INJECTION INTRAMUSCULAR; INTRAVENOUS at 02:01

## 2021-01-29 RX ADMIN — FENTANYL CITRATE 50 MCG: 50 INJECTION INTRAMUSCULAR; INTRAVENOUS at 12:01

## 2021-01-29 RX ADMIN — ATORVASTATIN CALCIUM 80 MG: 40 TABLET, FILM COATED ORAL at 09:01

## 2021-01-29 RX ADMIN — CASTOR OIL AND BALSAM, PERU: 788; 87 OINTMENT TOPICAL at 09:01

## 2021-01-29 RX ADMIN — METOPROLOL TARTRATE 12.5 MG: 25 TABLET, FILM COATED ORAL at 06:01

## 2021-01-29 RX ADMIN — CASTOR OIL AND BALSAM, PERU: 788; 87 OINTMENT TOPICAL at 02:01

## 2021-01-29 RX ADMIN — HYDROCODONE BITARTRATE AND ACETAMINOPHEN 1 TABLET: 5; 325 TABLET ORAL at 03:01

## 2021-01-29 RX ADMIN — VANCOMYCIN HYDROCHLORIDE 1250 MG: 1.25 INJECTION, POWDER, LYOPHILIZED, FOR SOLUTION INTRAVENOUS at 03:01

## 2021-01-29 RX ADMIN — SODIUM CHLORIDE, SODIUM LACTATE, POTASSIUM CHLORIDE, AND CALCIUM CHLORIDE: .6; .31; .03; .02 INJECTION, SOLUTION INTRAVENOUS at 12:01

## 2021-01-29 RX ADMIN — ONDANSETRON 8 MG: 4 TABLET, ORALLY DISINTEGRATING ORAL at 04:01

## 2021-01-29 RX ADMIN — HUMAN INSULIN 2 UNITS: 100 INJECTION, SOLUTION SUBCUTANEOUS at 05:01

## 2021-01-29 RX ADMIN — PANTOPRAZOLE SODIUM 40 MG: 40 INJECTION, POWDER, FOR SOLUTION INTRAVENOUS at 11:01

## 2021-01-30 VITALS
SYSTOLIC BLOOD PRESSURE: 108 MMHG | WEIGHT: 184.5 LBS | HEIGHT: 67 IN | DIASTOLIC BLOOD PRESSURE: 68 MMHG | TEMPERATURE: 98 F | RESPIRATION RATE: 18 BRPM | BODY MASS INDEX: 28.96 KG/M2 | HEART RATE: 85 BPM | OXYGEN SATURATION: 98 %

## 2021-01-30 PROBLEM — M86.9 OSTEOMYELITIS: Status: RESOLVED | Noted: 2021-01-24 | Resolved: 2021-01-30

## 2021-01-30 PROBLEM — L03.116 CELLULITIS OF LEFT LOWER EXTREMITY: Status: RESOLVED | Noted: 2021-01-22 | Resolved: 2021-01-30

## 2021-01-30 PROBLEM — M79.672 FOOT PAIN, LEFT: Status: RESOLVED | Noted: 2021-01-27 | Resolved: 2021-01-30

## 2021-01-30 PROBLEM — L03.119 CELLULITIS OF FOOT: Status: RESOLVED | Noted: 2021-01-22 | Resolved: 2021-01-30

## 2021-01-30 PROBLEM — M86.9 OSTEOMYELITIS OF THIRD TOE OF LEFT FOOT: Status: RESOLVED | Noted: 2021-01-29 | Resolved: 2021-01-30

## 2021-01-30 PROBLEM — L97.524 ULCER OF TOE OF LEFT FOOT, WITH NECROSIS OF BONE: Status: RESOLVED | Noted: 2021-01-28 | Resolved: 2021-01-30

## 2021-01-30 LAB
ANION GAP SERPL CALC-SCNC: 11 MMOL/L (ref 8–16)
BASOPHILS # BLD AUTO: 0.01 K/UL (ref 0–0.2)
BASOPHILS NFR BLD: 0.2 % (ref 0–1.9)
BUN SERPL-MCNC: 24 MG/DL (ref 8–23)
CALCIUM SERPL-MCNC: 8.8 MG/DL (ref 8.7–10.5)
CHLORIDE SERPL-SCNC: 101 MMOL/L (ref 95–110)
CO2 SERPL-SCNC: 24 MMOL/L (ref 23–29)
CREAT SERPL-MCNC: 1 MG/DL (ref 0.5–1.4)
DIFFERENTIAL METHOD: ABNORMAL
EOSINOPHIL # BLD AUTO: 0 K/UL (ref 0–0.5)
EOSINOPHIL NFR BLD: 0.7 % (ref 0–8)
ERYTHROCYTE [DISTWIDTH] IN BLOOD BY AUTOMATED COUNT: 13.3 % (ref 11.5–14.5)
EST. GFR  (AFRICAN AMERICAN): >60 ML/MIN/1.73 M^2
EST. GFR  (NON AFRICAN AMERICAN): 57.2 ML/MIN/1.73 M^2
GLUCOSE SERPL-MCNC: 134 MG/DL (ref 70–110)
GLUCOSE SERPL-MCNC: 148 MG/DL (ref 70–110)
GLUCOSE SERPL-MCNC: 165 MG/DL (ref 70–110)
HCT VFR BLD AUTO: 30.5 % (ref 37–48.5)
HGB BLD-MCNC: 9.6 G/DL (ref 12–16)
IMM GRANULOCYTES # BLD AUTO: 0.01 K/UL (ref 0–0.04)
IMM GRANULOCYTES NFR BLD AUTO: 0.2 % (ref 0–0.5)
LYMPHOCYTES # BLD AUTO: 0.4 K/UL (ref 1–4.8)
LYMPHOCYTES NFR BLD: 7.9 % (ref 18–48)
MAGNESIUM SERPL-MCNC: 2.1 MG/DL (ref 1.6–2.6)
MCH RBC QN AUTO: 29.6 PG (ref 27–31)
MCHC RBC AUTO-ENTMCNC: 31.5 G/DL (ref 32–36)
MCV RBC AUTO: 94 FL (ref 82–98)
MONOCYTES # BLD AUTO: 0.4 K/UL (ref 0.3–1)
MONOCYTES NFR BLD: 6.6 % (ref 4–15)
NEUTROPHILS # BLD AUTO: 4.7 K/UL (ref 1.8–7.7)
NEUTROPHILS NFR BLD: 84.4 % (ref 38–73)
NRBC BLD-RTO: 0 /100 WBC
PLATELET # BLD AUTO: 228 K/UL (ref 150–350)
PMV BLD AUTO: 10.4 FL (ref 9.2–12.9)
POTASSIUM SERPL-SCNC: 3.9 MMOL/L (ref 3.5–5.1)
RBC # BLD AUTO: 3.24 M/UL (ref 4–5.4)
SODIUM SERPL-SCNC: 136 MMOL/L (ref 136–145)
WBC # BLD AUTO: 5.6 K/UL (ref 3.9–12.7)

## 2021-01-30 PROCEDURE — 36415 COLL VENOUS BLD VENIPUNCTURE: CPT

## 2021-01-30 PROCEDURE — 85025 COMPLETE CBC W/AUTO DIFF WBC: CPT

## 2021-01-30 PROCEDURE — 25000003 PHARM REV CODE 250: Performed by: FAMILY MEDICINE

## 2021-01-30 PROCEDURE — 99900035 HC TECH TIME PER 15 MIN (STAT)

## 2021-01-30 PROCEDURE — C9113 INJ PANTOPRAZOLE SODIUM, VIA: HCPCS | Performed by: FAMILY MEDICINE

## 2021-01-30 PROCEDURE — 83735 ASSAY OF MAGNESIUM: CPT

## 2021-01-30 PROCEDURE — 27000221 HC OXYGEN, UP TO 24 HOURS

## 2021-01-30 PROCEDURE — 25000003 PHARM REV CODE 250: Performed by: ANESTHESIOLOGY

## 2021-01-30 PROCEDURE — 25000003 PHARM REV CODE 250: Performed by: PODIATRIST

## 2021-01-30 PROCEDURE — 25000003 PHARM REV CODE 250: Performed by: SURGERY

## 2021-01-30 PROCEDURE — 25000003 PHARM REV CODE 250: Performed by: INTERNAL MEDICINE

## 2021-01-30 PROCEDURE — 63600175 PHARM REV CODE 636 W HCPCS: Performed by: FAMILY MEDICINE

## 2021-01-30 PROCEDURE — 94761 N-INVAS EAR/PLS OXIMETRY MLT: CPT

## 2021-01-30 PROCEDURE — 80048 BASIC METABOLIC PNL TOTAL CA: CPT

## 2021-01-30 RX ORDER — HYDROCODONE BITARTRATE AND ACETAMINOPHEN 5; 325 MG/1; MG/1
1 TABLET ORAL EVERY 4 HOURS PRN
Qty: 15 TABLET | Refills: 0 | Status: SHIPPED | OUTPATIENT
Start: 2021-01-30

## 2021-01-30 RX ORDER — PROMETHAZINE HYDROCHLORIDE 25 MG/1
25 TABLET ORAL EVERY 6 HOURS PRN
Status: DISCONTINUED | OUTPATIENT
Start: 2021-01-30 | End: 2021-01-30 | Stop reason: HOSPADM

## 2021-01-30 RX ORDER — NAPROXEN SODIUM 220 MG/1
81 TABLET, FILM COATED ORAL DAILY
Qty: 30 TABLET | Refills: 0 | Status: SHIPPED | OUTPATIENT
Start: 2021-01-31 | End: 2022-01-31

## 2021-01-30 RX ORDER — ENOXAPARIN SODIUM 100 MG/ML
1 INJECTION SUBCUTANEOUS
Status: DISCONTINUED | OUTPATIENT
Start: 2021-01-30 | End: 2021-01-30 | Stop reason: HOSPADM

## 2021-01-30 RX ORDER — ONDANSETRON 4 MG/1
8 TABLET, ORALLY DISINTEGRATING ORAL EVERY 8 HOURS PRN
Status: DISCONTINUED | OUTPATIENT
Start: 2021-01-30 | End: 2021-01-30 | Stop reason: HOSPADM

## 2021-01-30 RX ORDER — HYDROXYZINE PAMOATE 25 MG/1
25 CAPSULE ORAL 2 TIMES DAILY
Status: DISCONTINUED | OUTPATIENT
Start: 2021-01-30 | End: 2021-01-30 | Stop reason: HOSPADM

## 2021-01-30 RX ORDER — PANTOPRAZOLE SODIUM 40 MG/1
40 TABLET, DELAYED RELEASE ORAL
Qty: 30 TABLET | Refills: 0 | Status: SHIPPED | OUTPATIENT
Start: 2021-01-30 | End: 2022-01-30

## 2021-01-30 RX ORDER — HYDROXYZINE PAMOATE 25 MG/1
25 CAPSULE ORAL 2 TIMES DAILY
Qty: 60 CAPSULE | Refills: 0 | Status: SHIPPED | OUTPATIENT
Start: 2021-01-30

## 2021-01-30 RX ORDER — DOXYCYCLINE HYCLATE 100 MG/1
100 TABLET, DELAYED RELEASE ORAL 2 TIMES DAILY
Qty: 28 TABLET | Refills: 0 | Status: SHIPPED | OUTPATIENT
Start: 2021-01-30 | End: 2021-02-13

## 2021-01-30 RX ORDER — HYDROCODONE BITARTRATE AND ACETAMINOPHEN 5; 325 MG/1; MG/1
1 TABLET ORAL EVERY 4 HOURS PRN
Status: DISCONTINUED | OUTPATIENT
Start: 2021-01-30 | End: 2021-01-30 | Stop reason: HOSPADM

## 2021-01-30 RX ADMIN — CASTOR OIL AND BALSAM, PERU: 788; 87 OINTMENT TOPICAL at 09:01

## 2021-01-30 RX ADMIN — HYDROXYZINE PAMOATE 25 MG: 25 CAPSULE ORAL at 01:01

## 2021-01-30 RX ADMIN — ENOXAPARIN SODIUM 80 MG: 80 INJECTION, SOLUTION INTRAVENOUS; SUBCUTANEOUS at 09:01

## 2021-01-30 RX ADMIN — HYDROCODONE BITARTRATE AND ACETAMINOPHEN 1 TABLET: 5; 325 TABLET ORAL at 01:01

## 2021-01-30 RX ADMIN — METOPROLOL SUCCINATE 25 MG: 25 TABLET, FILM COATED, EXTENDED RELEASE ORAL at 09:01

## 2021-01-30 RX ADMIN — PANTOPRAZOLE SODIUM 40 MG: 40 INJECTION, POWDER, FOR SOLUTION INTRAVENOUS at 09:01

## 2021-01-30 RX ADMIN — OXYCODONE HYDROCHLORIDE 5 MG: 5 TABLET ORAL at 09:01

## 2021-01-30 RX ADMIN — ASPIRIN 81 MG: 81 TABLET, CHEWABLE ORAL at 09:01

## 2021-01-30 RX ADMIN — FUROSEMIDE 20 MG: 20 TABLET ORAL at 09:01

## 2021-01-30 RX ADMIN — OXYCODONE HYDROCHLORIDE 5 MG: 5 TABLET ORAL at 12:01

## 2021-01-30 RX ADMIN — CLOPIDOGREL BISULFATE 75 MG: 75 TABLET, FILM COATED ORAL at 09:01

## 2021-01-30 RX ADMIN — HYDROCODONE BITARTRATE AND ACETAMINOPHEN 1 TABLET: 5; 325 TABLET ORAL at 05:01

## 2021-02-23 ENCOUNTER — OFFICE VISIT (OUTPATIENT)
Dept: PODIATRY | Facility: CLINIC | Age: 71
End: 2021-02-23
Payer: MEDICARE

## 2021-02-23 VITALS — RESPIRATION RATE: 20 BRPM | BODY MASS INDEX: 28.88 KG/M2 | WEIGHT: 184 LBS | HEIGHT: 67 IN

## 2021-02-23 DIAGNOSIS — I96 GANGRENE: ICD-10-CM

## 2021-02-23 DIAGNOSIS — I73.9 PAD (PERIPHERAL ARTERY DISEASE): ICD-10-CM

## 2021-02-23 DIAGNOSIS — L97.524 ULCER OF TOE OF LEFT FOOT, WITH NECROSIS OF BONE: ICD-10-CM

## 2021-02-23 DIAGNOSIS — E11.49 TYPE II DIABETES MELLITUS WITH NEUROLOGICAL MANIFESTATIONS: Primary | ICD-10-CM

## 2021-02-23 PROCEDURE — 99213 OFFICE O/P EST LOW 20 MIN: CPT | Mod: S$GLB,,, | Performed by: PODIATRIST

## 2021-02-23 PROCEDURE — 99213 PR OFFICE/OUTPT VISIT, EST, LEVL III, 20-29 MIN: ICD-10-PCS | Mod: S$GLB,,, | Performed by: PODIATRIST

## 2021-03-09 ENCOUNTER — OFFICE VISIT (OUTPATIENT)
Dept: PODIATRY | Facility: CLINIC | Age: 71
End: 2021-03-09
Payer: MEDICARE

## 2021-03-09 VITALS
RESPIRATION RATE: 18 BRPM | HEART RATE: 82 BPM | BODY MASS INDEX: 28.88 KG/M2 | OXYGEN SATURATION: 96 % | HEIGHT: 67 IN | WEIGHT: 184 LBS

## 2021-03-09 DIAGNOSIS — I96 GANGRENE: ICD-10-CM

## 2021-03-09 DIAGNOSIS — E11.49 TYPE II DIABETES MELLITUS WITH NEUROLOGICAL MANIFESTATIONS: Primary | ICD-10-CM

## 2021-03-09 DIAGNOSIS — L97.524 ULCER OF TOE OF LEFT FOOT, WITH NECROSIS OF BONE: ICD-10-CM

## 2021-03-09 DIAGNOSIS — I73.9 PAD (PERIPHERAL ARTERY DISEASE): ICD-10-CM

## 2021-03-09 PROCEDURE — 99213 PR OFFICE/OUTPT VISIT, EST, LEVL III, 20-29 MIN: ICD-10-PCS | Mod: S$GLB,,, | Performed by: PODIATRIST

## 2021-03-09 PROCEDURE — 99213 OFFICE O/P EST LOW 20 MIN: CPT | Mod: S$GLB,,, | Performed by: PODIATRIST

## 2021-03-16 ENCOUNTER — OFFICE VISIT (OUTPATIENT)
Dept: PODIATRY | Facility: CLINIC | Age: 71
End: 2021-03-16
Payer: MEDICARE

## 2021-03-16 VITALS — HEIGHT: 67 IN | BODY MASS INDEX: 28.88 KG/M2 | WEIGHT: 184 LBS

## 2021-03-16 DIAGNOSIS — Z87.898 HISTORY OF ULCERATION: ICD-10-CM

## 2021-03-16 DIAGNOSIS — E11.49 TYPE II DIABETES MELLITUS WITH NEUROLOGICAL MANIFESTATIONS: Primary | ICD-10-CM

## 2021-03-16 DIAGNOSIS — I96 GANGRENE: ICD-10-CM

## 2021-03-16 DIAGNOSIS — I73.9 PAD (PERIPHERAL ARTERY DISEASE): ICD-10-CM

## 2021-03-16 DIAGNOSIS — L97.524 ULCER OF TOE OF LEFT FOOT, WITH NECROSIS OF BONE: ICD-10-CM

## 2021-03-16 PROCEDURE — 99213 PR OFFICE/OUTPT VISIT, EST, LEVL III, 20-29 MIN: ICD-10-PCS | Mod: S$GLB,,, | Performed by: PODIATRIST

## 2021-03-16 PROCEDURE — 99213 OFFICE O/P EST LOW 20 MIN: CPT | Mod: S$GLB,,, | Performed by: PODIATRIST

## 2021-12-02 NOTE — TELEPHONE ENCOUNTER
FLORES Doyle Staff   Caller: Unspecified (2 days ago,  6:55 PM)               Discussed with Dr. Valdez.  ID consult not needed outpatient pre operatively.     Continue current wound care and VAC.     Get cleared by DR. Soares whom we show as her PCP.     Will schedule outpatient hardware removal after consent and wound care visit next week.     If she has no current appointment, please make her one.        
Per Dr Alexander:  Left message that he discussed with Dr Valdez and an ID consult is not needed before surgery.  She is to continue the current wound care and VAC. She will need to be cleared by Dr Soares  (noted PCP).  Surgery will be scheduled for outpatient hardware removal after the consent and wound care visit that she has on the 11th.    
Detail Level: Zone

## 2022-01-04 NOTE — TELEPHONE ENCOUNTER
Spoke with Ángela, she is going to get the pt a consult with infectious disease and speak with the attending physician and call us back . She did say that the pt has been very noncompliant and not following through with what she has be instructed to do.    full range of motion , no edema

## 2024-11-20 NOTE — TELEPHONE ENCOUNTER
----- Message from Justa Ochoa sent at 2/16/2018  1:08 PM CST -----  Contact: Cam Schilling Home health nurse  Needs clarification on wound care order--if it's ok to continue Coban. Please call back at  Please leave voicemail if she is unable to answer.    Denise-I rec'd a fax from [facility] stating the PA for Omeprazole has been denied because she has not tried and failed all of these: OTC Lansoprazole, OTC Esomeprazole, and OTC Omeprazole with sodium bicarbonate. Before I call her about denial-do you have any suggestions or recommendations on what she should do now that this has been denied?

## 2025-06-24 NOTE — ASSESSMENT & PLAN NOTE
Patient has difficulty standing up, falls of increased to multiple times a day over the last week and seemed to be worsening  Workup for possible neurologic deficit  Case management consulted for possible placement    08/12/2020  Case management still continuing to seek placement  Patient is not safe for discharge home as she was covered in feces and is unable to stand and take care of herself.    08/13/2020  Continue to seek placement for this patient  MRI without any acute abnormalities or masses  She is medically stable for transfer     Vaginal Delivery

## (undated) DEVICE — GAUZE VASELINE XEROFORM 5X9 8884433605

## (undated) DEVICE — BANDAGE ACE STERILE 3 REB3113

## (undated) DEVICE — SEE MEDLINE ITEM 152515

## (undated) DEVICE — SOLUTION IRRI NS BOTTLE 1000ML R5200-01

## (undated) DEVICE — BLADE SURG #15 CARBON STEEL

## (undated) DEVICE — SUTURE VICRYL 3-0 RB-1 27 J215H

## (undated) DEVICE — LINER SUCTION 3000CC

## (undated) DEVICE — SUTURE PROLENE 4-0 PS-2 18 8682H

## (undated) DEVICE — BLADE SCALPEL #15 371115

## (undated) DEVICE — BANDAGE ESMARK 4X9 55514

## (undated) DEVICE — SYRINGE 10ML 302995

## (undated) DEVICE — SHEATH INTRODUCER DESTINATION 6FX45

## (undated) DEVICE — SOLUTION SCRUB IODINE 4OZ

## (undated) DEVICE — TOURNIQUET SB QC DP 34X4IN

## (undated) DEVICE — CATH BLLN OTW MUSTANG 5.0MMX80MMX135CM

## (undated) DEVICE — SEE MEDLINE ITEM 146308

## (undated) DEVICE — DRESSING N ADH OIL EMUL 3X8

## (undated) DEVICE — PACK CUSTOM UNIV BASIN SLI

## (undated) DEVICE — GLIDECATH ANGLED TAPER 5FR 100CM CG508

## (undated) DEVICE — SOL 9P NACL IRR PIC IL

## (undated) DEVICE — NEEDLE SAFETY ECLIPSE 25G 1-1/2IN 305767

## (undated) DEVICE — SHOE POST OP FEMALE LARGE

## (undated) DEVICE — STRAP OR TABLE 5IN X 72IN

## (undated) DEVICE — DRAPE C-ARM FOR MOBILE XRAY

## (undated) DEVICE — SHEATH PINNACLE 5FRX10CM W/GUIDEWIRE

## (undated) DEVICE — PAD BOVIE ADULT

## (undated) DEVICE — SHEATH PINNACLE 6FRX10CM W/GUIDEWIRE

## (undated) DEVICE — SOL NACL IRR 3000ML

## (undated) DEVICE — PACK ARTHROSCOPY W/ISO BAC

## (undated) DEVICE — SEE MEDLINE ITEM 157116

## (undated) DEVICE — WIRE SPIDER EMBOLIC PROTECTION DEVICE FX 5MM

## (undated) DEVICE — TRAY DRY SPONGE SCRUB W FOAM

## (undated) DEVICE — Device

## (undated) DEVICE — SYR 10CC LUER LOCK

## (undated) DEVICE — BANDAGE KERLIX   441106

## (undated) DEVICE — INTERPULSE SET

## (undated) DEVICE — ELECTRODE REM PLYHSV RETURN 9

## (undated) DEVICE — SOL PVP-I SCRUB 7.5% 4OZ

## (undated) DEVICE — SLEEVE SCD EXPRESS CALF MEDIUM

## (undated) DEVICE — CATHETER RIM

## (undated) DEVICE — SUCTION FRAZIER TIP SURG 12FR

## (undated) DEVICE — SPONGE DERMACEA GAUZE 4X4

## (undated) DEVICE — GLOVE BIOGEL PI ULTRA TOUCH GRAY SZ7.5

## (undated) DEVICE — SOLUTION PREP IODINE 4OZ

## (undated) DEVICE — SEE MEDLINE ITEM 152530

## (undated) DEVICE — GUIDEWIRE STIFF ANGLED 035X260 LAUREATE

## (undated) DEVICE — CATHETER TRAILBLAZER 35X135

## (undated) DEVICE — SCRUB 10% POVIDONE IODINE 4OZ

## (undated) DEVICE — TRAY SKIN PREP DRY

## (undated) DEVICE — SEE MEDLINE ITEM 157131

## (undated) DEVICE — UNDERGLOVES BIOGEL PI SIZE 7.5